# Patient Record
Sex: MALE | Race: WHITE | NOT HISPANIC OR LATINO | URBAN - METROPOLITAN AREA
[De-identification: names, ages, dates, MRNs, and addresses within clinical notes are randomized per-mention and may not be internally consistent; named-entity substitution may affect disease eponyms.]

---

## 2017-07-18 ENCOUNTER — OFFICE (OUTPATIENT)
Dept: URBAN - METROPOLITAN AREA OTHER 6 | Facility: OTHER | Age: 67
End: 2017-07-18

## 2017-07-18 VITALS
WEIGHT: 203 LBS | SYSTOLIC BLOOD PRESSURE: 170 MMHG | HEIGHT: 70 IN | HEART RATE: 70 BPM | DIASTOLIC BLOOD PRESSURE: 84 MMHG

## 2017-07-18 DIAGNOSIS — K64.9 UNSPECIFIED HEMORRHOIDS: ICD-10-CM

## 2017-07-18 PROCEDURE — 99212 OFFICE O/P EST SF 10 MIN: CPT | Performed by: INTERNAL MEDICINE

## 2017-07-18 RX ORDER — PANTOPRAZOLE SODIUM 40 MG/1
80 TABLET, DELAYED RELEASE ORAL
Qty: 180 | Refills: 3 | Status: ACTIVE

## 2017-07-18 RX ORDER — AMITRIPTYLINE HYDROCHLORIDE 25 MG/1
TABLET, FILM COATED ORAL
Qty: 90 | Refills: 4 | Status: ACTIVE

## 2017-08-09 ENCOUNTER — OFFICE VISIT (OUTPATIENT)
Dept: CARDIOLOGY | Facility: CLINIC | Age: 67
End: 2017-08-09

## 2017-08-09 VITALS
BODY MASS INDEX: 29.26 KG/M2 | HEIGHT: 70 IN | DIASTOLIC BLOOD PRESSURE: 80 MMHG | SYSTOLIC BLOOD PRESSURE: 136 MMHG | HEART RATE: 67 BPM | WEIGHT: 204.4 LBS

## 2017-08-09 DIAGNOSIS — I25.10 CORONARY ARTERY DISEASE INVOLVING NATIVE CORONARY ARTERY OF NATIVE HEART WITHOUT ANGINA PECTORIS: Primary | ICD-10-CM

## 2017-08-09 DIAGNOSIS — I10 ESSENTIAL HYPERTENSION: ICD-10-CM

## 2017-08-09 PROCEDURE — 93000 ELECTROCARDIOGRAM COMPLETE: CPT | Performed by: INTERNAL MEDICINE

## 2017-08-09 PROCEDURE — 99213 OFFICE O/P EST LOW 20 MIN: CPT | Performed by: INTERNAL MEDICINE

## 2017-08-09 RX ORDER — ISOSORBIDE MONONITRATE 30 MG/1
30 TABLET, EXTENDED RELEASE ORAL 2 TIMES DAILY
COMMUNITY
End: 2018-08-09 | Stop reason: ALTCHOICE

## 2017-08-09 RX ORDER — MELOXICAM 15 MG/1
15 TABLET ORAL DAILY
COMMUNITY
Start: 2017-07-12 | End: 2018-02-06 | Stop reason: HOSPADM

## 2017-08-09 NOTE — PROGRESS NOTES
Date of Office Visit: 2017  Encounter Provider: Aquiles Velazquez MD  Place of Service: [facility] CARDIOLOGY  Patient Name: Kyle Amador  :1950    Chief Complaint   Patient presents with   • Coronary Artery Disease     1 yr follow up    :     HPI: Kyle Amador is a 66 y.o. male who presents today to followup. He has history of fairly chronic chest pain. He was found to have distal small vessel disease by catheterization in . In , he had another catheterization due to persistent pain and he had a normal FFR of the right coronary artery. In May 2015, his chest pain got much worse and he underwent repeat cardiac catheterization and the right coronary artery was stented. Despite this, his pain continued to worsen and ultimately, he was diagnosed with severe esophagitis. Now that this is under control, his chest pain has improved significantly.  He still has 1-2 second long episodes of pain a few times a month, but nothing that persists or is anginal.  He has good exercise tolerance. He is very active.  He denies dyspnea, edema, orthopnea, palpitations, or syncope.      Past Medical History:   Diagnosis Date   • CAD (coronary artery disease)     cath 2012 with 20-30% diffuse LAD, 30-40% ostial diag, small LCx with diffuse 30-50%, large RCA with 60-70% mid (normal FFR of 0.9). Angiographically it was unchanged by cath 2015 but due to persistant chest pain, he underwent placement of a ARLETTE (3.5x23 Xience)   • Chronic chest pain     likely due to esophagitis, finally resolved    • Diverticulosis    • Esophagitis    • GERD (gastroesophageal reflux disease)    • Hyperlipidemia    • Hypertension        Past Surgical History:   Procedure Laterality Date   • APPENDECTOMY         Social History     Social History   • Marital status:      Spouse name: N/A   • Number of children: N/A   • Years of education: N/A     Occupational History   • Not on file.     Social  "History Main Topics   • Smoking status: Former Smoker     Quit date: 8/9/1987   • Smokeless tobacco: Never Used      Comment: caffeine use/ SOFT DRINK - DAILY    • Alcohol use Yes      Comment: occasional use/ SOCIAL USE    • Drug use: Not on file   • Sexual activity: Not on file     Other Topics Concern   • Not on file     Social History Narrative       Family History   Problem Relation Age of Onset   • Heart disease Other    • Stroke Other    • Heart disease Father        Review of Systems   All other systems reviewed and are negative.      Allergies   Allergen Reactions   • Penicillins          Current Outpatient Prescriptions:   •  amitriptyline (ELAVIL) 25 MG tablet, every night., Disp: , Rfl:   •  amLODIPine (NORVASC) 5 MG tablet, Take  by mouth daily., Disp: , Rfl:   •  aspirin 81 MG tablet, Take  by mouth daily., Disp: , Rfl:   •  atorvastatin (LIPITOR) 40 MG tablet, , Disp: , Rfl:   •  benazepril (LOTENSIN) 20 MG tablet, Take by mouth., Disp: , Rfl:   •  Glucosamine-Chondroit-Vit C-Mn (GLUCOSAMINE 1500 COMPLEX) capsule, Take  by mouth., Disp: , Rfl:   •  ibuprofen (ADVIL,MOTRIN) 600 MG tablet, every day., Disp: , Rfl:   •  isosorbide mononitrate (IMDUR) 30 MG 24 hr tablet, Take 30 mg by mouth. TAKE TWO TABLETS BY MOUTH DAILY, Disp: , Rfl:   •  meloxicam (MOBIC) 15 MG tablet, Daily., Disp: , Rfl:   •  metoprolol succinate XL (TOPROL-XL) 50 MG 24 hr tablet, Take  by mouth daily., Disp: , Rfl:   •  pantoprazole (PROTONIX) 40 MG EC tablet, every day., Disp: , Rfl:   •  potassium citrate (UROCIT-K) 10 MEQ (1080 MG) CR tablet, Take  by mouth every day., Disp: , Rfl:   •  Saw Palmetto 160 MG tablet, Take 2 tablets by mouth., Disp: , Rfl:      Objective:     Vitals:    08/09/17 1139 08/09/17 1148   BP: 140/82 136/80   Pulse: 67    Weight: 204 lb 6.4 oz (92.7 kg)    Height: 70\" (177.8 cm)      Body mass index is 29.33 kg/(m^2).    Physical Exam   Constitutional: He is oriented to person, place, and time. He appears " well-developed and well-nourished.   HENT:   Head: Normocephalic.   Nose: Nose normal.   Mouth/Throat: Oropharynx is clear and moist.   Eyes: Conjunctivae and EOM are normal. Pupils are equal, round, and reactive to light.   Neck: Normal range of motion. No JVD present.   Cardiovascular: Normal rate, regular rhythm, normal heart sounds and intact distal pulses.    No murmur heard.  Pulmonary/Chest: Effort normal and breath sounds normal.   Abdominal: Soft. He exhibits no mass. There is no tenderness.   Musculoskeletal: Normal range of motion. He exhibits no edema.   Lymphadenopathy:     He has no cervical adenopathy.   Neurological: He is alert and oriented to person, place, and time. No cranial nerve deficit.   Skin: Skin is warm and dry. No rash noted.   Psychiatric: He has a normal mood and affect. His behavior is normal. Judgment and thought content normal.   Vitals reviewed.        ECG 12 Lead  Date/Time: 8/9/2017 1:03 PM  Performed by: AQUILES VELAZQUEZ  Authorized by: AQUILES VELAZQUEZ   Comparison: compared with previous ECG   Similar to previous ECG  Rhythm: sinus rhythm  Conduction: conduction normal  ST Segments: ST segments normal  T Waves: T waves normal  QRS axis: normal  Other: no other findings  Clinical impression: normal ECG              Assessment:       Diagnosis Plan   1. Coronary artery disease involving native coronary artery of native heart without angina pectoris     2. Essential hypertension            Plan:       1.  Coronary Artery Disease  Assessment  • The patient has no angina  • He has chronic chest pain and small vessel disease.  He's on aspirin and atorvastatin.    Subjective - Objective  • There has been a previous stent procedure using ARLETTE 2015  • Current antiplatelet therapy includes aspirin 81 mg    2.  His BP was 136/80 on recheck, which is within goal for him (he's not diabetic).      Sincerely,       Aquiles Velazquez MD

## 2017-11-30 ENCOUNTER — OFFICE (OUTPATIENT)
Dept: URBAN - METROPOLITAN AREA OTHER 6 | Facility: OTHER | Age: 67
End: 2017-11-30

## 2017-11-30 VITALS
DIASTOLIC BLOOD PRESSURE: 80 MMHG | HEART RATE: 78 BPM | SYSTOLIC BLOOD PRESSURE: 148 MMHG | WEIGHT: 205 LBS | HEIGHT: 70 IN

## 2017-11-30 DIAGNOSIS — K20.9 ESOPHAGITIS, UNSPECIFIED: ICD-10-CM

## 2017-11-30 PROCEDURE — 99212 OFFICE O/P EST SF 10 MIN: CPT | Performed by: INTERNAL MEDICINE

## 2018-02-05 ENCOUNTER — HOSPITAL ENCOUNTER (OUTPATIENT)
Facility: HOSPITAL | Age: 68
Setting detail: OBSERVATION
Discharge: HOME | End: 2018-02-06
Attending: INTERNAL MEDICINE | Admitting: INTERNAL MEDICINE

## 2018-02-05 ENCOUNTER — TELEPHONE (OUTPATIENT)
Dept: CARDIOLOGY | Facility: CLINIC | Age: 68
End: 2018-02-05

## 2018-02-05 ENCOUNTER — PREP FOR SURGERY (OUTPATIENT)
Dept: OTHER | Facility: HOSPITAL | Age: 68
End: 2018-02-05

## 2018-02-05 DIAGNOSIS — R07.2 PRECORDIAL PAIN: ICD-10-CM

## 2018-02-05 DIAGNOSIS — R07.2 PRECORDIAL PAIN: Primary | ICD-10-CM

## 2018-02-05 PROBLEM — R07.9 CHEST PAIN: Status: ACTIVE | Noted: 2018-02-05

## 2018-02-05 PROCEDURE — G0378 HOSPITAL OBSERVATION PER HR: HCPCS

## 2018-02-05 PROCEDURE — G0379 DIRECT REFER HOSPITAL OBSERV: HCPCS

## 2018-02-05 RX ORDER — ASPIRIN 81 MG/1
81 TABLET ORAL DAILY
Status: DISCONTINUED | OUTPATIENT
Start: 2018-02-06 | End: 2018-02-06 | Stop reason: HOSPADM

## 2018-02-05 RX ORDER — LISINOPRIL 20 MG/1
20 TABLET ORAL
Status: DISCONTINUED | OUTPATIENT
Start: 2018-02-06 | End: 2018-02-06 | Stop reason: HOSPADM

## 2018-02-05 RX ORDER — AMITRIPTYLINE HYDROCHLORIDE 25 MG/1
25 TABLET, FILM COATED ORAL NIGHTLY
Status: DISCONTINUED | OUTPATIENT
Start: 2018-02-05 | End: 2018-02-06 | Stop reason: HOSPADM

## 2018-02-05 RX ORDER — LORAZEPAM 0.5 MG/1
0.5 TABLET ORAL NIGHTLY PRN
Status: CANCELLED | OUTPATIENT
Start: 2018-02-05 | End: 2018-02-15

## 2018-02-05 RX ORDER — ISOSORBIDE MONONITRATE 30 MG/1
30 TABLET, EXTENDED RELEASE ORAL 2 TIMES DAILY
Status: DISCONTINUED | OUTPATIENT
Start: 2018-02-05 | End: 2018-02-06 | Stop reason: HOSPADM

## 2018-02-05 RX ORDER — ACETAMINOPHEN 325 MG/1
650 TABLET ORAL EVERY 4 HOURS PRN
Status: CANCELLED | OUTPATIENT
Start: 2018-02-05

## 2018-02-05 RX ORDER — ATORVASTATIN CALCIUM 20 MG/1
40 TABLET, FILM COATED ORAL DAILY
Status: DISCONTINUED | OUTPATIENT
Start: 2018-02-06 | End: 2018-02-06 | Stop reason: HOSPADM

## 2018-02-05 RX ORDER — METOPROLOL SUCCINATE 50 MG/1
50 TABLET, EXTENDED RELEASE ORAL NIGHTLY
Status: DISCONTINUED | OUTPATIENT
Start: 2018-02-05 | End: 2018-02-06 | Stop reason: HOSPADM

## 2018-02-05 RX ORDER — AMLODIPINE BESYLATE 5 MG/1
5 TABLET ORAL NIGHTLY
Status: DISCONTINUED | OUTPATIENT
Start: 2018-02-05 | End: 2018-02-06

## 2018-02-05 RX ORDER — ACETAMINOPHEN 325 MG/1
650 TABLET ORAL EVERY 4 HOURS PRN
Status: DISCONTINUED | OUTPATIENT
Start: 2018-02-05 | End: 2018-02-06 | Stop reason: HOSPADM

## 2018-02-05 RX ORDER — SODIUM CHLORIDE 0.9 % (FLUSH) 0.9 %
1-10 SYRINGE (ML) INJECTION AS NEEDED
Status: CANCELLED | OUTPATIENT
Start: 2018-02-05

## 2018-02-05 RX ORDER — PANTOPRAZOLE SODIUM 40 MG/1
40 TABLET, DELAYED RELEASE ORAL
Status: DISCONTINUED | OUTPATIENT
Start: 2018-02-06 | End: 2018-02-06 | Stop reason: HOSPADM

## 2018-02-05 RX ORDER — NITROGLYCERIN 0.4 MG/1
0.4 TABLET SUBLINGUAL
Status: DISCONTINUED | OUTPATIENT
Start: 2018-02-05 | End: 2018-02-06 | Stop reason: HOSPADM

## 2018-02-05 RX ORDER — ONDANSETRON 2 MG/ML
4 INJECTION INTRAMUSCULAR; INTRAVENOUS EVERY 6 HOURS PRN
Status: DISCONTINUED | OUTPATIENT
Start: 2018-02-05 | End: 2018-02-06 | Stop reason: HOSPADM

## 2018-02-05 RX ORDER — LORAZEPAM 0.5 MG/1
0.5 TABLET ORAL NIGHTLY PRN
Status: DISCONTINUED | OUTPATIENT
Start: 2018-02-05 | End: 2018-02-06 | Stop reason: HOSPADM

## 2018-02-05 RX ORDER — NITROGLYCERIN 0.4 MG/1
0.4 TABLET SUBLINGUAL
Status: CANCELLED | OUTPATIENT
Start: 2018-02-05

## 2018-02-05 RX ORDER — SODIUM CHLORIDE 0.9 % (FLUSH) 0.9 %
1-10 SYRINGE (ML) INJECTION AS NEEDED
Status: DISCONTINUED | OUTPATIENT
Start: 2018-02-05 | End: 2018-02-06 | Stop reason: HOSPADM

## 2018-02-05 RX ORDER — HYDROMORPHONE HYDROCHLORIDE 1 MG/ML
0.5 INJECTION, SOLUTION INTRAMUSCULAR; INTRAVENOUS; SUBCUTANEOUS
Status: DISCONTINUED | OUTPATIENT
Start: 2018-02-05 | End: 2018-02-06 | Stop reason: HOSPADM

## 2018-02-05 RX ADMIN — METOPROLOL SUCCINATE 50 MG: 50 TABLET, FILM COATED, EXTENDED RELEASE ORAL at 21:39

## 2018-02-05 RX ADMIN — AMLODIPINE BESYLATE 5 MG: 5 TABLET ORAL at 21:39

## 2018-02-05 RX ADMIN — AMITRIPTYLINE HYDROCHLORIDE 25 MG: 25 TABLET, FILM COATED ORAL at 21:39

## 2018-02-05 RX ADMIN — ISOSORBIDE MONONITRATE 30 MG: 30 TABLET ORAL at 21:39

## 2018-02-05 NOTE — TELEPHONE ENCOUNTER
Pt is having some chest pain. Last week during the night he had a sharp pain and became sweaty. This morning around 4:00 AM he had another episode of CP that lasted about an hour. It was a sharp pain on his left side that went up to his face where it became numb. He has been feeling a little dizzy. I advise the pt to go to the ER if he continue to have CP. Please advise    PT can be reached at [phone]    Thanks Erin

## 2018-02-06 ENCOUNTER — APPOINTMENT (OUTPATIENT)
Dept: NUCLEAR MEDICINE | Facility: HOSPITAL | Age: 68
End: 2018-02-06
Attending: INTERNAL MEDICINE

## 2018-02-06 VITALS
HEIGHT: 70 IN | WEIGHT: 204.5 LBS | RESPIRATION RATE: 18 BRPM | DIASTOLIC BLOOD PRESSURE: 89 MMHG | SYSTOLIC BLOOD PRESSURE: 160 MMHG | HEART RATE: 82 BPM | OXYGEN SATURATION: 98 % | TEMPERATURE: 98.1 F | BODY MASS INDEX: 29.28 KG/M2

## 2018-02-06 LAB
ALBUMIN SERPL-MCNC: 3.8 G/DL (ref 3.5–5.2)
ALBUMIN/GLOB SERPL: 1.6 G/DL
ALP SERPL-CCNC: 66 U/L (ref 39–117)
ALT SERPL W P-5'-P-CCNC: 19 U/L (ref 1–41)
ANION GAP SERPL CALCULATED.3IONS-SCNC: 8.8 MMOL/L
AST SERPL-CCNC: 23 U/L (ref 1–40)
BASOPHILS # BLD AUTO: 0.03 10*3/MM3 (ref 0–0.2)
BASOPHILS NFR BLD AUTO: 0.4 % (ref 0–1.5)
BH CV STRESS BP STAGE 1: NORMAL
BH CV STRESS BP STAGE 2: NORMAL
BH CV STRESS BP STAGE 3: NORMAL
BH CV STRESS DURATION MIN STAGE 1: 3
BH CV STRESS DURATION MIN STAGE 2: 3
BH CV STRESS DURATION MIN STAGE 3: 1
BH CV STRESS DURATION SEC STAGE 1: 0
BH CV STRESS DURATION SEC STAGE 2: 0
BH CV STRESS DURATION SEC STAGE 3: 35
BH CV STRESS GRADE STAGE 1: 10
BH CV STRESS GRADE STAGE 2: 12
BH CV STRESS GRADE STAGE 3: 14
BH CV STRESS HR STAGE 1: 102
BH CV STRESS HR STAGE 2: 118
BH CV STRESS HR STAGE 3: 133
BH CV STRESS METS STAGE 1: 5
BH CV STRESS METS STAGE 2: 7.5
BH CV STRESS METS STAGE 3: 10
BH CV STRESS PROTOCOL 1: NORMAL
BH CV STRESS RECOVERY BP: NORMAL MMHG
BH CV STRESS RECOVERY HR: 89 BPM
BH CV STRESS SPEED STAGE 1: 1.7
BH CV STRESS SPEED STAGE 2: 2.5
BH CV STRESS SPEED STAGE 3: 3.4
BH CV STRESS STAGE 1: 1
BH CV STRESS STAGE 2: 2
BH CV STRESS STAGE 3: 3
BILIRUB SERPL-MCNC: 0.5 MG/DL (ref 0.1–1.2)
BUN BLD-MCNC: 26 MG/DL (ref 8–23)
BUN/CREAT SERPL: 26.3 (ref 7–25)
CALCIUM SPEC-SCNC: 8.7 MG/DL (ref 8.6–10.5)
CHLORIDE SERPL-SCNC: 102 MMOL/L (ref 98–107)
CO2 SERPL-SCNC: 30.2 MMOL/L (ref 22–29)
CREAT BLD-MCNC: 0.99 MG/DL (ref 0.76–1.27)
DEPRECATED RDW RBC AUTO: 46 FL (ref 37–54)
EOSINOPHIL # BLD AUTO: 0.28 10*3/MM3 (ref 0–0.7)
EOSINOPHIL NFR BLD AUTO: 3.8 % (ref 0.3–6.2)
ERYTHROCYTE [DISTWIDTH] IN BLOOD BY AUTOMATED COUNT: 13.2 % (ref 11.5–14.5)
GFR SERPL CREATININE-BSD FRML MDRD: 75 ML/MIN/1.73
GLOBULIN UR ELPH-MCNC: 2.4 GM/DL
GLUCOSE BLD-MCNC: 99 MG/DL (ref 65–99)
HCT VFR BLD AUTO: 42.5 % (ref 40.4–52.2)
HGB BLD-MCNC: 13.7 G/DL (ref 13.7–17.6)
IMM GRANULOCYTES # BLD: 0 10*3/MM3 (ref 0–0.03)
IMM GRANULOCYTES NFR BLD: 0 % (ref 0–0.5)
LV EF NUC BP: 70 %
LYMPHOCYTES # BLD AUTO: 1.79 10*3/MM3 (ref 0.9–4.8)
LYMPHOCYTES NFR BLD AUTO: 24.5 % (ref 19.6–45.3)
MAXIMAL PREDICTED HEART RATE: 152 BPM
MCH RBC QN AUTO: 30.9 PG (ref 27–32.7)
MCHC RBC AUTO-ENTMCNC: 32.2 G/DL (ref 32.6–36.4)
MCV RBC AUTO: 95.9 FL (ref 79.8–96.2)
MONOCYTES # BLD AUTO: 0.6 10*3/MM3 (ref 0.2–1.2)
MONOCYTES NFR BLD AUTO: 8.2 % (ref 5–12)
NEUTROPHILS # BLD AUTO: 4.6 10*3/MM3 (ref 1.9–8.1)
NEUTROPHILS NFR BLD AUTO: 63.1 % (ref 42.7–76)
PERCENT MAX PREDICTED HR: 88.16 %
PLATELET # BLD AUTO: 222 10*3/MM3 (ref 140–500)
PMV BLD AUTO: 9.4 FL (ref 6–12)
POTASSIUM BLD-SCNC: 3.5 MMOL/L (ref 3.5–5.2)
PROT SERPL-MCNC: 6.2 G/DL (ref 6–8.5)
RBC # BLD AUTO: 4.43 10*6/MM3 (ref 4.6–6)
SODIUM BLD-SCNC: 141 MMOL/L (ref 136–145)
STRESS BASELINE BP: NORMAL MMHG
STRESS BASELINE HR: 72 BPM
STRESS PERCENT HR: 104 %
STRESS POST ESTIMATED WORKLOAD: 8.8 METS
STRESS POST EXERCISE DUR MIN: 7 MIN
STRESS POST EXERCISE DUR SEC: 35 SEC
STRESS POST PEAK BP: NORMAL MMHG
STRESS POST PEAK HR: 134 BPM
STRESS TARGET HR: 129 BPM
TROPONIN T SERPL-MCNC: <0.01 NG/ML (ref 0–0.03)
TROPONIN T SERPL-MCNC: <0.01 NG/ML (ref 0–0.03)
WBC NRBC COR # BLD: 7.3 10*3/MM3 (ref 4.5–10.7)

## 2018-02-06 PROCEDURE — 85025 COMPLETE CBC W/AUTO DIFF WBC: CPT | Performed by: INTERNAL MEDICINE

## 2018-02-06 PROCEDURE — G0378 HOSPITAL OBSERVATION PER HR: HCPCS

## 2018-02-06 PROCEDURE — 93005 ELECTROCARDIOGRAM TRACING: CPT | Performed by: INTERNAL MEDICINE

## 2018-02-06 PROCEDURE — A9500 TC99M SESTAMIBI: HCPCS | Performed by: INTERNAL MEDICINE

## 2018-02-06 PROCEDURE — 0 TECHNETIUM SESTAMIBI: Performed by: INTERNAL MEDICINE

## 2018-02-06 PROCEDURE — 93016 CV STRESS TEST SUPVJ ONLY: CPT | Performed by: INTERNAL MEDICINE

## 2018-02-06 PROCEDURE — 84484 ASSAY OF TROPONIN QUANT: CPT | Performed by: INTERNAL MEDICINE

## 2018-02-06 PROCEDURE — 93018 CV STRESS TEST I&R ONLY: CPT | Performed by: INTERNAL MEDICINE

## 2018-02-06 PROCEDURE — 93017 CV STRESS TEST TRACING ONLY: CPT

## 2018-02-06 PROCEDURE — 78452 HT MUSCLE IMAGE SPECT MULT: CPT

## 2018-02-06 PROCEDURE — 78452 HT MUSCLE IMAGE SPECT MULT: CPT | Performed by: INTERNAL MEDICINE

## 2018-02-06 PROCEDURE — 80053 COMPREHEN METABOLIC PANEL: CPT | Performed by: INTERNAL MEDICINE

## 2018-02-06 PROCEDURE — 93010 ELECTROCARDIOGRAM REPORT: CPT | Performed by: INTERNAL MEDICINE

## 2018-02-06 PROCEDURE — 99220 PR INITIAL OBSERVATION CARE/DAY 70 MINUTES: CPT | Performed by: INTERNAL MEDICINE

## 2018-02-06 RX ORDER — AMLODIPINE BESYLATE 10 MG/1
10 TABLET ORAL NIGHTLY
Qty: 30 TABLET | Refills: 5 | Status: SHIPPED | OUTPATIENT
Start: 2018-02-06 | End: 2018-02-27 | Stop reason: DRUGHIGH

## 2018-02-06 RX ORDER — AMLODIPINE BESYLATE 10 MG/1
10 TABLET ORAL NIGHTLY
Status: DISCONTINUED | OUTPATIENT
Start: 2018-02-06 | End: 2018-02-06 | Stop reason: HOSPADM

## 2018-02-06 RX ADMIN — TECHNETIUM TC 99M SESTAMIBI 1 DOSE: 1 INJECTION INTRAVENOUS at 12:30

## 2018-02-06 RX ADMIN — ISOSORBIDE MONONITRATE 30 MG: 30 TABLET ORAL at 08:39

## 2018-02-06 RX ADMIN — TECHNETIUM TC 99M SESTAMIBI 1 DOSE: 1 INJECTION INTRAVENOUS at 10:15

## 2018-02-06 RX ADMIN — ACETAMINOPHEN 650 MG: 325 TABLET, FILM COATED ORAL at 08:40

## 2018-02-06 NOTE — PLAN OF CARE
Problem: Patient Care Overview (Adult)  Goal: Plan of Care Review  Outcome: Ongoing (interventions implemented as appropriate)   02/06/18 0606   Coping/Psychosocial Response Interventions   Plan Of Care Reviewed With patient   Patient Care Overview   Progress improving   Outcome Evaluation   Outcome Summary/Follow up Plan Vitals stable. No falls. Pt c/o mild chest pain, MD aware. NPO. Monitoring closely.      Goal: Adult Individualization and Mutuality  Outcome: Ongoing (interventions implemented as appropriate)    Goal: Discharge Needs Assessment  Outcome: Ongoing (interventions implemented as appropriate)      Problem: Pain, Acute (Adult)  Goal: Identify Related Risk Factors and Signs and Symptoms  Outcome: Outcome(s) achieved Date Met: 02/06/18 02/06/18 0606   Pain, Acute   Related Risk Factors (Acute Pain) persistent pain   Signs and Symptoms (Acute Pain) verbalization of pain descriptors     Goal: Acceptable Pain Control/Comfort Level  Outcome: Ongoing (interventions implemented as appropriate)   02/06/18 0606   Pain, Acute (Adult)   Acceptable Pain Control/Comfort Level making progress toward outcome

## 2018-02-06 NOTE — H&P
Date of Hospital Visit:18  Encounter Provider: Cynthia Mercado, RN  Place of Service: [facility] CARDIOLOGY  Patient Name: Kyle Amador  :1950  Referral Provider: Aquiles Velazquez MD    Chief complaint: chest pain    History of Present Illness: Mr. Amador is a 66 y/o patient of Dr. Velazquez's with a history of CAD s/p PCI/ARLETTE to RCA in , esophagitis, GERD, HTN and hyperlipidemia. He was last seen by Dr. Velazquez, in office, on 17. At that time he stated that he has chest pain a few times a month that lasts about 2 seconds, which was felt to be chronic. No medication changes were made and he was scheduled to return in 1 year.     He called our office yesterday with c/o chest that was sharp and accompanied by diaphoresis. He went to the ED at [facility] and was transferred here for further care. He had a cardiac cath in  by Dr. Viera that showed a 75% RCA lesion that had PCI and ARLETTE placed. He also had an echo at that time which showed an EF of 60%. He is on Imdur 30mg bid.     This morning his troponin is negative and EKG reads NSR. AM labs are pending. He states that he continues to have constant dull pain, that is a 1/10 on pain scale. It is midsternal and does not radiate, which initially it radiated to his left arm. He was given 1 NTG in  ED, and states that it did not make a difference in his pain level. However, he did get relief with 4mg Morphine IV. Labs at  showed a negative trop, lipase 28, creat 1.12, K 3.3 and a normal CBC. EKG showed NSR cannot rule out anterior MI.   When he got on the treadmill he didn't really have increased chest pain but did have increased dizziness he denies any associated nausea vomiting or diaphoresis no shortness of breath.  In addition prior to this he been feeling fine.  He does state this is similar to prior pain.    Cardiac testing:  Echo   Conclusions  The left ventricular chamber size is normal.  Mild concentric  left ventricular hypertrophy is observed.  Global left ventricular wall motion and contractility are within normal  limits.  The ejection fraction was measured at 60%.    Cardiac Cath 2015  CINEANGIOGRAPHY:   1.  Left main: The left main coronary artery was free of disease bifurcating  into the left anterior descending and left circumflex coronary arteries. There  was no significant stenosis noted. Mild calcification was observed.   2.  Left anterior descending: There is mild calcification in the proximal and  midsegments of the left anterior descending. There is no evident high-grade  stenosis, but mild plaquing is noted in the midsegment. The distal segment is  large and has a significant apical branch that is free of significant disease.  There is a major diagonal branch arising from the proximal segment that is free  of significant disease.   3.  Left circumflex: The left circumflex coronary artery and terminal obtuse  marginal branches are free of significant disease.   4.  Right coronary artery: Diffuse calcification is seen in the right coronary  artery.  There is a 30% stenosis in the proximal segment and a 75% stenosis in  the distal segment. Beyond this narrowing the artery gives rise to the  posterior descending and several large posterolateral branches.  The distal  right coronary artery and posterolateral branches as well as the posterior  descending are free of significant disease.      LEFT VENTRICULOGRAPHY: Overall size of the ventricle is normal. Contractility  of the ventricle is within normal limits.      INTERVENTION: The initial stenosis in the mid right coronary artery was 75%.  CHARLEY flow was 3 initially and 3 post.  It is a C lesion.   Past Medical History:   Diagnosis Date   • CAD (coronary artery disease)     cath 5/2012 with 20-30% diffuse LAD, 30-40% ostial diag, small LCx with diffuse 30-50%, large RCA with 60-70% mid (normal FFR of 0.9). Angiographically it was unchanged by cath 5/2015  but due to persistant chest pain, he underwent placement of a ARLETTE (3.5x23 Xience)   • Chronic chest pain     likely due to esophagitis, finally resolved 2015   • Diverticulosis    • Esophagitis    • GERD (gastroesophageal reflux disease)    • Hyperlipidemia    • Hypertension        Past Surgical History:   Procedure Laterality Date   • APPENDECTOMY         No current facility-administered medications on file prior to encounter.      Current Outpatient Prescriptions on File Prior to Encounter   Medication Sig Dispense Refill   • amitriptyline (ELAVIL) 25 MG tablet every night.     • amLODIPine (NORVASC) 5 MG tablet Take 5 mg by mouth Every Night.     • aspirin 81 MG tablet Take  by mouth Every Night.     • atorvastatin (LIPITOR) 40 MG tablet Every Night.     • benazepril (LOTENSIN) 20 MG tablet Take 20 mg by mouth Every Night.     • Glucosamine-Chondroit-Vit C-Mn (GLUCOSAMINE 1500 COMPLEX) capsule Take  by mouth 2 (Two) Times a Day.     • ibuprofen (ADVIL,MOTRIN) 600 MG tablet Every 6 (Six) Hours As Needed for Moderate Pain .     • isosorbide mononitrate (IMDUR) 30 MG 24 hr tablet Take 30 mg by mouth 2 (Two) Times a Day. TAKE TWO TABLETS BY MOUTH DAILY      • meloxicam (MOBIC) 15 MG tablet 15 mg Daily.     • metoprolol succinate XL (TOPROL-XL) 50 MG 24 hr tablet Take 50 mg by mouth Every Night.     • pantoprazole (PROTONIX) 40 MG EC tablet Daily.     • Saw Palmetto 160 MG tablet Take 2 tablets by mouth 2 (Two) Times a Day.     • potassium citrate (UROCIT-K) 10 MEQ (1080 MG) CR tablet Take  by mouth every day.         Social History     Social History   • Marital status:      Spouse name: N/A   • Number of children: N/A   • Years of education: N/A     Occupational History   • Not on file.     Social History Main Topics   • Smoking status: Former Smoker     Quit date: 8/9/1987   • Smokeless tobacco: Never Used      Comment: caffeine use/ SOFT DRINK - DAILY    • Alcohol use Yes      Comment: occasional use/  "SOCIAL USE    • Drug use: Not on file   • Sexual activity: Not on file     Other Topics Concern   • Not on file     Social History Narrative       Family History   Problem Relation Age of Onset   • Heart disease Other    • Stroke Other    • Heart disease Father        REVIEW OF SYSTEMS:   All ROS was performed and is Negative except as outlined in HPI    Objective:     Vitals:    02/05/18 1942 02/05/18 2139 02/06/18 0423   BP: (!) 169/101 161/96 132/86   BP Location: Left arm Right arm Right arm   Patient Position: Lying Lying Lying   Pulse: 83 90 63   Resp: 18  18   Temp: 97.3 °F (36.3 °C)     TempSrc: Oral     SpO2: 97%  97%   Weight: 92.8 kg (204 lb 8 oz)     Height: 177.8 cm (70\")       Body mass index is 29.34 kg/(m^2).  Flowsheet Rows         First Filed Value    Admission Height  177.8 cm (70\") Documented at 02/05/2018 1942    Admission Weight  92.8 kg (204 lb 8 oz) Documented at 02/05/2018 1942          Physical Exam   Physical Exam   Constitutional: He is oriented to person, place, and time. He appears well-developed and well-nourished. No distress.   HENT:   Head: Normocephalic.   Eyes: Conjunctivae are normal. Pupils are equal, round, and reactive to light. No scleral icterus.   Neck: Normal carotid pulses, no hepatojugular reflux and no JVD present. Carotid bruit is not present. No tracheal deviation, no edema and no erythema present. No thyromegaly present.   Cardiovascular: Normal rate, regular rhythm, S1 normal, S2 normal, normal heart sounds and intact distal pulses.   No extrasystoles are present. PMI is not displaced.  Exam reveals no gallop, no distant heart sounds and no friction rub.    No murmur heard.  Pulses:       Carotid pulses are 2+ on the right side, and 2+ on the left side.       Radial pulses are 2+ on the right side, and 2+ on the left side.        Femoral pulses are 2+ on the right side, and 2+ on the left side.       Dorsalis pedis pulses are 2+ on the right side, and 2+ on the left " side.        Posterior tibial pulses are 2+ on the right side, and 2+ on the left side.   Pulmonary/Chest: Effort normal and breath sounds normal. No respiratory distress. He has no decreased breath sounds. He has no wheezes. He has no rhonchi. He has no rales. He exhibits no tenderness.   Abdominal: Soft. Bowel sounds are normal. He exhibits no distension and no mass. There is no hepatosplenomegaly. There is no tenderness. There is no rebound and no guarding.   Musculoskeletal: He exhibits no edema, tenderness or deformity.   Neurological: He is alert and oriented to person, place, and time.   Skin: Skin is warm and dry. No rash noted. He is not diaphoretic. No cyanosis or erythema. No pallor. Nails show no clubbing.   Psychiatric: He has a normal mood and affect. His speech is normal and behavior is normal. Judgment and thought content normal.       Lab Review:                      Results from last 7 days  Lab Units 02/06/18  0416   TROPONIN T ng/mL <0.010                                I personally viewed and interpreted the patient's EKG/Telemetry data    Assessment:    1.  67-year-old gentleman with a history of coronary artery disease now with chest pain a little worrisome for angina however may just be related to his elevated blood pressure.  ECG is normal troponin is negative.  He's had this discomfort now for over a day if troponins are negative.  Would check a perfusion stress test if that's low risk for normal adjust blood pressure medication.  If abnormal will need catheterization.  2.  Hypertension as above blood pressure markedly elevated yesterday and the day before that.  3.  Coronary disease preserved left ventricular systolic function previous stent placed to the right coronary artery in 2015 has above.  4.  Hyperlipidemia continue home therapy.      Plan:

## 2018-02-07 NOTE — PLAN OF CARE
Problem: Patient Care Overview (Adult)  Goal: Plan of Care Review  Outcome: Ongoing (interventions implemented as appropriate)   02/06/18 7984   Coping/Psychosocial Response Interventions   Plan Of Care Reviewed With patient   Patient Care Overview   Progress improving   Outcome Evaluation   Outcome Summary/Follow up Plan VSS. Up ad jones. Complaints of chest pain are ongoing and known by Dr. Leiva. Negative troponins x2. Negative stress test. Home this evening. CTM.     Goal: Adult Individualization and Mutuality  Outcome: Ongoing (interventions implemented as appropriate)    Goal: Discharge Needs Assessment  Outcome: Ongoing (interventions implemented as appropriate)      Problem: Pain, Acute (Adult)  Goal: Acceptable Pain Control/Comfort Level  Outcome: Ongoing (interventions implemented as appropriate)

## 2018-02-26 ENCOUNTER — TELEPHONE (OUTPATIENT)
Dept: CARDIOLOGY | Facility: CLINIC | Age: 68
End: 2018-02-26

## 2018-02-26 NOTE — TELEPHONE ENCOUNTER
Patient called from [phone]  Patient called stating he was discharged from the hospital and was told to follow up with PCP for bp.  He has been following with PCP but his bp is not coming coming down and he suggested he see you for this issue.  Called patient back.    Patient was told to increase amlodipine on discharge to 10 mg daily.  He was swelling badly so Dr. Lea told him to take 5 mg again and increase metoprolol from 50 mg to 100 mg daily.  After a week, he doubled it taking 50 mg 2 tablets twice daily.  So now he is on 200 mg daily.    BP and HR readings. (these were all after changing to metoprolol 200 mg daily)  Today 168/80  (usually in 60's, doesn't write it down)  Yesterday  10 am 156/71    9 pm 181/87  02/25/18 9 am 174/81    8 pm 157/79  02/23/18 10 am 152/77    8 pm 173/81  02/22/18 8:30 am 136/78 (best one)  02/21/18 9 /84  02/20/18 9:30 am 156/80    10 pm 164/71        The morning readings are after medications and the evening readings are before medications.    Pharmacy-[facility] in [place]   Today Dr. Lea told him to increase benazepril to 20 mg twice daily.  Patient denies eating salty foods but he states he cannot avoid all of it.  Patient just took the doubled benazepril this morning for the first time    Thanks,  Cami

## 2018-02-27 RX ORDER — AMLODIPINE BESYLATE 5 MG/1
5 TABLET ORAL DAILY
COMMUNITY
End: 2019-02-13

## 2018-03-05 DIAGNOSIS — I10 ESSENTIAL HYPERTENSION: Primary | ICD-10-CM

## 2018-03-13 ENCOUNTER — TELEPHONE (OUTPATIENT)
Dept: CARDIOLOGY | Facility: CLINIC | Age: 68
End: 2018-03-13

## 2018-03-13 NOTE — TELEPHONE ENCOUNTER
Pt called and needs to cancel his renal duplex for tomorrow and r/s it. Can you call pt at [phone]. Thanks.

## 2018-03-14 ENCOUNTER — APPOINTMENT (OUTPATIENT)
Dept: CARDIOLOGY | Facility: HOSPITAL | Age: 68
End: 2018-03-14
Attending: INTERNAL MEDICINE

## 2018-03-21 ENCOUNTER — HOSPITAL ENCOUNTER (OUTPATIENT)
Dept: CARDIOLOGY | Facility: HOSPITAL | Age: 68
Discharge: HOME | End: 2018-03-21
Attending: INTERNAL MEDICINE | Admitting: INTERNAL MEDICINE

## 2018-03-21 DIAGNOSIS — I10 ESSENTIAL HYPERTENSION: ICD-10-CM

## 2018-03-21 LAB
BH CV ECHO MEAS - DIST REN A EDV LEFT: -23.2 CM/SEC
BH CV ECHO MEAS - DIST REN A PSV LEFT: -96.3 CM/SEC
BH CV ECHO MEAS - DIST REN A RI LEFT: 0.76
BH CV ECHO MEAS - MID REN A EDV LEFT: -22.4 CM/SEC
BH CV ECHO MEAS - MID REN A PSV LEFT: -95.5 CM/SEC
BH CV ECHO MEAS - MID REN A RI LEFT: 0.77
BH CV ECHO MEAS - PROX REN A EDV LEFT: -20.6 CM/SEC
BH CV ECHO MEAS - PROX REN A PSV LEFT: -64.5 CM/SEC
BH CV ECHO MEAS - PROX REN A RI LEFT: 0.68
BH CV VAS KIDNEY HEIGHT LEFT: 5 CM
BH CV VAS RENAL AORTIC MID PSV: 82 CM/S
BH CV XLRA MEAS - KID L LEFT: 13.8 CM
BH CV XLRA MEAS DIST REN A EDV RIGHT: -19.8 CM/SEC
BH CV XLRA MEAS DIST REN A PSV RIGHT: -85.2 CM/SEC
BH CV XLRA MEAS DIST REN A RI RIGHT: 0.77
BH CV XLRA MEAS KID L RIGHT: 13.5 CM
BH CV XLRA MEAS MID REN A EDV RIGHT: -19.8 CM/SEC
BH CV XLRA MEAS MID REN A PSV RIGHT: -92.9 CM/SEC
BH CV XLRA MEAS MID REN A RI RIGHT: 0.79
BH CV XLRA MEAS PROX REN A EDV RIGHT: 11.2 CM/SEC
BH CV XLRA MEAS PROX REN A PSV RIGHT: 83.4 CM/SEC
BH CV XLRA MEAS PROX REN A RI RIGHT: 0.87
BH CV XLRA MEAS RAR LEFT: 1.2
BH CV XLRA MEAS RAR RIGHT: 1.1

## 2018-03-21 PROCEDURE — 93975 VASCULAR STUDY: CPT | Performed by: INTERNAL MEDICINE

## 2018-03-21 PROCEDURE — 93975 VASCULAR STUDY: CPT

## 2018-03-21 RX ORDER — SPIRONOLACTONE 25 MG/1
25 TABLET ORAL DAILY
Qty: 30 TABLET | Refills: 1 | Status: SHIPPED | OUTPATIENT
Start: 2018-03-21 | End: 2018-04-19 | Stop reason: SDUPTHER

## 2018-03-22 ENCOUNTER — TELEPHONE (OUTPATIENT)
Dept: CARDIOLOGY | Facility: CLINIC | Age: 68
End: 2018-03-22

## 2018-03-22 NOTE — TELEPHONE ENCOUNTER
----- Message from Aquiles Velazquez MD sent at 3/21/2018  3:17 PM EDT -----  I s/w him.      He continues to have hypoK+.  I am starting aldactone 25mg daily.  He is taking one potassium pill daily, and I kept that the same.  He will have blood work done on Tuesday at Dr. Lea's office and will report his BP to us after a week.  We'll need to get that blood work results.

## 2018-03-29 NOTE — TELEPHONE ENCOUNTER
Called to request lab work from Dr. Lea's office.  Pt reports there has been no change in his bp since starting the Spironolactone 25 mg on 03/21.      03/21 171/81 hr 61 10pm starting med   03/22 163/77 hr 60 9 am            175/ 76 hr 59 9 pm  03/23 149/80 hr 66 9 am            184/81 hr 60 10:30 pm    03/24  153/ 65 hr 64 10 am              179/76 hr 65  10 pm    03/27   178/77 hr 59 930 am   169/82 hr 61  8:30 pm    Pt reports that he has had a consistent headache prior to the medication.  He does not take anything for the pain; he also reports that he does have some intermittent cp at times but it doesn't last long and some lightheadedness.  Received labs from Dr. Lea's office.     Cr 1.09  Bun 26  Sodium 140  Potassium 4.2 < 4.3  chloride 104  Carbon Dioxide 29  Calcium 8.6

## 2018-03-29 NOTE — TELEPHONE ENCOUNTER
Increase to 50mg (2 pills) daily.  Can take both at same time.    Stop potassium pills.    Update in one week.

## 2018-03-29 NOTE — TELEPHONE ENCOUNTER
I informed pt of the dosage increase; he has declined to stop the Potassium with the increase.  I did go over the risk.  Pt will have additional lab work with his pcp the following week and will contact the office with an update.

## 2018-03-30 ENCOUNTER — TELEPHONE (OUTPATIENT)
Dept: CARDIOLOGY | Facility: CLINIC | Age: 68
End: 2018-03-30

## 2018-03-30 NOTE — TELEPHONE ENCOUNTER
Pt called today c/o that since he has been taking the new med, Spironolactone, he has noticed that he has noticed pain in his neck.  Pt reports that he notices the pain about 2 hrs after taking the medication.  Pt is in his pcp's office at this time and will consult with him regarding the pain.  He will report/ contact the office with any updates or changes.

## 2018-04-19 RX ORDER — SPIRONOLACTONE 100 MG/1
100 TABLET, FILM COATED ORAL DAILY
Qty: 30 TABLET | Refills: 1 | Status: SHIPPED | OUTPATIENT
Start: 2018-04-19 | End: 2018-06-11 | Stop reason: SDUPTHER

## 2018-04-30 ENCOUNTER — TELEPHONE (OUTPATIENT)
Dept: CARDIOLOGY | Facility: CLINIC | Age: 68
End: 2018-04-30

## 2018-04-30 NOTE — TELEPHONE ENCOUNTER
Pt called to report BP readings and see if you wanted to make any medicine adjustments...Chanda                                  HR                HR  4/21--  151/80  (61)  152/83  (69)  4/22--  139/52  (66)   170/80  (61)  4/23--  149/78  (59)  190/61  (67)  4/24--  157/81  (61  4/25--  163/81  (65)  166/88  (71)  4/26--  163/78  (66)  169/81  (70)  4/27--  153/69  (80)  168/79  (60)  4/28--  152/83  (79)  184/69  (64)  4/29--  170/82  (70)  178/77  (71)  4/30--  167/64  (66)

## 2018-05-03 DIAGNOSIS — I10 UNCONTROLLED HYPERTENSION: Primary | ICD-10-CM

## 2018-05-03 RX ORDER — BENAZEPRIL HYDROCHLORIDE 20 MG/1
20 TABLET ORAL 2 TIMES DAILY
Qty: 180 TABLET | Refills: 1 | Status: SHIPPED | OUTPATIENT
Start: 2018-05-03 | End: 2018-10-29 | Stop reason: ALTCHOICE

## 2018-05-09 ENCOUNTER — TELEPHONE (OUTPATIENT)
Dept: CARDIOLOGY | Facility: CLINIC | Age: 68
End: 2018-05-09

## 2018-05-09 NOTE — TELEPHONE ENCOUNTER
Patient called from [phone] and stated he has not heard yet about his referral to a kidney specialist.    He prefers  over [facility] any day.  It is the same distance from where he lives.      Thank you,  Julee

## 2018-05-09 NOTE — TELEPHONE ENCOUNTER
The patient is scheduled at [facility] on [address]. The patient is calling them to see if he can go their [place] location.    Thanks, Mavis

## 2018-06-11 ENCOUNTER — APPOINTMENT (OUTPATIENT)
Dept: CT IMAGING | Facility: HOSPITAL | Age: 68
End: 2018-06-11

## 2018-06-11 ENCOUNTER — HOSPITAL ENCOUNTER (EMERGENCY)
Facility: HOSPITAL | Age: 68
Discharge: HOME | End: 2018-06-11
Attending: EMERGENCY MEDICINE | Admitting: EMERGENCY MEDICINE

## 2018-06-11 ENCOUNTER — APPOINTMENT (OUTPATIENT)
Dept: GENERAL RADIOLOGY | Facility: HOSPITAL | Age: 68
End: 2018-06-11

## 2018-06-11 VITALS
WEIGHT: 200 LBS | HEIGHT: 70 IN | RESPIRATION RATE: 16 BRPM | BODY MASS INDEX: 28.63 KG/M2 | OXYGEN SATURATION: 99 % | DIASTOLIC BLOOD PRESSURE: 89 MMHG | HEART RATE: 64 BPM | TEMPERATURE: 98.3 F | SYSTOLIC BLOOD PRESSURE: 132 MMHG

## 2018-06-11 DIAGNOSIS — R07.89 ATYPICAL CHEST PAIN: Primary | ICD-10-CM

## 2018-06-11 LAB
ALBUMIN SERPL-MCNC: 4.1 G/DL (ref 3.5–5.2)
ALBUMIN/GLOB SERPL: 1.3 G/DL
ALP SERPL-CCNC: 79 U/L (ref 39–117)
ALT SERPL W P-5'-P-CCNC: 19 U/L (ref 1–41)
ANION GAP SERPL CALCULATED.3IONS-SCNC: 10.5 MMOL/L
AST SERPL-CCNC: 10 U/L (ref 1–40)
BASOPHILS # BLD AUTO: 0.02 10*3/MM3 (ref 0–0.2)
BASOPHILS NFR BLD AUTO: 0.2 % (ref 0–1.5)
BILIRUB SERPL-MCNC: 0.4 MG/DL (ref 0.1–1.2)
BUN BLD-MCNC: 28 MG/DL (ref 8–23)
BUN/CREAT SERPL: 23.5 (ref 7–25)
CALCIUM SPEC-SCNC: 9.5 MG/DL (ref 8.6–10.5)
CHLORIDE SERPL-SCNC: 102 MMOL/L (ref 98–107)
CO2 SERPL-SCNC: 26.5 MMOL/L (ref 22–29)
CREAT BLD-MCNC: 1.19 MG/DL (ref 0.76–1.27)
D DIMER PPP FEU-MCNC: 0.81 MCGFEU/ML (ref 0–0.49)
DEPRECATED RDW RBC AUTO: 44.8 FL (ref 37–54)
EOSINOPHIL # BLD AUTO: 0.26 10*3/MM3 (ref 0–0.7)
EOSINOPHIL NFR BLD AUTO: 3 % (ref 0.3–6.2)
ERYTHROCYTE [DISTWIDTH] IN BLOOD BY AUTOMATED COUNT: 13.3 % (ref 11.5–14.5)
GFR SERPL CREATININE-BSD FRML MDRD: 61 ML/MIN/1.73
GLOBULIN UR ELPH-MCNC: 3.1 GM/DL
GLUCOSE BLD-MCNC: 104 MG/DL (ref 65–99)
HCT VFR BLD AUTO: 39.6 % (ref 40.4–52.2)
HGB BLD-MCNC: 13.5 G/DL (ref 13.7–17.6)
IMM GRANULOCYTES # BLD: 0.01 10*3/MM3 (ref 0–0.03)
IMM GRANULOCYTES NFR BLD: 0.1 % (ref 0–0.5)
LYMPHOCYTES # BLD AUTO: 1.27 10*3/MM3 (ref 0.9–4.8)
LYMPHOCYTES NFR BLD AUTO: 14.8 % (ref 19.6–45.3)
MCH RBC QN AUTO: 31.5 PG (ref 27–32.7)
MCHC RBC AUTO-ENTMCNC: 34.1 G/DL (ref 32.6–36.4)
MCV RBC AUTO: 92.3 FL (ref 79.8–96.2)
MONOCYTES # BLD AUTO: 0.67 10*3/MM3 (ref 0.2–1.2)
MONOCYTES NFR BLD AUTO: 7.8 % (ref 5–12)
NEUTROPHILS # BLD AUTO: 6.36 10*3/MM3 (ref 1.9–8.1)
NEUTROPHILS NFR BLD AUTO: 74.2 % (ref 42.7–76)
NT-PROBNP SERPL-MCNC: 185 PG/ML (ref 5–900)
PLATELET # BLD AUTO: 222 10*3/MM3 (ref 140–500)
PMV BLD AUTO: 9.2 FL (ref 6–12)
POTASSIUM BLD-SCNC: 4.3 MMOL/L (ref 3.5–5.2)
PROT SERPL-MCNC: 7.2 G/DL (ref 6–8.5)
RBC # BLD AUTO: 4.29 10*6/MM3 (ref 4.6–6)
SODIUM BLD-SCNC: 139 MMOL/L (ref 136–145)
TROPONIN T SERPL-MCNC: <0.01 NG/ML (ref 0–0.03)
WBC NRBC COR # BLD: 8.58 10*3/MM3 (ref 4.5–10.7)

## 2018-06-11 PROCEDURE — 93005 ELECTROCARDIOGRAM TRACING: CPT | Performed by: EMERGENCY MEDICINE

## 2018-06-11 PROCEDURE — 85025 COMPLETE CBC W/AUTO DIFF WBC: CPT | Performed by: EMERGENCY MEDICINE

## 2018-06-11 PROCEDURE — 80053 COMPREHEN METABOLIC PANEL: CPT | Performed by: EMERGENCY MEDICINE

## 2018-06-11 PROCEDURE — 99284 EMERGENCY DEPT VISIT MOD MDM: CPT

## 2018-06-11 PROCEDURE — 83880 ASSAY OF NATRIURETIC PEPTIDE: CPT | Performed by: EMERGENCY MEDICINE

## 2018-06-11 PROCEDURE — 0 IOPAMIDOL PER 1 ML: Performed by: EMERGENCY MEDICINE

## 2018-06-11 PROCEDURE — 71045 X-RAY EXAM CHEST 1 VIEW: CPT

## 2018-06-11 PROCEDURE — 84484 ASSAY OF TROPONIN QUANT: CPT | Performed by: EMERGENCY MEDICINE

## 2018-06-11 PROCEDURE — 85379 FIBRIN DEGRADATION QUANT: CPT | Performed by: EMERGENCY MEDICINE

## 2018-06-11 PROCEDURE — 93010 ELECTROCARDIOGRAM REPORT: CPT | Performed by: INTERNAL MEDICINE

## 2018-06-11 PROCEDURE — 71275 CT ANGIOGRAPHY CHEST: CPT

## 2018-06-11 RX ORDER — SPIRONOLACTONE 100 MG/1
TABLET, FILM COATED ORAL
Qty: 90 TABLET | Refills: 0 | Status: SHIPPED | OUTPATIENT
Start: 2018-06-11 | End: 2018-09-09 | Stop reason: SDUPTHER

## 2018-06-11 RX ORDER — SODIUM CHLORIDE 0.9 % (FLUSH) 0.9 %
10 SYRINGE (ML) INJECTION AS NEEDED
Status: DISCONTINUED | OUTPATIENT
Start: 2018-06-11 | End: 2018-06-11 | Stop reason: HOSPADM

## 2018-06-11 RX ORDER — ASPIRIN 81 MG/1
324 TABLET, CHEWABLE ORAL ONCE
Status: COMPLETED | OUTPATIENT
Start: 2018-06-11 | End: 2018-06-11

## 2018-06-11 RX ORDER — NITROGLYCERIN 0.4 MG/1
0.4 TABLET SUBLINGUAL
Status: DISCONTINUED | OUTPATIENT
Start: 2018-06-11 | End: 2018-06-11 | Stop reason: HOSPADM

## 2018-06-11 RX ADMIN — ASPIRIN 324 MG: 81 TABLET, CHEWABLE ORAL at 07:38

## 2018-06-11 RX ADMIN — IOPAMIDOL 95 ML: 755 INJECTION, SOLUTION INTRAVENOUS at 09:09

## 2018-06-11 RX ADMIN — NITROGLYCERIN 0.4 MG: 0.4 TABLET SUBLINGUAL at 07:40

## 2018-06-11 NOTE — ED PROVIDER NOTES
" EMERGENCY DEPARTMENT ENCOUNTER    CHIEF COMPLAINT  Chief Complaint: chest pain  History given by: Patient  History limited by: nothing  Room Number: 14/14  PMD: Harinder Lea DO      HPI:  Pt is a 67 y.o. male who presents complaining of chest pain that began about 3 am with SOA. Pain woke him up out of sleep but was able to fall back asleep until 6 am. Pt has a stent that was placed in 2015. Pt has never experienced this kind of pain before. Pt has NG but doesn't use it instead he uses ASA. Pt says pain have improved. Pt has associated fever, chills, SOA, cough and diaphoresis. Pt denies vomiting and nausea.     Duration:  Began this morning at 3 am  Onset: Sudden  Timing: intermittent   Location: chest   Radiation: none  Quality: \"pain\"  Intensity/Severity: moderate  Progression: improved   Associated Symptoms: fever, chills, cough, and diaphoresis, SOA.   Aggravating Factors: none  Alleviating Factors: none  Previous Episodes: none  Treatment before arrival: none    PAST MEDICAL HISTORY  Active Ambulatory Problems     Diagnosis Date Noted   • CAD (coronary artery disease)    • Hypertension    • Chest pain 02/05/2018     Resolved Ambulatory Problems     Diagnosis Date Noted   • No Resolved Ambulatory Problems     Past Medical History:   Diagnosis Date   • CAD (coronary artery disease)    • Chronic chest pain    • Diverticulosis    • Esophagitis    • GERD (gastroesophageal reflux disease)    • Hyperlipidemia    • Hypertension        PAST SURGICAL HISTORY  Past Surgical History:   Procedure Laterality Date   • APPENDECTOMY         FAMILY HISTORY  Family History   Problem Relation Age of Onset   • Heart disease Other    • Stroke Other    • Heart disease Father        SOCIAL HISTORY  Social History     Social History   • Marital status:      Spouse name: N/A   • Number of children: N/A   • Years of education: N/A     Occupational History   • Not on file.     Social History Main Topics   • Smoking " status: Former Smoker     Quit date: 8/9/1987   • Smokeless tobacco: Never Used      Comment: caffeine use/ SOFT DRINK - DAILY    • Alcohol use Yes      Comment: occasional use/ SOCIAL USE    • Drug use: Unknown   • Sexual activity: Not on file     Other Topics Concern   • Not on file     Social History Narrative   • No narrative on file       ALLERGIES  Penicillins    REVIEW OF SYSTEMS  Review of Systems   Constitutional: Positive for chills, diaphoresis and fever (subjective). Negative for activity change and appetite change.   HENT: Negative for congestion and sore throat.    Eyes: Negative.    Respiratory: Positive for cough and shortness of breath.    Cardiovascular: Positive for chest pain. Negative for leg swelling.   Gastrointestinal: Negative for abdominal pain, diarrhea and vomiting.   Endocrine: Negative.    Genitourinary: Negative for decreased urine volume and dysuria.   Musculoskeletal: Negative for neck pain.   Skin: Negative for rash and wound.   Allergic/Immunologic: Negative.    Neurological: Negative for weakness, numbness and headaches.   Hematological: Negative.    Psychiatric/Behavioral: Negative.    All other systems reviewed and are negative.      PHYSICAL EXAM  ED Triage Vitals [06/11/18 0714]   Temp Heart Rate Resp BP SpO2   98.3 °F (36.8 °C) 79 16 -- 99 %      Temp src Heart Rate Source Patient Position BP Location FiO2 (%)   Oral Monitor -- -- --       Physical Exam   Constitutional: He is oriented to person, place, and time. No distress.   HENT:   Head: Normocephalic and atraumatic.   Eyes: EOM are normal. Pupils are equal, round, and reactive to light.   Neck: Normal range of motion. Neck supple.   Cardiovascular: Normal rate, regular rhythm and normal heart sounds.    Pulmonary/Chest: Effort normal and breath sounds normal. No respiratory distress.   Abdominal: Soft. There is no tenderness. There is no rebound and no guarding.   Musculoskeletal: Normal range of motion. He exhibits no  edema.   Neurological: He is alert and oriented to person, place, and time. He has normal sensation and normal strength.   Skin: Skin is warm and dry.   Psychiatric: Mood and affect normal.   Nursing note and vitals reviewed.      LAB RESULTS  Lab Results (last 24 hours)     Procedure Component Value Units Date/Time    CBC & Differential [[id number]] Collected:  06/11/18 0751    Specimen:  Blood Updated:  06/11/18 0804    Narrative:       The following orders were created for panel order CBC & Differential.  Procedure                               Abnormality         Status                     ---------                               -----------         ------                     CBC Auto Differential[[id number]]        Abnormal            Final result                 Please view results for these tests on the individual orders.    Comprehensive Metabolic Panel [[id number]]  (Abnormal) Collected:  06/11/18 0751    Specimen:  Blood Updated:  06/11/18 0823     Glucose 104 (H) mg/dL      BUN 28 (H) mg/dL      Creatinine 1.19 mg/dL      Sodium 139 mmol/L      Potassium 4.3 mmol/L      Chloride 102 mmol/L      CO2 26.5 mmol/L      Calcium 9.5 mg/dL      Total Protein 7.2 g/dL      Albumin 4.10 g/dL      ALT (SGPT) 19 U/L      AST (SGOT) 10 U/L      Alkaline Phosphatase 79 U/L      Total Bilirubin 0.4 mg/dL      eGFR Non African Amer 61 mL/min/1.73      Globulin 3.1 gm/dL      A/G Ratio 1.3 g/dL      BUN/Creatinine Ratio 23.5     Anion Gap 10.5 mmol/L     Troponin [[id number]]  (Normal) Collected:  06/11/18 0751    Specimen:  Blood Updated:  06/11/18 0823     Troponin T <0.010 ng/mL     Narrative:       Troponin T Reference Ranges:  Less than 0.03 ng/mL:    Negative for AMI  0.03 to 0.09 ng/mL:      Indeterminant for AMI  Greater than 0.09 ng/mL: Positive for AMI    BNP [[id number]]  (Normal) Collected:  06/11/18 0751    Specimen:  Blood Updated:  06/11/18 0821     proBNP 185.0 pg/mL     Narrative:       Among patients with  dyspnea, NT-proBNP is highly sensitive for the detection of acute congestive heart failure. In addition NT-proBNP of <300 pg/ml effectively rules out acute congestive heart failure with 99% negative predictive value.    D-dimer, Quantitative [[id number]]  (Abnormal) Collected:  06/11/18 0751    Specimen:  Blood Updated:  06/11/18 0817     D-Dimer, Quantitative 0.81 (H) MCGFEU/mL     Narrative:       The Stago D-Dimer test used in conjunction with a clinical pretest probability (PTP) assessment model, has been approved by the FDA to rule out the presence of venous thromboembolism (VTE) in outpatients suspected of deep venous thrombosis (DVT) or pulmonary embolism (PE).     CBC Auto Differential [[id number]]  (Abnormal) Collected:  06/11/18 0751    Specimen:  Blood Updated:  06/11/18 0804     WBC 8.58 10*3/mm3      RBC 4.29 (L) 10*6/mm3      Hemoglobin 13.5 (L) g/dL      Hematocrit 39.6 (L) %      MCV 92.3 fL      MCH 31.5 pg      MCHC 34.1 g/dL      RDW 13.3 %      RDW-SD 44.8 fl      MPV 9.2 fL      Platelets 222 10*3/mm3      Neutrophil % 74.2 %      Lymphocyte % 14.8 (L) %      Monocyte % 7.8 %      Eosinophil % 3.0 %      Basophil % 0.2 %      Immature Grans % 0.1 %      Neutrophils, Absolute 6.36 10*3/mm3      Lymphocytes, Absolute 1.27 10*3/mm3      Monocytes, Absolute 0.67 10*3/mm3      Eosinophils, Absolute 0.26 10*3/mm3      Basophils, Absolute 0.02 10*3/mm3      Immature Grans, Absolute 0.01 10*3/mm3           I ordered the above labs and reviewed the results    RADIOLOGY  XR Chest 1 View   Final Result   Mild cardiomegaly. No evidence of acute disease within the chest.     CT Angiogram Chest With Contrast: There is no evidence for pulmonary thromboemboli. No acute  abnormality is seen.     Discussed with Dr. Bates.        I ordered the above noted radiological studies. Interpreted by radiologist. Reviewed by me in PACS.       PROCEDURES  Procedures      PROGRESS AND CONSULTS      0730   CXR, EKG and labs  ordered for further testing.    0843  CT scan ordered     0845  Rechecked patient who is resting comfortably with stable vital signs. He is in no acute distress and states symptoms have improved.     1023  Rechecked pt and discussed normal CT scan findings    1023   Spoke with Dr. Ratliff (cardiology) and discussed pt history, labs, EKG, and CT scan findings. Discussed plan to discharge and follow-up with Dr. Hodges for GI work-up. Pt had a negative stress test in February when he was here.     1028  Recheck pt and discussed plan for discharge and to follow up with Dr. Hodges (GI) for GI work-up. Pt understands and agrees with treatment plan all questions were addressed.     MEDICAL DECISION MAKING  Results were reviewed/discussed with the patient and they were also made aware of online access. Pt also made aware that some labs, such as cultures, will not be resulted during ER visit and follow up with PMD is necessary.     MDM  Number of Diagnoses or Management Options     Amount and/or Complexity of Data Reviewed  Clinical lab tests: ordered and reviewed (Troponin - normal, D-dimer - 0.81)  Tests in the radiology section of CPT®: ordered and reviewed (CXR - Mild cardiomegaly. No evidence of acute disease within the chest.  CTA - unremarkable )  Tests in the medicine section of CPT®: ordered and reviewed  Decide to obtain previous medical records or to obtain history from someone other than the patient: yes           DIAGNOSIS  Final diagnoses:   Atypical chest pain       DISPOSITION  DISCHARGE    Patient discharged in stable condition.    Reviewed implications of results, diagnosis, meds, responsibility to follow up, warning signs and symptoms of possible worsening, potential complications and reasons to return to ER.    Patient/Family voiced understanding of above instructions.    Discussed plan for discharge, as there is no emergent indication for admission. Patient referred to primary care provider for BP management  due to today's BP. Pt/family is agreeable and understands need for follow up and repeat testing.  Pt is aware that discharge does not mean that nothing is wrong but it indicates no emergency is present that requires admission and they must continue care with follow-up as given below or physician of their choice.     FOLLOW-UP  Harinder Lea DO  [address] [facility] [address]  [phone]    Schedule an appointment as soon as possible for a visit       Alvino Hodges MD  [address] [facility]  [address]  [facility] [id number] [phone]      keep scheduled appointment    Aquiles Velazquez MD  [address] [facility] [address]  [facility] [id number] [phone]               Medication List      No changes were made to your prescriptions during this visit.           Latest Documented Vital Signs:  As of 10:32 AM  BP- 131/89 HR- 61 Temp- 98.3 °F (36.8 °C) (Oral) O2 sat- 99%    --  Documentation assistance provided by devon Ordonez for Dr. Bates.  Information recorded by the scribe was done at my direction and has been verified and validated by me.     Diego Ordonez  06/11/18 8797       Galen Bates MD  06/11/18 0919

## 2018-07-17 ENCOUNTER — OFFICE (OUTPATIENT)
Dept: URBAN - METROPOLITAN AREA CLINIC 66 | Facility: CLINIC | Age: 68
End: 2018-07-17
Payer: COMMERCIAL

## 2018-07-17 VITALS
WEIGHT: 207 LBS | DIASTOLIC BLOOD PRESSURE: 72 MMHG | HEART RATE: 68 BPM | HEIGHT: 70 IN | SYSTOLIC BLOOD PRESSURE: 110 MMHG

## 2018-07-17 DIAGNOSIS — K20.9 ESOPHAGITIS, UNSPECIFIED: ICD-10-CM

## 2018-07-17 DIAGNOSIS — R07.9 CHEST PAIN, UNSPECIFIED: ICD-10-CM

## 2018-07-17 PROCEDURE — 99213 OFFICE O/P EST LOW 20 MIN: CPT | Performed by: INTERNAL MEDICINE

## 2018-08-07 NOTE — PROGRESS NOTES
Date of Office Visit: 2018  Encounter Provider: Aquiles Velazquez MD  Place of Service: [facility] CARDIOLOGY  Patient Name: Kyle Amador  :1950    Chief Complaint   Patient presents with   • Coronary Artery Disease     1 yr follow up   :     HPI: Kyle Amador is a 67 y.o. male who presents today to followup.     He has history of fairly chronic chest pain. He was found to have distal small vessel disease by catheterization in . In , he had another catheterization due to persistent pain and he had a normal FFR of the right coronary artery. In May 2015, his chest pain got much worse and he underwent repeat cardiac catheterization and the right coronary artery was stented. Despite this, his pain continued to worsen and ultimately, he was diagnosed with severe esophagitis. Now that this is under control, his chest pain has improved significantly.  He still has 1-2 second long episodes of pain a few times a month, but nothing that persists or is anginal.  He had a normal Myoview stress in 2018 due to atypical chest pain.    Over the last year, we have struggled with worsening hypertension. We have made numerous medication adjustments.  He had terrible leg swelling with 10mg amlodipine but tolerates 5mg daily.  He had a normal renal artery duplex.  He refuses a sleep apnea evaluation.  He was noted to be hypokalemic and was placed on spironolactone; he was seen by Dr. Velasquez who increased this, and his SBP is now much better (consistently in the 130s).      He has generalized fatigue, and a dry hacking cough.  He has not been exercising regularly.  He denies dyspnea, edema, orthopnea, palpitations, or syncope.    Past Medical History:   Diagnosis Date   • CAD (coronary artery disease)     cath 2012 with 20-30% diffuse LAD, 30-40% ostial diag, small LCx with diffuse 30-50%, large RCA with 60-70% mid (normal FFR of 0.9). Angiographically it was unchanged by  cath 5/2015 but due to persistant chest pain, he underwent placement of a ARLETTE (3.5x23 Xience).  Normal Myoview 2/2018.   • Chronic chest pain     likely due to esophagitis, finally resolved 2015   • Diverticulosis    • Esophagitis    • GERD (gastroesophageal reflux disease)    • Hyperlipidemia    • Hypertension     probable hyperaldosteronism       Past Surgical History:   Procedure Laterality Date   • APPENDECTOMY         Social History     Social History   • Marital status:      Spouse name: N/A   • Number of children: N/A   • Years of education: N/A     Occupational History   • Not on file.     Social History Main Topics   • Smoking status: Former Smoker     Quit date: 8/9/1987   • Smokeless tobacco: Never Used      Comment: caffeine use/ SOFT DRINK - DAILY    • Alcohol use Yes      Comment: occasional use/ SOCIAL USE    • Drug use: Unknown   • Sexual activity: Not on file     Other Topics Concern   • Not on file     Social History Narrative   • No narrative on file       Family History   Problem Relation Age of Onset   • Heart disease Other    • Stroke Other    • Heart disease Father        Review of Systems   Constitution: Positive for malaise/fatigue and weight gain.   Cardiovascular: Negative for chest pain and palpitations.   Respiratory: Positive for cough and shortness of breath.    Endocrine: Positive for cold intolerance.   Musculoskeletal: Positive for joint pain.   Neurological: Positive for headaches and light-headedness.   All other systems reviewed and are negative.      Allergies   Allergen Reactions   • Penicillins          Current Outpatient Prescriptions:   •  amitriptyline (ELAVIL) 25 MG tablet, every night., Disp: , Rfl:   •  amLODIPine (NORVASC) 5 MG tablet, Take 5 mg by mouth Daily., Disp: , Rfl:   •  aspirin 81 MG tablet, Take  by mouth Every Night., Disp: , Rfl:   •  atorvastatin (LIPITOR) 40 MG tablet, Every Night., Disp: , Rfl:   •  benazepril (LOTENSIN) 20 MG tablet, Take 1  "tablet by mouth 2 (Two) Times a Day., Disp: 180 tablet, Rfl: 1  •  Glucosamine-Chondroit-Vit C-Mn (GLUCOSAMINE 1500 COMPLEX) capsule, Take  by mouth 2 (Two) Times a Day., Disp: , Rfl:   •  isosorbide mononitrate (IMDUR) 60 MG 24 hr tablet, 1/2 tablet bid, Disp: , Rfl:   •  metoprolol succinate XL (TOPROL-XL) 100 MG 24 hr tablet, Daily., Disp: , Rfl:   •  pantoprazole (PROTONIX) 40 MG EC tablet, Daily., Disp: , Rfl:   •  potassium citrate (UROCIT-K) 10 MEQ (1080 MG) CR tablet, Take  by mouth every day., Disp: , Rfl:   •  Saw Palmetto 160 MG tablet, Take 2 tablets by mouth 2 (Two) Times a Day., Disp: , Rfl:   •  spironolactone (ALDACTONE) 100 MG tablet, TAKE 1 TABLET BY MOUTH ONCE DAILY, Disp: 90 tablet, Rfl: 0  •  irbesartan (AVAPRO) 300 MG tablet, Take 1 tablet by mouth Every Night., Disp: 90 tablet, Rfl: 0     Objective:     Vitals:    08/09/18 1128 08/09/18 1137   BP: 150/70 138/80   BP Location: Left arm    Pulse: 66    Weight: 92.5 kg (204 lb)    Height: 177.8 cm (70\")      Body mass index is 29.27 kg/m².    Physical Exam   Constitutional: He is oriented to person, place, and time. He appears well-developed and well-nourished.   HENT:   Head: Normocephalic.   Nose: Nose normal.   Mouth/Throat: Oropharynx is clear and moist.   Eyes: Pupils are equal, round, and reactive to light. Conjunctivae and EOM are normal.   Neck: Normal range of motion. No JVD present.   Cardiovascular: Normal rate, regular rhythm, normal heart sounds and intact distal pulses.    No murmur heard.  Pulmonary/Chest: Effort normal and breath sounds normal.   Abdominal: Soft. He exhibits no mass. There is no tenderness.   Musculoskeletal: Normal range of motion. He exhibits no edema.   Lymphadenopathy:     He has no cervical adenopathy.   Neurological: He is alert and oriented to person, place, and time. No cranial nerve deficit.   Skin: Skin is warm and dry. No rash noted.   Psychiatric: He has a normal mood and affect. His behavior is " "normal. Judgment and thought content normal.   Vitals reviewed.        ECG 12 Lead  Date/Time: 8/9/2018 12:55 PM  Performed by: AQUILES VELAZQUEZ  Authorized by: AQUILES VELAZQUEZ   Comparison: compared with previous ECG   Similar to previous ECG  Rhythm: sinus rhythm  Conduction: conduction normal  ST Segments: ST segments normal  T Waves: T waves normal  QRS axis: normal  Other: no other findings  Clinical impression: normal ECG              Assessment:       Diagnosis Plan   1. Essential hypertension     2. Coronary artery disease involving native coronary artery of native heart without angina pectoris            Plan:       1.  His BP has greatly improved.  His dry cough may be due to benazepril.  His fatigue may be due to high dose metoprolol.  He very much wants to be on \"less medication.\"  I have cut metoprolol in half to 50mg daily, stopped amlodipine and benazepril, and started irbesartan 300mg daily.  He will call in a week with an update.    2.  Coronary Artery Disease  Assessment  • The patient has no angina  • He has chronic chest pain and small vessel disease.  He's on aspirin and atorvastatin.    Subjective - Objective  • There has been a previous stent procedure using ARLETTE 2015  • Current antiplatelet therapy includes aspirin 81 mg        Sincerely,       Aquiles Velazquez MD                  "

## 2018-08-09 ENCOUNTER — OFFICE VISIT (OUTPATIENT)
Dept: CARDIOLOGY | Facility: CLINIC | Age: 68
End: 2018-08-09

## 2018-08-09 VITALS
BODY MASS INDEX: 29.2 KG/M2 | WEIGHT: 204 LBS | SYSTOLIC BLOOD PRESSURE: 138 MMHG | HEIGHT: 70 IN | HEART RATE: 66 BPM | DIASTOLIC BLOOD PRESSURE: 80 MMHG

## 2018-08-09 DIAGNOSIS — I10 ESSENTIAL HYPERTENSION: Primary | ICD-10-CM

## 2018-08-09 DIAGNOSIS — I25.10 CORONARY ARTERY DISEASE INVOLVING NATIVE CORONARY ARTERY OF NATIVE HEART WITHOUT ANGINA PECTORIS: ICD-10-CM

## 2018-08-09 PROBLEM — R07.9 CHEST PAIN: Status: RESOLVED | Noted: 2018-02-05 | Resolved: 2018-08-09

## 2018-08-09 PROCEDURE — 99214 OFFICE O/P EST MOD 30 MIN: CPT | Performed by: INTERNAL MEDICINE

## 2018-08-09 PROCEDURE — 93000 ELECTROCARDIOGRAM COMPLETE: CPT | Performed by: INTERNAL MEDICINE

## 2018-08-09 RX ORDER — METOPROLOL SUCCINATE 50 MG/1
50 TABLET, EXTENDED RELEASE ORAL DAILY
COMMUNITY
Start: 2018-07-08 | End: 2019-02-13 | Stop reason: SDUPTHER

## 2018-08-09 RX ORDER — IRBESARTAN 300 MG/1
300 TABLET ORAL NIGHTLY
Qty: 90 TABLET | Refills: 0 | Status: SHIPPED | OUTPATIENT
Start: 2018-08-09 | End: 2018-10-29 | Stop reason: SDUPTHER

## 2018-08-09 RX ORDER — ISOSORBIDE MONONITRATE 60 MG/1
TABLET, EXTENDED RELEASE ORAL
COMMUNITY
Start: 2018-06-11 | End: 2019-02-13 | Stop reason: SDUPTHER

## 2018-08-09 NOTE — PATIENT INSTRUCTIONS
1.  Cut metoprolol in half    2.  Stop benazepril and stop amlodipine    3.  Start irbesartan 300mg daily

## 2018-08-17 ENCOUNTER — TELEPHONE (OUTPATIENT)
Dept: CARDIOLOGY | Facility: CLINIC | Age: 68
End: 2018-08-17

## 2018-08-17 NOTE — TELEPHONE ENCOUNTER
"Pt called with his HBP readings.    Plan:       1.  His BP has greatly improved.  His dry cough may be due to benazepril.  His fatigue may be due to high dose metoprolol.  He very much wants to be on \"less medication.\"  I have cut metoprolol in half to 50mg daily, stopped amlodipine and benazepril, and started irbesartan 300mg daily.  He will call in a week with an update.     08/10 0900 113/61 hr 72 - 9:00 129/75 hr 65  08/11 0900 140/50 hr 86 - 9:00 138/57 hr 79  08/12 1030 110/77 hr 93 - 9:00 138/58 hr 72   08/13 0900 142/60 hr 94 - 9:00 140/56 hr 69  08/14 0930 133/76 hr 96 - 9:00 126/83 hr 88  08/15 0900 123/72 hr 104 - 9:00 138/58 hr 69  08/16 0900 159/ 64 hr 93 - 9:00 139/72 hr 79    Pt does not reports the only thing he has notice that is different is his heart rate is a little higher.    "

## 2018-08-21 NOTE — TELEPHONE ENCOUNTER
I am happy with these.  HR is higher since I cut back on metoprolol.  I think that will improve over time.  Please let him know and that there is no change.    jdk

## 2018-09-10 RX ORDER — SPIRONOLACTONE 100 MG/1
TABLET, FILM COATED ORAL
Qty: 90 TABLET | Refills: 1 | Status: SHIPPED | OUTPATIENT
Start: 2018-09-10 | End: 2019-02-13

## 2018-10-29 RX ORDER — IRBESARTAN 300 MG/1
TABLET ORAL
Qty: 90 TABLET | Refills: 1 | Status: SHIPPED | OUTPATIENT
Start: 2018-10-29 | End: 2019-02-13 | Stop reason: SDUPTHER

## 2019-02-13 ENCOUNTER — OFFICE VISIT (OUTPATIENT)
Dept: CARDIOLOGY | Facility: CLINIC | Age: 69
End: 2019-02-13

## 2019-02-13 VITALS
WEIGHT: 200.8 LBS | BODY MASS INDEX: 28.75 KG/M2 | DIASTOLIC BLOOD PRESSURE: 80 MMHG | HEIGHT: 70 IN | SYSTOLIC BLOOD PRESSURE: 140 MMHG | HEART RATE: 71 BPM

## 2019-02-13 DIAGNOSIS — I10 ESSENTIAL HYPERTENSION: ICD-10-CM

## 2019-02-13 DIAGNOSIS — R42 DIZZINESS: ICD-10-CM

## 2019-02-13 DIAGNOSIS — I25.10 CORONARY ARTERY DISEASE INVOLVING NATIVE CORONARY ARTERY OF NATIVE HEART WITHOUT ANGINA PECTORIS: Primary | ICD-10-CM

## 2019-02-13 DIAGNOSIS — R79.9 ELEVATED BUN: ICD-10-CM

## 2019-02-13 PROCEDURE — 93000 ELECTROCARDIOGRAM COMPLETE: CPT | Performed by: NURSE PRACTITIONER

## 2019-02-13 PROCEDURE — 99214 OFFICE O/P EST MOD 30 MIN: CPT | Performed by: NURSE PRACTITIONER

## 2019-02-13 RX ORDER — ISOSORBIDE MONONITRATE 60 MG/1
30 TABLET, EXTENDED RELEASE ORAL 2 TIMES DAILY
Qty: 90 TABLET | Refills: 2 | Status: SHIPPED | OUTPATIENT
Start: 2019-02-13 | End: 2019-09-19 | Stop reason: SDUPTHER

## 2019-02-13 RX ORDER — METOPROLOL SUCCINATE 50 MG/1
50 TABLET, EXTENDED RELEASE ORAL DAILY
Qty: 90 TABLET | Refills: 2 | Status: SHIPPED | OUTPATIENT
Start: 2019-02-13 | End: 2019-11-28 | Stop reason: HOSPADM

## 2019-02-13 RX ORDER — IRBESARTAN 300 MG/1
300 TABLET ORAL DAILY
Qty: 90 TABLET | Refills: 2 | Status: SHIPPED | OUTPATIENT
Start: 2019-02-13 | End: 2019-08-26 | Stop reason: RX

## 2019-02-13 NOTE — PROGRESS NOTES
Date of Office Visit: 2019  Encounter Provider: SAUL Britt  Place of Service: [facility] CARDIOLOGY  Patient Name: Kyle Amador  :1950      Chief Complaint   Patient presents with   • Hypertension   • Coronary Artery Disease   :     Dear Dr. Lea,     HPI: Kyle Amador is a pleasant 68 y.o. male who presents today for cardiac follow up. He is a new patient to me and his previous records have been reviewed.  He has a history of GERD, hypertension, and hyperlipidemia.    He has a history of coronary artery disease (distal small vessel) per cardiac catheterization in .  In May 2015 he had worsening chest discomfort and underwent repeat cardiac catheterization with right coronary artery stent placement.  In 2018 he was having atypical chest pain and had a normal nuclear stress test.  As far as his blood pressure, he had a normal renal artery duplex in the past.  At one point he was on amlodipine 10 mg but developed lower extremity edema and this was eventually discontinued.  He has been hypokalemic in the past at one point was on spironolactone.  He stopped this on his own accord back in 2018 because of dizziness.    He is checking his blood pressures at home which typically are 120s-130s over 70s.  On occasion he will see his blood pressure 140s over 80s.  He reports dizziness/lightheadedness with quick positional changes.  His dizziness has improved since the discontinuation of the spironolactone.  He denies chest pain, shortness of breath, PND, orthopnea, cough, edema, palpitations, or syncope.  He is exercising on his treadmill and has lost 10 pounds since Ka[place] time.    He brought a copy of his lab work from his PCP office collected 19.  Lipid panel revealed total cholesterol 98, HDL 46, triglycerides 63, and LDL 38.  Basic metabolic panel was normal except for BUN of 29 which was elevated, creatinine was 1.1 and  potassium 4.2 both within normal limits.    Past Medical History:   Diagnosis Date   • CAD (coronary artery disease)     cath 2012 with 20-30% diffuse LAD, 30-40% ostial diag, small LCx with diffuse 30-50%, large RCA with 60-70% mid (normal FFR of 0.9). Angiographically it was unchanged by cath 2015 but due to persistant chest pain, he underwent placement of a ARLETTE (3.5x23 Xience).  Normal Myoview 2018.   • Chronic chest pain     likely due to esophagitis, finally resolved    • Diverticulosis    • Esophagitis    • GERD (gastroesophageal reflux disease)    • Hyperlipidemia    • Hypertension     probable hyperaldosteronism       Past Surgical History:   Procedure Laterality Date   • APPENDECTOMY         Social History     Socioeconomic History   • Marital status:      Spouse name: Not on file   • Number of children: Not on file   • Years of education: Not on file   • Highest education level: Not on file   Social Needs   • Financial resource strain: Not on file   • Food insecurity - worry: Not on file   • Food insecurity - inability: Not on file   • Transportation needs - medical: Not on file   • Transportation needs - non-medical: Not on file   Occupational History   • Not on file   Tobacco Use   • Smoking status: Former Smoker     Last attempt to quit: 1987     Years since quittin.5   • Smokeless tobacco: Never Used   Substance and Sexual Activity   • Alcohol use: Yes     Comment: No caffeine use   • Drug use: Not on file   • Sexual activity: Not on file   Other Topics Concern   • Not on file   Social History Narrative   • Not on file       Family History   Problem Relation Age of Onset   • Heart disease Other    • Stroke Other    • Heart disease Father        The following portion of the patient's history were reviewed and updated as appropriate: past medical history, past surgical history, past social history, past family history, allergies, current medications, and problem list.    Review  of Systems   Constitution: Positive for weight loss. Negative for chills, diaphoresis, fever, malaise/fatigue, night sweats and weight gain.   HENT: Negative for hearing loss, nosebleeds, sore throat and tinnitus.    Eyes: Negative for blurred vision, double vision, pain and visual disturbance.   Cardiovascular: Negative for chest pain, claudication, cyanosis, dyspnea on exertion, irregular heartbeat, leg swelling, near-syncope, orthopnea, palpitations, paroxysmal nocturnal dyspnea and syncope.   Respiratory: Negative for cough, hemoptysis, shortness of breath, snoring and wheezing.    Endocrine: Positive for cold intolerance. Negative for heat intolerance and polyuria.   Hematologic/Lymphatic: Negative for bleeding problem. Does not bruise/bleed easily.   Skin: Negative for color change, dry skin, flushing and itching.   Musculoskeletal: Positive for joint pain. Negative for falls, joint swelling, muscle cramps, muscle weakness and myalgias.   Gastrointestinal: Negative for abdominal pain, constipation, heartburn, melena, nausea and vomiting.   Genitourinary: Negative for dysuria and hematuria.   Neurological: Positive for dizziness, headaches and light-headedness. Negative for excessive daytime sleepiness, loss of balance, numbness, paresthesias, seizures and vertigo.   Psychiatric/Behavioral: Negative for altered mental status, depression, memory loss and substance abuse. The patient does not have insomnia and is not nervous/anxious.    Allergic/Immunologic: Negative for environmental allergies.       Allergies   Allergen Reactions   • Amlodipine Other (See Comments)     Lower extremity edema   • Penicillins    • Spironolactone Dizziness         Current Outpatient Medications:   •  amitriptyline (ELAVIL) 25 MG tablet, every night., Disp: , Rfl:   •  aspirin 81 MG tablet, Take  by mouth Every Night., Disp: , Rfl:   •  atorvastatin (LIPITOR) 40 MG tablet, Every Night., Disp: , Rfl:   •  Glucosamine-Chondroit-Vit  "C-Mn (GLUCOSAMINE 1500 COMPLEX) capsule, Take  by mouth 2 (Two) Times a Day., Disp: , Rfl:   •  irbesartan (AVAPRO) 300 MG tablet, Take 1 tablet by mouth Daily., Disp: 90 tablet, Rfl: 2  •  isosorbide mononitrate (IMDUR) 60 MG 24 hr tablet, Take 0.5 tablets by mouth 2 (Two) Times a Day. 1/2 tablet bid, Disp: 90 tablet, Rfl: 2  •  metoprolol succinate XL (TOPROL-XL) 50 MG 24 hr tablet, Take 1 tablet by mouth Daily., Disp: 90 tablet, Rfl: 2  •  pantoprazole (PROTONIX) 40 MG EC tablet, Daily., Disp: , Rfl:   •  Saw Palmetto 160 MG tablet, Take 2 tablets by mouth 2 (Two) Times a Day., Disp: , Rfl:   •  potassium citrate (UROCIT-K) 10 MEQ (1080 MG) CR tablet, Take  by mouth every day., Disp: , Rfl:         Objective:     Vitals:    02/13/19 1331   BP: 140/80   BP Location: Left arm   Pulse: 71   Weight: 91.1 kg (200 lb 12.8 oz)   Height: 177.8 cm (70\")     Body mass index is 28.81 kg/m².    PHYSICAL EXAM:    Vitals Reviewed.   General Appearance: No acute distress, well developed and well nourished.   Eyes: Conjunctiva and lids: No erythema, swelling, or discharge. Sclera non-icteric.   HENT: Atraumatic, normocephalic. External eyes, ears, and nose normal. No hearing loss noted. Mucous membranes normal. Lips not cyanotic. Neck supple with no tenderness.  Respiratory: No signs of respiratory distress. Respiration rhythm and depth normal.   Clear to auscultation. No rales, crackles, rhonchi, or wheezing auscultated.   Cardiovascular:  Jugular Venous Pressure: Normal  Heart Rate and Rhythm: Normal, Heart Sounds: Normal S1 and S2. No S3 or S4 noted.  Murmurs: No murmurs noted. No rubs, thrills, or gallops.   Arterial Pulses: Carotid pulses normal. No carotid bruit noted. Posterior tibialis and dorsalis pedis pulses normal.   Lower Extremities: No edema noted.  Gastrointestinal:  Abdomen soft, non-distended, non-tender. Normal bowel sounds. No hepatomegaly.   Musculoskeletal: Normal movement of extremities  Skin and Nails: " General appearance normal. No pallor, cyanosis, diaphoresis. Skin temperature normal. No clubbing of fingernails.   Psychiatric: Patient alert and oriented to person, place, and time. Speech and behavior appropriate. Normal mood and affect.       ECG 12 Lead  Date/Time: 2/13/2019 1:31 PM  Performed by: Etta Quach APRN  Authorized by: Etta Quach APRN   Comparison: compared with previous ECG from 8/9/2018  Similar to previous ECG  Rhythm: sinus rhythm  Rate: normal  BPM: 71  Conduction: conduction normal  ST Segments: ST segments normal  T Waves: T waves normal  QRS axis: normal    Clinical impression: normal ECG              Assessment:       Diagnosis Plan   1. Coronary artery disease involving native coronary artery of native heart without angina pectoris  ECG 12 Lead   2. Essential hypertension     3. Dizziness     4. Elevated BUN            Plan:       1.  Coronary Artery Disease: Denies anginal symptoms.  Continue aspirin, Toprol-XL, and atorvastatin for secondary prevention.    Coronary Artery Disease  Assessment  • The patient has no angina    Plan  • Lifestyle modifications discussed include adhering to a heart healthy diet, avoidance of tobacco products, maintenance of a healthy weight, medication compliance, regular exercise and regular monitoring of cholesterol and blood pressure    Subjective - Objective  • There has been a previous stent procedure using ARLETTE  • Current antiplatelet therapy includes aspirin 81 mg      2.  Hypertension: Blood pressure borderline elevated today.  He says his blood pressure is well controlled at home 120-130s over 70s.  He was unable to tolerate spironolactone and amlodipine in the past.  His dizziness has improved with the discontinuation of the spironolactone.    3.  Dizziness: I recommended that he keep himself better hydrated and drink at least 64 ounces of water per day.  This may help both his elevated BUN and dizziness.    4.  Elevated BUN: Noted on  recent blood work.     5. Follow-up with Dr. Velazquez in 6 months, unless otherwise needed sooner.      As always, it has been a pleasure to participate in your patient's care. Thank you.       Sincerely,         SAUL Freire

## 2019-03-19 RX ORDER — ATORVASTATIN CALCIUM 40 MG/1
40 TABLET, FILM COATED ORAL NIGHTLY
Qty: 90 TABLET | Refills: 1 | Status: SHIPPED | OUTPATIENT
Start: 2019-03-19 | End: 2019-09-09 | Stop reason: SDUPTHER

## 2019-05-15 ENCOUNTER — OFFICE (OUTPATIENT)
Dept: URBAN - METROPOLITAN AREA CLINIC 66 | Facility: CLINIC | Age: 69
End: 2019-05-15

## 2019-05-15 VITALS
WEIGHT: 198 LBS | DIASTOLIC BLOOD PRESSURE: 80 MMHG | SYSTOLIC BLOOD PRESSURE: 124 MMHG | HEIGHT: 70 IN | HEART RATE: 72 BPM

## 2019-05-15 DIAGNOSIS — F45.8 OTHER SOMATOFORM DISORDERS: ICD-10-CM

## 2019-05-15 DIAGNOSIS — R07.9 CHEST PAIN, UNSPECIFIED: ICD-10-CM

## 2019-05-15 DIAGNOSIS — K21.9 GASTRO-ESOPHAGEAL REFLUX DISEASE WITHOUT ESOPHAGITIS: ICD-10-CM

## 2019-05-15 PROCEDURE — 99213 OFFICE O/P EST LOW 20 MIN: CPT | Performed by: INTERNAL MEDICINE

## 2019-07-10 ENCOUNTER — HOSPITAL ENCOUNTER (OUTPATIENT)
Facility: HOSPITAL | Age: 69
Discharge: HOME | End: 2019-07-12
Attending: EMERGENCY MEDICINE | Admitting: INTERNAL MEDICINE

## 2019-07-10 ENCOUNTER — APPOINTMENT (OUTPATIENT)
Dept: GENERAL RADIOLOGY | Facility: HOSPITAL | Age: 69
End: 2019-07-10

## 2019-07-10 DIAGNOSIS — I20.0 UNSTABLE ANGINA (HCC): Primary | ICD-10-CM

## 2019-07-10 LAB
ALBUMIN SERPL-MCNC: 4.1 G/DL (ref 3.5–5.2)
ALBUMIN/GLOB SERPL: 1.4 G/DL
ALP SERPL-CCNC: 79 U/L (ref 39–117)
ALT SERPL W P-5'-P-CCNC: 19 U/L (ref 1–41)
ANION GAP SERPL CALCULATED.3IONS-SCNC: 9.7 MMOL/L (ref 5–15)
AST SERPL-CCNC: 20 U/L (ref 1–40)
BASOPHILS # BLD AUTO: 0.02 10*3/MM3 (ref 0–0.2)
BASOPHILS NFR BLD AUTO: 0.2 % (ref 0–1.5)
BILIRUB SERPL-MCNC: 0.4 MG/DL (ref 0.2–1.2)
BUN BLD-MCNC: 22 MG/DL (ref 8–23)
BUN/CREAT SERPL: 17.5 (ref 7–25)
CALCIUM SPEC-SCNC: 9.6 MG/DL (ref 8.6–10.5)
CHLORIDE SERPL-SCNC: 103 MMOL/L (ref 98–107)
CO2 SERPL-SCNC: 30.3 MMOL/L (ref 22–29)
CREAT BLD-MCNC: 1.26 MG/DL (ref 0.76–1.27)
DEPRECATED RDW RBC AUTO: 47.8 FL (ref 37–54)
EOSINOPHIL # BLD AUTO: 0.14 10*3/MM3 (ref 0–0.4)
EOSINOPHIL NFR BLD AUTO: 1.5 % (ref 0.3–6.2)
ERYTHROCYTE [DISTWIDTH] IN BLOOD BY AUTOMATED COUNT: 13.6 % (ref 12.3–15.4)
GFR SERPL CREATININE-BSD FRML MDRD: 57 ML/MIN/1.73
GLOBULIN UR ELPH-MCNC: 3 GM/DL
GLUCOSE BLD-MCNC: 106 MG/DL (ref 65–99)
HCT VFR BLD AUTO: 43.6 % (ref 37.5–51)
HGB BLD-MCNC: 14 G/DL (ref 13–17.7)
HOLD SPECIMEN: NORMAL
HOLD SPECIMEN: NORMAL
IMM GRANULOCYTES # BLD AUTO: 0.04 10*3/MM3 (ref 0–0.05)
IMM GRANULOCYTES NFR BLD AUTO: 0.4 % (ref 0–0.5)
LYMPHOCYTES # BLD AUTO: 1.53 10*3/MM3 (ref 0.7–3.1)
LYMPHOCYTES NFR BLD AUTO: 16.6 % (ref 19.6–45.3)
MCH RBC QN AUTO: 30.4 PG (ref 26.6–33)
MCHC RBC AUTO-ENTMCNC: 32.1 G/DL (ref 31.5–35.7)
MCV RBC AUTO: 94.6 FL (ref 79–97)
MONOCYTES # BLD AUTO: 0.6 10*3/MM3 (ref 0.1–0.9)
MONOCYTES NFR BLD AUTO: 6.5 % (ref 5–12)
NEUTROPHILS # BLD AUTO: 6.86 10*3/MM3 (ref 1.7–7)
NEUTROPHILS NFR BLD AUTO: 74.8 % (ref 42.7–76)
NRBC BLD AUTO-RTO: 0 /100 WBC (ref 0–0.2)
PLATELET # BLD AUTO: 266 10*3/MM3 (ref 140–450)
PMV BLD AUTO: 9 FL (ref 6–12)
POTASSIUM BLD-SCNC: 4.1 MMOL/L (ref 3.5–5.2)
PROT SERPL-MCNC: 7.1 G/DL (ref 6–8.5)
RBC # BLD AUTO: 4.61 10*6/MM3 (ref 4.14–5.8)
SODIUM BLD-SCNC: 143 MMOL/L (ref 136–145)
TROPONIN T SERPL-MCNC: <0.01 NG/ML (ref 0–0.03)
TROPONIN T SERPL-MCNC: <0.01 NG/ML (ref 0–0.03)
WBC NRBC COR # BLD: 9.19 10*3/MM3 (ref 3.4–10.8)
WHOLE BLOOD HOLD SPECIMEN: NORMAL
WHOLE BLOOD HOLD SPECIMEN: NORMAL

## 2019-07-10 PROCEDURE — G0378 HOSPITAL OBSERVATION PER HR: HCPCS

## 2019-07-10 PROCEDURE — 93005 ELECTROCARDIOGRAM TRACING: CPT | Performed by: EMERGENCY MEDICINE

## 2019-07-10 PROCEDURE — 99285 EMERGENCY DEPT VISIT HI MDM: CPT

## 2019-07-10 PROCEDURE — 93010 ELECTROCARDIOGRAM REPORT: CPT | Performed by: INTERNAL MEDICINE

## 2019-07-10 PROCEDURE — 93005 ELECTROCARDIOGRAM TRACING: CPT

## 2019-07-10 PROCEDURE — 80053 COMPREHEN METABOLIC PANEL: CPT | Performed by: EMERGENCY MEDICINE

## 2019-07-10 PROCEDURE — 84484 ASSAY OF TROPONIN QUANT: CPT | Performed by: EMERGENCY MEDICINE

## 2019-07-10 PROCEDURE — 85025 COMPLETE CBC W/AUTO DIFF WBC: CPT | Performed by: EMERGENCY MEDICINE

## 2019-07-10 PROCEDURE — 71046 X-RAY EXAM CHEST 2 VIEWS: CPT

## 2019-07-10 RX ORDER — NITROGLYCERIN 0.4 MG/1
0.4 TABLET SUBLINGUAL
Status: COMPLETED | OUTPATIENT
Start: 2019-07-10 | End: 2019-07-10

## 2019-07-10 RX ORDER — SODIUM CHLORIDE 0.9 % (FLUSH) 0.9 %
10 SYRINGE (ML) INJECTION AS NEEDED
Status: DISCONTINUED | OUTPATIENT
Start: 2019-07-10 | End: 2019-07-12 | Stop reason: HOSPADM

## 2019-07-10 RX ORDER — ASPIRIN 325 MG
325 TABLET ORAL ONCE
Status: COMPLETED | OUTPATIENT
Start: 2019-07-10 | End: 2019-07-10

## 2019-07-10 RX ADMIN — ASPIRIN 325 MG: 325 TABLET ORAL at 20:35

## 2019-07-10 RX ADMIN — NITROGLYCERIN 1 INCH: 20 OINTMENT TOPICAL at 21:39

## 2019-07-10 RX ADMIN — NITROGLYCERIN 0.4 MG: 0.4 TABLET SUBLINGUAL at 21:04

## 2019-07-10 RX ADMIN — NITROGLYCERIN 0.4 MG: 0.4 TABLET SUBLINGUAL at 20:57

## 2019-07-10 RX ADMIN — NITROGLYCERIN 0.4 MG: 0.4 TABLET SUBLINGUAL at 20:51

## 2019-07-11 PROBLEM — R42 DIZZINESS: Status: RESOLVED | Noted: 2019-02-13 | Resolved: 2019-07-11

## 2019-07-11 PROBLEM — R79.9 ELEVATED BUN: Status: RESOLVED | Noted: 2019-02-13 | Resolved: 2019-07-11

## 2019-07-11 PROBLEM — R07.2 PRECORDIAL PAIN: Status: ACTIVE | Noted: 2018-02-05

## 2019-07-11 PROCEDURE — G0378 HOSPITAL OBSERVATION PER HR: HCPCS

## 2019-07-11 PROCEDURE — C1894 INTRO/SHEATH, NON-LASER: HCPCS

## 2019-07-11 PROCEDURE — 25010000002 HEPARIN (PORCINE) PER 1000 UNITS: Performed by: INTERNAL MEDICINE

## 2019-07-11 PROCEDURE — 93458 L HRT ARTERY/VENTRICLE ANGIO: CPT | Performed by: INTERNAL MEDICINE

## 2019-07-11 PROCEDURE — 25010000002 MIDAZOLAM PER 1 MG: Performed by: INTERNAL MEDICINE

## 2019-07-11 PROCEDURE — 25010000002 FENTANYL CITRATE (PF) 100 MCG/2ML SOLUTION: Performed by: INTERNAL MEDICINE

## 2019-07-11 PROCEDURE — 0 IOPAMIDOL PER 1 ML: Performed by: INTERNAL MEDICINE

## 2019-07-11 PROCEDURE — 99220 PR INITIAL OBSERVATION CARE/DAY 70 MINUTES: CPT | Performed by: INTERNAL MEDICINE

## 2019-07-11 PROCEDURE — C1769 GUIDE WIRE: HCPCS | Performed by: INTERNAL MEDICINE

## 2019-07-11 PROCEDURE — 93010 ELECTROCARDIOGRAM REPORT: CPT | Performed by: INTERNAL MEDICINE

## 2019-07-11 PROCEDURE — C1894 INTRO/SHEATH, NON-LASER: HCPCS | Performed by: INTERNAL MEDICINE

## 2019-07-11 PROCEDURE — 93005 ELECTROCARDIOGRAM TRACING: CPT | Performed by: INTERNAL MEDICINE

## 2019-07-11 RX ORDER — FENTANYL CITRATE 50 UG/ML
INJECTION, SOLUTION INTRAMUSCULAR; INTRAVENOUS AS NEEDED
Status: DISCONTINUED | OUTPATIENT
Start: 2019-07-11 | End: 2019-07-11 | Stop reason: HOSPADM

## 2019-07-11 RX ORDER — METOPROLOL SUCCINATE 50 MG/1
50 TABLET, EXTENDED RELEASE ORAL
Status: DISCONTINUED | OUTPATIENT
Start: 2019-07-11 | End: 2019-07-12 | Stop reason: HOSPADM

## 2019-07-11 RX ORDER — ISOSORBIDE MONONITRATE 30 MG/1
30 TABLET, EXTENDED RELEASE ORAL 2 TIMES DAILY
Status: DISCONTINUED | OUTPATIENT
Start: 2019-07-11 | End: 2019-07-12 | Stop reason: HOSPADM

## 2019-07-11 RX ORDER — SODIUM CHLORIDE 9 MG/ML
INJECTION, SOLUTION INTRAVENOUS CONTINUOUS PRN
Status: COMPLETED | OUTPATIENT
Start: 2019-07-11 | End: 2019-07-11

## 2019-07-11 RX ORDER — MIDAZOLAM HYDROCHLORIDE 1 MG/ML
INJECTION INTRAMUSCULAR; INTRAVENOUS AS NEEDED
Status: DISCONTINUED | OUTPATIENT
Start: 2019-07-11 | End: 2019-07-11 | Stop reason: HOSPADM

## 2019-07-11 RX ORDER — LIDOCAINE HYDROCHLORIDE 20 MG/ML
INJECTION, SOLUTION INFILTRATION; PERINEURAL AS NEEDED
Status: DISCONTINUED | OUTPATIENT
Start: 2019-07-11 | End: 2019-07-11 | Stop reason: HOSPADM

## 2019-07-11 RX ORDER — SODIUM CHLORIDE 9 MG/ML
100 INJECTION, SOLUTION INTRAVENOUS CONTINUOUS
Status: DISCONTINUED | OUTPATIENT
Start: 2019-07-11 | End: 2019-07-12 | Stop reason: HOSPADM

## 2019-07-11 RX ORDER — ISOSORBIDE MONONITRATE 30 MG/1
30 TABLET, EXTENDED RELEASE ORAL 2 TIMES DAILY
Status: DISCONTINUED | OUTPATIENT
Start: 2019-07-11 | End: 2019-07-11

## 2019-07-11 RX ADMIN — SODIUM CHLORIDE 100 ML/HR: 9 INJECTION, SOLUTION INTRAVENOUS at 21:46

## 2019-07-11 RX ADMIN — ISOSORBIDE MONONITRATE 30 MG: 30 TABLET ORAL at 03:33

## 2019-07-11 RX ADMIN — METOPROLOL SUCCINATE 50 MG: 50 TABLET, FILM COATED, EXTENDED RELEASE ORAL at 09:06

## 2019-07-11 RX ADMIN — ISOSORBIDE MONONITRATE 30 MG: 30 TABLET ORAL at 09:06

## 2019-07-11 NOTE — PLAN OF CARE
Problem: Patient Care Overview  Goal: Plan of Care Review  Outcome: Ongoing (interventions implemented as appropriate)   07/11/19 0415   Coping/Psychosocial   Plan of Care Reviewed With patient   Plan of Care Review   Progress improving   OTHER   Outcome Summary Pt arrived from ER with intermittent CP worse with activity. VSS. BP elevated MD aware. NPO. Cardio to see. WCM     Goal: Individualization and Mutuality  Outcome: Ongoing (interventions implemented as appropriate)    Goal: Discharge Needs Assessment  Outcome: Ongoing (interventions implemented as appropriate)    Goal: Interprofessional Rounds/Family Conf  Outcome: Ongoing (interventions implemented as appropriate)      Problem: Pain, Chronic (Adult)  Goal: Identify Related Risk Factors and Signs and Symptoms  Outcome: Ongoing (interventions implemented as appropriate)    Goal: Acceptable Pain/Comfort Level and Functional Ability  Outcome: Ongoing (interventions implemented as appropriate)

## 2019-07-11 NOTE — ED PROVIDER NOTES
" EMERGENCY DEPARTMENT ENCOUNTER    Room Number:  24/24  Date of encounter:  7/10/2019  PCP: Harinder Lea DO  Historian:  Patient       HPI:  Chief Complaint:  Chest Pain   A complete HPI/ROS/PMH/PSH/SH/FH are limited due to:  None   Context: Kyle Amador is a 68 y.o. male with hx of CAD and stent placement in 2015 who presents to the ED c/o intermittent episodes of chest pain described as \"pressure\" that began this AM while on the treadmill. Pt states pain resolved with rest, but then reoccurred 1 hour prior to arrival at ED while still at rest, and is still persistent at this time. Pt confirms mild SOA upon episodes, but denies nausea, diaphoresis, N/V/D, abd pain, urinary symptoms, leg swelling, sore throat, or recent illness. Pt states he has had no recent travel or injury, and usually is able to run on the treadmill without issue. Pt affirms compliance with all medication outpatient, but states he did not take ASA or NTG PTA.    Duration:  More than 8 hours   Onset: gradual   Timing: intermittent   Location: chest   Radiation: none   Quality: pain   Intensity/Severity: mild to moderate   Progression: unchanged   Associated Symptoms: SOA  Aggravating Factors: none   Alleviating Factors: none   Previous Episodes: Pt has hx of CAD.   Treatment before arrival: none    PAST MEDICAL HISTORY  Active Ambulatory Problems     Diagnosis Date Noted   • CAD (coronary artery disease)    • Hypertension    • Dizziness 02/13/2019   • Elevated BUN 02/13/2019     Resolved Ambulatory Problems     Diagnosis Date Noted   • Chest pain 02/05/2018     Past Medical History:   Diagnosis Date   • CAD (coronary artery disease)    • Chronic chest pain    • Diverticulosis    • Esophagitis    • GERD (gastroesophageal reflux disease)    • Hyperlipidemia    • Hypertension          PAST SURGICAL HISTORY  Past Surgical History:   Procedure Laterality Date   • APPENDECTOMY           FAMILY HISTORY  Family History   Problem Relation Age " of Onset   • Heart disease Other    • Stroke Other    • Heart disease Father          SOCIAL HISTORY  Social History     Socioeconomic History   • Marital status:      Spouse name: Not on file   • Number of children: Not on file   • Years of education: Not on file   • Highest education level: Not on file   Tobacco Use   • Smoking status: Former Smoker     Last attempt to quit: 1987     Years since quittin.9   • Smokeless tobacco: Never Used   Substance and Sexual Activity   • Alcohol use: Yes     Comment: No caffeine use         ALLERGIES  Amlodipine; Penicillins; and Spironolactone        REVIEW OF SYSTEMS  Review of Systems   Constitutional: Negative for activity change, appetite change and fever.   HENT: Negative for congestion and sore throat.    Eyes: Negative.    Respiratory: Positive for shortness of breath. Negative for cough.    Cardiovascular: Positive for chest pain. Negative for leg swelling.   Gastrointestinal: Negative for abdominal pain, diarrhea, nausea and vomiting.   Endocrine: Negative.    Genitourinary: Negative for decreased urine volume and dysuria.   Musculoskeletal: Negative for neck pain.   Skin: Negative for rash and wound.   Allergic/Immunologic: Negative.    Neurological: Negative for weakness, numbness and headaches.   Hematological: Negative.    Psychiatric/Behavioral: Negative.    All other systems reviewed and are negative.           PHYSICAL EXAM      GENERAL: well developed, well nourished in no apparent distress  HENT: NCAT, neck supple, trachea midline  EYES: no scleral icterus, PERRL, normal conjunctiva  CV: regular rhythm, regular rate, no murmur  RESPIRATORY: unlabored effort, CTAB  ABDOMEN: soft, non-tender, non-distended  MUSCULOSKELETAL: no gross deformity, no BLE edema  NEURO: alert, CN II-XII grossly intact, sensory and motor function of extremities grossly intact.  SKIN: warm, dry, no rash  PSYCH:  Appropriate mood and affect    Vital signs and nursing  notes reviewed.          LAB RESULTS  Recent Results (from the past 24 hour(s))   Comprehensive Metabolic Panel    Collection Time: 07/10/19  8:28 PM   Result Value Ref Range    Glucose 106 (H) 65 - 99 mg/dL    BUN 22 8 - 23 mg/dL    Creatinine 1.26 0.76 - 1.27 mg/dL    Sodium 143 136 - 145 mmol/L    Potassium 4.1 3.5 - 5.2 mmol/L    Chloride 103 98 - 107 mmol/L    CO2 30.3 (H) 22.0 - 29.0 mmol/L    Calcium 9.6 8.6 - 10.5 mg/dL    Total Protein 7.1 6.0 - 8.5 g/dL    Albumin 4.10 3.50 - 5.20 g/dL    ALT (SGPT) 19 1 - 41 U/L    AST (SGOT) 20 1 - 40 U/L    Alkaline Phosphatase 79 39 - 117 U/L    Total Bilirubin 0.4 0.2 - 1.2 mg/dL    eGFR Non African Amer 57 (L) >60 mL/min/1.73    Globulin 3.0 gm/dL    A/G Ratio 1.4 g/dL    BUN/Creatinine Ratio 17.5 7.0 - 25.0    Anion Gap 9.7 5.0 - 15.0 mmol/L   Troponin    Collection Time: 07/10/19  8:28 PM   Result Value Ref Range    Troponin T <0.010 0.000 - 0.030 ng/mL   Light Blue Top    Collection Time: 07/10/19  8:28 PM   Result Value Ref Range    Extra Tube hold for add-on    Green Top (Gel)    Collection Time: 07/10/19  8:28 PM   Result Value Ref Range    Extra Tube Hold for add-ons.    Lavender Top    Collection Time: 07/10/19  8:28 PM   Result Value Ref Range    Extra Tube hold for add-on    Gold Top - SST    Collection Time: 07/10/19  8:28 PM   Result Value Ref Range    Extra Tube Hold for add-ons.    CBC Auto Differential    Collection Time: 07/10/19  8:28 PM   Result Value Ref Range    WBC 9.19 3.40 - 10.80 10*3/mm3    RBC 4.61 4.14 - 5.80 10*6/mm3    Hemoglobin 14.0 13.0 - 17.7 g/dL    Hematocrit 43.6 37.5 - 51.0 %    MCV 94.6 79.0 - 97.0 fL    MCH 30.4 26.6 - 33.0 pg    MCHC 32.1 31.5 - 35.7 g/dL    RDW 13.6 12.3 - 15.4 %    RDW-SD 47.8 37.0 - 54.0 fl    MPV 9.0 6.0 - 12.0 fL    Platelets 266 140 - 450 10*3/mm3    Neutrophil % 74.8 42.7 - 76.0 %    Lymphocyte % 16.6 (L) 19.6 - 45.3 %    Monocyte % 6.5 5.0 - 12.0 %    Eosinophil % 1.5 0.3 - 6.2 %    Basophil % 0.2 0.0 -  1.5 %    Immature Grans % 0.4 0.0 - 0.5 %    Neutrophils, Absolute 6.86 1.70 - 7.00 10*3/mm3    Lymphocytes, Absolute 1.53 0.70 - 3.10 10*3/mm3    Monocytes, Absolute 0.60 0.10 - 0.90 10*3/mm3    Eosinophils, Absolute 0.14 0.00 - 0.40 10*3/mm3    Basophils, Absolute 0.02 0.00 - 0.20 10*3/mm3    Immature Grans, Absolute 0.04 0.00 - 0.05 10*3/mm3    nRBC 0.0 0.0 - 0.2 /100 WBC       Ordered the above labs and reviewed the results.        RADIOLOGY  Xr Chest 2 View    Result Date: 7/10/2019  PA AND LATERAL CHEST X-RAY  HISTORY: Central chest pain.  Chest x-ray consisting of PA and lateral views is provided.  Comparison exams: Chest CT 06/11/2018 and chest x-ray 06/11/2018.  FINDINGS: The cardiomediastinal silhouette is normal. The lungs are clear. The costophrenic sulci are dry and the bones appear normal. There is no pneumothorax.      Negative.  This report was finalized on 7/10/2019 8:59 PM by Dr. Kaleb Bryant M.D.        I ordered the above noted radiological studies. Interpreted by radiologist. Images independently reviewed by me in PACS.    PROCEDURES  Procedures    EKG:           EKG time: 2011  Rhythm/Rate: NSR 88  P waves and DE: nml  QRS, axis: nml   ST and T waves: nml     Interpreted Contemporaneously by me, independently viewed  Comparison:  Unchanged compared to 6/11/18        MEDICATIONS GIVEN IN ER  Medications   sodium chloride 0.9 % flush 10 mL (not administered)   aspirin tablet 325 mg (325 mg Oral Given 7/10/19 2035)   nitroglycerin (NITROSTAT) SL tablet 0.4 mg (0.4 mg Sublingual Given 7/10/19 2104)   nitroglycerin (NITROSTAT) ointment 1 inch (1 inch Topical Given 7/10/19 2139)       PROGRESS AND CONSULTS     2024: 325mg ASA ordered for chest pain. Labs and CXR ordered for further evaluation.     2047: Upon pt exam, discussed pt's hypertension at 184/112. Informed pt of plan for labs and CXR in ED as well for further rule out, but pt updated on current normal EKG. Discussed plan for NTG in ED for  further chest pain and HTN improvement, but pt states he had no improvement with ASA already administered in ED.    2049: NTG ordered for chest pain.     2211: Call placed to [facility].     2244: Discussed pt's case with Dr. Brunner ([facility]) who agreed to admit pt to telemetry for further evaluation and cardiac workup.      2253: Pt rechecked and resting comfortably, states pain is improved at this time, but worsened with ambulation to restroom. Discussed with pt the negative acute findings of labs and CXR in ED, but plan for admission for further cardiac workup due to pt's hx. Pt understands and agrees with the plan, all questions answered.      MEDICAL DECISION MAKING  Review of old medical records -     MDM  Number of Diagnoses or Management Options     Amount and/or Complexity of Data Reviewed  Clinical lab tests: ordered and reviewed (Troponin (2028) - negative)  Tests in the radiology section of CPT®: ordered and reviewed (CXR - negative)  Tests in the medicine section of CPT®: ordered and reviewed (See note)  Review and summarize past medical records: yes (Stent placed in 2015, followed up with Dr. Quach (cardiology) in 2/2018 for hx of CAD, negative findings at that time )  Discuss the patient with other providers: yes (Dr. Brunner ([facility]))               DIAGNOSIS  Final diagnoses:   Unstable angina (CMS/[facility])         DISPOSITION  ADMISSION    Discussed treatment plan and reason for admission with pt/family and admitting physician.  Pt/family voiced understanding of the plan for admission for further testing/treatment as needed.         --  Documentation assistance provided by devon Kelly for Dr. Timi MD.  Information recorded by the devon was done at my direction and has been verified and validated by me.              Geri Kelly  07/10/19 9961       Presley Capellan MD  07/11/19 6977

## 2019-07-11 NOTE — PROGRESS NOTES
Discharge Planning Assessment  [facility]     Patient Name: Kyle Amador  MRN: [id number]  Today's Date: 7/11/2019    Admit Date: 7/10/2019    Discharge Needs Assessment     Row Name 07/11/19 1637       Living Environment    Lives With  spouse    Current Living Arrangements  home/apartment/condo    Primary Care Provided by  self    Quality of Family Relationships  supportive    Able to Return to Prior Arrangements  yes       Transition Planning    Patient/Family Anticipates Transition to  home with family    Patient/Family Anticipated Services at Transition  none    Transportation Anticipated  family or friend will provide       Discharge Needs Assessment    Concerns to be Addressed  no discharge needs identified;denies needs/concerns at this time    Equipment Currently Used at Home  none    Equipment Needed After Discharge  none        Discharge Plan     Row Name 07/11/19 1637       Plan    Plan  plans are home.     Patient/Family in Agreement with Plan  yes    Plan Comments  CCP spoke with pt and wife, Maricruz @ bedside, confirmed face sheet and primary pharmacy as [facility] in [place] // pt states he is independent with ADL's  Plans are home.  Instructed that CCP would follow and assist with dc as needed. Donna Reynoso RN        Destination      No service coordination in this encounter.      Durable Medical Equipment      No service coordination in this encounter.      Dialysis/Infusion      No service coordination in this encounter.      Home Medical Care      No service coordination in this encounter.      Therapy      No service coordination in this encounter.      Community Resources      No service coordination in this encounter.          Demographic Summary    No documentation.       Functional Status    No documentation.       Psychosocial    No documentation.       Abuse/Neglect    No documentation.       Legal     Row Name 07/11/19 1637       Financial/Legal    Who Manages Finances if Patient Unable   Pt states he has a [facility], paperwork was given to staff today to have scanned in EPIC        Substance Abuse    No documentation.       Patient Forms    No documentation.           Donna Reynoso RN

## 2019-07-11 NOTE — ACP (ADVANCE CARE PLANNING)
Patient requested wanted information regarding an Advance Directive.  It has been completed and is now on chart.

## 2019-07-11 NOTE — H&P
"Date of Hospital Visit: 19  Encounter Provider: Aquiles Velazquez MD  Place of Service: [facility] CARDIOLOG[facility]  Patient Name: Kyle Amador  :1950  Referral Provider: Ruth Brunner MD    Chief complaint: chest pain    History of Present Illness:     Mr Amador is a 69yo man who follows with me.     He has history of fairly chronic chest pain. He was found to have distal small vessel disease by catheterization in . In , he had another catheterization due to persistent pain and he had a normal FFR of the right coronary artery. In May 2015, his chest pain got much worse and he underwent repeat cardiac catheterization and the right coronary artery was stented. Despite this, his pain continued to worsen and ultimately, he was diagnosed with severe esophagitis. Once that was treated, his pain improved.  He did have atypical pain in 2018 and had a normal Myoview stress.     In , his blood pressure worsened and we had to make numerous medication adjustments.  He had terrible leg swelling with 10mg amlodipine but tolerated 5mg daily.  He had a normal renal artery duplex. He refused a sleep apnea evaluation.  He was noted to be hypokalemic and was placed on spironolactone; he was seen by Dr. Velasquez who increased this, and his SBP improved significantly. However, he self-discontinued this due to dizziness.  He lost weight and started exercising and his blood pressure improved.  He reports that his SBP is 130-140s at home.    He has been doing well.  Yesterday morning, he was walking on his treadmill and developed central chest pressure and dyspnea.  He stopped exercising and sat down and it resolved.  Last night, it came back \"with a vengeance\" at rest.  His wife drove him to the ED.  He received 3 SLNTG; the first two did very little but the third helped. NTP was placed.  He has been fine overnight but he was in bed.  This morning he got up to the go to the " bathroom and developed chest pressure again.     His BP was 185/110 upon arrival but has quickly come to 130/80s and has stayed there.    CXR 7/10/19  FINDINGS: The cardiomediastinal silhouette is normal. The lungs are  clear. The costophrenic sulci are dry and the bones appear normal. There  is no pneumothorax.    Previous Cardiac Testing    Stress Test 2/6/18  · Left ventricular ejection fraction is normal (Calculated EF = 70%).  · Myocardial perfusion imaging indicates a normal myocardial perfusion study with no evidence of ischemia.  · Impressions are consistent with a low risk study.    Echocardiogram 6/14/15  Conclusions  The left ventricular chamber size is normal.  Mild concentric left ventricular hypertrophy is observed.  Global left ventricular wall motion and contractility are within normal  limits.  The ejection fraction was measured at 60%.       Cardiac Catheterization 6/1/15  CINEANGIOGRAPHY:   1.  Left main: The left main coronary artery was free of disease bifurcating  into the left anterior descending and left circumflex coronary arteries. There  was no significant stenosis noted. Mild calcification was observed.   2.  Left anterior descending: There is mild calcification in the proximal and  midsegments of the left anterior descending. There is no evident high-grade  stenosis, but mild plaquing is noted in the midsegment. The distal segment is  large and has a significant apical branch that is free of significant disease.  There is a major diagonal branch arising from the proximal segment that is free  of significant disease.   3.  Left circumflex: The left circumflex coronary artery and terminal obtuse  marginal branches are free of significant disease.   4.  Right coronary artery: Diffuse calcification is seen in the right coronary  artery.  There is a 30% stenosis in the proximal segment and a 75% stenosis in  the distal segment. Beyond this narrowing the artery gives rise to the  posterior  descending and several large posterolateral branches.  The distal  right coronary artery and posterolateral branches as well as the posterior  descending are free of significant disease.   LEFT VENTRICULOGRAPHY: Overall size of the ventricle is normal. Contractility  of the ventricle is within normal limits.   INTERVENTION: The initial stenosis in the mid right coronary artery was 75%.  CHARLEY flow was 3 initially and 3 post.  It is a C lesion.   ASSESSMENT:  1.  Ischemic heart disease  A)  Etiology: Coronary atherosclerosis.   B)  Anatomy: Significant single-vessel coronary disease, but calcification  noted in the left anterior descending as well as noncritical disease.   C.  Physiology: Angina pectoris.  PROCEDURE:  1.  Left heart catheterization.   2.  Coronary arteriography.   3.  Left ventriculography.   4.  Percutaneous transluminal angioplasty with drug-eluting stent.      Herbert Viera M.D.      Past Medical History:   Diagnosis Date   • CAD (coronary artery disease)     cath 5/2012 with 20-30% diffuse LAD, 30-40% ostial diag, small LCx with diffuse 30-50%, large RCA with 60-70% mid (normal FFR of 0.9). Angiographically it was unchanged by cath 5/2015 but due to persistant chest pain, he underwent placement of a ARLETTE (3.5x23 Xience).  Normal Myoview 2/2018.   • Chronic chest pain     likely due to esophagitis, finally resolved 2015   • Diverticulosis    • Esophagitis    • GERD (gastroesophageal reflux disease)    • Hyperlipidemia    • Hypertension     probable hyperaldosteronism       Past Surgical History:   Procedure Laterality Date   • APPENDECTOMY         Prior to Admission medications    Medication Sig Start Date End Date Taking? Authorizing Provider   amitriptyline (ELAVIL) 25 MG tablet every night. 2/15/16  Yes Provider, MD Renzo   aspirin 81 MG tablet Take  by mouth Every Night. 6/19/13  Yes Provider, MD Renzo   atorvastatin (LIPITOR) 40 MG tablet Take 1 tablet by mouth Every Night.  "3/19/19  Yes Etta Quach APRN   Glucosamine-Chondroit-Vit C-Mn (GLUCOSAMINE 1500 COMPLEX) capsule Take  by mouth 2 (Two) Times a Day. 13  Yes Renzo Jeronimo MD   irbesartan (AVAPRO) 300 MG tablet Take 1 tablet by mouth Daily. 19  Yes Etta Quach APRN   isosorbide mononitrate (IMDUR) 60 MG 24 hr tablet Take 0.5 tablets by mouth 2 (Two) Times a Day. 1/2 tablet bid 19  Yes Etta Quach APRN   metoprolol succinate XL (TOPROL-XL) 50 MG 24 hr tablet Take 1 tablet by mouth Daily. 19  Yes Etta Quach APRN   pantoprazole (PROTONIX) 40 MG EC tablet Daily. 16  Yes Renzo Jeronimo MD   Saw Palmetto 160 MG tablet Take 2 tablets by mouth 2 (Two) Times a Day. 13  Yes Renzo Jeronimo MD       Social History     Socioeconomic History   • Marital status:      Spouse name: Not on file   • Number of children: Not on file   • Years of education: Not on file   • Highest education level: Not on file   Tobacco Use   • Smoking status: Former Smoker     Last attempt to quit: 1987     Years since quittin.9   • Smokeless tobacco: Never Used   Substance and Sexual Activity   • Alcohol use: Yes     Comment: No caffeine use   • Drug use: No   • Sexual activity: No     Birth control/protection: None       Family History   Problem Relation Age of Onset   • Heart disease Other    • Stroke Other    • Heart disease Father        Review of Systems   Respiratory: Positive for chest tightness.    All other systems reviewed and are negative.       Objective:     Vitals:    19 0741 19 0800 19 0905 19 0906   BP: 133/90  149/99    BP Location: Right arm  Right arm    Patient Position: Lying  Lying    Pulse:    77   Resp: 18 18 18    Temp: 98 °F (36.7 °C)      TempSrc: Oral      SpO2:       Weight:       Height:         Body mass index is 28.19 kg/m².  Flowsheet Rows      First Filed Value   Admission Height  177.8 cm (70\") Documented at 07/10/2019 " 2007   Admission Weight  90.8 kg (200 lb 1.6 oz) Documented at 07/10/2019 2022          Physical Exam   Constitutional: He is oriented to person, place, and time. He appears well-developed and well-nourished.   HENT:   Head: Normocephalic.   Nose: Nose normal.   Mouth/Throat: Oropharynx is clear and moist.   Eyes: Conjunctivae and EOM are normal. Pupils are equal, round, and reactive to light.   Neck: Normal range of motion. No JVD present.   Cardiovascular: Normal rate, regular rhythm, normal heart sounds and intact distal pulses.   Pulmonary/Chest: Effort normal and breath sounds normal.   Abdominal: Soft. He exhibits no mass. There is no tenderness.   Musculoskeletal: Normal range of motion. He exhibits no edema.   Neurological: He is alert and oriented to person, place, and time. No cranial nerve deficit.   Skin: Skin is warm and dry. No erythema.   Psychiatric: He has a normal mood and affect. His behavior is normal. Judgment and thought content normal.   Vitals reviewed.              Lab Review:                Results from last 7 days   Lab Units 07/10/19  2028   SODIUM mmol/L 143   POTASSIUM mmol/L 4.1   CHLORIDE mmol/L 103   CO2 mmol/L 30.3*   BUN mg/dL 22   CREATININE mg/dL 1.26   GLUCOSE mg/dL 106*   CALCIUM mg/dL 9.6     Results from last 7 days   Lab Units 07/10/19  2240 07/10/19  2028   TROPONIN T ng/mL <0.010 <0.010     Results from last 7 days   Lab Units 07/10/19  2028   WBC 10*3/mm3 9.19   HEMOGLOBIN g/dL 14.0   HEMATOCRIT % 43.6   PLATELETS 10*3/mm3 266                     EKG 7/10/19      Baseline EKG 2/13/19      I personally viewed and interpreted the patient's EKG/Telemetry data -- NSR, normal EKG    Assessment/Plan:     1.  Precordial pain  2.  CAD (coronary artery disease)  3.  Hypertension    He has known small vessel disease and has undergone previous PCI for large vessel disease, and continued to have pain until his esophagitis was treated.  Then he did well for a while.  Now he's  describing fairly typical angina and is still experiencing it with exertion today despite nitropaste and normalization of blood pressure.  We will proceed with coronary angiography today.    After that I'll remove the nitropaste and readdress his BP.  I suspect it was acutely high in the ED due to pain and the stress of the whole thing.

## 2019-07-12 VITALS
BODY MASS INDEX: 28.13 KG/M2 | OXYGEN SATURATION: 98 % | WEIGHT: 196.5 LBS | HEART RATE: 68 BPM | RESPIRATION RATE: 16 BRPM | HEIGHT: 70 IN | TEMPERATURE: 97.9 F | SYSTOLIC BLOOD PRESSURE: 151 MMHG | DIASTOLIC BLOOD PRESSURE: 90 MMHG

## 2019-07-12 LAB
ANION GAP SERPL CALCULATED.3IONS-SCNC: 10.6 MMOL/L (ref 5–15)
BUN BLD-MCNC: 22 MG/DL (ref 8–23)
BUN/CREAT SERPL: 21.6 (ref 7–25)
CALCIUM SPEC-SCNC: 8.3 MG/DL (ref 8.6–10.5)
CHLORIDE SERPL-SCNC: 103 MMOL/L (ref 98–107)
CHOLEST SERPL-MCNC: 100 MG/DL (ref 0–200)
CO2 SERPL-SCNC: 23.4 MMOL/L (ref 22–29)
CREAT BLD-MCNC: 1.02 MG/DL (ref 0.76–1.27)
DEPRECATED RDW RBC AUTO: 46.5 FL (ref 37–54)
ERYTHROCYTE [DISTWIDTH] IN BLOOD BY AUTOMATED COUNT: 13.6 % (ref 12.3–15.4)
GFR SERPL CREATININE-BSD FRML MDRD: 72 ML/MIN/1.73
GLUCOSE BLD-MCNC: 133 MG/DL (ref 65–99)
HBA1C MFR BLD: 5.4 % (ref 4.8–5.6)
HCT VFR BLD AUTO: 39.9 % (ref 37.5–51)
HDLC SERPL-MCNC: 42 MG/DL (ref 40–60)
HGB BLD-MCNC: 13.1 G/DL (ref 13–17.7)
LDLC SERPL CALC-MCNC: 41 MG/DL (ref 0–100)
LDLC/HDLC SERPL: 0.97 {RATIO}
MCH RBC QN AUTO: 30.8 PG (ref 26.6–33)
MCHC RBC AUTO-ENTMCNC: 32.8 G/DL (ref 31.5–35.7)
MCV RBC AUTO: 93.7 FL (ref 79–97)
PLATELET # BLD AUTO: 256 10*3/MM3 (ref 140–450)
PMV BLD AUTO: 9.3 FL (ref 6–12)
POTASSIUM BLD-SCNC: 3.6 MMOL/L (ref 3.5–5.2)
RBC # BLD AUTO: 4.26 10*6/MM3 (ref 4.14–5.8)
SODIUM BLD-SCNC: 137 MMOL/L (ref 136–145)
TRIGL SERPL-MCNC: 87 MG/DL (ref 0–150)
VLDLC SERPL-MCNC: 17.4 MG/DL (ref 5–40)
WBC NRBC COR # BLD: 7.17 10*3/MM3 (ref 3.4–10.8)

## 2019-07-12 PROCEDURE — A9270 NON-COVERED ITEM OR SERVICE: HCPCS | Performed by: INTERNAL MEDICINE

## 2019-07-12 PROCEDURE — 93005 ELECTROCARDIOGRAM TRACING: CPT | Performed by: NURSE PRACTITIONER

## 2019-07-12 PROCEDURE — 63710000001 ASPIRIN 81 MG TABLET DELAYED-RELEASE: Performed by: NURSE PRACTITIONER

## 2019-07-12 PROCEDURE — 80048 BASIC METABOLIC PNL TOTAL CA: CPT | Performed by: NURSE PRACTITIONER

## 2019-07-12 PROCEDURE — A9270 NON-COVERED ITEM OR SERVICE: HCPCS | Performed by: NURSE PRACTITIONER

## 2019-07-12 PROCEDURE — G0378 HOSPITAL OBSERVATION PER HR: HCPCS

## 2019-07-12 PROCEDURE — 85027 COMPLETE CBC AUTOMATED: CPT | Performed by: NURSE PRACTITIONER

## 2019-07-12 PROCEDURE — 63710000001 ISOSORBIDE MONONITRATE 30 MG TABLET SUSTAINED-RELEASE 24 HOUR: Performed by: INTERNAL MEDICINE

## 2019-07-12 PROCEDURE — 63710000001 METOPROLOL SUCCINATE XL 50 MG TABLET SUSTAINED-RELEASE 24 HOUR: Performed by: INTERNAL MEDICINE

## 2019-07-12 PROCEDURE — 80061 LIPID PANEL: CPT | Performed by: NURSE PRACTITIONER

## 2019-07-12 PROCEDURE — 99217 PR OBSERVATION CARE DISCHARGE MANAGEMENT: CPT | Performed by: NURSE PRACTITIONER

## 2019-07-12 PROCEDURE — 93010 ELECTROCARDIOGRAM REPORT: CPT | Performed by: INTERNAL MEDICINE

## 2019-07-12 PROCEDURE — 63710000001 LOSARTAN 100 MG TABLET: Performed by: NURSE PRACTITIONER

## 2019-07-12 PROCEDURE — 83036 HEMOGLOBIN GLYCOSYLATED A1C: CPT | Performed by: NURSE PRACTITIONER

## 2019-07-12 RX ORDER — LOSARTAN POTASSIUM 100 MG/1
100 TABLET ORAL
Status: DISCONTINUED | OUTPATIENT
Start: 2019-07-12 | End: 2019-07-12 | Stop reason: HOSPADM

## 2019-07-12 RX ORDER — ATORVASTATIN CALCIUM 20 MG/1
40 TABLET, FILM COATED ORAL NIGHTLY
Status: DISCONTINUED | OUTPATIENT
Start: 2019-07-12 | End: 2019-07-12 | Stop reason: HOSPADM

## 2019-07-12 RX ORDER — ASPIRIN 81 MG/1
81 TABLET ORAL DAILY
Status: DISCONTINUED | OUTPATIENT
Start: 2019-07-12 | End: 2019-07-12 | Stop reason: HOSPADM

## 2019-07-12 RX ORDER — PANTOPRAZOLE SODIUM 40 MG/1
40 TABLET, DELAYED RELEASE ORAL
Status: DISCONTINUED | OUTPATIENT
Start: 2019-07-12 | End: 2019-07-12 | Stop reason: HOSPADM

## 2019-07-12 RX ADMIN — LOSARTAN POTASSIUM 100 MG: 100 TABLET, FILM COATED ORAL at 13:02

## 2019-07-12 RX ADMIN — ISOSORBIDE MONONITRATE 30 MG: 30 TABLET ORAL at 08:56

## 2019-07-12 RX ADMIN — SODIUM CHLORIDE 100 ML/HR: 9 INJECTION, SOLUTION INTRAVENOUS at 07:17

## 2019-07-12 RX ADMIN — METOPROLOL SUCCINATE 50 MG: 50 TABLET, FILM COATED, EXTENDED RELEASE ORAL at 08:56

## 2019-07-12 RX ADMIN — ASPIRIN 81 MG: 81 TABLET, COATED ORAL at 13:02

## 2019-07-12 NOTE — DISCHARGE SUMMARY
Hospital Discharge    Patient Name: Kyle Amador  Age/Sex: 68 y.o. male  : 1950  MRN: [id number]    Encounter Provider: SAUL Powers  Referring Provider: Ruth Brunner MD  Place of Service: [facility] C[facility]IOLOG[facility]  Patient Care Team:  Harinder Lea DO as PCP - General  Harinder Lea DO as PCP - Family Medicine  Harinder Lea DO as PCP - Claims Attributed         Date of Discharge:  2019   Date of Admit: 7/10/2019    Discharge Condition: Stable  Discharge Diagnosis:    Precordial pain    CAD (coronary artery disease)    Hypertension      Hospital Course:   Kyle Amador is a 68 y.o. male followed by Dr. Velazquez with chronic chest pain and known coronary artery disease with small vessel disease as well as stenting to large RCA. He presented to the emergency room on 7/10 with complaints of chest pressure and dyspnea that occurred while on the treadmill and resolved with rest. It then recurred while at rest. He ruled out for myocardial infarction. His blood pressure was significantly elevated but quickly normalized. He underwent cardiac catheterization on  which showed nonobstructive disease and widely patent RCA. He was monitored overnight. His blood pressure has remained stable and he has not had additional chest pain. He has an appointment on  already scheduled with Dr. Velazquez which he will keep. He will monitor his blood pressure at home and call with problems.     Objective:  Temp:  [97.9 °F (36.6 °C)-98.4 °F (36.9 °C)] 97.9 °F (36.6 °C)  Heart Rate:  [64-90] 70  Resp:  [16-17] 16  BP: (134-169)/() 146/89    Intake/Output Summary (Last 24 hours) at 2019 1354  Last data filed at 2019 1321  Gross per 24 hour   Intake 2458.33 ml   Output --   Net 2458.33 ml     Body mass index is 28.19 kg/m².      07/10/19  2022 07/11/19  0108   Weight: 90.8 kg (200 lb 1.6 oz) 89.1 kg (196 lb 8 oz)     Weight change:      Physical Exam:  Constitutional: He is oriented to person, place, and time. He appears well-developed. He does not appear ill.   Neck: No JVD present.   Cardiovascular: Normal rate, regular rhythm and normal heart sounds.    Pulses:       Posterior tibial pulses are 2+ on the right side, and 2+ on the left side.   Pulmonary/Chest: Effort normal and breath sounds normal.   Abdominal: Soft. Normal appearance and bowel sounds are normal. There is no tenderness.   Musculoskeletal: Normal range of motion.        Right shoulder: He exhibits no deformity.        Left shoulder: He exhibits no deformity.   Neurological: He is alert and oriented to person, place, and time. He has normal strength.   Skin: Skin is warm, dry and intact. No rash noted.   Psychiatric: He has a normal mood and affect. His behavior is normal. Thought content normal.   Vitals reviewed  Right radial cath site - soft, no hematoma. Pulses intact.     Procedures Performed  Procedure(s):  Left Heart Cath       FINDINGS:     1. HEMODYNAMICS:  /5, /70.     2. LEFT VENTRICULOGRAPHY: EF 60-65, LVEDP 4-8mmHg     3. CORONARY ANGIOGRAPHY:   Left main: Medium caliber LMCA which is normal. Bifurcates to give LAD and circumflex.   LAD: Medium caliber LAD which is heavily calcified. There is mild mid LAD plaque. There is a large caliber first diagonal which is normal.  Left circumflex: Medium caliber. Mild stenosis, <40% in the mid segment. The first and second OM branches are normal.  RCA: Large caliebr, dominant RCA which is has a 30-40% mid vessel stenosis. The distal RCA stent is widely patent. The rPDA and PL are medium caliber are normal.      SUMMARY: No evidence of obstructive CAD. The RCA stent is widely patent.      RECOMMENDATIONS: BP management. Risk factor modification.        Consults:  Consults     Date and Time Order Name Status Description    7/10/2019 4040 JENSG (on-call MD unless specified) Completed           Pertinent Test  Results:  Results from last 7 days   Lab Units 07/12/19  0536 07/10/19  2028   SODIUM mmol/L 137 143   POTASSIUM mmol/L 3.6 4.1   CHLORIDE mmol/L 103 103   CO2 mmol/L 23.4 30.3*   BUN mg/dL 22 22   CREATININE mg/dL 1.02 1.26   GLUCOSE mg/dL 133* 106*   CALCIUM mg/dL 8.3* 9.6   AST (SGOT) U/L  --  20   ALT (SGPT) U/L  --  19     Results from last 7 days   Lab Units 07/10/19  2240 07/10/19  2028   TROPONIN T ng/mL <0.010 <0.010     Results from last 7 days   Lab Units 07/12/19  0536 07/10/19  2028   WBC 10*3/mm3 7.17 9.19   HEMOGLOBIN g/dL 13.1 14.0   HEMATOCRIT % 39.9 43.6   PLATELETS 10*3/mm3 256 266             Results from last 7 days   Lab Units 07/12/19 0536   CHOLESTEROL mg/dL 100   TRIGLYCERIDES mg/dL 87   HDL CHOL mg/dL 42               Discharge Medications     Discharge Medications      Continue These Medications      Instructions Start Date   amitriptyline 25 MG tablet  Commonly known as:  ELAVIL   Nightly      aspirin 81 MG tablet   Oral, Nightly      atorvastatin 40 MG tablet  Commonly known as:  LIPITOR   40 mg, Oral, Nightly      GLUCOSAMINE 1500 COMPLEX capsule   Oral, 2 Times Daily      irbesartan 300 MG tablet  Commonly known as:  AVAPRO   300 mg, Oral, Daily      isosorbide mononitrate 60 MG 24 hr tablet  Commonly known as:  IMDUR   30 mg, Oral, 2 Times Daily, 1/2 tablet bid       metoprolol succinate XL 50 MG 24 hr tablet  Commonly known as:  TOPROL-XL   50 mg, Oral, Daily      pantoprazole 40 MG EC tablet  Commonly known as:  PROTONIX   Daily      Saw Palmetto 160 MG tablet   2 tablets, Oral, 2 Times Daily             Discharge Diet:    Dietary Orders (From admission, onward)    Start     Ordered    07/11/19 2140  Diet Regular; Cardiac  Diet Effective Now     Question Answer Comment   Diet Texture / Consistency Regular    Common Modifiers Cardiac        07/11/19 2139          Activity at Discharge:  Routine post cardiac catheterization/PCI discharge home care instructions:    1. No submerging  procedure site below water for 7-10 days.  2. No lifting objects greater than 1 lbs for 3 days.  3. If groin site used, avoid climbing several flights of stairs or sitting for longer than 2 hours at a time for the next 24 hours.   4. Monitor puncture site for bleeding and/or knots;. If bleeding should occur at the groin site: lie flat, apply pressure and return to the ER. If bleeding should occur at the wrist site, apply pressure and return to the ER.  5.  You may apply a DRY Band-Aid over the puncture site if needed. Do not apply any lotions, salves or ointments to site.  6. No driving for 24 hrs  7. Return to ER for recurrent symptoms.  8. No smoking.  9. Take all medications as prescribed.      Discharge disposition: home     Discharge Instructions and Follow ups:  Future Appointments   Date Time Provider Department Center   8/21/2019 10:15 AM Aquiles Velazquez MD [id number] None     Additional Instructions for the Follow-ups that You Need to Schedule     Discharge Follow-up with Specified Provider: Keep appointment with Dr. Velazquez on 8/21   As directed      To:  Keep appointment with Dr. Velazquez on 8/21           Follow-up Information     Harinder Lea DO .    Specialty:  Family Medicine  Contact information:  [address] [place] [id number] [phone]                   Test Results Pending at Discharge: none     SAUL Powers  07/12/19  1:54 PM

## 2019-07-12 NOTE — PLAN OF CARE
Problem: Patient Care Overview  Goal: Plan of Care Review  Outcome: Ongoing (interventions implemented as appropriate)   07/11/19 1815   Coping/Psychosocial   Plan of Care Reviewed With patient   Plan of Care Review   Progress improving   OTHER   Outcome Summary VSS, no c/o sob or chest pain today, on room air, awaiting cardiac cath today , remains NPO      Goal: Individualization and Mutuality  Outcome: Ongoing (interventions implemented as appropriate)   07/11/19 1815   Individualization   Patient Specific Preferences likes door closed, television on, lights dimmed in room    Patient Specific Goals (Include Timeframe) pain controlled, medication regime tolerated, no sob    Patient Specific Interventions monitor vitals and oxygen sats, assess for c/o pain, cardiac monitor    Mutuality/Individual Preferences   What Anxieties, Fears, Concerns, or Questions Do You Have About Your Care? length of hospital stay, chest pain, tests ordered    What Information Would Help Us Give You More Personalized Care? provide patient with daily updates re: plan of care progress    How Would You and/or Your Support Person Like to Participate in Your Care? involve patient in plan of care and decision making    Mutuality/Individual Preferences   How to Address Anxieties/Fears discuss fears / concerns with [facility] staff / physicians      Goal: Discharge Needs Assessment  Outcome: Ongoing (interventions implemented as appropriate)   07/11/19 1815   Discharge Needs Assessment   Readmission Within the Last 30 Days no previous admission in last 30 days   Concerns to be Addressed no discharge needs identified;denies needs/concerns at this time   Patient/Family Anticipates Transition to home with family   Patient/Family Anticipated Services at Transition none   Transportation Anticipated family or friend will provide   Anticipated Changes Related to Illness none   Equipment Needed After Discharge none   Discharge Coordination/Progress plans on  going home with family assistance    Disability   Equipment Currently Used at Home none       Problem: Pain, Chronic (Adult)  Goal: Identify Related Risk Factors and Signs and Symptoms  Outcome: Ongoing (interventions implemented as appropriate)    Goal: Acceptable Pain/Comfort Level and Functional Ability  Outcome: Ongoing (interventions implemented as appropriate)   07/11/19 0928   Pain, Chronic (Adult)   Acceptable Pain/Comfort Level and Functional Ability making progress toward outcome

## 2019-07-16 ENCOUNTER — OFFICE (OUTPATIENT)
Dept: URBAN - METROPOLITAN AREA CLINIC 66 | Facility: CLINIC | Age: 69
End: 2019-07-16

## 2019-07-16 VITALS
HEIGHT: 70 IN | WEIGHT: 194 LBS | DIASTOLIC BLOOD PRESSURE: 90 MMHG | HEART RATE: 78 BPM | SYSTOLIC BLOOD PRESSURE: 144 MMHG

## 2019-07-16 DIAGNOSIS — K30 FUNCTIONAL DYSPEPSIA: ICD-10-CM

## 2019-07-16 DIAGNOSIS — R68.81 EARLY SATIETY: ICD-10-CM

## 2019-07-16 DIAGNOSIS — R07.89 OTHER CHEST PAIN: ICD-10-CM

## 2019-07-16 PROCEDURE — 99213 OFFICE O/P EST LOW 20 MIN: CPT | Performed by: NURSE PRACTITIONER

## 2019-07-16 RX ORDER — SUCRALFATE 1 G/1
2 TABLET ORAL
Qty: 60 | Refills: 1 | Status: ACTIVE
Start: 2019-07-16

## 2019-08-14 ENCOUNTER — ANESTHESIA EVENT (OUTPATIENT)
Dept: GASTROENTEROLOGY | Facility: HOSPITAL | Age: 69
End: 2019-08-14

## 2019-08-14 ENCOUNTER — ON CAMPUS - OUTPATIENT (OUTPATIENT)
Dept: URBAN - METROPOLITAN AREA HOSPITAL 114 | Facility: HOSPITAL | Age: 69
End: 2019-08-14

## 2019-08-14 ENCOUNTER — ANESTHESIA (OUTPATIENT)
Dept: GASTROENTEROLOGY | Facility: HOSPITAL | Age: 69
End: 2019-08-14

## 2019-08-14 ENCOUNTER — HOSPITAL ENCOUNTER (OUTPATIENT)
Facility: HOSPITAL | Age: 69
Setting detail: HOSPITAL OUTPATIENT SURGERY
Discharge: HOME | End: 2019-08-14
Attending: INTERNAL MEDICINE | Admitting: INTERNAL MEDICINE

## 2019-08-14 VITALS
SYSTOLIC BLOOD PRESSURE: 140 MMHG | RESPIRATION RATE: 14 BRPM | BODY MASS INDEX: 27.64 KG/M2 | WEIGHT: 193.1 LBS | TEMPERATURE: 97.9 F | OXYGEN SATURATION: 96 % | DIASTOLIC BLOOD PRESSURE: 90 MMHG | HEIGHT: 70 IN | HEART RATE: 63 BPM

## 2019-08-14 DIAGNOSIS — K44.9 DIAPHRAGMATIC HERNIA WITHOUT OBSTRUCTION OR GANGRENE: ICD-10-CM

## 2019-08-14 DIAGNOSIS — R07.9 CHEST PAIN: ICD-10-CM

## 2019-08-14 DIAGNOSIS — K29.50 UNSPECIFIED CHRONIC GASTRITIS WITHOUT BLEEDING: ICD-10-CM

## 2019-08-14 DIAGNOSIS — R07.89 OTHER CHEST PAIN: ICD-10-CM

## 2019-08-14 DIAGNOSIS — K20.8 OTHER ESOPHAGITIS: ICD-10-CM

## 2019-08-14 PROCEDURE — 25010000002 PROPOFOL 10 MG/ML EMULSION: Performed by: ANESTHESIOLOGY

## 2019-08-14 PROCEDURE — 43239 EGD BIOPSY SINGLE/MULTIPLE: CPT | Performed by: INTERNAL MEDICINE

## 2019-08-14 PROCEDURE — 88305 TISSUE EXAM BY PATHOLOGIST: CPT | Performed by: INTERNAL MEDICINE

## 2019-08-14 RX ORDER — PROPOFOL 10 MG/ML
VIAL (ML) INTRAVENOUS CONTINUOUS PRN
Status: DISCONTINUED | OUTPATIENT
Start: 2019-08-14 | End: 2019-08-14 | Stop reason: SURG

## 2019-08-14 RX ORDER — SODIUM CHLORIDE, SODIUM LACTATE, POTASSIUM CHLORIDE, CALCIUM CHLORIDE 600; 310; 30; 20 MG/100ML; MG/100ML; MG/100ML; MG/100ML
30 INJECTION, SOLUTION INTRAVENOUS CONTINUOUS PRN
Status: DISCONTINUED | OUTPATIENT
Start: 2019-08-14 | End: 2019-08-14 | Stop reason: HOSPADM

## 2019-08-14 RX ORDER — SODIUM CHLORIDE 0.9 % (FLUSH) 0.9 %
3 SYRINGE (ML) INJECTION EVERY 12 HOURS SCHEDULED
Status: DISCONTINUED | OUTPATIENT
Start: 2019-08-14 | End: 2019-08-14 | Stop reason: HOSPADM

## 2019-08-14 RX ORDER — SODIUM CHLORIDE 0.9 % (FLUSH) 0.9 %
3-10 SYRINGE (ML) INJECTION AS NEEDED
Status: DISCONTINUED | OUTPATIENT
Start: 2019-08-14 | End: 2019-08-14 | Stop reason: HOSPADM

## 2019-08-14 RX ORDER — PROPOFOL 10 MG/ML
VIAL (ML) INTRAVENOUS AS NEEDED
Status: DISCONTINUED | OUTPATIENT
Start: 2019-08-14 | End: 2019-08-14 | Stop reason: SURG

## 2019-08-14 RX ORDER — LIDOCAINE HYDROCHLORIDE 20 MG/ML
INJECTION, SOLUTION INFILTRATION; PERINEURAL AS NEEDED
Status: DISCONTINUED | OUTPATIENT
Start: 2019-08-14 | End: 2019-08-14 | Stop reason: SURG

## 2019-08-14 RX ADMIN — PROPOFOL 100 MG: 10 INJECTION, EMULSION INTRAVENOUS at 08:43

## 2019-08-14 RX ADMIN — SODIUM CHLORIDE, POTASSIUM CHLORIDE, SODIUM LACTATE AND CALCIUM CHLORIDE 30 ML/HR: 600; 310; 30; 20 INJECTION, SOLUTION INTRAVENOUS at 07:39

## 2019-08-14 RX ADMIN — PROPOFOL 100 MCG/KG/MIN: 10 INJECTION, EMULSION INTRAVENOUS at 08:43

## 2019-08-14 RX ADMIN — LIDOCAINE HYDROCHLORIDE 60 MG: 20 INJECTION, SOLUTION INFILTRATION; PERINEURAL at 08:43

## 2019-08-14 NOTE — H&P
Patient Care Team:  Harinder Lea DO as PCP - General  Harinder Lea DO as PCP - Family Medicine  Harinder Lea DO as PCP - Claims Attributed    Chief complaint chest pain    Subjective     History of Present Illness  Had recent event with chest pain and increased blood pressure this has resolved, no issues with eating,  Swallowing, bowel activity is okk   Review of Systems   All other systems reviewed and are negative.       Past Medical History:   Diagnosis Date   • CAD (coronary artery disease)     cath 5/2012 with 20-30% diffuse LAD, 30-40% ostial diag, small LCx with diffuse 30-50%, large RCA with 60-70% mid (normal FFR of 0.9). Angiographically it was unchanged by cath 5/2015 but due to persistant chest pain, he underwent placement of a ARLETTE (3.5x23 Xience).  Normal Myoview 2/2018.   • Chronic chest pain     likely due to esophagitis, finally resolved 2015   • Diverticulosis    • Esophagitis    • GERD (gastroesophageal reflux disease)    • Hyperlipidemia    • Hypertension     probable hyperaldosteronism   • Kidney stone      Past Surgical History:   Procedure Laterality Date   • APPENDECTOMY     • CARDIAC CATHETERIZATION N/A 7/11/2019    Procedure: Left Heart Cath;  Surgeon: Charo Weiss MD;  Location:  [facility];  Service: Cardiology   • CARDIAC CATHETERIZATION N/A 7/11/2019    Procedure: Coronary angiography;  Surgeon: Charo Weiss MD;  Location:  [facility];  Service: Cardiology   • CARDIAC CATHETERIZATION N/A 7/11/2019    Procedure: Left ventriculography;  Surgeon: Charo Weiss MD;  Location:  [facility];  Service: Cardiology   • COLONOSCOPY     • KIDNEY STONE SURGERY       Family History   Problem Relation Age of Onset   • Heart disease Other    • Stroke Other    • Heart disease Father      Social History     Tobacco Use   • Smoking status: Former Smoker     Last attempt to quit: 8/9/1987     Years since quitting:  32.0   • Smokeless tobacco: Never Used   Substance Use Topics   • Alcohol use: Yes     Comment: MONTHLY    • Drug use: No     Medications Prior to Admission   Medication Sig Dispense Refill Last Dose   • amitriptyline (ELAVIL) 25 MG tablet every night.   8/13/2019 at Unknown time   • aspirin 81 MG tablet Take  by mouth Every Night.   8/13/2019 at Unknown time   • atorvastatin (LIPITOR) 40 MG tablet Take 1 tablet by mouth Every Night. 90 tablet 1 8/13/2019 at Unknown time   • Glucosamine-Chondroit-Vit C-Mn (GLUCOSAMINE 1500 COMPLEX) capsule Take  by mouth 2 (Two) Times a Day.   8/13/2019 at Unknown time   • irbesartan (AVAPRO) 300 MG tablet Take 1 tablet by mouth Daily. 90 tablet 2 8/13/2019 at Unknown time   • isosorbide mononitrate (IMDUR) 60 MG 24 hr tablet Take 0.5 tablets by mouth 2 (Two) Times a Day. 1/2 tablet bid 90 tablet 2 8/13/2019 at Unknown time   • metoprolol succinate XL (TOPROL-XL) 50 MG 24 hr tablet Take 1 tablet by mouth Daily. 90 tablet 2 8/13/2019 at Unknown time   • pantoprazole (PROTONIX) 40 MG EC tablet Daily.   8/13/2019 at Unknown time   • Saw Palmetto 160 MG tablet Take 2 tablets by mouth 2 (Two) Times a Day.   8/13/2019 at Unknown time     Allergies:  Amlodipine; Penicillins; and Spironolactone    Objective      Vital Signs  Temp:  [97.9 °F (36.6 °C)] 97.9 °F (36.6 °C)  Heart Rate:  [61] 61  Resp:  [16] 16  BP: (159)/(86) 159/86    Physical Exam   Constitutional: He is oriented to person, place, and time. He appears well-developed and well-nourished.   HENT:   Mouth/Throat: Oropharynx is clear and moist.   Eyes: Conjunctivae are normal.   Neck: Neck supple.   Cardiovascular: Normal rate and regular rhythm.   Pulmonary/Chest: Effort normal and breath sounds normal.   Abdominal: Soft. Bowel sounds are normal.   Neurological: He is alert and oriented to person, place, and time.   Skin: Skin is warm and dry.   Psychiatric: He has a normal mood and affect.       Results Review:   I reviewed the  patient's new clinical results.      Assessment/Plan       * No active hospital problems. *      Assessment:  (Chest pain  Suspected reflux disease ).     Plan:   (Upper tract endoscopy, risks, alternatives and benefits discussed with patient and patient is agreeable to proceed).       I discussed the patients findings and my recommendations with patient and nursing staff    Alvino Hodges MD  08/14/19  8:46 AM

## 2019-08-14 NOTE — ANESTHESIA PREPROCEDURE EVALUATION
Anesthesia Evaluation     Nursing notes reviewed                Airway   Dental      Pulmonary - normal exam   (+) a smoker Former,   Cardiovascular - normal exam    (+) hypertension, CAD,   (-) angina      Neuro/Psych  GI/Hepatic/Renal/Endo    (+)  GERD,      Musculoskeletal     Abdominal    Substance History      OB/GYN          Other                        Anesthesia Plan    ASA 3     MAC     Anesthetic plan, all risks, benefits, and alternatives have been provided, discussed and informed consent has been obtained with: patient.

## 2019-08-14 NOTE — ANESTHESIA POSTPROCEDURE EVALUATION
Patient: Kyle Amador    Procedure Summary     Date:  08/14/19 Room / Location:   [facility]    Anesthesia Start:  0843 Anesthesia Stop:  0902    Procedure:  ESOPHAGOGASTRODUODENOSCOPY with Bx's (N/A Esophagus) Diagnosis:      Surgeon:  Alvino Hodges MD Provider:  Elias Figueroa MD    Anesthesia Type:  MAC ASA Status:  3          Anesthesia Type: MAC  Last vitals  BP   139/84 (08/14/19 0900)   Temp   36.6 °C (97.9 °F) (08/14/19 0732)   Pulse   68 (08/14/19 0900)   Resp   12 (08/14/19 0900)     SpO2   94 % (08/14/19 0900)     Post Anesthesia Care and Evaluation    Patient location during evaluation: PHASE II  Anesthetic complications: No anesthetic complications

## 2019-08-15 LAB
CYTO UR: NORMAL
LAB AP CASE REPORT: NORMAL
PATH REPORT.FINAL DX SPEC: NORMAL
PATH REPORT.GROSS SPEC: NORMAL

## 2019-08-21 ENCOUNTER — OFFICE VISIT (OUTPATIENT)
Dept: CARDIOLOGY | Facility: CLINIC | Age: 69
End: 2019-08-21

## 2019-08-21 VITALS
HEIGHT: 70 IN | DIASTOLIC BLOOD PRESSURE: 90 MMHG | SYSTOLIC BLOOD PRESSURE: 150 MMHG | WEIGHT: 198.4 LBS | BODY MASS INDEX: 28.4 KG/M2 | HEART RATE: 75 BPM

## 2019-08-21 DIAGNOSIS — I25.10 CORONARY ARTERY DISEASE INVOLVING NATIVE CORONARY ARTERY OF NATIVE HEART WITHOUT ANGINA PECTORIS: Primary | ICD-10-CM

## 2019-08-21 DIAGNOSIS — I10 ESSENTIAL HYPERTENSION: ICD-10-CM

## 2019-08-21 PROBLEM — R07.2 PRECORDIAL PAIN: Status: RESOLVED | Noted: 2018-02-05 | Resolved: 2019-08-21

## 2019-08-21 PROCEDURE — 99214 OFFICE O/P EST MOD 30 MIN: CPT | Performed by: INTERNAL MEDICINE

## 2019-08-21 PROCEDURE — 93000 ELECTROCARDIOGRAM COMPLETE: CPT | Performed by: INTERNAL MEDICINE

## 2019-08-21 NOTE — PROGRESS NOTES
Date of Office Visit: 2019  Encounter Provider: Aquiles Velazquez MD  Place of Service: [facility] CARDIOLOGY  Patient Name: Kyle Amador  :1950    Chief Complaint   Patient presents with   • Follow-up   :     HPI: Kyle Amador is a 68 y.o. male who presents today to followup.     He has history of fairly chronic chest pain. He was found to have distal small vessel disease by catheterization in . In , he had another catheterization due to persistent pain and he had a normal FFR of the right coronary artery. In May 2015, his chest pain got much worse and he underwent repeat cardiac catheterization and the right coronary artery was stented. Despite this, his pain continued to worsen and ultimately, he was diagnosed with severe esophagitis. He had a normal Myoview stress in 2018 due to atypical chest pain.  He was hospitalized in 2019 with chest pain again, and underwent coronary angiography; he had 30% disease in the circumflex and distal RCA.  Again, it was felt that his pain was GI in etiology.    He also has a history of severe hypertension. He had terrible leg swelling with 10mg amlodipine but tolerates 5mg daily.  He had a normal renal artery duplex.  He has refused a sleep apnea evaluation.  He was noted to be hypokalemic and was placed on spironolactone; he was seen by Dr. Velasquez who increased this, and his SBP is now much better (consistently in the 130s).      He has generalized fatigue and daytime sleepiness  He has a headache from ISMN and sometimes holds it for up to three days at a time due to medications for ED.  He does not notice any more chest pain on those days. He denies dyspnea, edema, orthopnea, palpitations, or syncope.    Past Medical History:   Diagnosis Date   • CAD (coronary artery disease)     cath 2012 with 20-30% diffuse LAD, 30-40% ostial diag, small LCx with diffuse 30-50%, large RCA with 60-70% mid (normal FFR of 0.9).  Angiographically it was unchanged by cath 2015 but due to persistant chest pain, he underwent placement of a ARLETTE (3.5x23 Xience).  Normal Myoview 2018. Cath 2019: 30 mid LCx, 30% distal RCA   • Chronic chest pain     likely due to esophagitis, finally resolved    • Diverticulosis    • Esophagitis    • GERD (gastroesophageal reflux disease)    • Hyperlipidemia    • Hypertension     probable hyperaldosteronism   • Kidney stone        Past Surgical History:   Procedure Laterality Date   • APPENDECTOMY     • CARDIAC CATHETERIZATION N/A 2019    Procedure: Left Heart Cath;  Surgeon: Charo Weiss MD;  [facility];  Service: Cardiology   • CARDIAC CATHETERIZATION N/A 2019    Procedure: Coronary angiography;  Surgeon: Charo Weiss MD;  Location:  [facility];  Service: Cardiology   • CARDIAC CATHETERIZATION N/A 2019    Procedure: Left ventriculography;  Surgeon: Charo Weiss MD;  [facility];  Service: Cardiology   • COLONOSCOPY     • ENDOSCOPY N/A 2019    Procedure: ESOPHAGOGASTRODUODENOSCOPY with Bx's;  Surgeon: Alvino Hodges MD;  Location: [facility] ENDOSCOPY;  Service: Gastroenterology   • KIDNEY STONE SURGERY         Social History     Socioeconomic History   • Marital status:      Spouse name: Not on file   • Number of children: Not on file   • Years of education: Not on file   • Highest education level: Not on file   Tobacco Use   • Smoking status: Former Smoker     Last attempt to quit: 1987     Years since quittin.0   • Smokeless tobacco: Never Used   Substance and Sexual Activity   • Alcohol use: Yes     Comment: MONTHLY    • Drug use: No   • Sexual activity: No     Birth control/protection: None       Family History   Problem Relation Age of Onset   • Heart disease Other    • Stroke Other    • Heart disease Father    • Diabetes Father    • Hypertension Father        Review of Systems  "  Constitution: Positive for malaise/fatigue.   Cardiovascular: Negative for chest pain and palpitations.   Respiratory: Positive for shortness of breath.    Musculoskeletal: Positive for joint pain.   Neurological: Positive for headaches.   All other systems reviewed and are negative.      Allergies   Allergen Reactions   • Ace Inhibitors Cough   • Amlodipine Other (See Comments)     Lower extremity edema   • Penicillins    • Spironolactone Dizziness         Current Outpatient Medications:   •  amitriptyline (ELAVIL) 25 MG tablet, every night., Disp: , Rfl:   •  aspirin 81 MG tablet, Take  by mouth Every Night., Disp: , Rfl:   •  atorvastatin (LIPITOR) 40 MG tablet, Take 1 tablet by mouth Every Night., Disp: 90 tablet, Rfl: 1  •  Glucosamine-Chondroit-Vit C-Mn (GLUCOSAMINE 1500 COMPLEX) capsule, Take  by mouth 2 (Two) Times a Day., Disp: , Rfl:   •  irbesartan (AVAPRO) 300 MG tablet, Take 1 tablet by mouth Daily., Disp: 90 tablet, Rfl: 2  •  isosorbide mononitrate (IMDUR) 60 MG 24 hr tablet, Take 0.5 tablets by mouth 2 (Two) Times a Day. 1/2 tablet bid, Disp: 90 tablet, Rfl: 2  •  metoprolol succinate XL (TOPROL-XL) 50 MG 24 hr tablet, Take 1 tablet by mouth Daily., Disp: 90 tablet, Rfl: 2  •  pantoprazole (PROTONIX) 40 MG EC tablet, Daily., Disp: , Rfl:   •  Saw Palmetto 160 MG tablet, Take 2 tablets by mouth 2 (Two) Times a Day., Disp: , Rfl:      Objective:     Vitals:    08/21/19 1030   BP: 150/90   BP Location: Left arm   Pulse: 75   Weight: 90 kg (198 lb 6.4 oz)   Height: 177.8 cm (70\")     Body mass index is 28.47 kg/m².    Physical Exam   Constitutional: He is oriented to person, place, and time. He appears well-developed and well-nourished.   HENT:   Head: Normocephalic.   Nose: Nose normal.   Mouth/Throat: Oropharynx is clear and moist.   Eyes: Conjunctivae and EOM are normal. Pupils are equal, round, and reactive to light.   Neck: Normal range of motion. No JVD present.   Cardiovascular: Normal rate, " regular rhythm, normal heart sounds and intact distal pulses.   No murmur heard.  Pulmonary/Chest: Effort normal and breath sounds normal.   Abdominal: Soft. There is no tenderness.   Musculoskeletal: Normal range of motion. He exhibits no edema.   Neurological: He is alert and oriented to person, place, and time. No cranial nerve deficit.   Skin: Skin is warm and dry. No rash noted.   Psychiatric: He has a normal mood and affect. His behavior is normal. Judgment and thought content normal.   Vitals reviewed.        ECG 12 Lead  Date/Time: 8/21/2019 1:56 PM  Performed by: Aquiles Velazquez MD  Authorized by: Aquiles Velazquez MD   Comparison: compared with previous ECG   Similar to previous ECG  Rhythm: sinus rhythm  Ectopy: atrial premature contractions  Q waves: III and aVF    ST Segments: ST segments normal  T Waves: T waves normal  Other: no other findings    Clinical impression: abnormal EKG              Assessment:       Diagnosis Plan   1. Coronary artery disease involving native coronary artery of native heart without angina pectoris     2. Essential hypertension            Plan:       1.  Coronary Artery Disease  Assessment  • The patient has no angina  • He has chronic chest pain and small vessel disease.  He's on aspirin and atorvastatin.  He would like to decrease ISMN to 30mg daily and I'm fine with that.  He states he was asked to consider increasing his pantoprazole to BID dosing but he states he will need a prescription for that.  I will defer that to Dr Lea or Dr Hodges.    Subjective - Objective  • There has been a previous stent procedure using ARLETTE 2015  • Current antiplatelet therapy includes aspirin 81 mg      2.  His BP has greatly improved. At home, his average SBP is in the 130s.  Irbesartan is on backorder so I'm going to order valsartan 320mg daily.  He will let me know if his BP goes up with the reduction in the ISMN dose.     Sincerely,       Aquiles Velazquez MD

## 2019-08-26 RX ORDER — VALSARTAN 320 MG/1
320 TABLET ORAL DAILY
Qty: 90 TABLET | Refills: 2 | Status: SHIPPED | OUTPATIENT
Start: 2019-08-26 | End: 2019-11-28 | Stop reason: HOSPADM

## 2019-09-09 RX ORDER — ATORVASTATIN CALCIUM 40 MG/1
TABLET, FILM COATED ORAL
Qty: 90 TABLET | Refills: 1 | Status: SHIPPED | OUTPATIENT
Start: 2019-09-09 | End: 2020-03-05

## 2019-09-17 ENCOUNTER — OFFICE (OUTPATIENT)
Dept: URBAN - METROPOLITAN AREA CLINIC 66 | Facility: CLINIC | Age: 69
End: 2019-09-17

## 2019-09-17 VITALS
DIASTOLIC BLOOD PRESSURE: 82 MMHG | HEART RATE: 86 BPM | HEIGHT: 70 IN | WEIGHT: 193 LBS | SYSTOLIC BLOOD PRESSURE: 124 MMHG

## 2019-09-17 DIAGNOSIS — K21.9 GASTRO-ESOPHAGEAL REFLUX DISEASE WITHOUT ESOPHAGITIS: ICD-10-CM

## 2019-09-17 DIAGNOSIS — K62.5 HEMORRHAGE OF ANUS AND RECTUM: ICD-10-CM

## 2019-09-17 DIAGNOSIS — K64.9 UNSPECIFIED HEMORRHOIDS: ICD-10-CM

## 2019-09-17 PROCEDURE — 99214 OFFICE O/P EST MOD 30 MIN: CPT | Performed by: INTERNAL MEDICINE

## 2019-09-17 RX ORDER — HYDROCORTISONE 25 MG/G
OINTMENT TOPICAL
Qty: 20 | Refills: 6 | Status: COMPLETED
Start: 2019-09-17 | End: 2024-01-25

## 2019-09-19 RX ORDER — ISOSORBIDE MONONITRATE 60 MG/1
TABLET, EXTENDED RELEASE ORAL
Qty: 90 TABLET | Refills: 0 | Status: SHIPPED | OUTPATIENT
Start: 2019-09-19 | End: 2020-04-16 | Stop reason: SDUPTHER

## 2019-09-24 ENCOUNTER — AMBULATORY SURGICAL CENTER (OUTPATIENT)
Dept: URBAN - METROPOLITAN AREA SURGERY 20 | Facility: SURGERY | Age: 69
End: 2019-09-24

## 2019-09-24 ENCOUNTER — OFFICE (OUTPATIENT)
Dept: URBAN - METROPOLITAN AREA PATHOLOGY 4 | Facility: PATHOLOGY | Age: 69
End: 2019-09-24

## 2019-09-24 VITALS — HEIGHT: 70 IN

## 2019-09-24 DIAGNOSIS — K52.89 OTHER SPECIFIED NONINFECTIVE GASTROENTERITIS AND COLITIS: ICD-10-CM

## 2019-09-24 DIAGNOSIS — K64.2 THIRD DEGREE HEMORRHOIDS: ICD-10-CM

## 2019-09-24 DIAGNOSIS — K62.5 HEMORRHAGE OF ANUS AND RECTUM: ICD-10-CM

## 2019-09-24 DIAGNOSIS — K52.9 NONINFECTIVE GASTROENTERITIS AND COLITIS, UNSPECIFIED: ICD-10-CM

## 2019-09-24 LAB
GI HISTOLOGY: A. SELECT: (no result)
GI HISTOLOGY: PDF REPORT: (no result)

## 2019-09-24 PROCEDURE — 88305 TISSUE EXAM BY PATHOLOGIST: CPT | Performed by: INTERNAL MEDICINE

## 2019-09-24 PROCEDURE — 45380 COLONOSCOPY AND BIOPSY: CPT | Performed by: INTERNAL MEDICINE

## 2019-10-09 ENCOUNTER — OFFICE VISIT (OUTPATIENT)
Dept: SURGERY | Facility: CLINIC | Age: 69
End: 2019-10-09

## 2019-10-09 VITALS
HEART RATE: 74 BPM | TEMPERATURE: 97.6 F | WEIGHT: 194.2 LBS | HEIGHT: 70 IN | OXYGEN SATURATION: 99 % | DIASTOLIC BLOOD PRESSURE: 80 MMHG | SYSTOLIC BLOOD PRESSURE: 180 MMHG | BODY MASS INDEX: 27.8 KG/M2

## 2019-10-09 DIAGNOSIS — K64.4 EXTERNAL HEMORRHOIDS WITH COMPLICATION: ICD-10-CM

## 2019-10-09 DIAGNOSIS — K64.8 INTERNAL HEMORRHOIDS WITH COMPLICATION: Primary | ICD-10-CM

## 2019-10-09 PROCEDURE — 99204 OFFICE O/P NEW MOD 45 MIN: CPT | Performed by: COLON & RECTAL SURGERY

## 2019-10-09 PROCEDURE — 46600 DIAGNOSTIC ANOSCOPY SPX: CPT | Performed by: COLON & RECTAL SURGERY

## 2019-10-09 RX ORDER — BACLOFEN 10 MG/1
TABLET ORAL
Refills: 2 | COMMUNITY
Start: 2019-10-01 | End: 2020-01-27 | Stop reason: ALTCHOICE

## 2019-10-09 RX ORDER — IRBESARTAN 300 MG/1
TABLET ORAL
COMMUNITY
Start: 2019-10-04 | End: 2019-10-09

## 2019-10-09 RX ORDER — SILODOSIN 4 MG/1
4 CAPSULE ORAL
COMMUNITY
End: 2020-01-27

## 2019-10-09 NOTE — PROGRESS NOTES
Kyle Amador is a 68 y.o. male who is seen as a consult at the request of Alvino Hodges MD for rectal bleeding    HPI:    Pt c/o hemorrhoids which bother him  He has intermittent bleeding, which will last for part of 1 day, then resolve on its own  However, the bleeding is heavy enough that he sometimes bleeds through his clothes    He had colonoscopy with Dr. Hodges 9/24/2019: +enlarged hems, patchy erythema with erosion and congestion in sigmoid    HC cream from Dr. Hodges might have helped a little bit    He has regular, daily BMs  He takes fiber tabs and a stool softener every day, which he has done for a long time    He was admitted for CP with subsequent cardiac cath in July with Dr. Weiss  Cardiac stent placement Dr. Viera 2015  Cardiologist Dr. Aquiles Velazquez    Past Medical History:   Diagnosis Date   • Atherosclerosis    • Atypical chest pain 06/11/2018    SEEN AT [facility] ER   • BPH (benign prostatic hyperplasia)    • Bursitis of right shoulder 03/2013   • CAD (coronary artery disease)    • Cardiomegaly 06/2015   • Cataract     BILATERAL   • Cervical spondylosis 09/2016   • Chorioretinal scar     BILATERAL   • Diverticulitis    • Early satiety    • ED (erectile dysfunction)    • Elevated BUN 02/2019   • Esophagitis    • Fatigue    • Functional dyspepsia    • GERD (gastroesophageal reflux disease)    • Heart disease    • Hemorrhoids    • High blood pressure    • High cholesterol    • Hyperlipidemia    • Hypertension     probable hyperaldosteronism   • Hyperthyroidism    • Hypocalcemia 03/2018   • Hypokalemia 06/2018   • Kidney cysts 03/2018    FOLLOWED BY DR. HIRAM EDWARD   • Kidney stones    • Low back pain    • Myopia 12/2018    LEFT EYE   • OA (osteoarthritis)    • Precordial pain    • Seborrheic keratosis    • Trochanteric bursitis of right hip 04/2019       Past Surgical History:   Procedure Laterality Date   • APPENDECTOMY N/A    • CARDIAC CATHETERIZATION N/A 7/11/2019    Procedure: Left Heart Cath;   Surgeon: Charo Weiss MD;  Location:  [facility] INVASIVE LOCATION;  Service: Cardiology   • CARDIAC CATHETERIZATION N/A 7/11/2019    Procedure: Coronary angiography;  Surgeon: Charo Weiss MD;  Location:  [facility] INVASIVE LOCATION;  Service: Cardiology   • CARDIAC CATHETERIZATION N/A 7/11/2019     Left ventriculography; 30 mid LCx, 30% distal RCA  Charo Weiss MD at [facility]   • CARDIAC CATHETERIZATION Left 05/18/2012     20-30% diffuse LAD, 30-40% ostial diag, small LCx with diffuse 30-50%, large RCA with 60-70% mid (normal FFR of 0.9). Angiographically it was unchanged by previous cath DR. ANSELMO FLANAGAN AT [facility]   • CARDIAC CATHETERIZATION Left 05/2015     but due to persistant chest pain, he underwent placement of a ARLETTE (3.5x23 Xience).     • COLONOSCOPY N/A 09/12/2006    DIVERTICULOSIS, MODERATE EXTERNAL HEMORRHOIDS, DR. KAYLAN PINEDA AT [facility]   • CORONARY ANGIOPLASTY WITH STENT PLACEMENT Left 06/01/2015    75% DISTAL RCA STENOSIS, RCA STENT, DR. JUSTIN BERKOWITZ AT [facility]   • ENDOSCOPY N/A 8/14/2019    Z LINE IRREGULAR, SMALL HIATAL HERNIA, MILD INFLAMMATION WITH ERYTHEMA AND FRIABILITY IN GASTRIC BODY, DR. MARTA FLORES AT [facility]   • ENDOSCOPY N/A 06/16/2015    LA GRADE B ESOPHAGITIS, ACUTE GASTRITIS, POSSIBLE PILL ESOPHAGITIS, DR. MARTA FLORES AT [facility]   • ENDOSCOPY AND COLONOSCOPY N/A 06/03/2011    CHRONIC GASTRITIS, HIATAL HERNIA,OTHERWISE NORMAL EGD, DIVERTICULOSIS IN SIGMOID, NON BLEEDING INTERNAL HEMORRHOIDS, OTHERWISE WNL CY, RESCOPE IN 10 YRS, DR. MARTA FLORES AT [facility]   • KIDNEY STONE SURGERY N/A        Social History:   reports that he quit smoking about 32 years ago. His smoking use included cigarettes. He has never used smokeless tobacco. He reports that he drinks alcohol. He reports that he does not use drugs.      Marriage status:     Family History   Problem Relation Age of Onset   • Stroke Other    • Heart disease Father    • Diabetes Father    • Hypertension Father          Current Outpatient  Medications:   •  amitriptyline (ELAVIL) 25 MG tablet, every night., Disp: , Rfl:   •  atorvastatin (LIPITOR) 40 MG tablet, TAKE 1 TABLET BY MOUTH EVERY DAY AT NIGHT, Disp: 90 tablet, Rfl: 1  •  baclofen (LIORESAL) 10 MG tablet, TAKE 1 TABLET BY MOUTH 3 TIMES A DAY AS NEEDED FOR MUSCLE RELAXATION, Disp: , Rfl: 2  •  isosorbide mononitrate (IMDUR) 60 MG 24 hr tablet, TAKE 0.5 TABLETS BY MOUTH 2 (TWO) TIMES A DAY., Disp: 90 tablet, Rfl: 0  •  metoprolol succinate XL (TOPROL-XL) 50 MG 24 hr tablet, Take 1 tablet by mouth Daily., Disp: 90 tablet, Rfl: 2  •  pantoprazole (PROTONIX) 40 MG EC tablet, Daily., Disp: , Rfl:   •  silodosin (RAPAFLO) 4 MG capsule capsule, Take 8 mg by mouth Daily With Breakfast., Disp: , Rfl:   •  valsartan (DIOVAN) 320 MG tablet, Take 1 tablet by mouth Daily., Disp: 90 tablet, Rfl: 2  •  aspirin 81 MG tablet, Take  by mouth Every Night., Disp: , Rfl:   •  Glucosamine-Chondroit-Vit C-Mn (GLUCOSAMINE 1500 COMPLEX) capsule, Take  by mouth 2 (Two) Times a Day., Disp: , Rfl:     Allergy  Ace inhibitors; Amlodipine; Penicillins; and Spironolactone    Review of Systems   Constitution: Negative for decreased appetite, weakness and weight gain.   HENT: Negative for congestion, hearing loss and hoarse voice.    Eyes: Negative for blurred vision, discharge and visual disturbance.   Cardiovascular: Positive for chest pain. Negative for cyanosis and leg swelling.   Respiratory: Negative for cough, shortness of breath, sleep disturbances due to breathing and snoring.    Endocrine: Negative for cold intolerance and heat intolerance.   Hematologic/Lymphatic: Does not bruise/bleed easily.   Skin: Negative for itching, poor wound healing and skin cancer.   Musculoskeletal: Positive for back pain. Negative for arthritis, joint pain and joint swelling.   Gastrointestinal: Positive for abdominal pain. Negative for change in bowel habit, bowel incontinence and constipation.   Genitourinary: Positive for bladder  incontinence. Negative for dysuria and hematuria.   Neurological: Negative for brief paralysis, excessive daytime sleepiness, dizziness, focal weakness, headaches and light-headedness.   Psychiatric/Behavioral: Negative for altered mental status and hallucinations. The patient does not have insomnia.    Allergic/Immunologic: Negative for HIV exposure and persistent infections.   All other systems reviewed and are negative.      Vitals:    10/09/19 1436   BP: 180/80   Pulse: 74   Temp: 97.6 °F (36.4 °C)   SpO2: 99%     Body mass index is 27.86 kg/m².    Physical Exam   Constitutional: He is oriented to person, place, and time. He appears well-developed and well-nourished. No distress.   HENT:   Head: Normocephalic and atraumatic.   Nose: Nose normal.   Mouth/Throat: Oropharynx is clear and moist.   Eyes: Conjunctivae and EOM are normal. Pupils are equal, round, and reactive to light.   Neck: Normal range of motion. No tracheal deviation present.   Pulmonary/Chest: Effort normal and breath sounds normal. No respiratory distress.   Abdominal: Soft. Bowel sounds are normal. He exhibits no distension.   Genitourinary:   Genitourinary Comments: Perianal exam: external hem - slightly enlarged RP  JEFFREY- good tone, no masses  Anoscopy performed:  Grade 3 x 2 internal hem RP & LLat, irritated   Musculoskeletal: Normal range of motion. He exhibits no edema or deformity.   Neurological: He is alert and oriented to person, place, and time. No cranial nerve deficit. Coordination and gait normal.   Skin: Skin is warm and dry.   Psychiatric: He has a normal mood and affect. His behavior is normal. Judgment normal.       Review of Medical Record:  I reviewed Dr. Hodges records: colonoscopy 9/24/2019: +enlarged hems, patchy erythema with erosion and congestion in sigmoid    Assessment:  1. Internal hemorrhoids with complication    2. External hemorrhoids with complication        Plan:    For persistent rectal bleeding, I recommend  hemorrhoidectomy.  I discussed risk including bleeding, infection, injury to sphincters; benefits; and alternatives.  I discussed in detail expected recovery, possible urinary retention, time off activities, and healing.  Patient expresses understanding and wishes to proceed.    Will need cardiac risk evaluation from Dr. Velazquez prior to surgery.      Scribed for Melo Hinson MD by Areli Dhillon PA-C  10/9/2019   This patient was evaluated by me, recommendations made, documentation reviewed, edited, and revised by me, Melo Hinson MD

## 2019-11-23 ENCOUNTER — ANESTHESIA EVENT (OUTPATIENT)
Dept: PERIOP | Facility: HOSPITAL | Age: 69
End: 2019-11-23

## 2019-11-23 ENCOUNTER — ANESTHESIA (OUTPATIENT)
Dept: PERIOP | Facility: HOSPITAL | Age: 69
End: 2019-11-23

## 2019-11-23 ENCOUNTER — APPOINTMENT (OUTPATIENT)
Dept: GENERAL RADIOLOGY | Facility: HOSPITAL | Age: 69
End: 2019-11-23

## 2019-11-23 ENCOUNTER — HOSPITAL ENCOUNTER (INPATIENT)
Facility: HOSPITAL | Age: 69
LOS: 5 days | Discharge: HOME | End: 2019-11-28
Attending: EMERGENCY MEDICINE | Admitting: HOSPITALIST

## 2019-11-23 DIAGNOSIS — N28.9 ACUTE RENAL INSUFFICIENCY: ICD-10-CM

## 2019-11-23 DIAGNOSIS — N13.2 URETERAL STONE WITH HYDRONEPHROSIS: Primary | ICD-10-CM

## 2019-11-23 DIAGNOSIS — R82.81 BACTERIURIA WITH PYURIA: ICD-10-CM

## 2019-11-23 DIAGNOSIS — R82.71 BACTERIURIA WITH PYURIA: ICD-10-CM

## 2019-11-23 PROBLEM — N17.9 AKI (ACUTE KIDNEY INJURY): Status: ACTIVE | Noted: 2019-11-23

## 2019-11-23 LAB
ANION GAP SERPL CALCULATED.3IONS-SCNC: 15.3 MMOL/L (ref 5–15)
BACTERIA UR QL AUTO: ABNORMAL /HPF
BASOPHILS # BLD AUTO: 0.04 10*3/MM3 (ref 0–0.2)
BASOPHILS NFR BLD AUTO: 0.2 % (ref 0–1.5)
BILIRUB UR QL STRIP: NEGATIVE
BUN BLD-MCNC: 24 MG/DL (ref 8–23)
BUN/CREAT SERPL: 17.3 (ref 7–25)
CALCIUM SPEC-SCNC: 9.6 MG/DL (ref 8.6–10.5)
CHLORIDE SERPL-SCNC: 97 MMOL/L (ref 98–107)
CLARITY UR: ABNORMAL
CO2 SERPL-SCNC: 26.7 MMOL/L (ref 22–29)
COLOR UR: YELLOW
CREAT BLD-MCNC: 1.39 MG/DL (ref 0.76–1.27)
DEPRECATED RDW RBC AUTO: 45.2 FL (ref 37–54)
EOSINOPHIL # BLD AUTO: 0.11 10*3/MM3 (ref 0–0.4)
EOSINOPHIL NFR BLD AUTO: 0.6 % (ref 0.3–6.2)
ERYTHROCYTE [DISTWIDTH] IN BLOOD BY AUTOMATED COUNT: 13.2 % (ref 12.3–15.4)
GFR SERPL CREATININE-BSD FRML MDRD: 51 ML/MIN/1.73
GLUCOSE BLD-MCNC: 135 MG/DL (ref 65–99)
GLUCOSE UR STRIP-MCNC: NEGATIVE MG/DL
HCT VFR BLD AUTO: 44 % (ref 37.5–51)
HGB BLD-MCNC: 14.6 G/DL (ref 13–17.7)
HGB UR QL STRIP.AUTO: ABNORMAL
HYALINE CASTS UR QL AUTO: ABNORMAL /LPF
IMM GRANULOCYTES # BLD AUTO: 0.08 10*3/MM3 (ref 0–0.05)
IMM GRANULOCYTES NFR BLD AUTO: 0.5 % (ref 0–0.5)
KETONES UR QL STRIP: ABNORMAL
LEUKOCYTE ESTERASE UR QL STRIP.AUTO: ABNORMAL
LYMPHOCYTES # BLD AUTO: 0.97 10*3/MM3 (ref 0.7–3.1)
LYMPHOCYTES NFR BLD AUTO: 5.7 % (ref 19.6–45.3)
MCH RBC QN AUTO: 30.5 PG (ref 26.6–33)
MCHC RBC AUTO-ENTMCNC: 33.2 G/DL (ref 31.5–35.7)
MCV RBC AUTO: 91.9 FL (ref 79–97)
MONOCYTES # BLD AUTO: 0.63 10*3/MM3 (ref 0.1–0.9)
MONOCYTES NFR BLD AUTO: 3.7 % (ref 5–12)
NEUTROPHILS # BLD AUTO: 15.19 10*3/MM3 (ref 1.7–7)
NEUTROPHILS NFR BLD AUTO: 89.3 % (ref 42.7–76)
NITRITE UR QL STRIP: POSITIVE
NRBC BLD AUTO-RTO: 0 /100 WBC (ref 0–0.2)
PH UR STRIP.AUTO: 6.5 [PH] (ref 5–8)
PLATELET # BLD AUTO: 329 10*3/MM3 (ref 140–450)
PMV BLD AUTO: 8.9 FL (ref 6–12)
POTASSIUM BLD-SCNC: 3.7 MMOL/L (ref 3.5–5.2)
PROT UR QL STRIP: ABNORMAL
RBC # BLD AUTO: 4.79 10*6/MM3 (ref 4.14–5.8)
RBC # UR: ABNORMAL /HPF
REF LAB TEST METHOD: ABNORMAL
SODIUM BLD-SCNC: 139 MMOL/L (ref 136–145)
SP GR UR STRIP: 1.02 (ref 1–1.03)
SQUAMOUS #/AREA URNS HPF: ABNORMAL /HPF
UROBILINOGEN UR QL STRIP: ABNORMAL
WBC NRBC COR # BLD: 17.02 10*3/MM3 (ref 3.4–10.8)
WBC UR QL AUTO: ABNORMAL /HPF

## 2019-11-23 PROCEDURE — 81001 URINALYSIS AUTO W/SCOPE: CPT | Performed by: EMERGENCY MEDICINE

## 2019-11-23 PROCEDURE — 74018 RADEX ABDOMEN 1 VIEW: CPT

## 2019-11-23 PROCEDURE — 25010000002 CEFTRIAXONE PER 250 MG: Performed by: EMERGENCY MEDICINE

## 2019-11-23 PROCEDURE — 0T768DZ DILATION OF RIGHT URETER WITH INTRALUMINAL DEVICE, VIA NATURAL OR ARTIFICIAL OPENING ENDOSCOPIC: ICD-10-PCS | Performed by: UROLOGY

## 2019-11-23 PROCEDURE — 87186 SC STD MICRODIL/AGAR DIL: CPT | Performed by: EMERGENCY MEDICINE

## 2019-11-23 PROCEDURE — 25010000002 FENTANYL CITRATE (PF) 100 MCG/2ML SOLUTION: Performed by: ANESTHESIOLOGY

## 2019-11-23 PROCEDURE — C1769 GUIDE WIRE: HCPCS | Performed by: UROLOGY

## 2019-11-23 PROCEDURE — C2617 STENT, NON-COR, TEM W/O DEL: HCPCS | Performed by: UROLOGY

## 2019-11-23 PROCEDURE — 25010000002 PROPOFOL 10 MG/ML EMULSION: Performed by: ANESTHESIOLOGY

## 2019-11-23 PROCEDURE — 99284 EMERGENCY DEPT VISIT MOD MDM: CPT

## 2019-11-23 PROCEDURE — 25010000002 MIDAZOLAM PER 1 MG

## 2019-11-23 PROCEDURE — 25010000002 ONDANSETRON PER 1 MG: Performed by: INTERNAL MEDICINE

## 2019-11-23 PROCEDURE — 25010000002 HYDROMORPHONE PER 4 MG: Performed by: EMERGENCY MEDICINE

## 2019-11-23 PROCEDURE — 25010000002 ONDANSETRON PER 1 MG: Performed by: EMERGENCY MEDICINE

## 2019-11-23 PROCEDURE — 87086 URINE CULTURE/COLONY COUNT: CPT | Performed by: EMERGENCY MEDICINE

## 2019-11-23 PROCEDURE — 85025 COMPLETE CBC W/AUTO DIFF WBC: CPT | Performed by: EMERGENCY MEDICINE

## 2019-11-23 PROCEDURE — 80048 BASIC METABOLIC PNL TOTAL CA: CPT | Performed by: EMERGENCY MEDICINE

## 2019-11-23 PROCEDURE — C1758 CATHETER, URETERAL: HCPCS | Performed by: UROLOGY

## 2019-11-23 PROCEDURE — 25010000002 KETOROLAC TROMETHAMINE PER 15 MG: Performed by: EMERGENCY MEDICINE

## 2019-11-23 PROCEDURE — 76000 FLUOROSCOPY <1 HR PHYS/QHP: CPT

## 2019-11-23 PROCEDURE — 0 IOTHALAMATE 60 % SOLUTION: Performed by: UROLOGY

## 2019-11-23 DEVICE — URETERAL STENT
Type: IMPLANTABLE DEVICE | Site: URETER | Status: FUNCTIONAL
Brand: CONTOUR™

## 2019-11-23 RX ORDER — PROPOFOL 10 MG/ML
VIAL (ML) INTRAVENOUS AS NEEDED
Status: DISCONTINUED | OUTPATIENT
Start: 2019-11-23 | End: 2019-11-23 | Stop reason: SURG

## 2019-11-23 RX ORDER — NITROGLYCERIN 0.4 MG/1
0.4 TABLET SUBLINGUAL
Status: DISCONTINUED | OUTPATIENT
Start: 2019-11-23 | End: 2019-11-28 | Stop reason: HOSPADM

## 2019-11-23 RX ORDER — CEFTRIAXONE SODIUM 1 G/50ML
1 INJECTION, SOLUTION INTRAVENOUS EVERY 24 HOURS
Status: DISCONTINUED | OUTPATIENT
Start: 2019-11-24 | End: 2019-11-24

## 2019-11-23 RX ORDER — MAGNESIUM HYDROXIDE 1200 MG/15ML
LIQUID ORAL AS NEEDED
Status: DISCONTINUED | OUTPATIENT
Start: 2019-11-23 | End: 2019-11-23 | Stop reason: HOSPADM

## 2019-11-23 RX ORDER — MIDAZOLAM HYDROCHLORIDE 1 MG/ML
1 INJECTION INTRAMUSCULAR; INTRAVENOUS
Status: DISCONTINUED | OUTPATIENT
Start: 2019-11-23 | End: 2019-11-23 | Stop reason: HOSPADM

## 2019-11-23 RX ORDER — CEFTRIAXONE SODIUM 1 G/50ML
1 INJECTION, SOLUTION INTRAVENOUS ONCE
Status: COMPLETED | OUTPATIENT
Start: 2019-11-23 | End: 2019-11-23

## 2019-11-23 RX ORDER — SODIUM CHLORIDE 9 MG/ML
125 INJECTION, SOLUTION INTRAVENOUS CONTINUOUS
Status: DISCONTINUED | OUTPATIENT
Start: 2019-11-23 | End: 2019-11-24

## 2019-11-23 RX ORDER — SODIUM CHLORIDE 0.9 % (FLUSH) 0.9 %
10 SYRINGE (ML) INJECTION AS NEEDED
Status: DISCONTINUED | OUTPATIENT
Start: 2019-11-23 | End: 2019-11-28 | Stop reason: HOSPADM

## 2019-11-23 RX ORDER — ONDANSETRON 2 MG/ML
4 INJECTION INTRAMUSCULAR; INTRAVENOUS ONCE
Status: COMPLETED | OUTPATIENT
Start: 2019-11-23 | End: 2019-11-23

## 2019-11-23 RX ORDER — SODIUM CHLORIDE 9 MG/ML
100 INJECTION, SOLUTION INTRAVENOUS CONTINUOUS
Status: DISCONTINUED | OUTPATIENT
Start: 2019-11-23 | End: 2019-11-24

## 2019-11-23 RX ORDER — ACETAMINOPHEN 325 MG/1
650 TABLET ORAL EVERY 6 HOURS PRN
Status: DISCONTINUED | OUTPATIENT
Start: 2019-11-23 | End: 2019-11-28 | Stop reason: HOSPADM

## 2019-11-23 RX ORDER — LIDOCAINE HYDROCHLORIDE 20 MG/ML
INJECTION, SOLUTION INFILTRATION; PERINEURAL AS NEEDED
Status: DISCONTINUED | OUTPATIENT
Start: 2019-11-23 | End: 2019-11-23 | Stop reason: SURG

## 2019-11-23 RX ORDER — HYDROMORPHONE HYDROCHLORIDE 1 MG/ML
0.5 INJECTION, SOLUTION INTRAMUSCULAR; INTRAVENOUS; SUBCUTANEOUS ONCE
Status: COMPLETED | OUTPATIENT
Start: 2019-11-23 | End: 2019-11-23

## 2019-11-23 RX ORDER — VALSARTAN 320 MG/1
320 TABLET ORAL DAILY
Status: DISCONTINUED | OUTPATIENT
Start: 2019-11-24 | End: 2019-11-24

## 2019-11-23 RX ORDER — SODIUM CHLORIDE 0.9 % (FLUSH) 0.9 %
3-10 SYRINGE (ML) INJECTION AS NEEDED
Status: DISCONTINUED | OUTPATIENT
Start: 2019-11-23 | End: 2019-11-23 | Stop reason: HOSPADM

## 2019-11-23 RX ORDER — SODIUM CHLORIDE 0.9 % (FLUSH) 0.9 %
3 SYRINGE (ML) INJECTION EVERY 12 HOURS SCHEDULED
Status: DISCONTINUED | OUTPATIENT
Start: 2019-11-23 | End: 2019-11-23 | Stop reason: HOSPADM

## 2019-11-23 RX ORDER — ATORVASTATIN CALCIUM 20 MG/1
40 TABLET, FILM COATED ORAL DAILY
Status: DISCONTINUED | OUTPATIENT
Start: 2019-11-24 | End: 2019-11-28 | Stop reason: HOSPADM

## 2019-11-23 RX ORDER — MIDAZOLAM HYDROCHLORIDE 1 MG/ML
INJECTION INTRAMUSCULAR; INTRAVENOUS
Status: COMPLETED
Start: 2019-11-23 | End: 2019-11-23

## 2019-11-23 RX ORDER — KETOROLAC TROMETHAMINE 15 MG/ML
15 INJECTION, SOLUTION INTRAMUSCULAR; INTRAVENOUS ONCE
Status: COMPLETED | OUTPATIENT
Start: 2019-11-23 | End: 2019-11-23

## 2019-11-23 RX ORDER — PANTOPRAZOLE SODIUM 40 MG/1
40 TABLET, DELAYED RELEASE ORAL 2 TIMES DAILY
Status: DISCONTINUED | OUTPATIENT
Start: 2019-11-23 | End: 2019-11-28 | Stop reason: HOSPADM

## 2019-11-23 RX ORDER — HYDROMORPHONE HYDROCHLORIDE 1 MG/ML
0.5 INJECTION, SOLUTION INTRAMUSCULAR; INTRAVENOUS; SUBCUTANEOUS EVERY 4 HOURS PRN
Status: DISCONTINUED | OUTPATIENT
Start: 2019-11-23 | End: 2019-11-28 | Stop reason: HOSPADM

## 2019-11-23 RX ORDER — METOPROLOL SUCCINATE 50 MG/1
50 TABLET, EXTENDED RELEASE ORAL DAILY
Status: DISCONTINUED | OUTPATIENT
Start: 2019-11-24 | End: 2019-11-24

## 2019-11-23 RX ORDER — MEPERIDINE HYDROCHLORIDE 25 MG/ML
12.5 INJECTION INTRAMUSCULAR; INTRAVENOUS; SUBCUTANEOUS
Status: DISCONTINUED | OUTPATIENT
Start: 2019-11-23 | End: 2019-11-23 | Stop reason: HOSPADM

## 2019-11-23 RX ORDER — SODIUM CHLORIDE, SODIUM LACTATE, POTASSIUM CHLORIDE, CALCIUM CHLORIDE 600; 310; 30; 20 MG/100ML; MG/100ML; MG/100ML; MG/100ML
9 INJECTION, SOLUTION INTRAVENOUS CONTINUOUS PRN
Status: DISCONTINUED | OUTPATIENT
Start: 2019-11-23 | End: 2019-11-28 | Stop reason: HOSPADM

## 2019-11-23 RX ORDER — MIDAZOLAM HYDROCHLORIDE 1 MG/ML
2 INJECTION INTRAMUSCULAR; INTRAVENOUS
Status: DISCONTINUED | OUTPATIENT
Start: 2019-11-23 | End: 2019-11-23 | Stop reason: HOSPADM

## 2019-11-23 RX ORDER — ONDANSETRON 2 MG/ML
4 INJECTION INTRAMUSCULAR; INTRAVENOUS ONCE AS NEEDED
Status: DISCONTINUED | OUTPATIENT
Start: 2019-11-23 | End: 2019-11-23 | Stop reason: HOSPADM

## 2019-11-23 RX ORDER — ONDANSETRON 2 MG/ML
4 INJECTION INTRAMUSCULAR; INTRAVENOUS EVERY 6 HOURS PRN
Status: DISCONTINUED | OUTPATIENT
Start: 2019-11-23 | End: 2019-11-28 | Stop reason: HOSPADM

## 2019-11-23 RX ORDER — ISOSORBIDE MONONITRATE 30 MG/1
30 TABLET, EXTENDED RELEASE ORAL
Status: DISCONTINUED | OUTPATIENT
Start: 2019-11-24 | End: 2019-11-24

## 2019-11-23 RX ORDER — FAMOTIDINE 10 MG/ML
20 INJECTION, SOLUTION INTRAVENOUS
Status: DISCONTINUED | OUTPATIENT
Start: 2019-11-23 | End: 2019-11-23 | Stop reason: HOSPADM

## 2019-11-23 RX ORDER — SODIUM CHLORIDE 0.9 % (FLUSH) 0.9 %
10 SYRINGE (ML) INJECTION EVERY 12 HOURS SCHEDULED
Status: DISCONTINUED | OUTPATIENT
Start: 2019-11-23 | End: 2019-11-28 | Stop reason: HOSPADM

## 2019-11-23 RX ORDER — FENTANYL CITRATE 50 UG/ML
25 INJECTION, SOLUTION INTRAMUSCULAR; INTRAVENOUS
Status: DISCONTINUED | OUTPATIENT
Start: 2019-11-23 | End: 2019-11-23 | Stop reason: HOSPADM

## 2019-11-23 RX ORDER — HYDROMORPHONE HYDROCHLORIDE 1 MG/ML
1 INJECTION, SOLUTION INTRAMUSCULAR; INTRAVENOUS; SUBCUTANEOUS ONCE
Status: COMPLETED | OUTPATIENT
Start: 2019-11-23 | End: 2019-11-23

## 2019-11-23 RX ORDER — AMITRIPTYLINE HYDROCHLORIDE 25 MG/1
25 TABLET, FILM COATED ORAL NIGHTLY
Status: DISCONTINUED | OUTPATIENT
Start: 2019-11-23 | End: 2019-11-28 | Stop reason: HOSPADM

## 2019-11-23 RX ADMIN — ONDANSETRON 4 MG: 2 INJECTION INTRAMUSCULAR; INTRAVENOUS at 16:52

## 2019-11-23 RX ADMIN — FENTANYL CITRATE 25 MCG: 50 INJECTION, SOLUTION INTRAMUSCULAR; INTRAVENOUS at 22:43

## 2019-11-23 RX ADMIN — MIDAZOLAM 1 MG: 1 INJECTION INTRAMUSCULAR; INTRAVENOUS at 21:38

## 2019-11-23 RX ADMIN — HYDROMORPHONE HYDROCHLORIDE 1 MG: 1 INJECTION, SOLUTION INTRAMUSCULAR; INTRAVENOUS; SUBCUTANEOUS at 16:52

## 2019-11-23 RX ADMIN — ONDANSETRON 4 MG: 2 INJECTION INTRAMUSCULAR; INTRAVENOUS at 15:58

## 2019-11-23 RX ADMIN — ACETAMINOPHEN 650 MG: 325 TABLET, FILM COATED ORAL at 23:54

## 2019-11-23 RX ADMIN — CEFTRIAXONE SODIUM 1 G: 1 INJECTION, SOLUTION INTRAVENOUS at 17:57

## 2019-11-23 RX ADMIN — HYDROMORPHONE HYDROCHLORIDE 0.5 MG: 1 INJECTION, SOLUTION INTRAMUSCULAR; INTRAVENOUS; SUBCUTANEOUS at 16:03

## 2019-11-23 RX ADMIN — SODIUM CHLORIDE 1000 ML: 9 INJECTION, SOLUTION INTRAVENOUS at 16:03

## 2019-11-23 RX ADMIN — KETOROLAC TROMETHAMINE 15 MG: 15 INJECTION, SOLUTION INTRAMUSCULAR; INTRAVENOUS at 16:02

## 2019-11-23 RX ADMIN — MIDAZOLAM HYDROCHLORIDE 1 MG: 1 INJECTION INTRAMUSCULAR; INTRAVENOUS at 21:38

## 2019-11-23 RX ADMIN — METOPROLOL TARTRATE 5 MG: 5 INJECTION, SOLUTION INTRAVENOUS at 18:33

## 2019-11-23 RX ADMIN — ONDANSETRON 4 MG: 2 INJECTION INTRAMUSCULAR; INTRAVENOUS at 23:54

## 2019-11-23 RX ADMIN — LIDOCAINE HYDROCHLORIDE 60 MG: 20 INJECTION, SOLUTION INFILTRATION; PERINEURAL at 21:45

## 2019-11-23 RX ADMIN — PROPOFOL 150 MG: 10 INJECTION, EMULSION INTRAVENOUS at 21:45

## 2019-11-24 ENCOUNTER — APPOINTMENT (OUTPATIENT)
Dept: CT IMAGING | Facility: HOSPITAL | Age: 69
End: 2019-11-24

## 2019-11-24 ENCOUNTER — APPOINTMENT (OUTPATIENT)
Dept: GENERAL RADIOLOGY | Facility: HOSPITAL | Age: 69
End: 2019-11-24

## 2019-11-24 PROBLEM — I95.9 HYPOTENSION: Status: ACTIVE | Noted: 2019-11-24

## 2019-11-24 PROBLEM — A41.9 SEVERE SEPSIS: Status: ACTIVE | Noted: 2019-11-24

## 2019-11-24 PROBLEM — N10 ACUTE PYELONEPHRITIS: Status: ACTIVE | Noted: 2019-11-24

## 2019-11-24 PROBLEM — R65.20 SEVERE SEPSIS (HCC): Status: ACTIVE | Noted: 2019-11-24

## 2019-11-24 LAB
ALBUMIN SERPL-MCNC: 3.4 G/DL (ref 3.5–5.2)
ALBUMIN/GLOB SERPL: 1.2 G/DL
ALP SERPL-CCNC: 87 U/L (ref 39–117)
ALT SERPL W P-5'-P-CCNC: 18 U/L (ref 1–41)
ANION GAP SERPL CALCULATED.3IONS-SCNC: 16.8 MMOL/L (ref 5–15)
ANION GAP SERPL CALCULATED.3IONS-SCNC: 17.9 MMOL/L (ref 5–15)
ANISOCYTOSIS BLD QL: ABNORMAL
AST SERPL-CCNC: 29 U/L (ref 1–40)
BASOPHILS # BLD MANUAL: 0.18 10*3/MM3 (ref 0–0.2)
BASOPHILS NFR BLD AUTO: 1 % (ref 0–1.5)
BILIRUB SERPL-MCNC: 1.1 MG/DL (ref 0.2–1.2)
BUN BLD-MCNC: 27 MG/DL (ref 8–23)
BUN BLD-MCNC: 34 MG/DL (ref 8–23)
BUN/CREAT SERPL: 11 (ref 7–25)
BUN/CREAT SERPL: 11.8 (ref 7–25)
CALCIUM SPEC-SCNC: 7.7 MG/DL (ref 8.6–10.5)
CALCIUM SPEC-SCNC: 8.1 MG/DL (ref 8.6–10.5)
CHLORIDE SERPL-SCNC: 103 MMOL/L (ref 98–107)
CHLORIDE SERPL-SCNC: 105 MMOL/L (ref 98–107)
CO2 SERPL-SCNC: 19.1 MMOL/L (ref 22–29)
CO2 SERPL-SCNC: 23.2 MMOL/L (ref 22–29)
CORTIS SERPL-MCNC: 26.4 MCG/DL
CREAT BLD-MCNC: 2.29 MG/DL (ref 0.76–1.27)
CREAT BLD-MCNC: 3.08 MG/DL (ref 0.76–1.27)
D-LACTATE SERPL-SCNC: 3.4 MMOL/L (ref 0.5–2)
D-LACTATE SERPL-SCNC: 4.6 MMOL/L (ref 0.5–2)
D-LACTATE SERPL-SCNC: 5.4 MMOL/L (ref 0.5–2)
DEPRECATED RDW RBC AUTO: 43.3 FL (ref 37–54)
ERYTHROCYTE [DISTWIDTH] IN BLOOD BY AUTOMATED COUNT: 12.7 % (ref 12.3–15.4)
GFR SERPL CREATININE-BSD FRML MDRD: 20 ML/MIN/1.73
GFR SERPL CREATININE-BSD FRML MDRD: 28 ML/MIN/1.73
GLOBULIN UR ELPH-MCNC: 2.8 GM/DL
GLUCOSE BLD-MCNC: 115 MG/DL (ref 65–99)
GLUCOSE BLD-MCNC: 96 MG/DL (ref 65–99)
HCT VFR BLD AUTO: 39.3 % (ref 37.5–51)
HGB BLD-MCNC: 13 G/DL (ref 13–17.7)
HOLD SPECIMEN: NORMAL
LYMPHOCYTES # BLD MANUAL: 0 10*3/MM3 (ref 0.7–3.1)
LYMPHOCYTES NFR BLD MANUAL: 0 % (ref 19.6–45.3)
MCH RBC QN AUTO: 30.6 PG (ref 26.6–33)
MCHC RBC AUTO-ENTMCNC: 33.1 G/DL (ref 31.5–35.7)
MCV RBC AUTO: 92.5 FL (ref 79–97)
METAMYELOCYTES NFR BLD MANUAL: 6 % (ref 0–0)
MICROCYTES BLD QL: ABNORMAL
MYELOCYTES NFR BLD MANUAL: 1 % (ref 0–0)
NEUTROPHILS # BLD AUTO: 16.92 10*3/MM3 (ref 1.7–7)
NEUTROPHILS NFR BLD MANUAL: 92 % (ref 42.7–76)
NT-PROBNP SERPL-MCNC: ABNORMAL PG/ML (ref 5–900)
PLAT MORPH BLD: NORMAL
PLATELET # BLD AUTO: 182 10*3/MM3 (ref 140–450)
PMV BLD AUTO: 9.4 FL (ref 6–12)
POTASSIUM BLD-SCNC: 3.5 MMOL/L (ref 3.5–5.2)
POTASSIUM BLD-SCNC: 4.2 MMOL/L (ref 3.5–5.2)
PROT SERPL-MCNC: 6.2 G/DL (ref 6–8.5)
RBC # BLD AUTO: 4.25 10*6/MM3 (ref 4.14–5.8)
SODIUM BLD-SCNC: 142 MMOL/L (ref 136–145)
SODIUM BLD-SCNC: 143 MMOL/L (ref 136–145)
TROPONIN T SERPL-MCNC: 0.02 NG/ML (ref 0–0.03)
WBC MORPH BLD: NORMAL
WBC NRBC COR # BLD: 18.39 10*3/MM3 (ref 3.4–10.8)

## 2019-11-24 PROCEDURE — 83605 ASSAY OF LACTIC ACID: CPT | Performed by: INTERNAL MEDICINE

## 2019-11-24 PROCEDURE — 25010000003 CEFTRIAXONE PER 250 MG: Performed by: HOSPITALIST

## 2019-11-24 PROCEDURE — 84484 ASSAY OF TROPONIN QUANT: CPT | Performed by: INTERNAL MEDICINE

## 2019-11-24 PROCEDURE — 83880 ASSAY OF NATRIURETIC PEPTIDE: CPT | Performed by: INTERNAL MEDICINE

## 2019-11-24 PROCEDURE — 25010000002 ERTAPENEM PER 500 MG: Performed by: INTERNAL MEDICINE

## 2019-11-24 PROCEDURE — 93010 ELECTROCARDIOGRAM REPORT: CPT | Performed by: INTERNAL MEDICINE

## 2019-11-24 PROCEDURE — 74176 CT ABD & PELVIS W/O CONTRAST: CPT

## 2019-11-24 PROCEDURE — 85007 BL SMEAR W/DIFF WBC COUNT: CPT | Performed by: INTERNAL MEDICINE

## 2019-11-24 PROCEDURE — 25010000002 ONDANSETRON PER 1 MG: Performed by: INTERNAL MEDICINE

## 2019-11-24 PROCEDURE — 82533 TOTAL CORTISOL: CPT | Performed by: INTERNAL MEDICINE

## 2019-11-24 PROCEDURE — 25010000002 HYDROMORPHONE PER 4 MG: Performed by: INTERNAL MEDICINE

## 2019-11-24 PROCEDURE — 93005 ELECTROCARDIOGRAM TRACING: CPT | Performed by: HOSPITALIST

## 2019-11-24 PROCEDURE — 71045 X-RAY EXAM CHEST 1 VIEW: CPT

## 2019-11-24 PROCEDURE — 80053 COMPREHEN METABOLIC PANEL: CPT | Performed by: INTERNAL MEDICINE

## 2019-11-24 PROCEDURE — 87040 BLOOD CULTURE FOR BACTERIA: CPT | Performed by: INTERNAL MEDICINE

## 2019-11-24 PROCEDURE — 85025 COMPLETE CBC W/AUTO DIFF WBC: CPT | Performed by: INTERNAL MEDICINE

## 2019-11-24 RX ORDER — CEFTRIAXONE SODIUM 2 G/50ML
2 INJECTION, SOLUTION INTRAVENOUS EVERY 24 HOURS
Status: DISCONTINUED | OUTPATIENT
Start: 2019-11-24 | End: 2019-11-28 | Stop reason: HOSPADM

## 2019-11-24 RX ORDER — SCOLOPAMINE TRANSDERMAL SYSTEM 1 MG/1
1 PATCH, EXTENDED RELEASE TRANSDERMAL
Status: DISCONTINUED | OUTPATIENT
Start: 2019-11-24 | End: 2019-11-28 | Stop reason: HOSPADM

## 2019-11-24 RX ADMIN — SODIUM CHLORIDE, PRESERVATIVE FREE 10 ML: 5 INJECTION INTRAVENOUS at 10:40

## 2019-11-24 RX ADMIN — ONDANSETRON 4 MG: 2 INJECTION INTRAMUSCULAR; INTRAVENOUS at 10:33

## 2019-11-24 RX ADMIN — SODIUM CHLORIDE 100 ML/HR: 9 INJECTION, SOLUTION INTRAVENOUS at 00:01

## 2019-11-24 RX ADMIN — PANTOPRAZOLE SODIUM 40 MG: 40 TABLET, DELAYED RELEASE ORAL at 21:35

## 2019-11-24 RX ADMIN — AMITRIPTYLINE HYDROCHLORIDE 25 MG: 25 TABLET, FILM COATED ORAL at 21:24

## 2019-11-24 RX ADMIN — SODIUM CHLORIDE 2625 ML: 9 INJECTION, SOLUTION INTRAVENOUS at 09:03

## 2019-11-24 RX ADMIN — ERTAPENEM SODIUM 1 G: 1 INJECTION, POWDER, LYOPHILIZED, FOR SOLUTION INTRAMUSCULAR; INTRAVENOUS at 15:09

## 2019-11-24 RX ADMIN — ACETAMINOPHEN 650 MG: 325 TABLET, FILM COATED ORAL at 11:13

## 2019-11-24 RX ADMIN — HYDROMORPHONE HYDROCHLORIDE 0.5 MG: 1 INJECTION, SOLUTION INTRAMUSCULAR; INTRAVENOUS; SUBCUTANEOUS at 02:46

## 2019-11-24 RX ADMIN — ATORVASTATIN CALCIUM 40 MG: 20 TABLET, FILM COATED ORAL at 10:33

## 2019-11-24 RX ADMIN — SODIUM CHLORIDE, PRESERVATIVE FREE 10 ML: 5 INJECTION INTRAVENOUS at 21:24

## 2019-11-24 RX ADMIN — PANTOPRAZOLE SODIUM 40 MG: 40 TABLET, DELAYED RELEASE ORAL at 10:33

## 2019-11-24 RX ADMIN — CEFTRIAXONE SODIUM 2 G: 2 INJECTION, SOLUTION INTRAVENOUS at 17:55

## 2019-11-24 RX ADMIN — ACETAMINOPHEN 650 MG: 325 TABLET, FILM COATED ORAL at 21:27

## 2019-11-24 RX ADMIN — ONDANSETRON 4 MG: 2 INJECTION INTRAMUSCULAR; INTRAVENOUS at 17:55

## 2019-11-24 RX ADMIN — SCOPALAMINE 1 PATCH: 1 PATCH, EXTENDED RELEASE TRANSDERMAL at 21:26

## 2019-11-24 NOTE — ANESTHESIA POSTPROCEDURE EVALUATION
"Patient: Kyle Amador    Procedure Summary     Date:  11/23/19 Room / Location:  [facility] OR 01 / [facility] MAIN OR    Anesthesia Start:  2140 Anesthesia Stop:  2207    Procedure:  CYSTO RT STENT INSERTION (Right ) Diagnosis:      Surgeon:  Jonnathan Wolf MD Provider:  Julio Yun MD    Anesthesia Type:  general ASA Status:  3 - Emergent          Anesthesia Type: general  Last vitals  BP   135/77 (11/23/19 2230)   Temp   37.3 °C (99.2 °F) (11/23/19 2206)   Pulse   (!) 122 (11/23/19 2230)   Resp   16 (11/23/19 2230)     SpO2   96 % (11/23/19 2230)     Post Anesthesia Care and Evaluation    Patient location during evaluation: bedside  Patient participation: complete - patient participated  Level of consciousness: awake and alert  Pain management: adequate  Airway patency: patent  Anesthetic complications: No anesthetic complications    Cardiovascular status: acceptable  Respiratory status: acceptable  Hydration status: acceptable    Comments: /77 (BP Location: Right arm, Patient Position: Lying)   Pulse (!) 122   Temp 37.3 °C (99.2 °F) (Oral)   Resp 16   Ht 177.8 cm (70\")   Wt 87.5 kg (193 lb)   SpO2 96%   BMI 27.69 kg/m²       "

## 2019-11-24 NOTE — ANESTHESIA PROCEDURE NOTES
Airway  Urgency: elective    Date/Time: 11/23/2019 9:46 PM  Airway not difficult    General Information and Staff    Patient location during procedure: OR  Anesthesiologist: Julio Yun MD    Indications and Patient Condition  Indications for airway management: airway protection    Preoxygenated: yes  MILS maintained throughout  Mask difficulty assessment: 1 - vent by mask    Final Airway Details  Final airway type: supraglottic airway      Successful airway: classic  Size 4    Number of attempts at approach: 1  Assessment: lips, teeth, and gum same as pre-op

## 2019-11-24 NOTE — ANESTHESIA PREPROCEDURE EVALUATION
Anesthesia Evaluation     Patient summary reviewed                Airway   No difficulty expected  Dental      Pulmonary    Cardiovascular     ECG reviewed  Rhythm: regular  Rate: abnormal    (+) hypertension, CAD,       Neuro/Psych  GI/Hepatic/Renal/Endo      Musculoskeletal     Abdominal    Substance History      OB/GYN          Other                        Anesthesia Plan    ASA 3 - emergent     general       Anesthetic plan, all risks, benefits, and alternatives have been provided, discussed and informed consent has been obtained with: patient.  Use of blood products discussed with patient .

## 2019-11-25 ENCOUNTER — APPOINTMENT (OUTPATIENT)
Dept: GENERAL RADIOLOGY | Facility: HOSPITAL | Age: 69
End: 2019-11-25

## 2019-11-25 ENCOUNTER — APPOINTMENT (OUTPATIENT)
Dept: ULTRASOUND IMAGING | Facility: HOSPITAL | Age: 69
End: 2019-11-25

## 2019-11-25 LAB
ALBUMIN SERPL-MCNC: 2.9 G/DL (ref 3.5–5.2)
ALBUMIN/GLOB SERPL: 1 G/DL
ALP SERPL-CCNC: 73 U/L (ref 39–117)
ALT SERPL W P-5'-P-CCNC: 17 U/L (ref 1–41)
ANION GAP SERPL CALCULATED.3IONS-SCNC: 15.4 MMOL/L (ref 5–15)
ANISOCYTOSIS BLD QL: ABNORMAL
ARTERIAL PATENCY WRIST A: POSITIVE
AST SERPL-CCNC: 32 U/L (ref 1–40)
ATMOSPHERIC PRESS: 745 MMHG
BACTERIA SPEC AEROBE CULT: ABNORMAL
BASE EXCESS BLDA CALC-SCNC: -4.6 MMOL/L (ref 0–2)
BDY SITE: ABNORMAL
BILIRUB SERPL-MCNC: 1 MG/DL (ref 0.2–1.2)
BUN BLD-MCNC: 46 MG/DL (ref 8–23)
BUN/CREAT SERPL: 12 (ref 7–25)
CALCIUM SPEC-SCNC: 7.5 MG/DL (ref 8.6–10.5)
CHLORIDE SERPL-SCNC: 101 MMOL/L (ref 98–107)
CHLORIDE UR-SCNC: 30 MMOL/L
CK SERPL-CCNC: 280 U/L (ref 20–200)
CK SERPL-CCNC: 325 U/L (ref 20–200)
CO2 SERPL-SCNC: 19.6 MMOL/L (ref 22–29)
CREAT BLD-MCNC: 3.84 MG/DL (ref 0.76–1.27)
CREAT UR-MCNC: 129.6 MG/DL
CRP SERPL-MCNC: 27.56 MG/DL (ref 0–0.5)
DEPRECATED RDW RBC AUTO: 42.1 FL (ref 37–54)
EOSINOPHIL SPEC QL MICRO: 0 % EOS/100 CELLS (ref 0–0)
ERYTHROCYTE [DISTWIDTH] IN BLOOD BY AUTOMATED COUNT: 12.9 % (ref 12.3–15.4)
GAS FLOW AIRWAY: 2 LPM
GFR SERPL CREATININE-BSD FRML MDRD: 16 ML/MIN/1.73
GLOBULIN UR ELPH-MCNC: 3 GM/DL
GLUCOSE BLD-MCNC: 69 MG/DL (ref 65–99)
HCO3 BLDA-SCNC: 19.2 MMOL/L (ref 22–28)
HCT VFR BLD AUTO: 34.8 % (ref 37.5–51)
HGB BLD-MCNC: 12.3 G/DL (ref 13–17.7)
LYMPHOCYTES # BLD MANUAL: 0.27 10*3/MM3 (ref 0.7–3.1)
LYMPHOCYTES NFR BLD MANUAL: 1 % (ref 19.6–45.3)
LYMPHOCYTES NFR BLD MANUAL: 3 % (ref 5–12)
MAGNESIUM SERPL-MCNC: 1.6 MG/DL (ref 1.6–2.4)
MCH RBC QN AUTO: 31.3 PG (ref 26.6–33)
MCHC RBC AUTO-ENTMCNC: 35.3 G/DL (ref 31.5–35.7)
MCV RBC AUTO: 88.5 FL (ref 79–97)
METAMYELOCYTES NFR BLD MANUAL: 1 % (ref 0–0)
MODALITY: ABNORMAL
MONOCYTES # BLD AUTO: 0.8 10*3/MM3 (ref 0.1–0.9)
NEUTROPHILS # BLD AUTO: 25.35 10*3/MM3 (ref 1.7–7)
NEUTROPHILS NFR BLD MANUAL: 95 % (ref 42.7–76)
PCO2 BLDA: 31.2 MM HG (ref 35–45)
PH BLDA: 7.4 PH UNITS (ref 7.35–7.45)
PLAT MORPH BLD: NORMAL
PLATELET # BLD AUTO: 136 10*3/MM3 (ref 140–450)
PMV BLD AUTO: 9.7 FL (ref 6–12)
PO2 BLDA: 74.9 MM HG (ref 80–100)
POTASSIUM BLD-SCNC: 4.4 MMOL/L (ref 3.5–5.2)
PROCALCITONIN SERPL-MCNC: 82.68 NG/ML (ref 0.1–0.25)
PROT SERPL-MCNC: 5.9 G/DL (ref 6–8.5)
PROT UR-MCNC: 107 MG/DL
RBC # BLD AUTO: 3.93 10*6/MM3 (ref 4.14–5.8)
SAO2 % BLDCOA: 95.1 % (ref 92–99)
SODIUM BLD-SCNC: 136 MMOL/L (ref 136–145)
SODIUM UR-SCNC: 33 MMOL/L
SPHEROCYTES BLD QL SMEAR: ABNORMAL
TOTAL RATE: 18 BREATHS/MINUTE
TROPONIN T SERPL-MCNC: 0.01 NG/ML (ref 0–0.03)
UUN 24H UR-MCNC: 447 MG/DL
WBC MORPH BLD: NORMAL
WBC NRBC COR # BLD: 26.68 10*3/MM3 (ref 3.4–10.8)

## 2019-11-25 PROCEDURE — 76775 US EXAM ABDO BACK WALL LIM: CPT

## 2019-11-25 PROCEDURE — 82550 ASSAY OF CK (CPK): CPT | Performed by: HOSPITALIST

## 2019-11-25 PROCEDURE — 86140 C-REACTIVE PROTEIN: CPT | Performed by: HOSPITALIST

## 2019-11-25 PROCEDURE — 84484 ASSAY OF TROPONIN QUANT: CPT | Performed by: HOSPITALIST

## 2019-11-25 PROCEDURE — 82803 BLOOD GASES ANY COMBINATION: CPT

## 2019-11-25 PROCEDURE — 93010 ELECTROCARDIOGRAM REPORT: CPT | Performed by: INTERNAL MEDICINE

## 2019-11-25 PROCEDURE — 83735 ASSAY OF MAGNESIUM: CPT | Performed by: HOSPITALIST

## 2019-11-25 PROCEDURE — 93005 ELECTROCARDIOGRAM TRACING: CPT | Performed by: HOSPITALIST

## 2019-11-25 PROCEDURE — 94799 UNLISTED PULMONARY SVC/PX: CPT

## 2019-11-25 PROCEDURE — 85025 COMPLETE CBC W/AUTO DIFF WBC: CPT | Performed by: HOSPITALIST

## 2019-11-25 PROCEDURE — 84540 ASSAY OF URINE/UREA-N: CPT | Performed by: INTERNAL MEDICINE

## 2019-11-25 PROCEDURE — 85007 BL SMEAR W/DIFF WBC COUNT: CPT | Performed by: HOSPITALIST

## 2019-11-25 PROCEDURE — 82550 ASSAY OF CK (CPK): CPT | Performed by: INTERNAL MEDICINE

## 2019-11-25 PROCEDURE — 25010000003 CEFTRIAXONE PER 250 MG: Performed by: HOSPITALIST

## 2019-11-25 PROCEDURE — 80053 COMPREHEN METABOLIC PANEL: CPT | Performed by: HOSPITALIST

## 2019-11-25 PROCEDURE — 82436 ASSAY OF URINE CHLORIDE: CPT | Performed by: INTERNAL MEDICINE

## 2019-11-25 PROCEDURE — 84300 ASSAY OF URINE SODIUM: CPT | Performed by: INTERNAL MEDICINE

## 2019-11-25 PROCEDURE — 71045 X-RAY EXAM CHEST 1 VIEW: CPT

## 2019-11-25 PROCEDURE — 36600 WITHDRAWAL OF ARTERIAL BLOOD: CPT

## 2019-11-25 PROCEDURE — 84145 PROCALCITONIN (PCT): CPT | Performed by: HOSPITALIST

## 2019-11-25 PROCEDURE — 84156 ASSAY OF PROTEIN URINE: CPT | Performed by: INTERNAL MEDICINE

## 2019-11-25 PROCEDURE — 25010000002 AMIODARONE IN DEXTROSE 5% 360-4.14 MG/200ML-% SOLUTION: Performed by: INTERNAL MEDICINE

## 2019-11-25 PROCEDURE — 87205 SMEAR GRAM STAIN: CPT | Performed by: INTERNAL MEDICINE

## 2019-11-25 PROCEDURE — 25010000002 ONDANSETRON PER 1 MG: Performed by: INTERNAL MEDICINE

## 2019-11-25 PROCEDURE — 25010000002 AMIODARONE IN DEXTROSE 5% 150-4.21 MG/100ML-% SOLUTION: Performed by: INTERNAL MEDICINE

## 2019-11-25 PROCEDURE — 82570 ASSAY OF URINE CREATININE: CPT | Performed by: INTERNAL MEDICINE

## 2019-11-25 RX ORDER — ECHINACEA PURPUREA EXTRACT 125 MG
1 TABLET ORAL AS NEEDED
Status: DISCONTINUED | OUTPATIENT
Start: 2019-11-25 | End: 2019-11-28 | Stop reason: HOSPADM

## 2019-11-25 RX ORDER — SODIUM CHLORIDE 9 MG/ML
75 INJECTION, SOLUTION INTRAVENOUS CONTINUOUS
Status: DISCONTINUED | OUTPATIENT
Start: 2019-11-25 | End: 2019-11-28 | Stop reason: HOSPADM

## 2019-11-25 RX ADMIN — CEFTRIAXONE SODIUM 2 G: 2 INJECTION, SOLUTION INTRAVENOUS at 18:07

## 2019-11-25 RX ADMIN — SODIUM CHLORIDE, PRESERVATIVE FREE 10 ML: 5 INJECTION INTRAVENOUS at 09:32

## 2019-11-25 RX ADMIN — PANTOPRAZOLE SODIUM 40 MG: 40 TABLET, DELAYED RELEASE ORAL at 20:44

## 2019-11-25 RX ADMIN — ONDANSETRON 4 MG: 2 INJECTION INTRAMUSCULAR; INTRAVENOUS at 18:34

## 2019-11-25 RX ADMIN — AMIODARONE HYDROCHLORIDE 1 MG/MIN: 1.8 INJECTION, SOLUTION INTRAVENOUS at 17:32

## 2019-11-25 RX ADMIN — SODIUM CHLORIDE 1000 ML: 9 INJECTION, SOLUTION INTRAVENOUS at 16:56

## 2019-11-25 RX ADMIN — SALINE NASAL SPRAY 1 SPRAY: 1.5 SOLUTION NASAL at 03:45

## 2019-11-25 RX ADMIN — ACETAMINOPHEN 650 MG: 325 TABLET, FILM COATED ORAL at 09:30

## 2019-11-25 RX ADMIN — PANTOPRAZOLE SODIUM 40 MG: 40 TABLET, DELAYED RELEASE ORAL at 09:30

## 2019-11-25 RX ADMIN — METOPROLOL TARTRATE 5 MG: 5 INJECTION, SOLUTION INTRAVENOUS at 16:12

## 2019-11-25 RX ADMIN — SODIUM CHLORIDE, PRESERVATIVE FREE 10 ML: 5 INJECTION INTRAVENOUS at 20:44

## 2019-11-25 RX ADMIN — SODIUM CHLORIDE 75 ML/HR: 9 INJECTION, SOLUTION INTRAVENOUS at 13:28

## 2019-11-25 RX ADMIN — ATORVASTATIN CALCIUM 40 MG: 20 TABLET, FILM COATED ORAL at 09:30

## 2019-11-25 RX ADMIN — AMITRIPTYLINE HYDROCHLORIDE 25 MG: 25 TABLET, FILM COATED ORAL at 20:44

## 2019-11-25 RX ADMIN — AMIODARONE HYDROCHLORIDE 0.5 MG/MIN: 1.8 INJECTION, SOLUTION INTRAVENOUS at 23:10

## 2019-11-25 RX ADMIN — AMIODARONE HYDROCHLORIDE 150 MG: 1.5 INJECTION, SOLUTION INTRAVENOUS at 16:55

## 2019-11-26 ENCOUNTER — APPOINTMENT (OUTPATIENT)
Dept: CARDIOLOGY | Facility: HOSPITAL | Age: 69
End: 2019-11-26

## 2019-11-26 PROBLEM — I48.91 ATRIAL FIBRILLATION (HCC): Status: ACTIVE | Noted: 2019-11-26

## 2019-11-26 LAB
ALBUMIN SERPL-MCNC: 2.7 G/DL (ref 3.5–5.2)
ANION GAP SERPL CALCULATED.3IONS-SCNC: 15.6 MMOL/L (ref 5–15)
AORTIC DIMENSIONLESS INDEX: 0.8 (DI)
BH CV ECHO MEAS - ACS: 2.1 CM
BH CV ECHO MEAS - AO MAX PG: 10.3 MMHG
BH CV ECHO MEAS - AO MEAN PG (FULL): 2 MMHG
BH CV ECHO MEAS - AO MEAN PG: 5 MMHG
BH CV ECHO MEAS - AO ROOT AREA (BSA CORRECTED): 1.6
BH CV ECHO MEAS - AO ROOT AREA: 9.1 CM^2
BH CV ECHO MEAS - AO ROOT DIAM: 3.4 CM
BH CV ECHO MEAS - AO V2 MAX: 160.1 CM/SEC
BH CV ECHO MEAS - AO V2 MEAN: 97.6 CM/SEC
BH CV ECHO MEAS - AO V2 VTI: 28.9 CM
BH CV ECHO MEAS - ASC AORTA: 3.4 CM
BH CV ECHO MEAS - AVA(I,A): 2.6 CM^2
BH CV ECHO MEAS - AVA(I,D): 2.6 CM^2
BH CV ECHO MEAS - BSA(HAYCOCK): 2.1 M^2
BH CV ECHO MEAS - BSA: 2.1 M^2
BH CV ECHO MEAS - BZI_BMI: 28.8 KILOGRAMS/M^2
BH CV ECHO MEAS - BZI_METRIC_HEIGHT: 177.8 CM
BH CV ECHO MEAS - BZI_METRIC_WEIGHT: 91.2 KG
BH CV ECHO MEAS - EDV(CUBED): 148.9 ML
BH CV ECHO MEAS - EDV(MOD-SP4): 99 ML
BH CV ECHO MEAS - EDV(TEICH): 135.3 ML
BH CV ECHO MEAS - EF(CUBED): 42.8 %
BH CV ECHO MEAS - EF(MOD-SP4): 42.4 %
BH CV ECHO MEAS - EF(TEICH): 35.2 %
BH CV ECHO MEAS - ESV(CUBED): 85.2 ML
BH CV ECHO MEAS - ESV(MOD-SP4): 57 ML
BH CV ECHO MEAS - ESV(TEICH): 87.7 ML
BH CV ECHO MEAS - FS: 17 %
BH CV ECHO MEAS - IVS/LVPW: 1.7
BH CV ECHO MEAS - IVSD: 1.5 CM
BH CV ECHO MEAS - LAT PEAK E' VEL: 6 CM/SEC
BH CV ECHO MEAS - LV DIASTOLIC VOL/BSA (35-75): 47.3 ML/M^2
BH CV ECHO MEAS - LV MASS(C)D: 256.6 GRAMS
BH CV ECHO MEAS - LV MASS(C)DI: 122.7 GRAMS/M^2
BH CV ECHO MEAS - LV MEAN PG: 3 MMHG
BH CV ECHO MEAS - LV SYSTOLIC VOL/BSA (12-30): 27.2 ML/M^2
BH CV ECHO MEAS - LV V1 MAX: 124.7 CM/SEC
BH CV ECHO MEAS - LV V1 MEAN: 77.2 CM/SEC
BH CV ECHO MEAS - LV V1 VTI: 23.8 CM
BH CV ECHO MEAS - LVIDD: 5.3 CM
BH CV ECHO MEAS - LVIDS: 4.4 CM
BH CV ECHO MEAS - LVLD AP4: 8.6 CM
BH CV ECHO MEAS - LVLS AP4: 7.3 CM
BH CV ECHO MEAS - LVOT AREA (M): 3.1 CM^2
BH CV ECHO MEAS - LVOT AREA: 3.1 CM^2
BH CV ECHO MEAS - LVOT DIAM: 2 CM
BH CV ECHO MEAS - LVPWD: 0.9 CM
BH CV ECHO MEAS - MED PEAK E' VEL: 6.68 CM/SEC
BH CV ECHO MEAS - MV A DUR: 0.11 SEC
BH CV ECHO MEAS - MV A MAX VEL: 84.8 CM/SEC
BH CV ECHO MEAS - MV DEC SLOPE: 574 CM/SEC^2
BH CV ECHO MEAS - MV DEC TIME: 172 SEC
BH CV ECHO MEAS - MV E MAX VEL: 84.8 CM/SEC
BH CV ECHO MEAS - MV E/A: 1
BH CV ECHO MEAS - MV MEAN PG: 3 MMHG
BH CV ECHO MEAS - MV P1/2T MAX VEL: 90.3 CM/SEC
BH CV ECHO MEAS - MV P1/2T: 46.1 MSEC
BH CV ECHO MEAS - MV V2 MEAN: 77.9 CM/SEC
BH CV ECHO MEAS - MV V2 VTI: 20.3 CM
BH CV ECHO MEAS - MVA P1/2T LCG: 2.4 CM^2
BH CV ECHO MEAS - MVA(P1/2T): 4.8 CM^2
BH CV ECHO MEAS - MVA(VTI): 3.7 CM^2
BH CV ECHO MEAS - PA ACC SLOPE: 1014 CM/SEC^2
BH CV ECHO MEAS - PA ACC TIME: 0.1 SEC
BH CV ECHO MEAS - PA MAX PG (FULL): 2.2 MMHG
BH CV ECHO MEAS - PA MAX PG: 3.7 MMHG
BH CV ECHO MEAS - PA PR(ACCEL): 36.3 MMHG
BH CV ECHO MEAS - PA V2 MAX: 96.4 CM/SEC
BH CV ECHO MEAS - RAP SYSTOLE: 8 MMHG
BH CV ECHO MEAS - RV MAX PG: 1.5 MMHG
BH CV ECHO MEAS - RV MEAN PG: 1 MMHG
BH CV ECHO MEAS - RV V1 MAX: 60.6 CM/SEC
BH CV ECHO MEAS - RV V1 MEAN: 45.3 CM/SEC
BH CV ECHO MEAS - RV V1 VTI: 13.6 CM
BH CV ECHO MEAS - RVSP: 33 MMHG
BH CV ECHO MEAS - SI(AO): 125.4 ML/M^2
BH CV ECHO MEAS - SI(CUBED): 30.4 ML/M^2
BH CV ECHO MEAS - SI(LVOT): 35.7 ML/M^2
BH CV ECHO MEAS - SI(MOD-SP4): 20.1 ML/M^2
BH CV ECHO MEAS - SI(TEICH): 22.8 ML/M^2
BH CV ECHO MEAS - SV(AO): 262.4 ML
BH CV ECHO MEAS - SV(CUBED): 63.7 ML
BH CV ECHO MEAS - SV(LVOT): 74.8 ML
BH CV ECHO MEAS - SV(MOD-SP4): 42 ML
BH CV ECHO MEAS - SV(TEICH): 47.7 ML
BH CV ECHO MEAS - TAPSE (>1.6): 2.7 CM2
BH CV ECHO MEAS - TR MAX PG: 25
BH CV ECHO MEAS - TR MAX VEL: 249 CM/SEC
BH CV ECHO MEASUREMENTS AVERAGE E/E' RATIO: 13.38
BH CV VAS BP RIGHT ARM: NORMAL MMHG
BH CV XLRA - RV BASE: 3 CM
BH CV XLRA - TDI S': 15.9 CM/SEC
BUN BLD-MCNC: 51 MG/DL (ref 8–23)
BUN/CREAT SERPL: 17.6 (ref 7–25)
BURR CELLS BLD QL SMEAR: ABNORMAL
CALCIUM SPEC-SCNC: 7.4 MG/DL (ref 8.6–10.5)
CHLORIDE SERPL-SCNC: 106 MMOL/L (ref 98–107)
CO2 SERPL-SCNC: 19.4 MMOL/L (ref 22–29)
CREAT BLD-MCNC: 2.89 MG/DL (ref 0.76–1.27)
DEPRECATED RDW RBC AUTO: 44.6 FL (ref 37–54)
ERYTHROCYTE [DISTWIDTH] IN BLOOD BY AUTOMATED COUNT: 13.6 % (ref 12.3–15.4)
GFR SERPL CREATININE-BSD FRML MDRD: 22 ML/MIN/1.73
GLUCOSE BLD-MCNC: 91 MG/DL (ref 65–99)
HCT VFR BLD AUTO: 33.9 % (ref 37.5–51)
HGB BLD-MCNC: 11.6 G/DL (ref 13–17.7)
LEFT ATRIUM VOLUME INDEX: 16.9 ML/M2
LYMPHOCYTES # BLD MANUAL: 0 10*3/MM3 (ref 0.7–3.1)
LYMPHOCYTES NFR BLD MANUAL: 0 % (ref 19.6–45.3)
MAXIMAL PREDICTED HEART RATE: 150 BPM
MCH RBC QN AUTO: 30.8 PG (ref 26.6–33)
MCHC RBC AUTO-ENTMCNC: 34.2 G/DL (ref 31.5–35.7)
MCV RBC AUTO: 89.9 FL (ref 79–97)
NEUTROPHILS # BLD AUTO: 25.04 10*3/MM3 (ref 1.7–7)
NEUTROPHILS NFR BLD MANUAL: 100 % (ref 42.7–76)
PHOSPHATE SERPL-MCNC: 2.7 MG/DL (ref 2.5–4.5)
PLATELET # BLD AUTO: 122 10*3/MM3 (ref 140–450)
PMV BLD AUTO: 9.8 FL (ref 6–12)
POIKILOCYTOSIS BLD QL SMEAR: ABNORMAL
POTASSIUM BLD-SCNC: 3.8 MMOL/L (ref 3.5–5.2)
RBC # BLD AUTO: 3.77 10*6/MM3 (ref 4.14–5.8)
SMALL PLATELETS BLD QL SMEAR: ABNORMAL
SODIUM BLD-SCNC: 141 MMOL/L (ref 136–145)
STRESS TARGET HR: 128 BPM
TSH SERPL DL<=0.05 MIU/L-ACNC: 0.66 UIU/ML (ref 0.27–4.2)
WBC MORPH BLD: NORMAL
WBC NRBC COR # BLD: 25.04 10*3/MM3 (ref 3.4–10.8)

## 2019-11-26 PROCEDURE — 93306 TTE W/DOPPLER COMPLETE: CPT | Performed by: INTERNAL MEDICINE

## 2019-11-26 PROCEDURE — 85025 COMPLETE CBC W/AUTO DIFF WBC: CPT | Performed by: HOSPITALIST

## 2019-11-26 PROCEDURE — 93306 TTE W/DOPPLER COMPLETE: CPT

## 2019-11-26 PROCEDURE — 85007 BL SMEAR W/DIFF WBC COUNT: CPT | Performed by: HOSPITALIST

## 2019-11-26 PROCEDURE — 94799 UNLISTED PULMONARY SVC/PX: CPT

## 2019-11-26 PROCEDURE — 99222 1ST HOSP IP/OBS MODERATE 55: CPT | Performed by: INTERNAL MEDICINE

## 2019-11-26 PROCEDURE — 84443 ASSAY THYROID STIM HORMONE: CPT | Performed by: INTERNAL MEDICINE

## 2019-11-26 PROCEDURE — 25010000003 CEFTRIAXONE PER 250 MG: Performed by: HOSPITALIST

## 2019-11-26 PROCEDURE — 25010000002 AMIODARONE IN DEXTROSE 5% 360-4.14 MG/200ML-% SOLUTION: Performed by: INTERNAL MEDICINE

## 2019-11-26 PROCEDURE — 80069 RENAL FUNCTION PANEL: CPT | Performed by: INTERNAL MEDICINE

## 2019-11-26 PROCEDURE — 25010000002 MAGNESIUM SULFATE IN D5W 1G/100ML (PREMIX) 1-5 GM/100ML-% SOLUTION: Performed by: INTERNAL MEDICINE

## 2019-11-26 RX ORDER — MAGNESIUM SULFATE 1 G/100ML
2 INJECTION INTRAVENOUS ONCE
Status: COMPLETED | OUTPATIENT
Start: 2019-11-26 | End: 2019-11-26

## 2019-11-26 RX ADMIN — PANTOPRAZOLE SODIUM 40 MG: 40 TABLET, DELAYED RELEASE ORAL at 20:29

## 2019-11-26 RX ADMIN — PANTOPRAZOLE SODIUM 40 MG: 40 TABLET, DELAYED RELEASE ORAL at 09:54

## 2019-11-26 RX ADMIN — SODIUM CHLORIDE, PRESERVATIVE FREE 10 ML: 5 INJECTION INTRAVENOUS at 20:29

## 2019-11-26 RX ADMIN — ATORVASTATIN CALCIUM 40 MG: 20 TABLET, FILM COATED ORAL at 09:53

## 2019-11-26 RX ADMIN — SODIUM CHLORIDE 75 ML/HR: 9 INJECTION, SOLUTION INTRAVENOUS at 18:17

## 2019-11-26 RX ADMIN — SODIUM CHLORIDE 75 ML/HR: 9 INJECTION, SOLUTION INTRAVENOUS at 04:20

## 2019-11-26 RX ADMIN — AMIODARONE HYDROCHLORIDE 1 MG/MIN: 1.8 INJECTION, SOLUTION INTRAVENOUS at 20:29

## 2019-11-26 RX ADMIN — MAGNESIUM SULFATE 2 G: 1 INJECTION INTRAVENOUS at 09:55

## 2019-11-26 RX ADMIN — SODIUM CHLORIDE, PRESERVATIVE FREE 10 ML: 5 INJECTION INTRAVENOUS at 09:55

## 2019-11-26 RX ADMIN — AMITRIPTYLINE HYDROCHLORIDE 25 MG: 25 TABLET, FILM COATED ORAL at 20:29

## 2019-11-26 RX ADMIN — CEFTRIAXONE SODIUM 2 G: 2 INJECTION, SOLUTION INTRAVENOUS at 18:17

## 2019-11-27 LAB
ALBUMIN SERPL-MCNC: 2.8 G/DL (ref 3.5–5.2)
ANION GAP SERPL CALCULATED.3IONS-SCNC: 11.6 MMOL/L (ref 5–15)
BUN BLD-MCNC: 34 MG/DL (ref 8–23)
BUN/CREAT SERPL: 18.5 (ref 7–25)
BURR CELLS BLD QL SMEAR: ABNORMAL
CALCIUM SPEC-SCNC: 8.2 MG/DL (ref 8.6–10.5)
CHLORIDE SERPL-SCNC: 106 MMOL/L (ref 98–107)
CO2 SERPL-SCNC: 23.4 MMOL/L (ref 22–29)
CREAT BLD-MCNC: 1.84 MG/DL (ref 0.76–1.27)
DEPRECATED RDW RBC AUTO: 45.5 FL (ref 37–54)
EOSINOPHIL # BLD MANUAL: 0.36 10*3/MM3 (ref 0–0.4)
EOSINOPHIL NFR BLD MANUAL: 2 % (ref 0.3–6.2)
ERYTHROCYTE [DISTWIDTH] IN BLOOD BY AUTOMATED COUNT: 13.4 % (ref 12.3–15.4)
GFR SERPL CREATININE-BSD FRML MDRD: 37 ML/MIN/1.73
GLUCOSE BLD-MCNC: 107 MG/DL (ref 65–99)
HCT VFR BLD AUTO: 34.8 % (ref 37.5–51)
HGB BLD-MCNC: 11.6 G/DL (ref 13–17.7)
LYMPHOCYTES # BLD MANUAL: 0.54 10*3/MM3 (ref 0.7–3.1)
LYMPHOCYTES NFR BLD MANUAL: 1 % (ref 5–12)
LYMPHOCYTES NFR BLD MANUAL: 3 % (ref 19.6–45.3)
MAGNESIUM SERPL-MCNC: 2.4 MG/DL (ref 1.6–2.4)
MCH RBC QN AUTO: 30.8 PG (ref 26.6–33)
MCHC RBC AUTO-ENTMCNC: 33.3 G/DL (ref 31.5–35.7)
MCV RBC AUTO: 92.3 FL (ref 79–97)
MONOCYTES # BLD AUTO: 0.18 10*3/MM3 (ref 0.1–0.9)
NEUTROPHILS # BLD AUTO: 16.8 10*3/MM3 (ref 1.7–7)
NEUTROPHILS NFR BLD MANUAL: 94 % (ref 42.7–76)
PHOSPHATE SERPL-MCNC: 1.8 MG/DL (ref 2.5–4.5)
PHOSPHATE SERPL-MCNC: 2.8 MG/DL (ref 2.5–4.5)
PLAT MORPH BLD: NORMAL
PLATELET # BLD AUTO: 134 10*3/MM3 (ref 140–450)
PMV BLD AUTO: 10.3 FL (ref 6–12)
POIKILOCYTOSIS BLD QL SMEAR: ABNORMAL
POTASSIUM BLD-SCNC: 3.6 MMOL/L (ref 3.5–5.2)
RBC # BLD AUTO: 3.77 10*6/MM3 (ref 4.14–5.8)
SODIUM BLD-SCNC: 141 MMOL/L (ref 136–145)
WBC MORPH BLD: NORMAL
WBC NRBC COR # BLD: 17.87 10*3/MM3 (ref 3.4–10.8)

## 2019-11-27 PROCEDURE — 25010000003 CEFTRIAXONE PER 250 MG: Performed by: HOSPITALIST

## 2019-11-27 PROCEDURE — 85007 BL SMEAR W/DIFF WBC COUNT: CPT | Performed by: HOSPITALIST

## 2019-11-27 PROCEDURE — 84100 ASSAY OF PHOSPHORUS: CPT | Performed by: HOSPITALIST

## 2019-11-27 PROCEDURE — 85025 COMPLETE CBC W/AUTO DIFF WBC: CPT | Performed by: HOSPITALIST

## 2019-11-27 PROCEDURE — 80069 RENAL FUNCTION PANEL: CPT | Performed by: INTERNAL MEDICINE

## 2019-11-27 PROCEDURE — 83735 ASSAY OF MAGNESIUM: CPT | Performed by: HOSPITALIST

## 2019-11-27 PROCEDURE — 25010000002 AMIODARONE IN DEXTROSE 5% 360-4.14 MG/200ML-% SOLUTION: Performed by: INTERNAL MEDICINE

## 2019-11-27 PROCEDURE — 94799 UNLISTED PULMONARY SVC/PX: CPT

## 2019-11-27 PROCEDURE — 99233 SBSQ HOSP IP/OBS HIGH 50: CPT | Performed by: NURSE PRACTITIONER

## 2019-11-27 RX ORDER — AMIODARONE HYDROCHLORIDE 200 MG/1
200 TABLET ORAL EVERY 12 HOURS SCHEDULED
Status: DISCONTINUED | OUTPATIENT
Start: 2019-11-27 | End: 2019-11-28 | Stop reason: HOSPADM

## 2019-11-27 RX ORDER — TAMSULOSIN HYDROCHLORIDE 0.4 MG/1
0.4 CAPSULE ORAL DAILY
Status: DISCONTINUED | OUTPATIENT
Start: 2019-11-27 | End: 2019-11-28 | Stop reason: HOSPADM

## 2019-11-27 RX ORDER — METOPROLOL TARTRATE 50 MG/1
50 TABLET, FILM COATED ORAL EVERY 12 HOURS SCHEDULED
Status: DISCONTINUED | OUTPATIENT
Start: 2019-11-27 | End: 2019-11-28 | Stop reason: HOSPADM

## 2019-11-27 RX ADMIN — METOPROLOL TARTRATE 50 MG: 50 TABLET, FILM COATED ORAL at 12:29

## 2019-11-27 RX ADMIN — TAMSULOSIN HYDROCHLORIDE 0.4 MG: 0.4 CAPSULE ORAL at 09:28

## 2019-11-27 RX ADMIN — SODIUM CHLORIDE, PRESERVATIVE FREE 10 ML: 5 INJECTION INTRAVENOUS at 09:28

## 2019-11-27 RX ADMIN — AMIODARONE HYDROCHLORIDE 200 MG: 200 TABLET ORAL at 20:37

## 2019-11-27 RX ADMIN — SODIUM CHLORIDE, PRESERVATIVE FREE 10 ML: 5 INJECTION INTRAVENOUS at 20:39

## 2019-11-27 RX ADMIN — AMITRIPTYLINE HYDROCHLORIDE 25 MG: 25 TABLET, FILM COATED ORAL at 20:37

## 2019-11-27 RX ADMIN — CEFTRIAXONE SODIUM 2 G: 2 INJECTION, SOLUTION INTRAVENOUS at 17:50

## 2019-11-27 RX ADMIN — METOPROLOL TARTRATE 50 MG: 50 TABLET, FILM COATED ORAL at 20:37

## 2019-11-27 RX ADMIN — AMIODARONE HYDROCHLORIDE 1 MG/MIN: 1.8 INJECTION, SOLUTION INTRAVENOUS at 07:28

## 2019-11-27 RX ADMIN — SODIUM CHLORIDE 75 ML/HR: 9 INJECTION, SOLUTION INTRAVENOUS at 22:37

## 2019-11-27 RX ADMIN — AMIODARONE HYDROCHLORIDE 200 MG: 200 TABLET ORAL at 12:29

## 2019-11-27 RX ADMIN — SODIUM CHLORIDE 75 ML/HR: 9 INJECTION, SOLUTION INTRAVENOUS at 07:28

## 2019-11-27 RX ADMIN — PANTOPRAZOLE SODIUM 40 MG: 40 TABLET, DELAYED RELEASE ORAL at 09:28

## 2019-11-27 RX ADMIN — SCOPALAMINE 1 PATCH: 1 PATCH, EXTENDED RELEASE TRANSDERMAL at 17:51

## 2019-11-27 RX ADMIN — PANTOPRAZOLE SODIUM 40 MG: 40 TABLET, DELAYED RELEASE ORAL at 20:37

## 2019-11-27 RX ADMIN — POTASSIUM PHOSPHATE, MONOBASIC AND POTASSIUM PHOSPHATE, DIBASIC 20 MMOL: 224; 236 INJECTION, SOLUTION INTRAVENOUS at 11:26

## 2019-11-27 RX ADMIN — ATORVASTATIN CALCIUM 40 MG: 20 TABLET, FILM COATED ORAL at 09:28

## 2019-11-28 VITALS
TEMPERATURE: 97.7 F | DIASTOLIC BLOOD PRESSURE: 94 MMHG | WEIGHT: 200.5 LBS | HEIGHT: 70 IN | RESPIRATION RATE: 18 BRPM | OXYGEN SATURATION: 99 % | SYSTOLIC BLOOD PRESSURE: 157 MMHG | HEART RATE: 84 BPM | BODY MASS INDEX: 28.71 KG/M2

## 2019-11-28 LAB
ALBUMIN SERPL-MCNC: 2.9 G/DL (ref 3.5–5.2)
ANION GAP SERPL CALCULATED.3IONS-SCNC: 11.9 MMOL/L (ref 5–15)
BASOPHILS # BLD AUTO: 0.06 10*3/MM3 (ref 0–0.2)
BASOPHILS NFR BLD AUTO: 0.6 % (ref 0–1.5)
BUN BLD-MCNC: 23 MG/DL (ref 8–23)
BUN/CREAT SERPL: 17.6 (ref 7–25)
CALCIUM SPEC-SCNC: 8 MG/DL (ref 8.6–10.5)
CHLORIDE SERPL-SCNC: 104 MMOL/L (ref 98–107)
CO2 SERPL-SCNC: 24.1 MMOL/L (ref 22–29)
CREAT BLD-MCNC: 1.31 MG/DL (ref 0.76–1.27)
DEPRECATED RDW RBC AUTO: 43.9 FL (ref 37–54)
EOSINOPHIL # BLD AUTO: 0.34 10*3/MM3 (ref 0–0.4)
EOSINOPHIL NFR BLD AUTO: 3.2 % (ref 0.3–6.2)
ERYTHROCYTE [DISTWIDTH] IN BLOOD BY AUTOMATED COUNT: 13.4 % (ref 12.3–15.4)
GFR SERPL CREATININE-BSD FRML MDRD: 54 ML/MIN/1.73
GLUCOSE BLD-MCNC: 102 MG/DL (ref 65–99)
HCT VFR BLD AUTO: 32.2 % (ref 37.5–51)
HGB BLD-MCNC: 10.7 G/DL (ref 13–17.7)
IMM GRANULOCYTES # BLD AUTO: 0.07 10*3/MM3 (ref 0–0.05)
IMM GRANULOCYTES NFR BLD AUTO: 0.7 % (ref 0–0.5)
LYMPHOCYTES # BLD AUTO: 0.74 10*3/MM3 (ref 0.7–3.1)
LYMPHOCYTES NFR BLD AUTO: 7.1 % (ref 19.6–45.3)
MAGNESIUM SERPL-MCNC: 2 MG/DL (ref 1.6–2.4)
MCH RBC QN AUTO: 29.6 PG (ref 26.6–33)
MCHC RBC AUTO-ENTMCNC: 33.2 G/DL (ref 31.5–35.7)
MCV RBC AUTO: 89 FL (ref 79–97)
MONOCYTES # BLD AUTO: 0.89 10*3/MM3 (ref 0.1–0.9)
MONOCYTES NFR BLD AUTO: 8.5 % (ref 5–12)
NEUTROPHILS # BLD AUTO: 8.37 10*3/MM3 (ref 1.7–7)
NEUTROPHILS NFR BLD AUTO: 79.9 % (ref 42.7–76)
NRBC BLD AUTO-RTO: 0 /100 WBC (ref 0–0.2)
PHOSPHATE SERPL-MCNC: 2.1 MG/DL (ref 2.5–4.5)
PLATELET # BLD AUTO: 129 10*3/MM3 (ref 140–450)
PMV BLD AUTO: 9.5 FL (ref 6–12)
POTASSIUM BLD-SCNC: 3.3 MMOL/L (ref 3.5–5.2)
RBC # BLD AUTO: 3.62 10*6/MM3 (ref 4.14–5.8)
SODIUM BLD-SCNC: 140 MMOL/L (ref 136–145)
WBC NRBC COR # BLD: 10.47 10*3/MM3 (ref 3.4–10.8)

## 2019-11-28 PROCEDURE — 83735 ASSAY OF MAGNESIUM: CPT | Performed by: HOSPITALIST

## 2019-11-28 PROCEDURE — 99232 SBSQ HOSP IP/OBS MODERATE 35: CPT | Performed by: INTERNAL MEDICINE

## 2019-11-28 PROCEDURE — 85025 COMPLETE CBC W/AUTO DIFF WBC: CPT | Performed by: HOSPITALIST

## 2019-11-28 PROCEDURE — 80069 RENAL FUNCTION PANEL: CPT | Performed by: INTERNAL MEDICINE

## 2019-11-28 RX ORDER — AMIODARONE HYDROCHLORIDE 200 MG/1
200 TABLET ORAL EVERY 12 HOURS SCHEDULED
Qty: 60 TABLET | Refills: 0 | Status: SHIPPED | OUTPATIENT
Start: 2019-11-28 | End: 2019-12-26

## 2019-11-28 RX ORDER — METOPROLOL TARTRATE 50 MG/1
50 TABLET, FILM COATED ORAL EVERY 12 HOURS SCHEDULED
Qty: 60 TABLET | Refills: 0 | Status: SHIPPED | OUTPATIENT
Start: 2019-11-28 | End: 2019-12-28

## 2019-11-28 RX ORDER — CEPHALEXIN 500 MG/1
500 CAPSULE ORAL 4 TIMES DAILY
Qty: 20 CAPSULE | Refills: 0 | Status: SHIPPED | OUTPATIENT
Start: 2019-11-28 | End: 2019-12-03

## 2019-11-28 RX ADMIN — SODIUM CHLORIDE, PRESERVATIVE FREE 10 ML: 5 INJECTION INTRAVENOUS at 08:51

## 2019-11-28 RX ADMIN — PANTOPRAZOLE SODIUM 40 MG: 40 TABLET, DELAYED RELEASE ORAL at 08:52

## 2019-11-28 RX ADMIN — AMIODARONE HYDROCHLORIDE 200 MG: 200 TABLET ORAL at 08:52

## 2019-11-28 RX ADMIN — ATORVASTATIN CALCIUM 40 MG: 20 TABLET, FILM COATED ORAL at 08:52

## 2019-11-28 RX ADMIN — METOPROLOL TARTRATE 50 MG: 50 TABLET, FILM COATED ORAL at 08:52

## 2019-11-28 RX ADMIN — TAMSULOSIN HYDROCHLORIDE 0.4 MG: 0.4 CAPSULE ORAL at 08:52

## 2019-11-28 RX ADMIN — SODIUM CHLORIDE 75 ML/HR: 9 INJECTION, SOLUTION INTRAVENOUS at 12:27

## 2019-11-29 ENCOUNTER — TRANSCRIBE ORDERS (OUTPATIENT)
Dept: LAB | Facility: HOSPITAL | Age: 69
End: 2019-11-29

## 2019-11-29 ENCOUNTER — READMISSION MANAGEMENT (OUTPATIENT)
Dept: CALL CENTER | Facility: HOSPITAL | Age: 69
End: 2019-11-29

## 2019-11-29 ENCOUNTER — LAB (OUTPATIENT)
Dept: LAB | Facility: HOSPITAL | Age: 69
End: 2019-11-29

## 2019-11-29 DIAGNOSIS — N17.9 ACUTE RENAL FAILURE, UNSPECIFIED ACUTE RENAL FAILURE TYPE (HCC): Primary | ICD-10-CM

## 2019-11-29 DIAGNOSIS — N17.9 ACUTE RENAL FAILURE, UNSPECIFIED ACUTE RENAL FAILURE TYPE (HCC): ICD-10-CM

## 2019-11-29 LAB
ANION GAP SERPL CALCULATED.3IONS-SCNC: 11.2 MMOL/L (ref 5–15)
BACTERIA SPEC AEROBE CULT: NORMAL
BACTERIA SPEC AEROBE CULT: NORMAL
BUN BLD-MCNC: 22 MG/DL (ref 8–23)
BUN/CREAT SERPL: 17.1 (ref 7–25)
CALCIUM SPEC-SCNC: 8.6 MG/DL (ref 8.6–10.5)
CHLORIDE SERPL-SCNC: 104 MMOL/L (ref 98–107)
CO2 SERPL-SCNC: 25.8 MMOL/L (ref 22–29)
CREAT BLD-MCNC: 1.29 MG/DL (ref 0.76–1.27)
GFR SERPL CREATININE-BSD FRML MDRD: 55 ML/MIN/1.73
GLUCOSE BLD-MCNC: 105 MG/DL (ref 65–99)
POTASSIUM BLD-SCNC: 3.5 MMOL/L (ref 3.5–5.2)
SODIUM BLD-SCNC: 141 MMOL/L (ref 136–145)

## 2019-11-29 PROCEDURE — 80048 BASIC METABOLIC PNL TOTAL CA: CPT

## 2019-11-29 PROCEDURE — 36415 COLL VENOUS BLD VENIPUNCTURE: CPT

## 2019-11-29 NOTE — OUTREACH NOTE
Prep Survey      Responses   Facility patient discharged from?  [place]   Is patient eligible?  Yes   Discharge diagnosis  Acute pyelonephritis, cystoscopy & stent   Does the patient have one of the following disease processes/diagnoses(primary or secondary)?  Other   Does the patient have Home health ordered?  No   Is there a DME ordered?  No   Prep survey completed?  Yes          Saige Vargas RN

## 2019-12-02 ENCOUNTER — READMISSION MANAGEMENT (OUTPATIENT)
Dept: CALL CENTER | Facility: HOSPITAL | Age: 69
End: 2019-12-02

## 2019-12-02 NOTE — OUTREACH NOTE
Medical Week 1 Survey      Responses   Facility patient discharged from?  [place]   Does the patient have one of the following disease processes/diagnoses(primary or secondary)?  Other   Is there a successful TCM telephone encounter documented?  No   Week 1 attempt successful?  Yes   Call start time  1010   Call end time  1016   Discharge diagnosis  Acute pyelonephritis, cystoscopy & stent   Meds reviewed with patient/caregiver?  Yes   Is the patient having any side effects they believe may be caused by any medication additions or changes?  No   Does the patient have all medications ordered at discharge?  Yes   Is the patient taking all medications as directed (includes completed medication regime)?  Yes   Does the patient have a primary care provider?   Yes   Does the patient have an appointment with their PCP within 7 days of discharge?  N/A   Comments regarding PCP  Appointment will be scheduled with PCP today   Has the patient kept scheduled appointments due by today?  N/A   Comments  Appointment with Etta Quach is 12/11/19,  Appointment with Dr. Regan for stent removal will be made when office calls back. Dr. with Dr. Peters is 12/16/19.    Psychosocial issues?  No   Did the patient receive a copy of their discharge instructions?  Yes   Nursing interventions  Reviewed instructions with patient   What is the patient's perception of their health status since discharge?  Improving   Is the patient/caregiver able to teach back signs and symptoms related to disease process for when to call PCP?  Yes   Is the patient/caregiver able to teach back signs and symptoms related to disease process for when to call 911?  Yes   Is the patient/caregiver able to teach back the hierarchy of who to call/visit for symptoms/problems? PCP, Specialist, Home health nurse, Urgent Care, ED, 911  Yes   Week 1 call completed?  Yes          Selene Everett RN

## 2019-12-04 ENCOUNTER — HOSPITAL ENCOUNTER (OUTPATIENT)
Facility: HOSPITAL | Age: 69
Setting detail: HOSPITAL OUTPATIENT SURGERY
End: 2019-12-04
Attending: UROLOGY | Admitting: UROLOGY

## 2019-12-10 ENCOUNTER — READMISSION MANAGEMENT (OUTPATIENT)
Dept: CALL CENTER | Facility: HOSPITAL | Age: 69
End: 2019-12-10

## 2019-12-10 NOTE — OUTREACH NOTE
Medical Week 2 Survey      Responses   Facility patient discharged from?  [place]   Does the patient have one of the following disease processes/diagnoses(primary or secondary)?  Other   Week 2 attempt successful?  Yes   Call start time  1137   Discharge diagnosis  Acute pyelonephritis, cystoscopy & stent   Call end time  1139   Is patient permission given to speak with other caregiver?  No   Meds reviewed with patient/caregiver?  Yes   Is the patient taking all medications as directed (includes completed medication regime)?  Yes   Does the patient have a primary care provider?   Yes   Has the patient kept scheduled appointments due by today?  Yes   Comments  Has followed with urology, stent still in   Has home health visited the patient within 72 hours of discharge?  N/A   Psychosocial issues?  No   What is the patient's perception of their health status since discharge?  Improving   Week 2 Call Completed?  Yes   Wrap up additional comments  Stent in place. No complaints. Compliments nurse, Ruddy, who took care of him.           Aida Pedro RN

## 2019-12-11 ENCOUNTER — OFFICE VISIT (OUTPATIENT)
Dept: CARDIOLOGY | Facility: CLINIC | Age: 69
End: 2019-12-11

## 2019-12-11 VITALS
SYSTOLIC BLOOD PRESSURE: 164 MMHG | HEIGHT: 70 IN | HEART RATE: 65 BPM | BODY MASS INDEX: 27.92 KG/M2 | WEIGHT: 195 LBS | DIASTOLIC BLOOD PRESSURE: 90 MMHG | OXYGEN SATURATION: 98 %

## 2019-12-11 DIAGNOSIS — I25.10 CORONARY ARTERY DISEASE INVOLVING NATIVE CORONARY ARTERY OF NATIVE HEART WITHOUT ANGINA PECTORIS: ICD-10-CM

## 2019-12-11 DIAGNOSIS — I10 ESSENTIAL HYPERTENSION: ICD-10-CM

## 2019-12-11 DIAGNOSIS — I48.0 PAROXYSMAL ATRIAL FIBRILLATION (HCC): Primary | ICD-10-CM

## 2019-12-11 DIAGNOSIS — Z01.810 PRE-OPERATIVE CARDIOVASCULAR EXAMINATION: ICD-10-CM

## 2019-12-11 PROCEDURE — 99214 OFFICE O/P EST MOD 30 MIN: CPT | Performed by: NURSE PRACTITIONER

## 2019-12-11 RX ORDER — OXYCODONE HYDROCHLORIDE AND ACETAMINOPHEN 5; 325 MG/1; MG/1
1 TABLET ORAL EVERY 4 HOURS PRN
Refills: 0 | COMMUNITY
Start: 2019-11-23 | End: 2019-12-11 | Stop reason: SDDI

## 2019-12-11 NOTE — PROGRESS NOTES
[facility] CARDIOLOGY  [address]  [place] [facility] [address]  Phone: [phone]      Patient Name: Kyle Amador  :1950  Age: 69 y.o.  Primary Cardiologist: Aquiles Velazquez MD  Encounter Provider:  SAUL Flood      Chief Complaint:   Chief Complaint   Patient presents with   • Follow-up     1 week Hospital         HPI  Kyle Amador is a 69 y.o. male who presents today for one-week hospital reevaluation.     Pt has a  history significant for atrial fibrillation, CAD, hypertension, history of sepsis.    Patient was hospitalized and discharged on 2019.  Patient was admitted with kidney stones and pyelonephritis.  During hospitalization patient had paroxysmal atrial fibrillation with rapid ventricular rate.  Patient was started on amiodarone as well as restarting of beta-blocker therapy.    Patient reports that he has not had any episodes of atrial fibrillation since discharge.  He states he is in need of having his ureteral stent removed and lithotripsy for renal stones.  He states that this was originally been scheduled by his urologist however anesthesiology would not schedule the procedure until he had risk assessment from our office.  Patient states that he has not had any episodes of fever.  He denies any chest pain, shortness of breath, palpitations, lightheadedness, lower extremity edema, fatigue.    The following portions of the patient's history were reviewed and updated as appropriate: allergies, current medications, past family history, past medical history, past social history, past surgical history and problem list.      Review of Systems   Constitution: Negative for malaise/fatigue.   Cardiovascular: Negative for chest pain and leg swelling.   Respiratory: Negative for shortness of breath.    Genitourinary: Positive for flank pain.   Neurological: Negative for light-headedness.   All other systems reviewed and are  "negative.      OBJECTIVE:     Vitals:    12/11/19 1444   BP: 164/90   BP Location: Right arm   Patient Position: Sitting   Pulse: 65   SpO2: 98%   Weight: 88.5 kg (195 lb)   Height: 177.8 cm (70\")     Body mass index is 27.98 kg/m².    Physical Exam   Constitutional: He is oriented to person, place, and time. Vital signs are normal. He appears well-developed and well-nourished.   Eyes: Conjunctivae are normal.   Neck: Carotid bruit is not present.   Cardiovascular: Normal rate, regular rhythm and normal heart sounds.   No murmur heard.  Pulmonary/Chest: Effort normal and breath sounds normal.   Abdominal: Normal appearance.   Musculoskeletal: Normal range of motion.   No pedal edema   Neurological: He is alert and oriented to person, place, and time. GCS eye subscore is 4. GCS verbal subscore is 5. GCS motor subscore is 6.   Skin: Skin is warm and dry.   Psychiatric: He has a normal mood and affect. His speech is normal and behavior is normal. Judgment and thought content normal. Cognition and memory are normal.       Procedures    Cardiac Procedures:  1. Myocardial perfusion stress test 2/6/2018.  Normal myocardial perfusion study without evidence of ischemia.  2. Renal artery duplex 3/21/2018.  Normal.  3. Cardiac catheterization 7/11/2019.  No evidence of obstructive CAD.  RCA stent is widely patent.  4. Echocardiogram 11/26/2019.  EF 66%.  All LV wall segments contract normally.  LV cavity is small.  Mild LVH.  Diastolic function normal.          ASSESSMENT:      Diagnosis Plan   1. Paroxysmal atrial fibrillation (CMS/HCC)     2. Coronary artery disease involving native coronary artery of native heart without angina pectoris     3. Essential hypertension     4. Pre-operative cardiovascular examination           PLAN OF CARE:     1. Paroxysmal atrial fibrillation.  Patient had episodes of atrial fibrillation during hospitalization where he was septic from obstructive renal stones with pyelonephritis.  Patient's " rate has been controlled with amiodarone and metoprolol tartrate.  Was considering stopping amiodarone today, however patient is in need of ureteral stent removal and lithotripsy.  I think that given his atrial fibrillation during an acute illness we should probably continue this until after procedure.  He is currently not anticoagulated secondary to needs for surgery.  2. CAD.  Patient had normal myocardial perfusion stress test February 2018.  He is currently asymptomatic and denies any chest pain or shortness of breath.  3. Hypertension.  Blood pressure currently elevated at 164/90.  Patient reports that he is having pain related to his renal stones.  We will continue to monitor blood pressure closely.  4. Preoperative cardiovascular exam.  Patient considered low risk of major cardiac event during procedure.  He is currently not on any anticoagulants.  No need for any further cardiac testing.  Patient had echocardiogram November 2019 which revealed normal LV function and wall motion.  5. Follow-up with me 1 week after ureteral stent and lithotripsy.  Follow-up with Dr. Velazquez 1 month after my scheduled appointment.           Discharge Medications           Accurate as of December 11, 2019  3:17 PM. If you have any questions, ask your nurse or doctor.               Continue These Medications      Instructions Start Date   amiodarone 200 MG tablet  Commonly known as:  PACERONE   200 mg, Oral, Every 12 Hours Scheduled      amitriptyline 25 MG tablet  Commonly known as:  ELAVIL   Nightly      aspirin 81 MG tablet   Oral, Nightly      atorvastatin 40 MG tablet  Commonly known as:  LIPITOR   TAKE 1 TABLET BY MOUTH EVERY DAY AT NIGHT      baclofen 10 MG tablet  Commonly known as:  LIORESAL   TAKE 1 TABLET BY MOUTH 3 TIMES A DAY AS NEEDED FOR MUSCLE RELAXATION      GLUCOSAMINE 1500 COMPLEX capsule   Oral, 2 Times Daily      isosorbide mononitrate 60 MG 24 hr tablet  Commonly known as:  IMDUR   TAKE 0.5 TABLETS BY MOUTH 2  (TWO) TIMES A DAY.      metoprolol tartrate 50 MG tablet  Commonly known as:  LOPRESSOR   50 mg, Oral, Every 12 Hours Scheduled      pantoprazole 40 MG EC tablet  Commonly known as:  PROTONIX   40 mg, 2 Times Daily      silodosin 4 MG capsule capsule  Commonly known as:  RAPAFLO   8 mg, Oral, Daily With Breakfast         Stop These Medications    oxyCODONE-acetaminophen 5-325 MG per tablet  Commonly known as:  PERCOCET  Stopped by:  SAUL Flood            Thank you for allowing me to participate in the care of your patient,         SAUL Flood  [facility]  12/11/19  3:17 PM    **Hua Disclaimer:**  Much of this encounter note is an electronic transcription/translation of spoken language to printed text. The electronic translation of spoken language may permit erroneous, or at times, nonsensical words or phrases to be inadvertently transcribed. Although I have reviewed the note for such errors, some may still exist.

## 2019-12-17 RX ORDER — CEFTRIAXONE SODIUM 1 G/50ML
1 INJECTION, SOLUTION INTRAVENOUS ONCE
Status: CANCELLED | OUTPATIENT
Start: 2019-12-18

## 2019-12-18 ENCOUNTER — HOSPITAL ENCOUNTER (OUTPATIENT)
Facility: HOSPITAL | Age: 69
Setting detail: HOSPITAL OUTPATIENT SURGERY
Discharge: HOME | End: 2019-12-18
Attending: UROLOGY | Admitting: UROLOGY

## 2019-12-18 ENCOUNTER — ANESTHESIA EVENT (OUTPATIENT)
Dept: PERIOP | Facility: HOSPITAL | Age: 69
End: 2019-12-18

## 2019-12-18 ENCOUNTER — APPOINTMENT (OUTPATIENT)
Dept: GENERAL RADIOLOGY | Facility: HOSPITAL | Age: 69
End: 2019-12-18

## 2019-12-18 ENCOUNTER — READMISSION MANAGEMENT (OUTPATIENT)
Dept: CALL CENTER | Facility: HOSPITAL | Age: 69
End: 2019-12-18

## 2019-12-18 ENCOUNTER — ANESTHESIA (OUTPATIENT)
Dept: PERIOP | Facility: HOSPITAL | Age: 69
End: 2019-12-18

## 2019-12-18 VITALS
TEMPERATURE: 97.7 F | HEIGHT: 70 IN | RESPIRATION RATE: 14 BRPM | SYSTOLIC BLOOD PRESSURE: 148 MMHG | WEIGHT: 190.38 LBS | BODY MASS INDEX: 27.25 KG/M2 | OXYGEN SATURATION: 98 % | HEART RATE: 58 BPM | DIASTOLIC BLOOD PRESSURE: 75 MMHG

## 2019-12-18 DIAGNOSIS — N20.1 RIGHT URETERAL STONE: ICD-10-CM

## 2019-12-18 PROCEDURE — 25010000002 FENTANYL CITRATE (PF) 100 MCG/2ML SOLUTION: Performed by: NURSE ANESTHETIST, CERTIFIED REGISTERED

## 2019-12-18 PROCEDURE — 74420 UROGRAPHY RTRGR +-KUB: CPT

## 2019-12-18 PROCEDURE — C1758 CATHETER, URETERAL: HCPCS | Performed by: UROLOGY

## 2019-12-18 PROCEDURE — 74018 RADEX ABDOMEN 1 VIEW: CPT

## 2019-12-18 PROCEDURE — 25010000002 ONDANSETRON PER 1 MG: Performed by: NURSE ANESTHETIST, CERTIFIED REGISTERED

## 2019-12-18 PROCEDURE — 25010000003 CEFAZOLIN IN DEXTROSE 2-4 GM/100ML-% SOLUTION: Performed by: UROLOGY

## 2019-12-18 PROCEDURE — C2617 STENT, NON-COR, TEM W/O DEL: HCPCS | Performed by: UROLOGY

## 2019-12-18 PROCEDURE — 82360 CALCULUS ASSAY QUANT: CPT | Performed by: UROLOGY

## 2019-12-18 PROCEDURE — 76000 FLUOROSCOPY <1 HR PHYS/QHP: CPT

## 2019-12-18 PROCEDURE — 25010000002 MIDAZOLAM PER 1 MG: Performed by: ANESTHESIOLOGY

## 2019-12-18 PROCEDURE — 25010000002 PROPOFOL 10 MG/ML EMULSION: Performed by: NURSE ANESTHETIST, CERTIFIED REGISTERED

## 2019-12-18 DEVICE — URETERAL STENT
Type: IMPLANTABLE DEVICE | Site: URETER | Status: FUNCTIONAL
Brand: CONTOUR™

## 2019-12-18 RX ORDER — HYDROMORPHONE HYDROCHLORIDE 1 MG/ML
0.5 INJECTION, SOLUTION INTRAMUSCULAR; INTRAVENOUS; SUBCUTANEOUS
Status: DISCONTINUED | OUTPATIENT
Start: 2019-12-18 | End: 2019-12-18 | Stop reason: HOSPADM

## 2019-12-18 RX ORDER — ONDANSETRON 2 MG/ML
4 INJECTION INTRAMUSCULAR; INTRAVENOUS ONCE AS NEEDED
Status: DISCONTINUED | OUTPATIENT
Start: 2019-12-18 | End: 2019-12-18 | Stop reason: HOSPADM

## 2019-12-18 RX ORDER — PROMETHAZINE HYDROCHLORIDE 25 MG/1
25 TABLET ORAL ONCE AS NEEDED
Status: DISCONTINUED | OUTPATIENT
Start: 2019-12-18 | End: 2019-12-18 | Stop reason: HOSPADM

## 2019-12-18 RX ORDER — FENTANYL CITRATE 50 UG/ML
INJECTION, SOLUTION INTRAMUSCULAR; INTRAVENOUS AS NEEDED
Status: DISCONTINUED | OUTPATIENT
Start: 2019-12-18 | End: 2019-12-18 | Stop reason: SURG

## 2019-12-18 RX ORDER — DIPHENHYDRAMINE HCL 25 MG
25 CAPSULE ORAL
Status: DISCONTINUED | OUTPATIENT
Start: 2019-12-18 | End: 2019-12-18 | Stop reason: HOSPADM

## 2019-12-18 RX ORDER — SODIUM CHLORIDE 0.9 % (FLUSH) 0.9 %
3-10 SYRINGE (ML) INJECTION AS NEEDED
Status: DISCONTINUED | OUTPATIENT
Start: 2019-12-18 | End: 2019-12-18 | Stop reason: HOSPADM

## 2019-12-18 RX ORDER — FENTANYL CITRATE 50 UG/ML
50 INJECTION, SOLUTION INTRAMUSCULAR; INTRAVENOUS
Status: DISCONTINUED | OUTPATIENT
Start: 2019-12-18 | End: 2019-12-18 | Stop reason: HOSPADM

## 2019-12-18 RX ORDER — SODIUM CHLORIDE 0.9 % (FLUSH) 0.9 %
3 SYRINGE (ML) INJECTION EVERY 12 HOURS SCHEDULED
Status: DISCONTINUED | OUTPATIENT
Start: 2019-12-18 | End: 2019-12-18 | Stop reason: HOSPADM

## 2019-12-18 RX ORDER — EPHEDRINE SULFATE 50 MG/ML
5 INJECTION, SOLUTION INTRAVENOUS ONCE AS NEEDED
Status: DISCONTINUED | OUTPATIENT
Start: 2019-12-18 | End: 2019-12-18 | Stop reason: HOSPADM

## 2019-12-18 RX ORDER — OXYCODONE AND ACETAMINOPHEN 7.5; 325 MG/1; MG/1
1 TABLET ORAL ONCE AS NEEDED
Status: DISCONTINUED | OUTPATIENT
Start: 2019-12-18 | End: 2019-12-18 | Stop reason: HOSPADM

## 2019-12-18 RX ORDER — SODIUM CHLORIDE, SODIUM LACTATE, POTASSIUM CHLORIDE, CALCIUM CHLORIDE 600; 310; 30; 20 MG/100ML; MG/100ML; MG/100ML; MG/100ML
9 INJECTION, SOLUTION INTRAVENOUS CONTINUOUS
Status: DISCONTINUED | OUTPATIENT
Start: 2019-12-18 | End: 2019-12-18 | Stop reason: HOSPADM

## 2019-12-18 RX ORDER — FAMOTIDINE 10 MG/ML
20 INJECTION, SOLUTION INTRAVENOUS ONCE
Status: COMPLETED | OUTPATIENT
Start: 2019-12-18 | End: 2019-12-18

## 2019-12-18 RX ORDER — PROPOFOL 10 MG/ML
VIAL (ML) INTRAVENOUS AS NEEDED
Status: DISCONTINUED | OUTPATIENT
Start: 2019-12-18 | End: 2019-12-18 | Stop reason: SURG

## 2019-12-18 RX ORDER — DIPHENHYDRAMINE HYDROCHLORIDE 50 MG/ML
12.5 INJECTION INTRAMUSCULAR; INTRAVENOUS
Status: DISCONTINUED | OUTPATIENT
Start: 2019-12-18 | End: 2019-12-18 | Stop reason: HOSPADM

## 2019-12-18 RX ORDER — LABETALOL HYDROCHLORIDE 5 MG/ML
5 INJECTION, SOLUTION INTRAVENOUS
Status: DISCONTINUED | OUTPATIENT
Start: 2019-12-18 | End: 2019-12-18 | Stop reason: HOSPADM

## 2019-12-18 RX ORDER — FAMOTIDINE 10 MG/ML
20 INJECTION, SOLUTION INTRAVENOUS ONCE
Status: DISCONTINUED | OUTPATIENT
Start: 2019-12-18 | End: 2019-12-18 | Stop reason: HOSPADM

## 2019-12-18 RX ORDER — LIDOCAINE HYDROCHLORIDE 20 MG/ML
INJECTION, SOLUTION INFILTRATION; PERINEURAL AS NEEDED
Status: DISCONTINUED | OUTPATIENT
Start: 2019-12-18 | End: 2019-12-18 | Stop reason: SURG

## 2019-12-18 RX ORDER — MIDAZOLAM HYDROCHLORIDE 1 MG/ML
2 INJECTION INTRAMUSCULAR; INTRAVENOUS
Status: DISCONTINUED | OUTPATIENT
Start: 2019-12-18 | End: 2019-12-18 | Stop reason: HOSPADM

## 2019-12-18 RX ORDER — CEFAZOLIN SODIUM 2 G/100ML
2 INJECTION, SOLUTION INTRAVENOUS ONCE
Status: COMPLETED | OUTPATIENT
Start: 2019-12-18 | End: 2019-12-18

## 2019-12-18 RX ORDER — MIDAZOLAM HYDROCHLORIDE 1 MG/ML
1 INJECTION INTRAMUSCULAR; INTRAVENOUS
Status: DISCONTINUED | OUTPATIENT
Start: 2019-12-18 | End: 2019-12-18 | Stop reason: HOSPADM

## 2019-12-18 RX ORDER — PROMETHAZINE HYDROCHLORIDE 25 MG/ML
12.5 INJECTION, SOLUTION INTRAMUSCULAR; INTRAVENOUS ONCE AS NEEDED
Status: DISCONTINUED | OUTPATIENT
Start: 2019-12-18 | End: 2019-12-18 | Stop reason: HOSPADM

## 2019-12-18 RX ORDER — ONDANSETRON 2 MG/ML
INJECTION INTRAMUSCULAR; INTRAVENOUS AS NEEDED
Status: DISCONTINUED | OUTPATIENT
Start: 2019-12-18 | End: 2019-12-18 | Stop reason: SURG

## 2019-12-18 RX ORDER — FLUMAZENIL 0.1 MG/ML
0.2 INJECTION INTRAVENOUS AS NEEDED
Status: DISCONTINUED | OUTPATIENT
Start: 2019-12-18 | End: 2019-12-18 | Stop reason: HOSPADM

## 2019-12-18 RX ORDER — PROMETHAZINE HYDROCHLORIDE 25 MG/1
25 SUPPOSITORY RECTAL ONCE AS NEEDED
Status: DISCONTINUED | OUTPATIENT
Start: 2019-12-18 | End: 2019-12-18 | Stop reason: HOSPADM

## 2019-12-18 RX ORDER — LIDOCAINE HYDROCHLORIDE 10 MG/ML
0.5 INJECTION, SOLUTION EPIDURAL; INFILTRATION; INTRACAUDAL; PERINEURAL ONCE AS NEEDED
Status: DISCONTINUED | OUTPATIENT
Start: 2019-12-18 | End: 2019-12-18 | Stop reason: HOSPADM

## 2019-12-18 RX ORDER — PROMETHAZINE HYDROCHLORIDE 25 MG/ML
6.25 INJECTION, SOLUTION INTRAMUSCULAR; INTRAVENOUS
Status: DISCONTINUED | OUTPATIENT
Start: 2019-12-18 | End: 2019-12-18 | Stop reason: HOSPADM

## 2019-12-18 RX ORDER — MAGNESIUM HYDROXIDE 1200 MG/15ML
LIQUID ORAL AS NEEDED
Status: DISCONTINUED | OUTPATIENT
Start: 2019-12-18 | End: 2019-12-18 | Stop reason: HOSPADM

## 2019-12-18 RX ORDER — EPHEDRINE SULFATE 50 MG/ML
INJECTION, SOLUTION INTRAVENOUS AS NEEDED
Status: DISCONTINUED | OUTPATIENT
Start: 2019-12-18 | End: 2019-12-18 | Stop reason: SURG

## 2019-12-18 RX ORDER — HYDRALAZINE HYDROCHLORIDE 20 MG/ML
5 INJECTION INTRAMUSCULAR; INTRAVENOUS
Status: DISCONTINUED | OUTPATIENT
Start: 2019-12-18 | End: 2019-12-18 | Stop reason: HOSPADM

## 2019-12-18 RX ORDER — ACETAMINOPHEN 325 MG/1
650 TABLET ORAL ONCE AS NEEDED
Status: DISCONTINUED | OUTPATIENT
Start: 2019-12-18 | End: 2019-12-18 | Stop reason: HOSPADM

## 2019-12-18 RX ORDER — NALOXONE HCL 0.4 MG/ML
0.2 VIAL (ML) INJECTION AS NEEDED
Status: DISCONTINUED | OUTPATIENT
Start: 2019-12-18 | End: 2019-12-18 | Stop reason: HOSPADM

## 2019-12-18 RX ORDER — HYDROCODONE BITARTRATE AND ACETAMINOPHEN 7.5; 325 MG/1; MG/1
1 TABLET ORAL ONCE AS NEEDED
Status: COMPLETED | OUTPATIENT
Start: 2019-12-18 | End: 2019-12-18

## 2019-12-18 RX ADMIN — CEFAZOLIN SODIUM 2000 MG: 2 INJECTION, SOLUTION INTRAVENOUS at 14:19

## 2019-12-18 RX ADMIN — SODIUM CHLORIDE, POTASSIUM CHLORIDE, SODIUM LACTATE AND CALCIUM CHLORIDE 9 ML/HR: 600; 310; 30; 20 INJECTION, SOLUTION INTRAVENOUS at 13:58

## 2019-12-18 RX ADMIN — EPHEDRINE SULFATE 10 MG: 50 INJECTION INTRAVENOUS at 14:45

## 2019-12-18 RX ADMIN — ONDANSETRON HYDROCHLORIDE 4 MG: 2 SOLUTION INTRAMUSCULAR; INTRAVENOUS at 14:47

## 2019-12-18 RX ADMIN — HYDROCODONE BITARTRATE AND ACETAMINOPHEN 1 TABLET: 7.5; 325 TABLET ORAL at 15:08

## 2019-12-18 RX ADMIN — PROPOFOL 150 MG: 10 INJECTION, EMULSION INTRAVENOUS at 14:16

## 2019-12-18 RX ADMIN — LIDOCAINE HYDROCHLORIDE 100 MG: 20 INJECTION, SOLUTION INFILTRATION; PERINEURAL at 14:16

## 2019-12-18 RX ADMIN — FENTANYL CITRATE 25 MCG: 50 INJECTION INTRAMUSCULAR; INTRAVENOUS at 14:36

## 2019-12-18 RX ADMIN — MIDAZOLAM 1 MG: 1 INJECTION INTRAMUSCULAR; INTRAVENOUS at 13:58

## 2019-12-18 RX ADMIN — FENTANYL CITRATE 25 MCG: 50 INJECTION INTRAMUSCULAR; INTRAVENOUS at 14:48

## 2019-12-18 RX ADMIN — FENTANYL CITRATE 50 MCG: 50 INJECTION INTRAMUSCULAR; INTRAVENOUS at 14:19

## 2019-12-18 RX ADMIN — EPHEDRINE SULFATE 10 MG: 50 INJECTION INTRAVENOUS at 14:32

## 2019-12-18 RX ADMIN — FAMOTIDINE 20 MG: 10 INJECTION INTRAVENOUS at 13:58

## 2019-12-18 NOTE — ANESTHESIA PROCEDURE NOTES
Airway  Urgency: elective    Date/Time: 12/18/2019 2:17 PM    General Information and Staff    Patient location during procedure: OR  Anesthesiologist: Maximiliano Underwood MD  CRNA: Angelique Street CRNA    Indications and Patient Condition  Indications for airway management: airway protection    Preoxygenated: yes  MILS maintained throughout  Mask difficulty assessment: 1 - vent by mask    Final Airway Details  Final airway type: supraglottic airway      Successful airway: unique  Size 4    Number of attempts at approach: 1  Assessment: lips, teeth, and gum same as pre-op and atraumatic intubation

## 2019-12-18 NOTE — ANESTHESIA PREPROCEDURE EVALUATION
Anesthesia Evaluation     Patient summary reviewed and Nursing notes reviewed                Airway   Mallampati: III  Dental      Pulmonary    (+) a smoker Former,   Cardiovascular     ECG reviewed  Rhythm: irregular    (+) hypertension, CAD, dysrhythmias Paroxysmal Atrial Fib, hyperlipidemia,       Neuro/Psych- negative ROS  GI/Hepatic/Renal/Endo    (+)  GERD,  renal disease CRI and stones,     Musculoskeletal     Abdominal    Substance History - negative use     OB/GYN negative ob/gyn ROS         Other   arthritis,                      Anesthesia Plan    ASA 3     general     intravenous induction     Anesthetic plan, all risks, benefits, and alternatives have been provided, discussed and informed consent has been obtained with: patient.

## 2019-12-18 NOTE — H&P
Urology History and Physical  Patient Identification:  Name: Kyle Amador  Age: 69 y.o.  Sex: male  :  1950  MRN: [id number]                       Chief Complaint:  R ureteral stone.    History of Present Illness: Pt of Dr. Jimenez regan scheduled for ureteroscopy today. Had infected 3mm stone stented 4 weeks ago set up for uscope today by Dr. Regan.       Problem List:  @PROBList@  Past Medical History:  Past Medical History:   Diagnosis Date   • Atherosclerosis    • Atypical chest pain    • BPH (benign prostatic hyperplasia)    • Bursitis of right shoulder 2013   • CAD (coronary artery disease)    • Cataract     BILATERAL   • Cervical spondylosis 2016   • Chorioretinal scar     BILATERAL   • Diverticulitis    • Early satiety    • ED (erectile dysfunction)    • Elevated BUN 2019   • Esophagitis    • Functional dyspepsia    • GERD (gastroesophageal reflux disease)    • Hemorrhoids    • Hyperlipidemia    • Hypertension     probable hyperaldosteronism   • Hyperthyroidism    • Hypocalcemia 2018   • Hypokalemia 2018   • Kidney cysts 2018    FOLLOWED BY DR. HIRAM EDWARD   • Kidney stones    • Low back pain    • Myopia 2018    LEFT EYE   • OA (osteoarthritis)    • PAF (paroxysmal atrial fibrillation) (CMS/formerly [facility])    • Seborrheic keratosis    • Trochanteric bursitis of right hip 2019     Past Surgical History:  Past Surgical History:   Procedure Laterality Date   • APPENDECTOMY N/A    • CARDIAC CATHETERIZATION N/A 2019    Procedure: Left Heart Cath;  Surgeon: Charo Weiss MD;  Location: [facility] CATH INVASIVE LOCATION;  Service: Cardiology   • CARDIAC CATHETERIZATION N/A 2019    Procedure: Coronary angiography;  Surgeon: Charo Weiss MD;  Location: [facility] CATH INVASIVE LOCATION;  Service: Cardiology   • CARDIAC CATHETERIZATION N/A 2019     Left ventriculography; 30 mid LCx, 30% distal RCA  Charo Weiss MD at [facility]   • CARDIAC CATHETERIZATION Left 2012      20-30% diffuse LAD, 30-40% ostial diag, small LCx with diffuse 30-50%, large RCA with 60-70% mid (normal FFR of 0.9). Angiographically it was unchanged by previous cath DR. ANSELMO FLANAGAN AT [facility]   • CARDIAC CATHETERIZATION Left 05/2015     but due to persistant chest pain, he underwent placement of a ARLETTE (3.5x23 Xience).     • COLONOSCOPY N/A 09/12/2006    DIVERTICULOSIS, MODERATE EXTERNAL HEMORRHOIDS, DR. KAYLAN PINEDA AT [facility]   • CORONARY ANGIOPLASTY WITH STENT PLACEMENT Left 06/01/2015    75% DISTAL RCA STENOSIS, RCA STENT, DR. JUSTIN BERKOWITZ AT [facility]   • CYSTOSCOPY W/ URETERAL STENT PLACEMENT Right 11/23/2019    Procedure: CYSTO RT STENT INSERTION;  Surgeon: Jonnathan Wolf MD;  Location: [facility];  Service: Urology   • ENDOSCOPY N/A 8/14/2019    Z LINE IRREGULAR, SMALL HIATAL HERNIA, MILD INFLAMMATION WITH ERYTHEMA AND FRIABILITY IN GASTRIC BODY, DR. MARTA FLORES AT [facility]   • ENDOSCOPY N/A 06/16/2015    LA GRADE B ESOPHAGITIS, ACUTE GASTRITIS, POSSIBLE PILL ESOPHAGITIS, DR. MARTA FLORES AT [facility]   • ENDOSCOPY AND COLONOSCOPY N/A 06/03/2011    CHRONIC GASTRITIS, HIATAL HERNIA,OTHERWISE NORMAL EGD, DIVERTICULOSIS IN SIGMOID, NON BLEEDING INTERNAL HEMORRHOIDS, OTHERWISE WNL CY, RESCOPE IN 10 YRS, DR. MARTA FLORES AT [facility]   • KIDNEY STONE SURGERY N/A       Home Meds:  Medications Prior to Admission   Medication Sig Dispense Refill Last Dose   • amiodarone (PACERONE) 200 MG tablet Take 1 tablet by mouth Every 12 (Twelve) Hours for 30 days. 60 tablet 0 Taking   • amitriptyline (ELAVIL) 25 MG tablet every night.   Taking   • atorvastatin (LIPITOR) 40 MG tablet TAKE 1 TABLET BY MOUTH EVERY DAY AT NIGHT 90 tablet 1 Taking   • baclofen (LIORESAL) 10 MG tablet TAKE 1 TABLET BY MOUTH 3 TIMES A DAY AS NEEDED FOR MUSCLE RELAXATION  2 Taking   • Glucosamine-Chondroit-Vit C-Mn (GLUCOSAMINE 1500 COMPLEX) capsule Take  by mouth 2 (Two) Times a Day.   Taking   • isosorbide mononitrate (IMDUR) 60 MG 24 hr tablet TAKE 0.5 TABLETS BY  MOUTH 2 (TWO) TIMES A DAY. 90 tablet 0 Taking   • metoprolol tartrate (LOPRESSOR) 50 MG tablet Take 1 tablet by mouth Every 12 (Twelve) Hours for 30 days. 60 tablet 0 Taking   • pantoprazole (PROTONIX) 40 MG EC tablet 40 mg 2 (Two) Times a Day.   Taking   • silodosin (RAPAFLO) 4 MG capsule capsule Take 4 mg by mouth Daily With Breakfast.   Taking   • aspirin 81 MG tablet Take 81 mg by mouth Every Night. HOLDING FOR SX   Taking     Current Meds:   Current Facility-Administered Medications   Medication Dose Route Frequency Provider Last Rate Last Dose   • ceFAZolin in dextrose (ANCEF) IVPB solution 2 g  2 g Intravenous Once Roberto Drew MD         Allergies:  Allergies   Allergen Reactions   • Penicillins    • Spironolactone Dizziness     Immunizations:    There is no immunization history on file for this patient.  Social History:   Social History     Tobacco Use   • Smoking status: Former Smoker     Types: Cigarettes     Last attempt to quit: 1987     Years since quittin.3   • Smokeless tobacco: Never Used   Substance Use Topics   • Alcohol use: Yes     Comment: MONTHLY       Family History:  Family History   Problem Relation Age of Onset   • Stroke Other    • Heart disease Father    • Diabetes Father    • Hypertension Father    • Malig Hyperthermia Neg Hx         Review of Systems  negative 12 point system review except:as above    Objective:  tMax 24 hrs: No data recorded.    Vitals Ranges:      Intake and Output Last 3 Shifts:   No intake/output data recorded.    Exam:  There were no vitals taken for this visit.     GENERAL:    Alert, cooperative, no distress, appears stated age   Head:    Normocephalic, without obvious abnormality, atraumatic   Ears:    Normal external inspection, Nl. hearing   Throat:   Lips, mucosa, and tongue normal   Back:     No CVA tenderness   Lungs:     Respirations unlabored;Normal palpation   CV;   Regular rate and rhythm, No edema   Abdomen:     Soft, non-tender,  no  masses   :       Musculoskeletal:   Extremities normal,Gait Normal   Neurologic/Psych:   Orientation Normal; Mood/Affect Normal       Data Review:  No visits with results within 3 Day(s) from this visit.   Latest known visit with results is:   Lab on 11/29/2019   Component Date Value Ref Range Status   • Glucose 11/29/2019 105* 65 - 99 mg/dL Final   • BUN 11/29/2019 22  8 - 23 mg/dL Final   • Creatinine 11/29/2019 1.29* 0.76 - 1.27 mg/dL Final   • Sodium 11/29/2019 141  136 - 145 mmol/L Final   • Potassium 11/29/2019 3.5  3.5 - 5.2 mmol/L Final   • Chloride 11/29/2019 104  98 - 107 mmol/L Final   • CO2 11/29/2019 25.8  22.0 - 29.0 mmol/L Final   • Calcium 11/29/2019 8.6  8.6 - 10.5 mg/dL Final   • eGFR Non African Amer 11/29/2019 55* >60 mL/min/1.73 Final   • BUN/Creatinine Ratio 11/29/2019 17.1  7.0 - 25.0 Final   • Anion Gap 11/29/2019 11.2  5.0 - 15.0 mmol/L Final       No results found.      Assessment:   R ureteral stone      Plan:   Ureteroscopy, stone extraction, JJ stent, RBOs reviewed.       @[facility]@  12/18/2019

## 2019-12-18 NOTE — OUTREACH NOTE
Medical Week 3 Survey      Responses   Facility patient discharged from?  [facility]   Does the patient have one of the following disease processes/diagnoses(primary or secondary)?  Other   Week 3 attempt successful?  No   Revoke  Readmitted [readmitted for surgery]          Leeanna Penaloza RN

## 2019-12-18 NOTE — ANESTHESIA POSTPROCEDURE EVALUATION
"Patient: Kyle Amador    Procedure Summary     Date:  12/18/19 Room / Location:  [facility] OR 01 / [facility] MAIN OR    Anesthesia Start:  1412 Anesthesia Stop:  1459    Procedure:  RIGHT URETEROSCOPY STENT REMOVAL & STENT PLACEMENT & STONE BASKET EXTRACTION &  LASER LITHOTRIPSY (Right ) Diagnosis:      Surgeon:  Roberto Drew MD Provider:  Maximiliano Underwood MD    Anesthesia Type:  general ASA Status:  3          Anesthesia Type: general    Vitals  Vitals Value Taken Time   /80 12/18/2019  3:15 PM   Temp 36.5 °C (97.7 °F) 12/18/2019  2:58 PM   Pulse 61 12/18/2019  3:19 PM   Resp 12 12/18/2019  3:15 PM   SpO2 96 % 12/18/2019  3:19 PM   Vitals shown include unvalidated device data.        Post Anesthesia Care and Evaluation    Patient location during evaluation: bedside  Patient participation: complete - patient participated  Level of consciousness: awake  Pain management: adequate  Airway patency: patent  Anesthetic complications: No anesthetic complications    Cardiovascular status: acceptable  Respiratory status: acceptable  Hydration status: acceptable    Comments: */93 (BP Location: Left arm, Patient Position: Lying)   Pulse 63   Temp 36.5 °C (97.7 °F) (Oral)   Resp 14   Ht 177.8 cm (70\")   Wt 86.4 kg (190 lb 6 oz)   SpO2 97%   BMI 27.32 kg/m²         "

## 2019-12-26 ENCOUNTER — OFFICE VISIT (OUTPATIENT)
Dept: CARDIOLOGY | Facility: CLINIC | Age: 69
End: 2019-12-26

## 2019-12-26 VITALS
WEIGHT: 191.8 LBS | OXYGEN SATURATION: 99 % | BODY MASS INDEX: 27.46 KG/M2 | HEART RATE: 66 BPM | DIASTOLIC BLOOD PRESSURE: 78 MMHG | SYSTOLIC BLOOD PRESSURE: 148 MMHG | HEIGHT: 70 IN

## 2019-12-26 DIAGNOSIS — N18.30 CHRONIC RENAL INSUFFICIENCY, STAGE III (MODERATE) (HCC): ICD-10-CM

## 2019-12-26 DIAGNOSIS — N13.2 URETERAL STONE WITH HYDRONEPHROSIS: ICD-10-CM

## 2019-12-26 DIAGNOSIS — I25.10 CORONARY ARTERY DISEASE INVOLVING NATIVE CORONARY ARTERY OF NATIVE HEART WITHOUT ANGINA PECTORIS: ICD-10-CM

## 2019-12-26 DIAGNOSIS — I10 ESSENTIAL HYPERTENSION: ICD-10-CM

## 2019-12-26 DIAGNOSIS — I48.0 PAROXYSMAL ATRIAL FIBRILLATION (HCC): Primary | ICD-10-CM

## 2019-12-26 DIAGNOSIS — R11.0 NAUSEA: ICD-10-CM

## 2019-12-26 PROBLEM — N20.2 CALCULUS OF KIDNEY AND URETER: Status: ACTIVE | Noted: 2019-12-16

## 2019-12-26 PROCEDURE — 99213 OFFICE O/P EST LOW 20 MIN: CPT | Performed by: NURSE PRACTITIONER

## 2019-12-26 PROCEDURE — 93000 ELECTROCARDIOGRAM COMPLETE: CPT | Performed by: NURSE PRACTITIONER

## 2019-12-26 RX ORDER — PROMETHAZINE HYDROCHLORIDE 25 MG/1
25 TABLET ORAL EVERY 6 HOURS PRN
COMMUNITY
End: 2020-01-27 | Stop reason: ALTCHOICE

## 2019-12-26 NOTE — PROGRESS NOTES
Date of Office Visit: 19  Encounter Provider: SAUL Lima  Primary Cardiologist: Dr. Velazquez  Place of Service: [facility] CARDIOLOGY  Patient Name: Kyle Amador  :1950      Subjective:     Chief Complaint:  1 week follow up PAF    History of Present Illness:  Kyle Amador is a pleasant 69 y.o. male who is new to me.  Outside records have been obtained and reviewed by me.     This is a patient of Dr. Velazquez with history of atrial fibrillation, CAD, hypertension, kidney stones, pyelonephritis and sepsis.    2018 patient was hospitalized with kidney stones and pyelonephritis.  During his hospitalization he had paroxysmal atrial fibrillation with rapid ventricular rate.  He was started on amiodarone and restarted on beta-blocker.    2019 patient presented to see SAUL Anderson in office for preoperative evaluation in 1 week hospital follow-up.  He was in need of having his ureteral stent removed and lithotripsy for renal stones but anesthesiology would not schedule the procedure until he was cleared by cardiology.  At that visit he denied fever, chest pain, shortness of breath, palpitations, lightheadedness, lower extremity edema and fatigue.  Caitlin considered stopping his amiodarone wanted to leave him on until post procedure.  She cleared him for his procedure as he was considered low risk of major cardiac event.  She wanted him to follow back up 1 week after his procedure.    He is presenting today for follow-up visit with his wife.  He overall feels poorly does not have much of an appetite and thus has lost some weight.  He has had increasing nausea but no recent vomiting since hospital discharge.  He denies any constipation, diarrhea or changes in bowel habits.  He still has some mild right flank pain.  He had his lithotripsy about a week ago and he is having his ureteral stent removed tomorrow.  He believes the amiodarone might be causing  him to feel nauseous.  His primary care physician gave him some Phenergan which is not really helping.  He denies chest pain, shortness of breath, lower extremity edema, severe dizziness or lightheadedness or headaches.  He does have some mild dizziness and lightheadedness but no syncope or near syncope.  He denies any PND or orthopnea.  He has not really been checking his blood pressure at home.  While hospitalized he did occasionally have the sensation of rapid fluttering while he was in atrial fibrillation but other times is asymptomatic.  Fortunately today he is in sinus rhythm.  He denies any fever.  At times he has some chills and feels cold but does not feel that he has had a fever.           Past Medical History:   Diagnosis Date   • Atherosclerosis    • Atypical chest pain    • BPH (benign prostatic hyperplasia)    • Bursitis of right shoulder 03/2013   • CAD (coronary artery disease)    • Cataract     BILATERAL   • Cervical spondylosis 09/2016   • Chorioretinal scar     BILATERAL   • Diverticulitis    • Early satiety    • ED (erectile dysfunction)    • Elevated BUN 02/2019   • Esophagitis    • Functional dyspepsia    • GERD (gastroesophageal reflux disease)    • Hemorrhoids    • Hyperlipidemia    • Hypertension     probable hyperaldosteronism   • Hyperthyroidism    • Hypocalcemia 03/2018   • Hypokalemia 06/2018   • Kidney cysts 03/2018    FOLLOWED BY DR. HIRAM EDWARD   • Kidney stones    • Low back pain    • Myopia 12/2018    LEFT EYE   • OA (osteoarthritis)    • PAF (paroxysmal atrial fibrillation) (CMS/[facility])    • Seborrheic keratosis    • Trochanteric bursitis of right hip 04/2019     Past Surgical History:   Procedure Laterality Date   • APPENDECTOMY N/A    • CARDIAC CATHETERIZATION N/A 7/11/2019    Procedure: Left Heart Cath;  Surgeon: Charo Weiss MD;  Location: [facility] CATH INVASIVE LOCATION;  Service: Cardiology   • CARDIAC CATHETERIZATION N/A 7/11/2019    Procedure: Coronary angiography;   Surgeon: Charo Weiss MD;  Location: [facility] CATH INVASIVE LOCATION;  Service: Cardiology   • CARDIAC CATHETERIZATION N/A 7/11/2019     Left ventriculography; 30 mid LCx, 30% distal RCA  Charo Weiss MD at [facility]   • CARDIAC CATHETERIZATION Left 05/18/2012     20-30% diffuse LAD, 30-40% ostial diag, small LCx with diffuse 30-50%, large RCA with 60-70% mid (normal FFR of 0.9). Angiographically it was unchanged by previous cath DR. ANSELMO FLANAGAN AT [facility]   • CARDIAC CATHETERIZATION Left 05/2015     but due to persistant chest pain, he underwent placement of a ARLETTE (3.5x23 Xience).     • COLONOSCOPY N/A 09/12/2006    DIVERTICULOSIS, MODERATE EXTERNAL HEMORRHOIDS, DR. KAYLAN PINEDA AT [facility]   • CORONARY ANGIOPLASTY WITH STENT PLACEMENT Left 06/01/2015    75% DISTAL RCA STENOSIS, RCA STENT, DR. JUSTIN BERKOWITZ AT [facility]   • CYSTOSCOPY URETEROSCOPY LASER LITHOTRIPSY Right 12/18/2019    Procedure: RIGHT URETEROSCOPY STENT REMOVAL & STENT PLACEMENT & STONE BASKET EXTRACTION &  LASER LITHOTRIPSY;  Surgeon: Roberto Drew MD;  Location: [facility] OR;  Service: Urology   • CYSTOSCOPY W/ URETERAL STENT PLACEMENT Right 11/23/2019    Procedure: CYSTO RT STENT INSERTION;  Surgeon: Jonnathan Wolf MD;  Location: [facility] MAIN OR;  Service: Urology   • ENDOSCOPY N/A 8/14/2019    Z LINE IRREGULAR, SMALL HIATAL HERNIA, MILD INFLAMMATION WITH ERYTHEMA AND FRIABILITY IN GASTRIC BODY, DR. MARTA FLORES AT [facility]   • ENDOSCOPY N/A 06/16/2015    LA GRADE B ESOPHAGITIS, ACUTE GASTRITIS, POSSIBLE PILL ESOPHAGITIS, DR. MARTA FLORES AT [facility]   • ENDOSCOPY AND COLONOSCOPY N/A 06/03/2011    CHRONIC GASTRITIS, HIATAL HERNIA,OTHERWISE NORMAL EGD, DIVERTICULOSIS IN SIGMOID, NON BLEEDING INTERNAL HEMORRHOIDS, OTHERWISE WNL CY, RESCOPE IN 10 YRS, DR. MARTA FLORES AT [facility]   • KIDNEY STONE SURGERY N/A      Outpatient Medications Prior to Visit   Medication Sig Dispense Refill   • amitriptyline (ELAVIL) 25 MG tablet every night.     • aspirin 81 MG tablet Take  81 mg by mouth Every Night. HOLDING FOR SX     • atorvastatin (LIPITOR) 40 MG tablet TAKE 1 TABLET BY MOUTH EVERY DAY AT NIGHT 90 tablet 1   • baclofen (LIORESAL) 10 MG tablet TAKE 1 TABLET BY MOUTH 3 TIMES A DAY AS NEEDED FOR MUSCLE RELAXATION  2   • Glucosamine-Chondroit-Vit C-Mn (GLUCOSAMINE 1500 COMPLEX) capsule Take  by mouth 2 (Two) Times a Day.     • isosorbide mononitrate (IMDUR) 60 MG 24 hr tablet TAKE 0.5 TABLETS BY MOUTH 2 (TWO) TIMES A DAY. 90 tablet 0   • metoprolol tartrate (LOPRESSOR) 50 MG tablet Take 1 tablet by mouth Every 12 (Twelve) Hours for 30 days. 60 tablet 0   • Multiple Vitamins-Minerals (MULTIVITAMIN ADULT PO) Take 1 tablet by mouth Daily.     • pantoprazole (PROTONIX) 40 MG EC tablet 40 mg 2 (Two) Times a Day.     • Saw Palmetto, Serenoa repens, (SAW PALMETTO BERRY PO) Take 1 tablet by mouth 2 (Two) Times a Day.     • silodosin (RAPAFLO) 4 MG capsule capsule Take 4 mg by mouth Daily With Breakfast.     • amiodarone (PACERONE) 200 MG tablet Take 1 tablet by mouth Every 12 (Twelve) Hours for 30 days. 60 tablet 0     No facility-administered medications prior to visit.        Allergies as of 2019 - Reviewed 2019   Allergen Reaction Noted   • Penicillins  2016   • Spironolactone Dizziness 2019     Social History     Socioeconomic History   • Marital status:      Spouse name: Not on file   • Number of children: Not on file   • Years of education: Not on file   • Highest education level: Not on file   Tobacco Use   • Smoking status: Former Smoker     Types: Cigarettes     Last attempt to quit: 1987     Years since quittin.4   • Smokeless tobacco: Never Used   • Tobacco comment: caffeine use    Substance and Sexual Activity   • Alcohol use: Yes     Comment: MONTHLY    • Drug use: No   • Sexual activity: Never     Birth control/protection: None     Family History   Problem Relation Age of Onset   • Stroke Other    • Heart disease Father    • Diabetes Father  "   • Hypertension Father    • Malig Hyperthermia Neg Hx      Review of Systems   Constitution: Positive for chills, decreased appetite, malaise/fatigue and weight loss (5 lbs). Negative for fever.   HENT: Negative for ear pain, hearing loss, nosebleeds and sore throat.    Eyes: Negative for double vision, pain, vision loss in left eye and vision loss in right eye.   Cardiovascular: Negative for chest pain, claudication, dyspnea on exertion, irregular heartbeat, leg swelling, near-syncope, orthopnea, palpitations, paroxysmal nocturnal dyspnea and syncope.   Respiratory: Positive for cough. Negative for shortness of breath, snoring and wheezing.    Endocrine: Positive for cold intolerance. Negative for heat intolerance.   Hematologic/Lymphatic: Negative for bleeding problem.   Skin: Positive for color change. Negative for itching, rash and unusual hair distribution.   Musculoskeletal: Negative for joint pain and joint swelling.   Gastrointestinal: Positive for abdominal pain and nausea. Negative for diarrhea, hematochezia, melena and vomiting.   Genitourinary: Positive for frequency. Negative for decreased libido, hematuria, hesitancy and incomplete emptying.   Neurological: Positive for dizziness and light-headedness. Negative for excessive daytime sleepiness, headaches, loss of balance, numbness, paresthesias and seizures.   Psychiatric/Behavioral: Negative for depression.          Objective:     Vitals:    12/26/19 1507 12/26/19 1540   BP: 148/78    BP Location: Right arm    Patient Position: Sitting    Cuff Size: Adult    Pulse: 69 66   SpO2:  99%   Weight: 87 kg (191 lb 12.8 oz)    Height: 177.8 cm (70\")      Body mass index is 27.52 kg/m².    PHYSICAL EXAM:  Physical Exam   Constitutional: He is oriented to person, place, and time. He appears well-developed and well-nourished. No distress.   HENT:   Head: Normocephalic and atraumatic.   Eyes: Pupils are equal, round, and reactive to light. Conjunctivae and EOM " are normal.   Neck: No JVD present. Carotid bruit is not present.   Cardiovascular: Normal rate, regular rhythm, normal heart sounds and intact distal pulses.   No murmur heard.  Pulses:       Radial pulses are 2+ on the right side, and 2+ on the left side.        Posterior tibial pulses are 2+ on the right side, and 2+ on the left side.   No lower extremity edema   Pulmonary/Chest: Effort normal. No accessory muscle usage. No tachypnea. No respiratory distress. He has no decreased breath sounds. He has no wheezes. He has no rhonchi. He has no rales.   Abdominal: Soft. Bowel sounds are normal. He exhibits no distension. There is no tenderness. There is CVA tenderness (right). There is no rebound and no guarding.   Musculoskeletal: Normal range of motion. He exhibits no edema.   Neurological: He is alert and oriented to person, place, and time.   Skin: Skin is warm, dry and intact. He is not diaphoretic. No erythema.   Psychiatric: He has a normal mood and affect. His speech is normal and behavior is normal. Judgment and thought content normal. Cognition and memory are normal.   Nursing note and vitals reviewed.        ECG 12 Lead  Date/Time: 12/26/2019 3:29 PM  Performed by: Seble Esposito APRN  Authorized by: Seble Esposito APRN   Comparison: compared with previous ECG from 11/25/2019  Similar to previous ECG  Comparison to previous ECG: In SR rhythm today  Rhythm: sinus rhythm  Rate: normal  BPM: 69  Comments: Otherwise normal ECG  Indication: Hx of PAF with RVR            Assessment:       Diagnosis Plan   1. Paroxysmal atrial fibrillation (CMS/HCC)     2. Coronary artery disease involving native coronary artery of native heart without angina pectoris     3. Essential hypertension     4. Chronic renal insufficiency, stage III (moderate) (CMS/HCC)     5. Ureteral stone with hydronephrosis         Plan:     1. Paroxysmal atrial fibrillation:  Patient had episodes of atrial fibrillation with RVR while  hospitalized and septic with renal stone. He is in SR today. He is on amiodarone 200 mg BID.  We will stop his amiodarone as he believes it is causing him to feel nauseated/poorly. Will monitor recurrence for atrial fibrillation.  He was educated on signs and symptoms for which to notify me.  2. CAD:  Patient had normal myocardial perfusion stress test February 2018.  He is currently asymptomatic and denies any chest pain or shortness of breath.  Continue medical therapy with aspirin 81 mg daily, atorvastatin 40 mg daily, metoprolol tartrate and isosorbide.  3. Hypertension:  Blood pressure still mildly elevated today. Continue to monitor.   4. Ureteral stone with pyelonephritis: Per urology.  He had his lithotripsy and removal of 2 stones.  His stent is still in place and there is plans to have it removed tomorrow.  5.  Nausea: Patient feels this is secondary to amiodarone.  I am going to just have him stop it and see if his symptoms improve.  If not he should follow back up with primary care physician.  He does not have any other significant abdominal pain or abdominal symptoms.  He has had some chills since the hospital but denies sensation of a fever.    Follow up with Dr. Velazquez on 1/27/19, unless otherwise needed sooner.  I advised the patient to contact our office with any questions or concerns.      It has been a pleasure to participate in this patient's care. Please feel free to contact me with any questions or concerns.     Seble Esposito, APRN  12/26/19             Your medication list           Accurate as of December 26, 2019  9:17 PM. If you have any questions, ask your nurse or doctor.               CONTINUE taking these medications      Instructions Last Dose Given Next Dose Due   amitriptyline 25 MG tablet  Commonly known as:  ELAVIL      every night.       aspirin 81 MG tablet      Take 81 mg by mouth Every Night. HOLDING FOR SX       atorvastatin 40 MG tablet  Commonly known as:  LIPITOR      TAKE 1  TABLET BY MOUTH EVERY DAY AT NIGHT       baclofen 10 MG tablet  Commonly known as:  LIORESAL      TAKE 1 TABLET BY MOUTH 3 TIMES A DAY AS NEEDED FOR MUSCLE RELAXATION       GLUCOSAMINE 1500 COMPLEX capsule      Take  by mouth 2 (Two) Times a Day.       isosorbide mononitrate 60 MG 24 hr tablet  Commonly known as:  IMDUR      TAKE 0.5 TABLETS BY MOUTH 2 (TWO) TIMES A DAY.       metoprolol tartrate 50 MG tablet  Commonly known as:  LOPRESSOR      Take 1 tablet by mouth Every 12 (Twelve) Hours for 30 days.       MULTIVITAMIN ADULT PO      Take 1 tablet by mouth Daily.       pantoprazole 40 MG EC tablet  Commonly known as:  PROTONIX      40 mg 2 (Two) Times a Day.       SAW PALMETTO BERRY PO      Take 1 tablet by mouth 2 (Two) Times a Day.       silodosin 4 MG capsule capsule  Commonly known as:  RAPAFLO      Take 4 mg by mouth Daily With Breakfast.          STOP taking these medications    amiodarone 200 MG tablet  Commonly known as:  PACERONE  Stopped by:  SAUL Lima               The above medication changes may not have been made by this provider.  Medication list was updated to reflect medications patient is currently taking including medication changes and discontinuations made by other healthcare providers.     Dictated utilizing Dragon Dictation System.

## 2020-01-02 LAB
CA PHOS CRY STONE QL IR: 40 %
COLOR STONE: NORMAL
COM CRY STONE QL IR: 60 %
COMPN STONE: NORMAL
Lab: NORMAL
Lab: NORMAL
NIDUS STONE QL: NORMAL
PATH REPORT.COMMENTS IMP SPEC: NORMAL
SIZE STONE: NORMAL MM
WT STONE: 49.2 MG

## 2020-01-07 NOTE — PROGRESS NOTES
RM:________     PCP: Harinder Lea DO    : 1950  AGE: 69 y.o.    REASON FOR VISIT/  CC:  AFIB       WT: ____________ BP: __________L __________R HR______    CHEST PAIN: _____________    SOA: _____________PALPS: _______________     LIGHTHEADED: ___________FATIGUE: ________________ EDEMA __________    ALLERGIES:Penicillins and Spironolactone SMOKING HISTORY:  Social History     Tobacco Use   • Smoking status: Former Smoker     Types: Cigarettes     Last attempt to quit: 1987     Years since quittin.4   • Smokeless tobacco: Never Used   • Tobacco comment: caffeine use    Substance Use Topics   • Alcohol use: Yes     Comment: MONTHLY    • Drug use: No     CAFFEINE USE_________________  ALCOHOL ______________________    Below is the patient's most recent value for Albumin, ALT, AST, BUN, Calcium, Chloride, Cholesterol, CO2, Creatinine, GFR, Glucose, HDL, Hematocrit, Hemoglobin, Hemoglobin A1C, LDL, Magnesium, Phosphorus, Platelets, Potassium, PSA, Sodium, Triglycerides, TSH and WBC.   Lab Results   Component Value Date    ALBUMIN 2.90 (L) 2019    ALT 17 2019    AST 32 2019    BUN 22 2019    CALCIUM 8.6 2019     2019    CHOL 100 2019    CO2 25.8 2019    CREATININE 1.29 (H) 2019    HDL 42 2019    HCT 32.2 (L) 2019    HGB 10.7 (L) 2019    HGBA1C 5.40 2019    LDL 41 2019    MG 2.0 2019    PHOS 2.1 (L) 2019     (L) 2019    K 3.5 2019     2019    TRIG 87 2019    TSH 0.658 2019    WBC 10.47 2019          NEW DIAGNOSIS/ SURGERY/ HOSP OR ED VISITS: ______________________    __________________________________________________________________      RECENT LABS OR DIAGNOSTIC TESTING:  _____________________________    __________________________________________________________________      ASSESSMENT/ PLAN:  _______________________________________________    __________________________________________________________________

## 2020-01-08 RX ORDER — METOPROLOL SUCCINATE 50 MG/1
TABLET, EXTENDED RELEASE ORAL
Qty: 90 TABLET | Refills: 0 | Status: SHIPPED | OUTPATIENT
Start: 2020-01-08 | End: 2021-05-18 | Stop reason: SDUPTHER

## 2020-01-27 ENCOUNTER — OFFICE VISIT (OUTPATIENT)
Dept: CARDIOLOGY | Facility: CLINIC | Age: 70
End: 2020-01-27

## 2020-01-27 ENCOUNTER — TELEPHONE (OUTPATIENT)
Dept: CARDIOLOGY | Facility: CLINIC | Age: 70
End: 2020-01-27

## 2020-01-27 ENCOUNTER — LAB (OUTPATIENT)
Dept: LAB | Facility: HOSPITAL | Age: 70
End: 2020-01-27

## 2020-01-27 VITALS
HEART RATE: 66 BPM | WEIGHT: 194.2 LBS | SYSTOLIC BLOOD PRESSURE: 176 MMHG | HEIGHT: 70 IN | BODY MASS INDEX: 27.8 KG/M2 | DIASTOLIC BLOOD PRESSURE: 90 MMHG

## 2020-01-27 DIAGNOSIS — N10 ACUTE PYELONEPHRITIS: ICD-10-CM

## 2020-01-27 DIAGNOSIS — I48.91 POSTOPERATIVE ATRIAL FIBRILLATION (HCC): Primary | ICD-10-CM

## 2020-01-27 DIAGNOSIS — I10 ESSENTIAL HYPERTENSION: ICD-10-CM

## 2020-01-27 DIAGNOSIS — I97.89 POSTOPERATIVE ATRIAL FIBRILLATION (HCC): Primary | ICD-10-CM

## 2020-01-27 DIAGNOSIS — I25.10 CORONARY ARTERY DISEASE INVOLVING NATIVE CORONARY ARTERY OF NATIVE HEART WITHOUT ANGINA PECTORIS: ICD-10-CM

## 2020-01-27 DIAGNOSIS — N17.9 AKI (ACUTE KIDNEY INJURY) (HCC): ICD-10-CM

## 2020-01-27 DIAGNOSIS — N18.30 CHRONIC RENAL INSUFFICIENCY, STAGE III (MODERATE) (HCC): ICD-10-CM

## 2020-01-27 PROBLEM — A41.9 SEVERE SEPSIS (HCC): Status: RESOLVED | Noted: 2019-11-24 | Resolved: 2020-01-27

## 2020-01-27 PROBLEM — I95.9 HYPOTENSION: Status: RESOLVED | Noted: 2019-11-24 | Resolved: 2020-01-27

## 2020-01-27 PROBLEM — N13.2 URETERAL STONE WITH HYDRONEPHROSIS: Status: RESOLVED | Noted: 2019-11-23 | Resolved: 2020-01-27

## 2020-01-27 PROBLEM — N20.2 CALCULUS OF KIDNEY AND URETER: Status: RESOLVED | Noted: 2019-12-16 | Resolved: 2020-01-27

## 2020-01-27 PROBLEM — R65.20 SEVERE SEPSIS (HCC): Status: RESOLVED | Noted: 2019-11-24 | Resolved: 2020-01-27

## 2020-01-27 LAB
ANION GAP SERPL CALCULATED.3IONS-SCNC: 11.3 MMOL/L (ref 5–15)
BUN BLD-MCNC: 29 MG/DL (ref 8–23)
BUN/CREAT SERPL: 22 (ref 7–25)
CALCIUM SPEC-SCNC: 9.4 MG/DL (ref 8.6–10.5)
CHLORIDE SERPL-SCNC: 100 MMOL/L (ref 98–107)
CO2 SERPL-SCNC: 27.7 MMOL/L (ref 22–29)
CREAT BLD-MCNC: 1.32 MG/DL (ref 0.76–1.27)
GFR SERPL CREATININE-BSD FRML MDRD: 54 ML/MIN/1.73
GLUCOSE BLD-MCNC: 99 MG/DL (ref 65–99)
POTASSIUM BLD-SCNC: 4.3 MMOL/L (ref 3.5–5.2)
SODIUM BLD-SCNC: 139 MMOL/L (ref 136–145)

## 2020-01-27 PROCEDURE — 80048 BASIC METABOLIC PNL TOTAL CA: CPT

## 2020-01-27 PROCEDURE — 99214 OFFICE O/P EST MOD 30 MIN: CPT | Performed by: INTERNAL MEDICINE

## 2020-01-27 PROCEDURE — 93000 ELECTROCARDIOGRAM COMPLETE: CPT | Performed by: INTERNAL MEDICINE

## 2020-01-27 PROCEDURE — 36415 COLL VENOUS BLD VENIPUNCTURE: CPT

## 2020-01-27 RX ORDER — VALSARTAN 80 MG/1
80 TABLET ORAL DAILY
Qty: 90 TABLET | Refills: 1 | Status: SHIPPED | OUTPATIENT
Start: 2020-01-27 | End: 2020-02-18 | Stop reason: SDUPTHER

## 2020-01-27 NOTE — TELEPHONE ENCOUNTER
----- Message from Aquiles Velazquez MD sent at 1/27/2020 12:47 PM EST -----  Delia -- Please let him know that I am restarting valsartan, but at a lower dose than he used to take . I ordered 80mg daily . I am not starting the spironolactone back right now.    Iván -- please arrange 6m f/u with TK.    chriss [email or web address]

## 2020-01-27 NOTE — PROGRESS NOTES
Date of Office Visit: 2020  Encounter Provider: Aquiles Velazquez MD  Place of Service: [facility] CARDIOLOGY  Patient Name: Kyle Amador  :1950    Chief Complaint   Patient presents with   • Coronary Artery Disease   • Atrial Fibrillation   :     HPI: Kyle Amador is a 69 y.o. male who presents today to followup.     He has history of fairly chronic chest pain. He was found to have distal small vessel disease by catheterization in . In , he had another catheterization due to persistent pain and he had a normal FFR of the right coronary artery. In May 2015, his chest pain got much worse and he underwent repeat cardiac catheterization and the right coronary artery was stented. Despite this, his pain continued to worsen and ultimately, he was diagnosed with severe esophagitis. He had a normal Myoview stress in 2018 due to atypical chest pain.  He was hospitalized in 2019 with chest pain again, and underwent coronary angiography; he had 30% disease in the circumflex and distal RCA.  Again, it was felt that his pain was GI in etiology.    He also has a history of severe hypertension. He had terrible leg swelling with 10mg amlodipine but had been tolerating 5mg daily. At some point, that was stopped, however. He had a normal renal artery duplex.  He has refused a sleep apnea evaluation.  He was noted to be hypokalemic and was placed on spironolactone.    In 2019, he was hospitalized with sepsis, GEORGES on CKD, kidney stones, and hypotension.  He had a brief episode of atrial fibrillation in that setting.  He was placed on oral amiodarone, which we stopped after one month.  His spironolactone and valsartan were stopped (spirono was added to his allergy list, but I removed it as he's not allergic).    He is still tired but denies any other complaints.  He denies palpitations or syncope.  He has mild positional lightheadedness sometimes.  He reports  dyspnea, but then says he can walk on a treadmill for 45 minutes without issue.      Past Medical History:   Diagnosis Date   • Atherosclerosis    • Atypical chest pain    • BPH (benign prostatic hyperplasia)    • Bursitis of right shoulder 03/2013   • CAD (coronary artery disease)    • Cataract     BILATERAL   • Cervical spondylosis 09/2016   • Chorioretinal scar     BILATERAL   • Diverticulitis    • Early satiety    • ED (erectile dysfunction)    • Esophagitis    • Functional dyspepsia    • GERD (gastroesophageal reflux disease)    • Hemorrhoids    • Hyperlipidemia    • Hypertension     probable hyperaldosteronism   • Hyperthyroidism    • Hypocalcemia 03/2018   • Hypokalemia 06/2018   • Kidney cysts 03/2018    FOLLOWED BY DR. HIRAM EDWARD   • Kidney stones    • Low back pain    • Myopia 12/2018    LEFT EYE   • OA (osteoarthritis)    • PAF (paroxysmal atrial fibrillation) (CMS/[facility])    • Seborrheic keratosis    • Trochanteric bursitis of right hip 04/2019       Past Surgical History:   Procedure Laterality Date   • APPENDECTOMY N/A    • CARDIAC CATHETERIZATION N/A 7/11/2019    Procedure: Left Heart Cath;  Surgeon: Charo Weiss MD;  Location:  [facility];  Service: Cardiology   • CARDIAC CATHETERIZATION N/A 7/11/2019    Procedure: Coronary angiography;  Surgeon: Charo Weiss MD;  Location:  [facility];  Service: Cardiology   • CARDIAC CATHETERIZATION N/A 7/11/2019     Left ventriculography; 30 mid LCx, 30% distal RCA  Charo Weiss MD at [facility]   • CARDIAC CATHETERIZATION Left 05/18/2012     20-30% diffuse LAD, 30-40% ostial diag, small LCx with diffuse 30-50%, large RCA with 60-70% mid (normal FFR of 0.9). Angiographically it was unchanged by previous cath DR. ANSELMO FLANAGAN AT [facility]   • CARDIAC CATHETERIZATION Left 05/2015     but due to persistant chest pain, he underwent placement of a ARLETTE (3.5x23 Xience).     • COLONOSCOPY N/A 09/12/2006    DIVERTICULOSIS, MODERATE EXTERNAL  HEMORRHOIDS, DR. KAYLAN PINEDA AT [facility]   • CORONARY ANGIOPLASTY WITH STENT PLACEMENT Left 2015    75% DISTAL RCA STENOSIS, RCA STENT, DR. JUSTIN BERKOWITZ AT [facility]   • CYSTOSCOPY URETEROSCOPY LASER LITHOTRIPSY Right 2019    Procedure: RIGHT URETEROSCOPY STENT REMOVAL & STENT PLACEMENT & STONE BASKET EXTRACTION &  LASER LITHOTRIPSY;  Surgeon: Roberto Drew MD;  [facility] [facility];  Service: Urology   • CYSTOSCOPY W/ URETERAL STENT PLACEMENT Right 2019    Procedure: CYSTO RT STENT INSERTION;  Surgeon: Jonnathan Wolf MD;  Location: [facility];  Service: Urology   • ENDOSCOPY N/A 2019    Z LINE IRREGULAR, SMALL HIATAL HERNIA, MILD INFLAMMATION WITH ERYTHEMA AND FRIABILITY IN GASTRIC BODY, DR. MARTA FLORES AT [facility]   • ENDOSCOPY N/A 2015    LA GRADE B ESOPHAGITIS, ACUTE GASTRITIS, POSSIBLE PILL ESOPHAGITIS, DR. MARTA FLORES AT [facility]   • ENDOSCOPY AND COLONOSCOPY N/A 2011    CHRONIC GASTRITIS, HIATAL HERNIA,OTHERWISE NORMAL EGD, DIVERTICULOSIS IN SIGMOID, NON BLEEDING INTERNAL HEMORRHOIDS, OTHERWISE WNL CY, RESCOPE IN 10 YRS, DR. MARTA FLORES AT [facility]   • KIDNEY STONE SURGERY N/A        Social History     Socioeconomic History   • Marital status:      Spouse name: Not on file   • Number of children: Not on file   • Years of education: Not on file   • Highest education level: Not on file   Tobacco Use   • Smoking status: Former Smoker     Types: Cigarettes     Last attempt to quit: 1987     Years since quittin.4   • Smokeless tobacco: Never Used   • Tobacco comment: caffeine use    Substance and Sexual Activity   • Alcohol use: Yes     Drinks per session: 1 or 2     Binge frequency: Weekly   • Drug use: No   • Sexual activity: Never     Birth control/protection: None   Social History Narrative    Caffeine use; Decaf.        Family History   Problem Relation Age of Onset   • Stroke Other    • Heart disease Father    • Diabetes Father    • Hypertension Father   "  • Malig Hyperthermia Neg Hx        Review of Systems   Constitution: Positive for malaise/fatigue.   Cardiovascular: Negative for chest pain and palpitations.   Endocrine: Positive for cold intolerance.   Musculoskeletal: Positive for joint pain.   Neurological: Positive for headaches.   All other systems reviewed and are negative.      Allergies   Allergen Reactions   • Amiodarone Nausea Only and Cough   • Penicillins          Current Outpatient Medications:   •  amitriptyline (ELAVIL) 25 MG tablet, every night., Disp: , Rfl:   •  aspirin 81 MG tablet, Take 81 mg by mouth Every Night. HOLDING FOR SX, Disp: , Rfl:   •  atorvastatin (LIPITOR) 40 MG tablet, TAKE 1 TABLET BY MOUTH EVERY DAY AT NIGHT, Disp: 90 tablet, Rfl: 1  •  Glucosamine-Chondroit-Vit C-Mn (GLUCOSAMINE 1500 COMPLEX) capsule, Take  by mouth 2 (Two) Times a Day., Disp: , Rfl:   •  isosorbide mononitrate (IMDUR) 60 MG 24 hr tablet, TAKE 0.5 TABLETS BY MOUTH 2 (TWO) TIMES A DAY., Disp: 90 tablet, Rfl: 0  •  metoprolol succinate XL (TOPROL-XL) 50 MG 24 hr tablet, TAKE 1 TABLET BY MOUTH EVERY DAY, Disp: 90 tablet, Rfl: 0  •  Multiple Vitamins-Minerals (MULTIVITAMIN ADULT PO), Take 1 tablet by mouth Daily., Disp: , Rfl:   •  pantoprazole (PROTONIX) 40 MG EC tablet, 40 mg 2 (Two) Times a Day., Disp: , Rfl:   •  silodosin (RAPAFLO) 4 MG capsule capsule, Take 4 mg by mouth Daily With Breakfast., Disp: , Rfl:      Objective:     Vitals:    01/27/20 1048   BP: 176/90   BP Location: Left arm   Pulse: 66   Weight: 88.1 kg (194 lb 3.2 oz)   Height: 177.8 cm (70\")     Body mass index is 27.86 kg/m².    Physical Exam   Constitutional: He is oriented to person, place, and time. He appears well-developed and well-nourished.   HENT:   Head: Normocephalic.   Nose: Nose normal.   Mouth/Throat: Oropharynx is clear and moist.   Eyes: Pupils are equal, round, and reactive to light. Conjunctivae and EOM are normal.   Neck: Normal range of motion. No JVD present. "   Cardiovascular: Normal rate, regular rhythm, normal heart sounds and intact distal pulses.   No murmur heard.  Pulmonary/Chest: Effort normal and breath sounds normal.   Abdominal: Soft. There is no tenderness.   Musculoskeletal: Normal range of motion. He exhibits no edema.   Neurological: He is alert and oriented to person, place, and time. No cranial nerve deficit.   Skin: Skin is warm and dry. No rash noted.   Psychiatric: He has a normal mood and affect. His behavior is normal. Judgment and thought content normal.   Vitals reviewed.        ECG 12 Lead  Date/Time: 1/27/2020 12:39 PM  Performed by: Aquiles Velazquez MD  Authorized by: Aquiles Velazquez MD   Comparison: compared with previous ECG   Similar to previous ECG  Rhythm: sinus rhythm  Conduction: conduction normal  ST Segments: ST segments normal  T Waves: T waves normal  QRS axis: normal  Other: no other findings    Clinical impression: normal ECG              Assessment:       Diagnosis Plan   1. Postoperative atrial fibrillation (CMS/HCC)     2. Acute pyelonephritis     3. GEORGES (acute kidney injury) (CMS/HCC)  Basic Metabolic Panel   4. Chronic renal insufficiency, stage III (moderate) (CMS/[facility])  Basic Metabolic Panel   5. Essential hypertension     6. Coronary artery disease involving native coronary artery of native heart without angina pectoris            Plan:       1.  In November 2019 he had sepsis/GEORGES/pyelo/renal stones, and he had a very brief episode of AF. He took one month of amiodarone.  He will remain on metoprolol.    2-5.  In November 2019 he had sepsis/GEORGES/pyelo/renal stones, and his valsartan and spironolactone were stopped.  He also had a very brief episode of AF, and took one month of amiodarone.  Now, his BP is too high again . I sent him for labs, and his SCr is stable at 1.3.      I am going to restart valsartan 80mg, and he has an appointment with Dr Green next month.     6.  Coronary Artery Disease  Assessment  • The patient has no  angina  • He has chronic chest pain and small vessel disease.  He's on aspirin and atorvastatin.      Subjective - Objective  • There has been a previous stent procedure using ARLETTE 2015  • Current antiplatelet therapy includes aspirin 81 mg      Sincerely,       Aquiles Velazquez MD

## 2020-02-18 ENCOUNTER — TELEPHONE (OUTPATIENT)
Dept: CARDIOLOGY | Facility: CLINIC | Age: 70
End: 2020-02-18

## 2020-02-18 RX ORDER — VALSARTAN 80 MG/1
80 TABLET ORAL 2 TIMES DAILY
Qty: 60 TABLET | Refills: 1 | Status: SHIPPED | OUTPATIENT
Start: 2020-02-18 | End: 2020-02-26 | Stop reason: SDUPTHER

## 2020-02-18 NOTE — TELEPHONE ENCOUNTER
"Pt called concerned that his blood pressure top number has been high 185-200 and the past few days he has been having a headache and he feels like his \"eyes are going to pop out of his head\"  "

## 2020-02-18 NOTE — TELEPHONE ENCOUNTER
I spoke with patient via telephone.  He says his blood pressures have been high ever since he saw Dr. Velazquez.  He is currently taking valsartan 80 mg daily and said at one point he was taking 320 mg daily.    I have recommended that he increase his valsartan to 80 mg twice per day.  If he has any worsening symptoms, have asked him to call the office.  He does not feel that he needs to go to the emergency department tonight and I told him if things change he may need to do so.    He has an appointment to see his nephrologist, Dr. Green in [place], [place] on Monday.

## 2020-02-21 ENCOUNTER — TELEPHONE (OUTPATIENT)
Dept: CARDIOLOGY | Facility: CLINIC | Age: 70
End: 2020-02-21

## 2020-02-21 NOTE — TELEPHONE ENCOUNTER
----- Message from SAUL Turcios sent at 2/18/2020  5:36 PM EST -----    Follow-up on patient symptoms and blood pressure

## 2020-02-21 NOTE — TELEPHONE ENCOUNTER
I spoke with patient via phone.  His blood pressure yesterday was 147/74 and he said this was the lowest that it has been in a long time.  He is feeling better on the valsartan 80 mg twice a day and is no longer having a headache or eye pain.  He will follow-up with his nephrologist on Monday.

## 2020-02-25 NOTE — TELEPHONE ENCOUNTER
Pt calls back today and states that he has not had any improvement in his BP readings since increasing his valsartan 80 mg to twice daily. He states that he has a constant dull headache.    Pt reports that he went to his nephrologist yesterday, Dr. Maloney, and was told that his kidneys were in excellent condition.     Pt would like to know what to do next    Saturday  900 am  187/80, 61    1130am  185/92,67    200pm  219/83,60    500pm  214/83,60    900pm  214/88,68    Sunday  830am  184/94, 69    1130am  189/87, 68    445pm  206/93,68    930pm  226/74, 60    Monday  900am  172/91, 73    100pm at Nephrology office  166/90, 72    530pm   183/89, 61    Today   800am  213/79, 79

## 2020-02-26 RX ORDER — VALSARTAN 160 MG/1
160 TABLET ORAL 2 TIMES DAILY
Qty: 60 TABLET | Refills: 1 | Status: SHIPPED | OUTPATIENT
Start: 2020-02-26 | End: 2020-02-27 | Stop reason: SDUPTHER

## 2020-02-26 RX ORDER — HYDROCHLOROTHIAZIDE 25 MG/1
25 TABLET ORAL DAILY
Qty: 30 TABLET | Refills: 1 | Status: SHIPPED | OUTPATIENT
Start: 2020-02-26 | End: 2020-02-27 | Stop reason: SDUPTHER

## 2020-02-26 NOTE — TELEPHONE ENCOUNTER
Dr. Maloney asked us to manage blood pressure.    Increase valsartan to 160 mg twice per day.  Add hydrochlorothiazide 25 mg 1 tablet daily in the morning (this is a water pill).  Have him follow-up with me in 2 weeks and will repeat a BMP at that time.

## 2020-02-27 RX ORDER — VALSARTAN 160 MG/1
160 TABLET ORAL 2 TIMES DAILY
Qty: 180 TABLET | Refills: 0 | Status: SHIPPED | OUTPATIENT
Start: 2020-02-27 | End: 2020-05-21 | Stop reason: SDUPTHER

## 2020-02-27 RX ORDER — HYDROCHLOROTHIAZIDE 25 MG/1
25 TABLET ORAL DAILY
Qty: 90 TABLET | Refills: 0 | Status: SHIPPED | OUTPATIENT
Start: 2020-02-27 | End: 2020-03-06

## 2020-02-27 NOTE — TELEPHONE ENCOUNTER
Spoke with pt  He will start taking Valsartan 160 mg BID and HCTZ 25 mg QD    I have sent in both of these prescriptions for pt per his request.    Pt is scheduled to see TK on 3/10/2020

## 2020-03-05 RX ORDER — ATORVASTATIN CALCIUM 40 MG/1
TABLET, FILM COATED ORAL
Qty: 90 TABLET | Refills: 0 | Status: SHIPPED | OUTPATIENT
Start: 2020-03-05 | End: 2020-03-13 | Stop reason: SDUPTHER

## 2020-03-10 ENCOUNTER — OFFICE VISIT (OUTPATIENT)
Dept: CARDIOLOGY | Facility: CLINIC | Age: 70
End: 2020-03-10

## 2020-03-10 ENCOUNTER — HOSPITAL ENCOUNTER (OUTPATIENT)
Dept: CARDIOLOGY | Facility: HOSPITAL | Age: 70
Discharge: HOME | End: 2020-03-10
Admitting: NURSE PRACTITIONER

## 2020-03-10 VITALS
HEART RATE: 74 BPM | BODY MASS INDEX: 27.89 KG/M2 | SYSTOLIC BLOOD PRESSURE: 190 MMHG | DIASTOLIC BLOOD PRESSURE: 94 MMHG | HEIGHT: 70 IN | WEIGHT: 194.8 LBS

## 2020-03-10 VITALS — HEART RATE: 74 BPM | DIASTOLIC BLOOD PRESSURE: 85 MMHG | SYSTOLIC BLOOD PRESSURE: 160 MMHG

## 2020-03-10 DIAGNOSIS — I97.89 POSTOPERATIVE ATRIAL FIBRILLATION (HCC): ICD-10-CM

## 2020-03-10 DIAGNOSIS — N18.30 CHRONIC RENAL INSUFFICIENCY, STAGE III (MODERATE) (HCC): ICD-10-CM

## 2020-03-10 DIAGNOSIS — I25.10 CORONARY ARTERY DISEASE INVOLVING NATIVE CORONARY ARTERY OF NATIVE HEART WITHOUT ANGINA PECTORIS: ICD-10-CM

## 2020-03-10 DIAGNOSIS — I10 ESSENTIAL HYPERTENSION: Primary | ICD-10-CM

## 2020-03-10 DIAGNOSIS — I48.91 POSTOPERATIVE ATRIAL FIBRILLATION (HCC): ICD-10-CM

## 2020-03-10 PROCEDURE — 93000 ELECTROCARDIOGRAM COMPLETE: CPT | Performed by: NURSE PRACTITIONER

## 2020-03-10 PROCEDURE — 36415 COLL VENOUS BLD VENIPUNCTURE: CPT

## 2020-03-10 PROCEDURE — 25010000002 HYDRALAZINE PER 20 MG: Performed by: NURSE PRACTITIONER

## 2020-03-10 PROCEDURE — 96374 THER/PROPH/DIAG INJ IV PUSH: CPT

## 2020-03-10 PROCEDURE — 99214 OFFICE O/P EST MOD 30 MIN: CPT | Performed by: NURSE PRACTITIONER

## 2020-03-10 RX ORDER — HYDRALAZINE HYDROCHLORIDE 20 MG/ML
10 INJECTION INTRAMUSCULAR; INTRAVENOUS ONCE
Status: COMPLETED | OUTPATIENT
Start: 2020-03-10 | End: 2020-03-10

## 2020-03-10 RX ORDER — HYDRALAZINE HYDROCHLORIDE 25 MG/1
25 TABLET, FILM COATED ORAL 3 TIMES DAILY
Qty: 90 TABLET | Refills: 1 | Status: SHIPPED | OUTPATIENT
Start: 2020-03-10 | End: 2020-04-16 | Stop reason: SDUPTHER

## 2020-03-10 RX ADMIN — HYDRALAZINE HYDROCHLORIDE 10 MG: 20 INJECTION INTRAMUSCULAR; INTRAVENOUS at 12:38

## 2020-03-10 NOTE — PROGRESS NOTES
Date of Office Visit: 03/10/2020  Encounter Provider: SAUL Britt  Place of Service: [facility] CARDIOLOGY  Patient Name: Kyle Amador  :1950  Primary Cardiologist: Dr. Aquiles Velazquez    Chief Complaint   Patient presents with   • Hypertension   • Follow-up   :     Dear Dr. Lea,     HPI: yKle Amador is a pleasant 69 y.o. male who presents today for cardiac follow up.  He has been diagnosed with GERD, hypertension, and hyperlipidemia.    In , he was diagnosed with distal small vessel coronary artery disease per cardiac catheterization.  In May 2015, he had worsening chest discomfort and underwent repeat cardiac catheterization and had right coronary artery stent placement.  In 2018 he was having atypical chest pain and had a normal nuclear stress test.      As far as his blood pressure, he had a normal renal artery duplex in the past.  At one point he was on amlodipine 10 mg, but developed lower extremity edema and this was eventually discontinued.  In 2018, he stopped the spironolactone due to dizziness.    In 2019, he followed up in the office with Dr. Aquiles Velazquez.  He reported generalized fatigue, daytime sleepiness, headache, and chest pain.  Dr. Velazquez noted that his chest pain was chronic and she recommended decreasing the isosorbide to 30 mg daily to help with his headaches and felt that it may be better to increase his pantoprazole to a twice daily dosing but would defer to PCP or GI.  His blood pressure was well controlled systolic 130s.  Irbesartan was on back order so he was changed to valsartan.    In 2019, he was hospitalized with kidney stones and pyelonephritis.  During the hospitalization he developed paroxysmal atrial fibrillation with RVR and was started on amiodarone and beta-blocker therapy.  He had acute kidney injury on chronic kidney disease and the spironolactone and valsartan were discontinued.    In  "December 2019, he followed up in our office with SAUL Anderson.  He had an upcoming ureteral stent removal and lithotripsy so she recommended that he continue with his amiodarone to prevent further atrial fibrillation.  His blood pressure was elevated which was felt to be due to his pain.    At the end of December 2019, he then followed up with SAUL Hernandez after his lithotripsy.  He reported decreased appetite, weight loss, nausea, and right flank pain.  His amiodarone was discontinued due to the GI symptoms.    In January 2020, he followed up in the office with Dr. Velazquez.  He reported some mild dyspnea and positional lightheadedness.  His blood pressure was elevated and she restarted him on valsartan 80 mg as his creatinine was 1.3.  He was to follow-up with his nephrologist, Dr. Green.    On 2/18/2020 he contacted our office stating that his blood pressures were systolic 185-200 the last few days he felt like \"my eyes are going to pop out of my head\".  I spoke with him via telephone and recommended increasing his valsartan to 80 mg twice per day.  He was to follow-up with his nephrologist, Dr. Green that following Monday.  I followed up with him via telephone and he said his blood pressure was much better controlled 147/74 and that was the best it had been in \"a long time\".  He further denied headache or eye pain.    On 2/25/2020 he contacted our office stating that he was told his kidneys were in excellent condition by Dr. Green.  His blood pressure was elevated again 180s-200s over 70-90s.  I recommended adding valsartan 160 mg twice per day and HCTZ 25 mg 1 tablet daily in the morning.  I asked him to follow-up with me in a couple weeks for repeat BMP.    On 2/24/2020 he saw Dr. Jennie Green.  I reviewed the office note and it said that his stage III chronic kidney disease was stable with a GFR of 58.  That he would follow-up with cardiology for blood pressure control.    Mr. Amador presents today for " "follow-up.  He said his blood pressure was good for \"1 day\" and is been running 175/93-to 213/76 with heart rate in the 60s.  He is not feeling well.  He continues to have occasional headaches and pressure behind his eyes.  He tried the hydrochlorothiazide but was urinating too often so he stopped the medication.  As discussed before he was intolerant to spironolactone because of dizziness and amlodipine due to lower extremity edema in the past.  He denies chest pain, shortness of breath, PND, orthopnea, edema, palpitations, dizziness, or syncope.  Upon review of his medications he is taking isosorbide 30 mg twice per day, not once daily as previously instructed and he thinks he just missed heard the directions.      Past Medical History:   Diagnosis Date   • Atherosclerosis    • Atypical chest pain    • BPH (benign prostatic hyperplasia)    • Bursitis of right shoulder 03/2013   • CAD (coronary artery disease)    • Cataract     BILATERAL   • Cervical spondylosis 09/2016   • Chorioretinal scar     BILATERAL   • Diverticulitis    • Early satiety    • ED (erectile dysfunction)    • Esophagitis    • Functional dyspepsia    • GERD (gastroesophageal reflux disease)    • Hemorrhoids    • Hyperlipidemia    • Hypertension     probable hyperaldosteronism   • Hyperthyroidism    • Hypocalcemia 03/2018   • Hypokalemia 06/2018   • Kidney cysts 03/2018    FOLLOWED BY DR. HIRAM EDWARD   • Kidney stones    • Low back pain    • Myopia 12/2018    LEFT EYE   • OA (osteoarthritis)    • PAF (paroxysmal atrial fibrillation) (CMS/[facility])    • Seborrheic keratosis    • Trochanteric bursitis of right hip 04/2019       Past Surgical History:   Procedure Laterality Date   • APPENDECTOMY N/A    • CARDIAC CATHETERIZATION N/A 7/11/2019    Procedure: Left Heart Cath;  Surgeon: Charo Weiss MD;  Location: [facility] CATH INVASIVE LOCATION;  Service: Cardiology   • CARDIAC CATHETERIZATION N/A 7/11/2019    Procedure: Coronary angiography;  Surgeon: " Charo Weiss MD;  Location: [facility] INVASIVE LOCATION;  Service: Cardiology   • CARDIAC CATHETERIZATION N/A 7/11/2019     Left ventriculography; 30 mid LCx, 30% distal RCA  Charo Weiss MD at Located within [facility]   • CARDIAC CATHETERIZATION Left 05/18/2012     20-30% diffuse LAD, 30-40% ostial diag, small LCx with diffuse 30-50%, large RCA with 60-70% mid (normal FFR of 0.9). Angiographically it was unchanged by previous cath DR. ANSELMO FLANAGAN AT Located within [facility]   • CARDIAC CATHETERIZATION Left 05/2015     but due to persistant chest pain, he underwent placement of a ARLETTE (3.5x23 Xience).     • COLONOSCOPY N/A 09/12/2006    DIVERTICULOSIS, MODERATE EXTERNAL HEMORRHOIDS, DR. KAYLAN PINEDA AT Located within [facility]   • CORONARY ANGIOPLASTY WITH STENT PLACEMENT Left 06/01/2015    75% DISTAL RCA STENOSIS, RCA STENT, DR. JUSTIN BERKOWITZ AT Located within [facility]   • CYSTOSCOPY URETEROSCOPY LASER LITHOTRIPSY Right 12/18/2019    Procedure: RIGHT URETEROSCOPY STENT REMOVAL & STENT PLACEMENT & STONE BASKET EXTRACTION &  LASER LITHOTRIPSY;  Surgeon: Roberto Drew MD;  Location: [facility] OR;  Service: Urology   • CYSTOSCOPY W/ URETERAL STENT PLACEMENT Right 11/23/2019    Procedure: CYSTO RT STENT INSERTION;  Surgeon: Jonnathan Wolf MD;  Location: [facility] OR;  Service: Urology   • ENDOSCOPY N/A 8/14/2019    Z LINE IRREGULAR, SMALL HIATAL HERNIA, MILD INFLAMMATION WITH ERYTHEMA AND FRIABILITY IN GASTRIC BODY, DR. MARTA FLORES AT Located within [facility]   • ENDOSCOPY N/A 06/16/2015    LA GRADE B ESOPHAGITIS, ACUTE GASTRITIS, POSSIBLE PILL ESOPHAGITIS, DR. MARTA FLORES AT Located within [facility]   • ENDOSCOPY AND COLONOSCOPY N/A 06/03/2011    CHRONIC GASTRITIS, HIATAL HERNIA,OTHERWISE NORMAL EGD, DIVERTICULOSIS IN SIGMOID, NON BLEEDING INTERNAL HEMORRHOIDS, OTHERWISE WNL CY, RESCOPE IN 10 YRS, DR. MARTA FLORES AT Located within [facility]   • KIDNEY STONE SURGERY N/A        Social History     Socioeconomic History   • Marital status:      Spouse name: Not on file   • Number of children: Not on file   • Years of education: Not  on file   • Highest education level: Not on file   Tobacco Use   • Smoking status: Former Smoker     Types: Cigarettes     Last attempt to quit: 1987     Years since quittin.6   • Smokeless tobacco: Never Used   Substance and Sexual Activity   • Alcohol use: Yes     Drinks per session: 1 or 2     Binge frequency: Weekly     Comment: Occ//Caffeine use: Rare   • Drug use: No   • Sexual activity: Never     Birth control/protection: None   Social History Narrative    Caffeine use; Decaf.        Family History   Problem Relation Age of Onset   • Stroke Other    • Heart disease Father    • Diabetes Father    • Hypertension Father    • Malig Hyperthermia Neg Hx        The following portion of the patient's history were reviewed and updated as appropriate: past medical history, past surgical history, past social history, past family history, allergies, current medications, and problem list.    Review of Systems   Constitution: Positive for malaise/fatigue. Negative for chills, diaphoresis, fever, night sweats, weight gain and weight loss.   HENT: Negative for hearing loss, nosebleeds, sore throat and tinnitus.    Eyes: Positive for pain. Negative for blurred vision, double vision and visual disturbance.   Cardiovascular: Negative for chest pain, claudication, cyanosis, dyspnea on exertion, irregular heartbeat, leg swelling, near-syncope, orthopnea, palpitations, paroxysmal nocturnal dyspnea and syncope.   Respiratory: Negative for cough, hemoptysis, shortness of breath, snoring and wheezing.    Endocrine: Positive for cold intolerance. Negative for heat intolerance and polyuria.   Hematologic/Lymphatic: Negative for bleeding problem. Does not bruise/bleed easily.   Skin: Negative for color change, dry skin, flushing and itching.   Musculoskeletal: Positive for joint pain. Negative for falls, joint swelling, muscle cramps, muscle weakness and myalgias.   Gastrointestinal: Negative for abdominal pain, constipation,  heartburn, melena, nausea and vomiting.   Genitourinary: Negative for dysuria and hematuria.        Erectile dysfunction   Neurological: Positive for headaches. Negative for excessive daytime sleepiness, dizziness, light-headedness, loss of balance, numbness, paresthesias, seizures and vertigo.   Psychiatric/Behavioral: Negative for altered mental status, depression, memory loss and substance abuse. The patient does not have insomnia and is not nervous/anxious.    Allergic/Immunologic: Negative for environmental allergies.       Allergies   Allergen Reactions   • Amiodarone Nausea Only and Cough   • Penicillins          Current Outpatient Medications:   •  amitriptyline (ELAVIL) 25 MG tablet, every night., Disp: , Rfl:   •  aspirin 81 MG tablet, Take 81 mg by mouth Every Night. HOLDING FOR SX, Disp: , Rfl:   •  atorvastatin (LIPITOR) 40 MG tablet, TAKE 1 TABLET BY MOUTH EVERY DAY EVERY NIGHT, Disp: 90 tablet, Rfl: 0  •  isosorbide mononitrate (IMDUR) 60 MG 24 hr tablet, TAKE 0.5 TABLETS BY MOUTH 2 (TWO) TIMES A DAY., Disp: 90 tablet, Rfl: 0  •  metoprolol succinate XL (TOPROL-XL) 50 MG 24 hr tablet, TAKE 1 TABLET BY MOUTH EVERY DAY, Disp: 90 tablet, Rfl: 0  •  Multiple Vitamins-Minerals (MULTIVITAMIN ADULT PO), Take 1 tablet by mouth Daily., Disp: , Rfl:   •  pantoprazole (PROTONIX) 40 MG EC tablet, 40 mg 2 (Two) Times a Day., Disp: , Rfl:   •  valsartan (DIOVAN) 160 MG tablet, Take 1 tablet by mouth 2 (Two) Times a Day., Disp: 180 tablet, Rfl: 0  •  hydrALAZINE (APRESOLINE) 25 MG tablet, Take 1 tablet by mouth 3 (Three) Times a Day., Disp: 90 tablet, Rfl: 1  No current facility-administered medications for this visit.     Facility-Administered Medications Ordered in Other Visits:   •  hydrALAZINE (APRESOLINE) injection 10 mg, 10 mg, Intravenous, Once, Etta Quach APRN        Objective:     Vitals:    03/10/20 1132 03/10/20 1135   BP: (!) 194/90 (!) 190/94   BP Location: Left arm Right arm   Pulse: 74   "  Weight: 88.4 kg (194 lb 12.8 oz)    Height: 177.8 cm (70\")      Body mass index is 27.95 kg/m².    PHYSICAL EXAM:    Vitals Reviewed.   General Appearance: No acute distress, well developed and well nourished.   Eyes: Conjunctiva and lids: No erythema, swelling, or discharge. Sclera non-icteric.   HENT: Atraumatic, normocephalic. External eyes, ears, and nose normal. No hearing loss noted. Mucous membranes normal. Lips not cyanotic. Neck supple with no tenderness.  Respiratory: No signs of respiratory distress. Respiration rhythm and depth normal.   Clear to auscultation. No rales, crackles, rhonchi, or wheezing auscultated.   Cardiovascular:  Jugular Venous Pressure: Normal  Heart Rate and Rhythm: Normal, Heart Sounds: Normal S1 and S2. No S3 or S4 noted.  Murmurs: No murmurs noted. No rubs, thrills, or gallops.    Lower Extremities: No edema noted.  Gastrointestinal:  Abdomen soft, non-distended, non-tender. Normal bowel sounds.   Musculoskeletal: Normal movement of extremities  Skin and Nails: General appearance normal. No pallor, cyanosis, diaphoresis. Skin temperature normal. No clubbing of fingernails.   Psychiatric: Patient alert and oriented to person, place, and time. Speech and behavior appropriate. Normal mood and affect.       ECG 12 Lead  Date/Time: 3/10/2020 11:36 AM  Performed by: Etta Quach APRN  Authorized by: Etta Quach APRN   Comparison: compared with previous ECG from 1/27/2020  Similar to previous ECG  Rhythm: sinus rhythm  Rate: normal  BPM: 74  Conduction: conduction normal  ST Segments: ST segments normal  T Waves: T waves normal  QRS axis: normal  Other findings: poor R wave progression    Clinical impression: non-specific ECG              Assessment:       Diagnosis Plan   1. Essential hypertension     2. Chronic renal insufficiency, stage III (moderate) (CMS/HCC)     3. Coronary artery disease involving native coronary artery of native heart without angina pectoris     4. " Postoperative atrial fibrillation (CMS/[facility])            Plan:       1.  Essential Hypertension: Blood pressure remains elevated despite medication changes.  He has been intolerant to amlodipine, spironolactone, and most recently HCTZ due to adverse effects.  I recommended continuing with his current medication regimen.  His blood pressure is elevated today and have sent him over to our [facility] to receive 1 dose of IV hydralazine 10 mg.  He will then go  his prescription for hydralazine 25 mg 1 tablet 3 times a day and monitor his blood pressure at home.  He would benefit from following a low-sodium diet.  He has had a renal duplex in the past which was negative for renal artery stenosis and has mild, nonobstructive CAD.  Echocardiogram was stable.    2.  Stage III Chronic Kidney Disease: Followed by Dr. Jennie Green and felt to be stable with a GFR of 58.    3.  Coronary Artery Disease: Mild, nonobstructive CAD per cardiac catheterization 2019.  Denies anginal symptoms.  Continue goal-directed therapy with aspirin, metoprolol succinate, and atorvastatin for secondary prevention.    Coronary Artery Disease  Assessment  • The patient has no angina    Plan  • Lifestyle modifications discussed include adhering to a heart healthy diet, avoidance of tobacco products, maintenance of a healthy weight, medication compliance, regular exercise and regular monitoring of cholesterol and blood pressure    Subjective - Objective  • There has been a previous stent procedure using ARLETTE  • Current antiplatelet therapy includes aspirin 81 mg      4.  Postoperative Atrial Fibrillation: Occurred after ureter stent placement in November 2019.  He was trialed on amiodarone and developed adverse effects in which the medication was discontinued.  No recurrence of atrial fibrillation since the surgery.    5. Follow-up with me in 4 weeks, unless otherwise needed sooner.  I have asked him to call with any concerns before  then.    ADDENDUM 3/10/2020: Patient went to the [facility] and his blood pressure after the IV hydralazine was 160/85.  I went to check on him and he felt lightheaded.  I asked him to sit there for a little bit until his lightheadedness resolved and we gave him some water.  Dr. Velazquez reviewed the office note and agrees with the plan of care.    As always, it has been a pleasure to participate in your patient's care. Thank you.       Sincerely,         SAUL Freire

## 2020-03-13 RX ORDER — ATORVASTATIN CALCIUM 40 MG/1
40 TABLET, FILM COATED ORAL DAILY
Qty: 90 TABLET | Refills: 0 | Status: SHIPPED | OUTPATIENT
Start: 2020-03-13 | End: 2020-05-21 | Stop reason: SDUPTHER

## 2020-04-16 ENCOUNTER — TELEMEDICINE (OUTPATIENT)
Dept: CARDIOLOGY | Facility: CLINIC | Age: 70
End: 2020-04-16

## 2020-04-16 VITALS
SYSTOLIC BLOOD PRESSURE: 172 MMHG | DIASTOLIC BLOOD PRESSURE: 69 MMHG | BODY MASS INDEX: 27.26 KG/M2 | WEIGHT: 190.4 LBS | HEIGHT: 70 IN | HEART RATE: 69 BPM

## 2020-04-16 DIAGNOSIS — N18.30 CHRONIC RENAL INSUFFICIENCY, STAGE III (MODERATE) (HCC): ICD-10-CM

## 2020-04-16 DIAGNOSIS — I10 ESSENTIAL HYPERTENSION: Primary | ICD-10-CM

## 2020-04-16 DIAGNOSIS — I48.91 POSTOPERATIVE ATRIAL FIBRILLATION (HCC): ICD-10-CM

## 2020-04-16 DIAGNOSIS — I25.10 CORONARY ARTERY DISEASE INVOLVING NATIVE CORONARY ARTERY OF NATIVE HEART WITHOUT ANGINA PECTORIS: ICD-10-CM

## 2020-04-16 DIAGNOSIS — I97.89 POSTOPERATIVE ATRIAL FIBRILLATION (HCC): ICD-10-CM

## 2020-04-16 PROCEDURE — 99214 OFFICE O/P EST MOD 30 MIN: CPT | Performed by: NURSE PRACTITIONER

## 2020-04-16 RX ORDER — ISOSORBIDE MONONITRATE 30 MG/1
30 TABLET, EXTENDED RELEASE ORAL DAILY
Qty: 30 TABLET | Refills: 0 | Status: SHIPPED | OUTPATIENT
Start: 2020-04-16 | End: 2021-05-18 | Stop reason: SDUPTHER

## 2020-04-16 RX ORDER — HYDRALAZINE HYDROCHLORIDE 50 MG/1
50 TABLET, FILM COATED ORAL 3 TIMES DAILY
Qty: 90 TABLET | Refills: 2 | Status: SHIPPED | OUTPATIENT
Start: 2020-04-16 | End: 2020-07-27

## 2020-04-16 NOTE — PROGRESS NOTES
Telehealth Visit     Date of Office Visit: 2020  Encounter Provider: SAUL Britt  Place of Service: [facility] CARDIOLOGY  Patient Name: Kyle Amador  :1950  Primary Cardiologist: Dr. Aquiles Velazquez    Chief Complaint   Patient presents with   • Hypertension   • Follow-up   :     Dear Dr. Lea,     HPI: Kyle Amador is a pleasant 69 y.o. male who is an established patient of our practice and today is his follow visit for hypertension. Due to COVID-19 virus, I am conducting a telehealth visit via video/audio with patient and he has consented to this visit today.     He has been diagnosed with GERD, hypertension, and hyperlipidemia.    In , he was diagnosed with distal small vessel coronary artery disease per cardiac catheterization.  In May 2015, he had worsening chest discomfort and underwent repeat cardiac catheterization and had right coronary artery stent placement.  In 2018 he was having atypical chest pain and had a normal nuclear stress test.      As far as his blood pressure, he had a normal renal artery duplex in the past.  At one point he was on amlodipine 10 mg, but developed lower extremity edema and this was eventually discontinued.  In 2018, he stopped the spironolactone due to dizziness.    In 2019, he followed up in the office with Dr. Aquiles Velazquez.  He reported generalized fatigue, daytime sleepiness, headache, and chest pain.  Dr. Velazquez noted that his chest pain was chronic and she recommended decreasing the isosorbide to 30 mg daily to help with his headaches.  She felt that it may be better to increase his pantoprazole to a twice daily dosing, but would defer to PCP or GI.  His blood pressure was well controlled systolic 130s.  Irbesartan was on back order so he was changed to valsartan.    In 2019, he was hospitalized with kidney stones and pyelonephritis.  During the hospitalization he developed  "paroxysmal atrial fibrillation with RVR and was started on amiodarone and beta-blocker therapy.  He had acute kidney injury on chronic kidney disease and the spironolactone and valsartan were discontinued.    In December 2019, he followed up in our office with SAUL Anderson.  He had an upcoming ureteral stent removal and lithotripsy so she recommended that he continue with his amiodarone to prevent further atrial fibrillation.  His blood pressure was elevated which was felt to be due to his pain.    At the end of December 2019, he then followed up with SAUL Hernandez after his lithotripsy.  He reported decreased appetite, weight loss, nausea, and right flank pain.  His amiodarone was discontinued due to the GI symptoms.    In January 2020, he followed up in the office with Dr. Velazquez.  He reported some mild dyspnea and positional lightheadedness.  His blood pressure was elevated and she restarted him on valsartan 80 mg as his creatinine was 1.3.  He was to follow-up with his nephrologist, Dr. Green.    On 2/18/2020 he contacted our office stating that his blood pressures were systolic 185-200 the last few days he felt like \"my eyes are going to pop out of my head\".  I spoke with him via telephone and recommended increasing his valsartan to 80 mg twice per day.  He was to follow-up with his nephrologist, Dr. Green that following Monday.  I followed up with him via telephone and he said his blood pressure was much better controlled 147/74 and that was the best it had been in \"a long time\".  He further denied headache or eye pain.    On 2/25/2020 he contacted our office stating that he was told his kidneys were in excellent condition by Dr. Green.  His blood pressure was elevated again 180s-200s over 70-90s.  I recommended adding valsartan 160 mg twice per day and HCTZ 25 mg 1 tablet daily in the morning.  I asked him to follow-up with me in a couple weeks for repeat BMP.    On 2/24/2020 he saw Dr. Jennie Green.  " "I reviewed the office note and it said that his stage III chronic kidney disease was stable with a GFR of 58.  That he would follow-up with cardiology for blood pressure control.    On 13/2020 I saw the patient in the office for elevated blood pressure again 175//76 with heart rate in the 60s.  He had occasional eye pressure and headaches.  He stopped taking the HCTZ because he was urinating too often.     Mr. Amador presents today for follow-up.  He said his blood pressure was good for \"1 day\" and is been running 175/93-to 213/76 with heart rate in the 60s.  He is not feeling well.  He continues to have occasional headaches and pressure behind his eyes.  He tried the hydrochlorothiazide but was urinating too often so he stopped the medication.  He was given IV hydralazine in the office because his blood pressure was so high and started on oral hydralazine 25 mg twice per day.    Today is his follow-up visit.  He has had blood pressures in the past week of 120/60 and 146/65 with heart rates in the 60s and 70s.  However he has had more blood pressures in the 160-170/70 range on average.  His headaches and eye pressure have resolved.  He still exercising on the treadmill for 1 hour daily.  He often misses the afternoon hydralazine dose.  He denies chest pain, shortness of air, PND, orthopnea, palpitations, edema, dizziness, syncope, or bleeding.    Past Medical History:   Diagnosis Date   • Atherosclerosis    • Atypical chest pain    • BPH (benign prostatic hyperplasia)    • Bursitis of right shoulder 03/2013   • CAD (coronary artery disease)    • Cataract     BILATERAL   • Cervical spondylosis 09/2016   • Chorioretinal scar     BILATERAL   • Chronic renal insufficiency, stage III (moderate) (CMS/[facility])    • Diverticulitis    • Early satiety    • ED (erectile dysfunction)    • Esophagitis    • Functional dyspepsia    • GERD (gastroesophageal reflux disease)    • Hemorrhoids    • Hyperlipidemia    • Hypertension     " probable hyperaldosteronism   • Hyperthyroidism    • Hypocalcemia 03/2018   • Hypokalemia 06/2018   • Kidney cysts 03/2018    FOLLOWED BY DR. HIRAM EDWARD   • Kidney stones    • Low back pain    • Myopia 12/2018    LEFT EYE   • OA (osteoarthritis)    • PAF (paroxysmal atrial fibrillation) (CMS/HCC)    • Seborrheic keratosis    • Trochanteric bursitis of right hip 04/2019       Past Surgical History:   Procedure Laterality Date   • APPENDECTOMY N/A    • CARDIAC CATHETERIZATION N/A 7/11/2019    Procedure: Left Heart Cath;  Surgeon: Charo Weiss MD;  Location: [facility] CATH INVASIVE LOCATION;  Service: Cardiology   • CARDIAC CATHETERIZATION N/A 7/11/2019    Procedure: Coronary angiography;  Surgeon: Charo Weiss MD;  Location: [facility] CATH INVASIVE LOCATION;  Service: Cardiology   • CARDIAC CATHETERIZATION N/A 7/11/2019     Left ventriculography; 30 mid LCx, 30% distal RCA  Charo Weiss MD at [facility]   • CARDIAC CATHETERIZATION Left 05/18/2012     20-30% diffuse LAD, 30-40% ostial diag, small LCx with diffuse 30-50%, large RCA with 60-70% mid (normal FFR of 0.9). Angiographically it was unchanged by previous cath DR. ANSELMO FLANAGAN AT [facility]   • CARDIAC CATHETERIZATION Left 05/2015     but due to persistant chest pain, he underwent placement of a ARLETTE (3.5x23 Xience).     • COLONOSCOPY N/A 09/12/2006    DIVERTICULOSIS, MODERATE EXTERNAL HEMORRHOIDS, DR. KAYLAN PINEDA AT [facility]   • CORONARY ANGIOPLASTY WITH STENT PLACEMENT Left 06/01/2015    75% DISTAL RCA STENOSIS, RCA STENT, DR. JUSTIN BERKOWITZ AT [facility]   • CYSTOSCOPY URETEROSCOPY LASER LITHOTRIPSY Right 12/18/2019    Procedure: RIGHT URETEROSCOPY STENT REMOVAL & STENT PLACEMENT & STONE BASKET EXTRACTION &  LASER LITHOTRIPSY;  Surgeon: Roberto Drew MD;  Location: [facility] OR;  Service: Urology   • CYSTOSCOPY W/ URETERAL STENT PLACEMENT Right 11/23/2019    Procedure: CYSTO RT STENT INSERTION;  Surgeon: Jonnathan Wolf MD;  Location: [facility] MAIN OR;  Service:  Urology   • ENDOSCOPY N/A 2019    Z LINE IRREGULAR, SMALL HIATAL HERNIA, MILD INFLAMMATION WITH ERYTHEMA AND FRIABILITY IN GASTRIC BODY, DR. MARTA FLORES AT [facility]   • ENDOSCOPY N/A 2015    LA GRADE B ESOPHAGITIS, ACUTE GASTRITIS, POSSIBLE PILL ESOPHAGITIS, DR. MARTA FLORES AT [facility]   • ENDOSCOPY AND COLONOSCOPY N/A 2011    CHRONIC GASTRITIS, HIATAL HERNIA,OTHERWISE NORMAL EGD, DIVERTICULOSIS IN SIGMOID, NON BLEEDING INTERNAL HEMORRHOIDS, OTHERWISE WNL CY, RESCOPE IN 10 YRS, DR. MARTA FLORES AT [facility]   • KIDNEY STONE SURGERY N/A        Social History     Socioeconomic History   • Marital status:      Spouse name: Not on file   • Number of children: Not on file   • Years of education: Not on file   • Highest education level: Not on file   Tobacco Use   • Smoking status: Former Smoker     Types: Cigarettes     Last attempt to quit: 1987     Years since quittin.7   • Smokeless tobacco: Never Used   Substance and Sexual Activity   • Alcohol use: Yes     Drinks per session: 1 or 2     Binge frequency: Weekly     Comment: Occ//Caffeine use: Rare   • Drug use: No   • Sexual activity: Never     Birth control/protection: None   Social History Narrative    Caffeine use; Decaf.        Family History   Problem Relation Age of Onset   • Stroke Other    • Heart disease Father    • Diabetes Father    • Hypertension Father    • Malig Hyperthermia Neg Hx        The following portion of the patient's history were reviewed and updated as appropriate: past medical history, past surgical history, past social history, past family history, allergies, current medications, and problem list.    Review of Systems   Constitution: Negative for chills, diaphoresis, fever, malaise/fatigue, night sweats, weight gain and weight loss.   HENT: Negative for hearing loss, nosebleeds, sore throat and tinnitus.    Eyes: Negative for blurred vision, double vision, pain and visual disturbance.   Cardiovascular: Negative for chest  pain, claudication, cyanosis, dyspnea on exertion, irregular heartbeat, leg swelling, near-syncope, orthopnea, palpitations, paroxysmal nocturnal dyspnea and syncope.   Respiratory: Negative for cough, hemoptysis, shortness of breath, snoring and wheezing.    Endocrine: Negative for cold intolerance, heat intolerance and polyuria.   Hematologic/Lymphatic: Negative for bleeding problem. Does not bruise/bleed easily.   Skin: Negative for color change, dry skin, flushing and itching.   Musculoskeletal: Positive for joint pain. Negative for falls, joint swelling, muscle cramps, muscle weakness and myalgias.   Gastrointestinal: Negative for abdominal pain, constipation, heartburn, melena, nausea and vomiting.   Genitourinary: Negative for dysuria and hematuria.        Erectile dysfunction   Neurological: Negative for excessive daytime sleepiness, dizziness, headaches, light-headedness, loss of balance, numbness, paresthesias, seizures and vertigo.   Psychiatric/Behavioral: Negative for altered mental status, depression, memory loss and substance abuse. The patient does not have insomnia and is not nervous/anxious.    Allergic/Immunologic: Negative for environmental allergies.       Allergies   Allergen Reactions   • Amiodarone Nausea Only and Cough   • Penicillins          Current Outpatient Medications:   •  amitriptyline (ELAVIL) 25 MG tablet, every night., Disp: , Rfl:   •  aspirin 81 MG tablet, Take 81 mg by mouth Every Night., Disp: , Rfl:   •  atorvastatin (LIPITOR) 40 MG tablet, Take 1 tablet by mouth Daily., Disp: 90 tablet, Rfl: 0  •  hydrALAZINE (APRESOLINE) 25 MG tablet, Take 1 tablet by mouth 3 (Three) Times a Day., Disp: 90 tablet, Rfl: 1  •  isosorbide mononitrate (IMDUR) 60 MG 24 hr tablet, TAKE 0.5 TABLETS BY MOUTH daily  •  metoprolol succinate XL (TOPROL-XL) 50 MG 24 hr tablet, TAKE 1 TABLET BY MOUTH EVERY DAY, Disp: 90 tablet, Rfl: 0  •  Multiple Vitamins-Minerals (MULTIVITAMIN ADULT PO), Take 1 tablet  "by mouth Daily., Disp: , Rfl:   •  pantoprazole (PROTONIX) 40 MG EC tablet, 40 mg 2 (Two) Times a Day., Disp: , Rfl:   •  valsartan (DIOVAN) 160 MG tablet, Take 1 tablet by mouth 2 (Two) Times a Day., Disp: 180 tablet, Rfl: 0        Objective:     Vitals:    04/16/20 1437   BP: 172/69   BP Location: Left arm   Pulse: 69   Weight: 86.4 kg (190 lb 6.4 oz)   Height: 177.8 cm (70\")     Body mass index is 27.32 kg/m².    PHYSICAL EXAM:    Vitals Reviewed  General Appearance: No acute distress, well developed and well nourished.   Eyes: Conjunctivae and lids: No erythema, swelling, or discharge. Sclerae anicteric.   HENT: Atraumatic, normocephalic. External eyes, ears, and nose normal. No hearing loss noted. Mucous membranes normal. Lips not cyanotic.   Neck: Symmetrical and no masses noted.   Respiratory: No signs of respiratory distress. Respiration rhythm and depth normal.   Musculoskeletal: Normal movement of extremities.  Skin and Nails: General appearance normal. No pallor, cyanosis, diaphoresis.   Psychiatric: Patient alert and oriented to person, place, and time. Speech and behavior appropriate. Normal mood and affect.       Assessment:       Diagnosis Plan   1. Essential hypertension     2. Coronary artery disease involving native coronary artery of native heart without angina pectoris     3. Chronic renal insufficiency, stage III (moderate) (CMS/HCC)     4. Postoperative atrial fibrillation (CMS/HCC)            Plan:       1.  Essential Hypertension: His blood pressure has improved and his headaches and eye pressure have resolved.  I recommended increasing his hydralazine to 50 mg 3 times a day.  I will follow-up with him in about 6 weeks.    2.  Stage III Chronic Kidney Disease: Followed by Dr. Jennie Green and felt to be stable with a GFR of 58.    3.  Coronary Artery Disease: Mild, nonobstructive CAD per cardiac catheterization 2019.  Denies anginal symptoms.  Continue goal-directed therapy with aspirin, " metoprolol succinate, and atorvastatin for secondary prevention.    Coronary Artery Disease  Assessment  • The patient has no angina    Plan  • Lifestyle modifications discussed include adhering to a heart healthy diet, avoidance of tobacco products, maintenance of a healthy weight, medication compliance, regular exercise and regular monitoring of cholesterol and blood pressure    Subjective - Objective  • There has been a previous stent procedure using ARLETTE  • Current antiplatelet therapy includes aspirin 81 mg      4.  Postoperative Atrial Fibrillation: Occurred after ureter stent placement in November 2019.  He was trialed on amiodarone and developed adverse effects in which the medication was discontinued.  No recurrence of atrial fibrillation since the surgery and he is not on anticoagulation therapy.    5.  I will follow-up with him in approximately 6 weeks.    This patient has consented to a telehealth visit via video/audio. The visit was scheduled as a video visit to comply with patient safety concerns in accordance with CDC recommendations.  All vitals recorded within this visit are reported by the patient.  I spent 25 minutes in total including but not limited to the 12 minutes spent in direct conversation with this patient.     As always, it has been a pleasure to participate in your patient's care. Thank you.       Sincerely,         SAUL Freire      Dictated using Dragon Dictation

## 2020-05-21 ENCOUNTER — TELEMEDICINE (OUTPATIENT)
Dept: CARDIOLOGY | Facility: CLINIC | Age: 70
End: 2020-05-21

## 2020-05-21 VITALS
BODY MASS INDEX: 27.2 KG/M2 | WEIGHT: 190 LBS | SYSTOLIC BLOOD PRESSURE: 154 MMHG | HEIGHT: 70 IN | HEART RATE: 76 BPM | DIASTOLIC BLOOD PRESSURE: 58 MMHG

## 2020-05-21 DIAGNOSIS — I48.91 POSTOPERATIVE ATRIAL FIBRILLATION (HCC): ICD-10-CM

## 2020-05-21 DIAGNOSIS — N18.30 CHRONIC RENAL INSUFFICIENCY, STAGE III (MODERATE) (HCC): ICD-10-CM

## 2020-05-21 DIAGNOSIS — I25.10 CORONARY ARTERY DISEASE INVOLVING NATIVE CORONARY ARTERY OF NATIVE HEART WITHOUT ANGINA PECTORIS: ICD-10-CM

## 2020-05-21 DIAGNOSIS — I97.89 POSTOPERATIVE ATRIAL FIBRILLATION (HCC): ICD-10-CM

## 2020-05-21 DIAGNOSIS — I10 ESSENTIAL HYPERTENSION: Primary | ICD-10-CM

## 2020-05-21 PROCEDURE — 99214 OFFICE O/P EST MOD 30 MIN: CPT | Performed by: NURSE PRACTITIONER

## 2020-05-21 RX ORDER — VALSARTAN 160 MG/1
160 TABLET ORAL 2 TIMES DAILY
Qty: 180 TABLET | Refills: 2 | Status: SHIPPED | OUTPATIENT
Start: 2020-05-21 | End: 2021-02-22

## 2020-05-21 RX ORDER — ATORVASTATIN CALCIUM 40 MG/1
40 TABLET, FILM COATED ORAL DAILY
Qty: 90 TABLET | Refills: 2 | Status: SHIPPED | OUTPATIENT
Start: 2020-05-21 | End: 2021-03-02

## 2020-05-21 NOTE — PROGRESS NOTES
Telehealth Visit     Date of Office Visit: 2020  Encounter Provider: SAUL Britt  Place of Service: [facility]  Patient Name: Kyle Amador  :1950  Primary Cardiologist: Dr. Aquiles Velazquez    Chief Complaint   Patient presents with   • Hypertension   :     Dear Dr. Lea,     HPI: Kyle Amador is a pleasant 69 y.o. male who is an established patient of our practice and today is his follow visit for hypertension. Due to COVID-19 virus, I am conducting a telehealth visit via video with patient and he has consented to this visit today.     He has been diagnosed with GERD, hypertension, and hyperlipidemia.    In , he was diagnosed with distal small vessel coronary artery disease per cardiac catheterization.  In May 2015, he had worsening chest discomfort and underwent repeat cardiac catheterization and had right coronary artery stent placement.  In 2018, he was having atypical chest pain and had a normal nuclear stress test.  He has had intermittent chronic chest pain since then.    He has had a longstanding history of hypertension.  He had a normal renal artery duplex in the past.  He cannot tolerate amlodipine because of lower extremity, spironolactone due to dizziness,    In 2019, he was hospitalized with kidney stones and pyelonephritis.  During the hospitalization he developed paroxysmal atrial fibrillation with RVR and was started on amiodarone and beta-blocker therapy.  He had acute kidney injury on chronic kidney disease and valsartan was discontinued.    In 2020, Dr. Velazquez restarted him on valsartan 80 mg as his creatinine was 1.3.  He was to follow-up with his nephrologist, Dr. Green.  Since then his blood pressure has been elevated and valsartan increased to 160 mg twice per day and started on HCTZ 25 mg daily.  He eventually stopped HCTZ because he was urinating too frequently.  Now been started on hydralazine 50  mg 3 times daily.    On 2/24/2020 he saw Dr. Jennie Green.  I reviewed the office note and it said that his stage III chronic kidney disease was stable with a GFR of 58.  That he would follow-up with cardiology for blood pressure control.    Today is his follow-up video visit.  He feels much better with lower blood pressures ranging 129//66.  He has seen blood pressures in the 130s, 140s, and 150s.  Heart rates are in the 60s.  His headaches and eye pressure have completely resolved.  He is taking hydralazine 50 mg twice a day, but often misses that afternoon dosage.  I told him I think his blood pressure would be much better controlled if he would take that afternoon dose.  He is active and walks on the treadmill 1 hour daily.  He denies chest pain, shortness of air, palpitations, edema, dizziness, or syncope.  He is due for repeat blood work.    Past Medical History:   Diagnosis Date   • Atherosclerosis    • Atypical chest pain    • BPH (benign prostatic hyperplasia)    • Bursitis of right shoulder 03/2013   • CAD (coronary artery disease)    • Cataract     BILATERAL   • Cervical spondylosis 09/2016   • Chorioretinal scar     BILATERAL   • Chronic renal insufficiency, stage III (moderate) (CMS/HCC)    • Diverticulitis    • Early satiety    • ED (erectile dysfunction)    • Esophagitis    • Functional dyspepsia    • GERD (gastroesophageal reflux disease)    • Hemorrhoids    • Hyperlipidemia    • Hypertension     probable hyperaldosteronism   • Hyperthyroidism    • Hypocalcemia 03/2018   • Hypokalemia 06/2018   • Kidney cysts 03/2018    FOLLOWED BY DR. HIRAM EDWARD   • Kidney stones    • Low back pain    • Myopia 12/2018    LEFT EYE   • OA (osteoarthritis)    • PAF (paroxysmal atrial fibrillation) (CMS/HCC)    • Seborrheic keratosis    • Trochanteric bursitis of right hip 04/2019       Past Surgical History:   Procedure Laterality Date   • APPENDECTOMY N/A    • CARDIAC CATHETERIZATION N/A 7/11/2019     Procedure: Left Heart Cath;  Surgeon: Charo Weiss MD;  Location: [facility] CATH INVASIVE LOCATION;  Service: Cardiology   • CARDIAC CATHETERIZATION N/A 7/11/2019    Procedure: Coronary angiography;  Surgeon: Charo Weiss MD;  Location: [facility] CATH INVASIVE LOCATION;  Service: Cardiology   • CARDIAC CATHETERIZATION N/A 7/11/2019     Left ventriculography; 30 mid LCx, 30% distal RCA  Charo Weiss MD at [facility]   • CARDIAC CATHETERIZATION Left 05/18/2012     20-30% diffuse LAD, 30-40% ostial diag, small LCx with diffuse 30-50%, large RCA with 60-70% mid (normal FFR of 0.9). Angiographically it was unchanged by previous cath DR. ANSELMO FLANAGAN AT [facility]   • CARDIAC CATHETERIZATION Left 05/2015     but due to persistant chest pain, he underwent placement of a ARLETTE (3.5x23 Xience).     • COLONOSCOPY N/A 09/12/2006    DIVERTICULOSIS, MODERATE EXTERNAL HEMORRHOIDS, DR. KAYLAN PINEDA AT [facility]   • CORONARY ANGIOPLASTY WITH STENT PLACEMENT Left 06/01/2015    75% DISTAL RCA STENOSIS, RCA STENT, DR. JUSTIN BERKOWITZ AT [facility]   • CYSTOSCOPY URETEROSCOPY LASER LITHOTRIPSY Right 12/18/2019    Procedure: RIGHT URETEROSCOPY STENT REMOVAL & STENT PLACEMENT & STONE BASKET EXTRACTION &  LASER LITHOTRIPSY;  Surgeon: Roberto Drew MD;  Location: [facility] OR;  Service: Urology   • CYSTOSCOPY W/ URETERAL STENT PLACEMENT Right 11/23/2019    Procedure: CYSTO RT STENT INSERTION;  Surgeon: Jonnathan Wolf MD;  Location: [facility] OR;  Service: Urology   • ENDOSCOPY N/A 8/14/2019    Z LINE IRREGULAR, SMALL HIATAL HERNIA, MILD INFLAMMATION WITH ERYTHEMA AND FRIABILITY IN GASTRIC BODY, DR. MARTA FLORES AT [facility]   • ENDOSCOPY N/A 06/16/2015    LA GRADE B ESOPHAGITIS, ACUTE GASTRITIS, POSSIBLE PILL ESOPHAGITIS, DR. MARTA FLORES AT [facility]   • ENDOSCOPY AND COLONOSCOPY N/A 06/03/2011    CHRONIC GASTRITIS, HIATAL HERNIA,OTHERWISE NORMAL EGD, DIVERTICULOSIS IN SIGMOID, NON BLEEDING INTERNAL HEMORRHOIDS, OTHERWISE WNL CY, RESCOPE IN 10 YRS, DR. LOZANO  MARK AT [facility]   • KIDNEY STONE SURGERY N/A        Social History     Socioeconomic History   • Marital status:      Spouse name: Not on file   • Number of children: Not on file   • Years of education: Not on file   • Highest education level: Not on file   Tobacco Use   • Smoking status: Former Smoker     Types: Cigarettes     Last attempt to quit: 1987     Years since quittin.8   • Smokeless tobacco: Never Used   Substance and Sexual Activity   • Alcohol use: Yes     Drinks per session: 1 or 2     Binge frequency: Weekly     Comment: Occ//Caffeine use: Rare   • Drug use: No   • Sexual activity: Never     Birth control/protection: None   Social History Narrative    Caffeine use; Decaf.        Family History   Problem Relation Age of Onset   • Stroke Other    • Heart disease Father    • Diabetes Father    • Hypertension Father    • Malig Hyperthermia Neg Hx        The following portion of the patient's history were reviewed and updated as appropriate: past medical history, past surgical history, past social history, past family history, allergies, current medications, and problem list.    Review of Systems   Constitution: Negative for chills, diaphoresis, fever, malaise/fatigue, night sweats, weight gain and weight loss.   HENT: Negative for hearing loss, nosebleeds, sore throat and tinnitus.    Eyes: Negative for blurred vision, double vision, pain and visual disturbance.   Cardiovascular: Negative for chest pain, claudication, cyanosis, dyspnea on exertion, irregular heartbeat, leg swelling, near-syncope, orthopnea, palpitations, paroxysmal nocturnal dyspnea and syncope.   Respiratory: Negative for cough, hemoptysis, shortness of breath, snoring and wheezing.    Endocrine: Negative for cold intolerance, heat intolerance and polyuria.   Hematologic/Lymphatic: Negative for bleeding problem. Does not bruise/bleed easily.   Skin: Negative for color change, dry skin, flushing and itching.   Musculoskeletal:  "Positive for joint pain. Negative for falls, joint swelling, muscle cramps, muscle weakness and myalgias.   Gastrointestinal: Negative for abdominal pain, constipation, heartburn, melena, nausea and vomiting.   Genitourinary: Negative for dysuria and hematuria.        Erectile dysfunction   Neurological: Negative for excessive daytime sleepiness, dizziness, headaches, light-headedness, loss of balance, numbness, paresthesias, seizures and vertigo.   Psychiatric/Behavioral: Negative for altered mental status, depression, memory loss and substance abuse. The patient does not have insomnia and is not nervous/anxious.    Allergic/Immunologic: Negative for environmental allergies.       Allergies   Allergen Reactions   • Amiodarone Nausea Only and Cough   • Penicillins          Current Outpatient Medications:   •  amitriptyline (ELAVIL) 25 MG tablet, every night., Disp: , Rfl:   •  aspirin 81 MG tablet, Take 81 mg by mouth Every Night., Disp: , Rfl:   •  atorvastatin (LIPITOR) 40 MG tablet, Take 1 tablet by mouth Daily., Disp: 90 tablet, Rfl: 0  •  hydrALAZINE (APRESOLINE) 25 MG tablet, Take 1 tablet by mouth 3 (Three) Times a Day., Disp: 90 tablet, Rfl: 1 (mostly taking it in the morning and the p.m.)   •  isosorbide mononitrate (IMDUR) 60 MG 24 hr tablet, TAKE 0.5 TABLETS BY MOUTH daily  •  metoprolol succinate XL (TOPROL-XL) 50 MG 24 hr tablet, TAKE 1 TABLET BY MOUTH EVERY DAY, Disp: 90 tablet, Rfl: 0  •  Multiple Vitamins-Minerals (MULTIVITAMIN ADULT PO), Take 1 tablet by mouth Daily., Disp: , Rfl:   •  pantoprazole (PROTONIX) 40 MG EC tablet, 40 mg 2 (Two) Times a Day., Disp: , Rfl:   •  valsartan (DIOVAN) 160 MG tablet, Take 1 tablet by mouth 2 (Two) Times a Day., Disp: 180 tablet, Rfl: 0        Objective:     Vitals:    05/21/20 0920   BP: 154/58   BP Location: Left arm   Pulse: 76   Weight: 86.2 kg (190 lb)   Height: 177.8 cm (70\")     Body mass index is 27.26 kg/m².    PHYSICAL EXAM:    Vitals Reviewed  General " Appearance: No acute distress, well developed and well nourished.   Eyes: Conjunctivae and lids: No erythema, swelling, or discharge. Sclerae anicteric.   HENT: Atraumatic, normocephalic. External eyes, ears, and nose normal. No hearing loss noted. Mucous membranes normal. Lips not cyanotic.   Neck: Symmetrical and no masses noted.   Respiratory: No signs of respiratory distress. Respiration rhythm and depth normal.   Musculoskeletal: Normal movement of extremities.  Skin and Nails: General appearance normal. No pallor, cyanosis, diaphoresis.   Psychiatric: Patient alert and oriented to person, place, and time. Speech and behavior appropriate. Normal mood and affect.       Assessment:      No diagnosis found.       Plan:       1.  Essential Hypertension: His blood pressure has improved and his headaches and eye pressure have resolved.  On his last telehealth visit I increased his hydralazine to 50 mg 3 times daily.  He is often missing that afternoon dosage and I think if he would take that his blood pressure would be ideally controlled.  Continue with regular exercise and low-sodium diet.    2.  Stage III Chronic Kidney Disease: Followed by Dr. Jennie Green and felt to be stable with a GFR of 58.    3.  Coronary Artery Disease: Mild, nonobstructive CAD per cardiac catheterization 2019.  Denies anginal symptoms.  Continue goal-directed therapy with aspirin, metoprolol succinate, and atorvastatin for secondary prevention.    Coronary Artery Disease  Assessment  • The patient has no angina    Plan  • Lifestyle modifications discussed include adhering to a heart healthy diet, avoidance of tobacco products, maintenance of a healthy weight, medication compliance, regular exercise and regular monitoring of cholesterol and blood pressure    Subjective - Objective  • There has been a previous stent procedure using ARLETTE  • Current antiplatelet therapy includes aspirin 81 mg      4.  Postoperative Atrial Fibrillation: Occurred  after ureter stent placement in November 2019.  He was trialed on amiodarone and developed adverse effects in which the medication was discontinued.  No recurrence of atrial fibrillation since the surgery and he is not on anticoagulation therapy.    5.  Repeat blood work.  He has asked that I order it in the [facility] system and send him a copy of the orders.  He will then give those to his PCP so that he can have them done in his office.  I explained that typically PCPs will take my orders and he just wants a copy of what we need to have done.    6.  We will cancel his appointment in July and reschedule in August 2020.    This patient has consented to a telehealth visit via video/audio. The visit was scheduled as a video visit to comply with patient safety concerns in accordance with CDC recommendations.  All vitals recorded within this visit are reported by the patient.  I spent 25 minutes in total including but not limited to the 12 minutes spent in direct conversation with this patient.     As always, it has been a pleasure to participate in your patient's care. Thank you.       Sincerely,         SAUL Freire      Dictated using Dragon Dictation

## 2020-07-27 RX ORDER — HYDRALAZINE HYDROCHLORIDE 50 MG/1
TABLET, FILM COATED ORAL
Qty: 270 TABLET | Refills: 0 | Status: SHIPPED | OUTPATIENT
Start: 2020-07-27 | End: 2020-08-20 | Stop reason: SDUPTHER

## 2020-08-20 ENCOUNTER — OFFICE VISIT (OUTPATIENT)
Dept: CARDIOLOGY | Facility: CLINIC | Age: 70
End: 2020-08-20

## 2020-08-20 VITALS
HEIGHT: 70 IN | BODY MASS INDEX: 28.63 KG/M2 | SYSTOLIC BLOOD PRESSURE: 168 MMHG | WEIGHT: 200 LBS | HEART RATE: 74 BPM | DIASTOLIC BLOOD PRESSURE: 88 MMHG

## 2020-08-20 DIAGNOSIS — I10 ESSENTIAL HYPERTENSION: Primary | ICD-10-CM

## 2020-08-20 DIAGNOSIS — I25.10 CORONARY ARTERY DISEASE INVOLVING NATIVE CORONARY ARTERY OF NATIVE HEART WITHOUT ANGINA PECTORIS: ICD-10-CM

## 2020-08-20 DIAGNOSIS — I48.91 POSTOPERATIVE ATRIAL FIBRILLATION (HCC): ICD-10-CM

## 2020-08-20 DIAGNOSIS — I49.1 APC (ATRIAL PREMATURE CONTRACTIONS): ICD-10-CM

## 2020-08-20 DIAGNOSIS — I97.89 POSTOPERATIVE ATRIAL FIBRILLATION (HCC): ICD-10-CM

## 2020-08-20 DIAGNOSIS — N18.30 CHRONIC RENAL INSUFFICIENCY, STAGE III (MODERATE) (HCC): ICD-10-CM

## 2020-08-20 PROCEDURE — 93000 ELECTROCARDIOGRAM COMPLETE: CPT | Performed by: NURSE PRACTITIONER

## 2020-08-20 PROCEDURE — 99214 OFFICE O/P EST MOD 30 MIN: CPT | Performed by: NURSE PRACTITIONER

## 2020-08-20 RX ORDER — HYDRALAZINE HYDROCHLORIDE 100 MG/1
100 TABLET, FILM COATED ORAL 3 TIMES DAILY
Qty: 90 TABLET | Refills: 6 | Status: SHIPPED | OUTPATIENT
Start: 2020-08-20 | End: 2020-11-18 | Stop reason: SDUPTHER

## 2020-08-20 NOTE — PROGRESS NOTES
Date of Office Visit: 2020  Encounter Provider: SAUL Britt  Place of Service: [facility] CARDIOLOGY  Patient Name: Kyle Amador  :1950  Primary Cardiologist: Dr. Aquiles Velazquez     Chief Complaint   Patient presents with   • Hypertension   • Follow-up   :     Dear Dr. Lea,     HPI: Kyle Amador is a pleasant 69 y.o. male who presents today for cardiac follow up. He has been diagnosed with GERD, hypertension, and hyperlipidemia.     In , he was diagnosed with distal small vessel coronary artery disease per cardiac catheterization.  In May 2015, he had worsening chest discomfort and underwent repeat cardiac catheterization and had right coronary artery stent placement.  In 2018, he was having atypical chest pain and had a normal nuclear stress test.  He has had intermittent chronic chest pain since then.     He has had a longstanding history of hypertension.  He had a normal renal artery duplex in the past.  He cannot tolerate amlodipine because of lower extremity or spironolactone due to dizziness.      In 2019, he was hospitalized with kidney stones and pyelonephritis.  During the hospitalization he developed paroxysmal atrial fibrillation with RVR and was started on amiodarone and beta-blocker therapy.  He had acute kidney injury on chronic kidney disease and valsartan was discontinued.     In 2020, Dr. Velazquez restarted him on valsartan 80 mg and his creatinine was 1.3.  He was to follow-up with his nephrologist, Dr. Green.  Since then his blood pressure has been elevated and valsartan increased to 160 mg twice per day and he was started on HCTZ 25 mg daily.  He eventually stopped HCTZ because he was urinating too frequently.  He was then started on hydralazine 50 mg 3 times daily.     On 2020 he saw Dr. Jennie Green.  I reviewed the office note and it said that his stage III chronic kidney disease was stable with a GFR of  "58. Dr. Green recommended follow-up with cardiology for blood pressure control.    In May 2020, he had a video visit.  His blood pressures are ranging 129//66.  He was taking hydralazine 50 mg twice a day and not taking the afternoon dosage.  He was walking on his treadmill 1 hour daily. I recommended that he take the hydralazine 50 mg 3 times a day.     Today is his follow-up visit.    His blood pressure remains elevated.  He has checked his blood pressure over the past week which is ranged 142//70 with heart rates in the mid 60-80s.  He follows a low-sodium diet, walks on the treadmill 1 hour a day, and feels fit.  He denies anxiety or history of sleep apnea.  He is taking his afternoon dose of hydralazine \"75% of the time\".  Today his EKG shows supraventricular bigeminy which is a new finding for him.  He has a history of paroxysmal atrial fibrillation in the setting of acute illness.  He denies palpitations, chest pain, shortness of breath, dizziness, or edema.    I reviewed his blood work from 8/13/2020: Total cholesterol 114, triglycerides 67, HDL 50, and LDL 51.  CBC showed hemoglobin of 12.6 and hematocrit 39.6.  I do not have a CMP.    Past Medical History:   Diagnosis Date   • BPH (benign prostatic hyperplasia)    • Bursitis of right shoulder 03/2013   • CAD (coronary artery disease)     Status post stent to RCA   • Cataract     BILATERAL   • Cervical spondylosis 09/2016   • Chorioretinal scar     BILATERAL   • Chronic renal insufficiency, stage III (moderate) (CMS/HCC)    • Diverticulitis    • ED (erectile dysfunction)    • Esophagitis    • Functional dyspepsia    • GERD (gastroesophageal reflux disease)    • Hemorrhoids    • Hyperlipidemia    • Hypertension     probable hyperaldosteronism   • Hyperthyroidism    • Hypocalcemia 03/2018   • Hypokalemia 06/2018   • Kidney cysts 03/2018    FOLLOWED BY DR. HIRAM EDWARD   • Kidney stones    • Myopia 12/2018    LEFT EYE   • OA (osteoarthritis)    • " PAF (paroxysmal atrial fibrillation) (CMS/[facility])    • Seborrheic keratosis    • Trochanteric bursitis of right hip 04/2019       Past Surgical History:   Procedure Laterality Date   • APPENDECTOMY N/A    • CARDIAC CATHETERIZATION N/A 7/11/2019    Procedure: Left Heart Cath;  Surgeon: Charo Weiss MD;  Location: [facility] CATH INVASIVE LOCATION;  Service: Cardiology   • CARDIAC CATHETERIZATION N/A 7/11/2019    Procedure: Coronary angiography;  Surgeon: Charo Weiss MD;  Location: [facility] CATH INVASIVE LOCATION;  Service: Cardiology   • CARDIAC CATHETERIZATION N/A 7/11/2019     Left ventriculography; 30 mid LCx, 30% distal RCA  Charo Weiss MD at [facility]   • CARDIAC CATHETERIZATION Left 05/18/2012     20-30% diffuse LAD, 30-40% ostial diag, small LCx with diffuse 30-50%, large RCA with 60-70% mid (normal FFR of 0.9). Angiographically it was unchanged by previous cath DR. ANSELMO FLANAGAN AT [facility]   • CARDIAC CATHETERIZATION Left 05/2015     but due to persistant chest pain, he underwent placement of a ARLETTE (3.5x23 Xience).     • COLONOSCOPY N/A 09/12/2006    DIVERTICULOSIS, MODERATE EXTERNAL HEMORRHOIDS, DR. KAYLAN PINEDA AT [facility]   • CORONARY ANGIOPLASTY WITH STENT PLACEMENT Left 06/01/2015    75% DISTAL RCA STENOSIS, RCA STENT, DR. JUSTIN BERKOWITZ AT [facility]   • CYSTOSCOPY URETEROSCOPY LASER LITHOTRIPSY Right 12/18/2019    Procedure: RIGHT URETEROSCOPY STENT REMOVAL & STENT PLACEMENT & STONE BASKET EXTRACTION &  LASER LITHOTRIPSY;  Surgeon: Roberto Drew MD;  Location: [facility] OR;  Service: Urology   • CYSTOSCOPY W/ URETERAL STENT PLACEMENT Right 11/23/2019    Procedure: CYSTO RT STENT INSERTION;  Surgeon: Jonnathan Wolf MD;  Location: [facility] MAIN OR;  Service: Urology   • ENDOSCOPY N/A 8/14/2019    Z LINE IRREGULAR, SMALL HIATAL HERNIA, MILD INFLAMMATION WITH ERYTHEMA AND FRIABILITY IN GASTRIC BODY, DR. MARTA FLORES AT [facility]   • ENDOSCOPY N/A 06/16/2015    LA GRADE B ESOPHAGITIS, ACUTE GASTRITIS, POSSIBLE PILL  ESOPHAGITIS, DR. MARTA FLORES AT [facility]   • ENDOSCOPY AND COLONOSCOPY N/A 2011    CHRONIC GASTRITIS, HIATAL HERNIA,OTHERWISE NORMAL EGD, DIVERTICULOSIS IN SIGMOID, NON BLEEDING INTERNAL HEMORRHOIDS, OTHERWISE WNL CY, RESCOPE IN 10 YRS, DR. MARTA FLORES AT [facility]   • KIDNEY STONE SURGERY N/A        Social History     Socioeconomic History   • Marital status:      Spouse name: Not on file   • Number of children: Not on file   • Years of education: Not on file   • Highest education level: Not on file   Tobacco Use   • Smoking status: Former Smoker     Types: Cigarettes     Last attempt to quit: 1987     Years since quittin.0   • Smokeless tobacco: Never Used   Substance and Sexual Activity   • Alcohol use: Yes     Drinks per session: 1 or 2     Binge frequency: Weekly     Comment: Occ//Caffeine use: Rare   • Drug use: No   • Sexual activity: Never     Birth control/protection: None   Social History Narrative    Caffeine use; Decaf.        Family History   Problem Relation Age of Onset   • Stroke Other    • Heart disease Father    • Diabetes Father    • Hypertension Father    • Malig Hyperthermia Neg Hx        The following portion of the patient's history were reviewed and updated as appropriate: past medical history, past surgical history, past social history, past family history, allergies, current medications, and problem list.    Review of Systems   Constitution: Positive for malaise/fatigue. Negative for chills, diaphoresis, fever, night sweats, weight gain and weight loss.   HENT: Negative for hearing loss, nosebleeds, sore throat and tinnitus.    Eyes: Negative for blurred vision, double vision, pain and visual disturbance.   Cardiovascular: Negative for chest pain, claudication, cyanosis, dyspnea on exertion, irregular heartbeat, leg swelling, near-syncope, orthopnea, palpitations, paroxysmal nocturnal dyspnea and syncope.   Respiratory: Negative for cough, hemoptysis, shortness of breath, snoring  and wheezing.    Endocrine: Positive for cold intolerance and heat intolerance. Negative for polyuria.   Hematologic/Lymphatic: Negative for bleeding problem. Does not bruise/bleed easily.   Skin: Negative for color change, dry skin, flushing and itching.   Musculoskeletal: Positive for joint pain. Negative for falls, joint swelling, muscle cramps, muscle weakness and myalgias.   Gastrointestinal: Negative for abdominal pain, constipation, heartburn, melena, nausea and vomiting.   Genitourinary: Negative for dysuria and hematuria.        Erectile dysfunction   Neurological: Negative for excessive daytime sleepiness, dizziness, light-headedness, loss of balance, numbness, paresthesias, seizures and vertigo.   Psychiatric/Behavioral: Negative for altered mental status, depression, memory loss and substance abuse. The patient does not have insomnia and is not nervous/anxious.    Allergic/Immunologic: Negative for environmental allergies.       Allergies   Allergen Reactions   • Amiodarone Nausea Only and Cough   • Penicillins          Current Outpatient Medications:   •  amitriptyline (ELAVIL) 25 MG tablet, every night., Disp: , Rfl:   •  aspirin 81 MG tablet, Take 81 mg by mouth Every Night., Disp: , Rfl:   •  atorvastatin (LIPITOR) 40 MG tablet, Take 1 tablet by mouth Daily., Disp: 90 tablet, Rfl: 2  •  hydrALAZINE (APRESOLINE) 100 MG tablet, Take 1 tablet by mouth 3 (Three) Times a Day., Disp: 90 tablet, Rfl: 6  •  isosorbide mononitrate (IMDUR) 30 MG 24 hr tablet, Take 1 tablet by mouth Daily., Disp: 30 tablet, Rfl: 0  •  metoprolol succinate XL (TOPROL-XL) 50 MG 24 hr tablet, TAKE 1 TABLET BY MOUTH EVERY DAY, Disp: 90 tablet, Rfl: 0  •  Multiple Vitamins-Minerals (MULTIVITAMIN ADULT PO), Take 1 tablet by mouth Daily., Disp: , Rfl:   •  pantoprazole (PROTONIX) 40 MG EC tablet, 40 mg 2 (Two) Times a Day., Disp: , Rfl:   •  Saw Palmetto, Serenoa repens, (SAW PALMETTO PO), Take 1 tablet by mouth Daily., Disp: , Rfl:  "  •  valsartan (DIOVAN) 160 MG tablet, Take 1 tablet by mouth 2 (Two) Times a Day., Disp: 180 tablet, Rfl: 2        Objective:     Vitals:    08/20/20 1249   BP: 168/88   Pulse: 74   Weight: 90.7 kg (200 lb)   Height: 177.8 cm (70\")     Body mass index is 28.7 kg/m².    PHYSICAL EXAM:    Vitals Reviewed.   General Appearance: No acute distress, well developed and well nourished.    Eyes: Conjunctiva and lids: No erythema, swelling, or discharge. Sclera non-icteric.   HENT: Atraumatic, normocephalic. External eyes, ears, and nose normal. No hearing loss noted. Mucous membranes normal. Lips not cyanotic. Neck supple with no tenderness.  Respiratory: No signs of respiratory distress. Respiration rhythm and depth normal.   Clear to auscultation. No rales, crackles, rhonchi, or wheezing auscultated.   Cardiovascular:  Jugular Venous Pressure: Normal  Heart Rate and Rhythm: Normal, Heart Sounds: Normal S1 and S2. No S3 or S4 noted.  Murmurs: No murmurs noted. No rubs, thrills, or gallops.   Lower Extremities: No edema noted.  Gastrointestinal:  Abdomen soft, non-distended, non-tender. Normal bowel sounds. No hepatomegaly.   Musculoskeletal: Normal movement of extremities  Skin and Nails: General appearance normal. No pallor, cyanosis, diaphoresis. Skin temperature normal. No clubbing of fingernails.   Psychiatric: Patient alert and oriented to person, place, and time. Speech and behavior appropriate. Normal mood and affect.       ECG 12 Lead  Date/Time: 8/20/2020 12:45 PM  Performed by: Etta Quach APRN  Authorized by: Etta Quach APRN   Comparison: compared with previous ECG from 3/10/2020  Similar to previous ECG  Rhythm: sinus rhythm  Ectopy: atrial premature contractions and bigeminy  Rate: normal  BPM: 74  Conduction: conduction normal  ST Segments: ST segments normal  T Waves: T waves normal  QRS axis: normal  Other: no other findings    Clinical impression: abnormal EKG              Assessment:       " Diagnosis Plan   1. Essential hypertension     2. APC (atrial premature contractions)  Holter Monitor - 24 Hour   3. Chronic renal insufficiency, stage III (moderate) (CMS/HCC)     4. Coronary artery disease involving native coronary artery of native heart without angina pectoris     5. Postoperative atrial fibrillation (CMS/HCC)  Holter Monitor - 24 Hour          Plan:       1.  Essential Hypertension: I feel that he is doing all the right things and his blood pressure remains elevated.  He has been intolerant to amlodipine and spironolactone.  He is on the maximum dose of valsartan.  I recommended increasing hydralazine to 100 mg 3 times a day and recommending that he tried to always take that afternoon dosage.  He follows a low-sodium diet.    2.  Atrial Premature Contractions: Noted in a bigeminal pattern today and he is asymptomatic.  He denies any risk factors that would be causing the APCs.  I recommended a 24-hour Holter monitor to quantify the APCs.  Certainly concerned about the recurrence of atrial fibrillation.     3.  Stage III Chronic Kidney Disease: Followed by Dr. Jennie Green and felt to be stable with a GFR of 58.     4.  Coronary Artery Disease: Mild, nonobstructive CAD per cardiac catheterization 2019.  Denies anginal symptoms.  Continue goal-directed therapy with aspirin, metoprolol succinate, and atorvastatin for secondary prevention.      5.  Postoperative Atrial Fibrillation: Occurred after ureter stent placement in November 2019.  He was trialed on amiodarone and developed adverse effects in which the medication was discontinued.  No recurrence of atrial fibrillation since the surgery and he is not on anticoagulation therapy.  APCs noted today and I have ordered a Holter monitor.     6.  I have recommended follow-up with Dr. Aquiles Velazquez in 3 months, unless otherwise needed sooner.    ADDENDUM 8/26/2020:  · On 8/24/2020 he contacted our office stating that his blood pressures were still  elevated despite increasing hydralazine and he was concerned.  I added chlorthalidone 25 mg 1 tablet daily to his regimen and recommended a repeat BMP in 1 week.  · Holter monitor completed showed normal sinus rhythm with an average heart rate of 76, rare APCs, no PVCs, and no cardiac arrhythmias noted.  · Results discussed with patient.  He has not started the chlorthalidone and will have a BMP completed at his PCP office.  I will follow-up on his blood pressure and blood test results.  He will follow-up with Dr. Velazquez in November 2020.    As always, it has been a pleasure to participate in your patient's care. Thank you.       Sincerely,       SAUL Freire  [facility] Cardiology      · COVID-19 Precautions - Patient was compliant in wearing a mask. When I saw the patient, I used appropriate personal protective equipment (PPE) including mask (standard procedure).  Additionally, I used gown, gloves, and eye shield if indicated.  Hand hygiene was completed before and after seeing the patient.  · Dictated utilizing Dragon Dictation

## 2020-08-24 ENCOUNTER — TELEPHONE (OUTPATIENT)
Dept: CARDIOLOGY | Facility: CLINIC | Age: 70
End: 2020-08-24

## 2020-08-24 DIAGNOSIS — I10 ESSENTIAL HYPERTENSION: Primary | ICD-10-CM

## 2020-08-24 RX ORDER — CHLORTHALIDONE 25 MG/1
25 TABLET ORAL DAILY
Qty: 30 TABLET | Refills: 1 | Status: SHIPPED | OUTPATIENT
Start: 2020-08-24 | End: 2020-11-19

## 2020-08-24 NOTE — TELEPHONE ENCOUNTER
1.  Essential Hypertension: I feel that he is doing all the right things and his blood pressure remains elevated.  He has been intolerant to amlodipine and spironolactone.  He is on the maximum dose of valsartan.  I recommended increasing hydralazine to 100 mg 3 times a day and recommending that he tried to always take that afternoon dosage.  He follows a low-sodium diet.      Pt called stating that he started taking the increased dose of Hydralazine and by Friday evening he was extremely fatigued with no change in his blood pressure.    He would like to know what he should do

## 2020-08-24 NOTE — TELEPHONE ENCOUNTER
Add cholarthalidone 25 mg 1 tablet daily in AM. Repeat BMP in one week at main lab. Call with update in 2-3 weeks. Continue other meds at same dosages.

## 2020-08-24 NOTE — TELEPHONE ENCOUNTER
Spoke with pt and he verbalized understanding    He would like to get his blood work done at his PCP in a week  I have mailed him the lab order to get this done

## 2020-09-10 ENCOUNTER — TRANSCRIBE ORDERS (OUTPATIENT)
Dept: ADMINISTRATIVE | Facility: HOSPITAL | Age: 70
End: 2020-09-10

## 2020-09-10 ENCOUNTER — LAB (OUTPATIENT)
Dept: LAB | Facility: HOSPITAL | Age: 70
End: 2020-09-10

## 2020-09-10 DIAGNOSIS — I10 ESSENTIAL HYPERTENSION, MALIGNANT: Primary | ICD-10-CM

## 2020-09-10 DIAGNOSIS — I10 ESSENTIAL HYPERTENSION, MALIGNANT: ICD-10-CM

## 2020-09-10 LAB
ANION GAP SERPL CALCULATED.3IONS-SCNC: 7.9 MMOL/L (ref 5–15)
BUN SERPL-MCNC: 35 MG/DL (ref 8–23)
BUN/CREAT SERPL: 23.8 (ref 7–25)
CALCIUM SPEC-SCNC: 9.6 MG/DL (ref 8.6–10.5)
CHLORIDE SERPL-SCNC: 103 MMOL/L (ref 98–107)
CO2 SERPL-SCNC: 27.1 MMOL/L (ref 22–29)
CREAT SERPL-MCNC: 1.47 MG/DL (ref 0.76–1.27)
GFR SERPL CREATININE-BSD FRML MDRD: 47 ML/MIN/1.73
GLUCOSE SERPL-MCNC: 93 MG/DL (ref 65–99)
POTASSIUM SERPL-SCNC: 3.9 MMOL/L (ref 3.5–5.2)
SODIUM SERPL-SCNC: 138 MMOL/L (ref 136–145)

## 2020-09-10 PROCEDURE — 80048 BASIC METABOLIC PNL TOTAL CA: CPT

## 2020-09-10 PROCEDURE — 36415 COLL VENOUS BLD VENIPUNCTURE: CPT

## 2020-11-10 NOTE — PROGRESS NOTES
RM:________     PCP: Harinder Lea DO    : 1950  AGE: 70 y.o.  EST PATIENT   REASON FOR VISIT/  CC:    BP Readings from Last 3 Encounters:   20 168/88   20 154/58   20 172/69        WT: ____________ BP: __________L __________R HR______    CHEST PAIN: _____________    SOA: _____________PALPS: _______________     LIGHTHEADED: ___________FATIGUE: ________________ EDEMA __________    ALLERGIES:Amiodarone and Penicillins SMOKING HISTORY:  Social History     Tobacco Use   • Smoking status: Former Smoker     Types: Cigarettes     Quit date: 1987     Years since quittin.2   • Smokeless tobacco: Never Used   Substance Use Topics   • Alcohol use: Yes     Drinks per session: 1 or 2     Binge frequency: Weekly     Comment: Occ//Caffeine use: Rare   • Drug use: No     CAFFEINE USE_________________  ALCOHOL ______________________    Below is the patient's most recent value for Albumin, ALT, AST, BUN, Calcium, Chloride, Cholesterol, CO2, Creatinine, GFR, Glucose, HDL, Hematocrit, Hemoglobin, Hemoglobin A1C, LDL, Magnesium, Phosphorus, Platelets, Potassium, PSA, Sodium, Triglycerides, TSH and WBC.   Lab Results   Component Value Date    ALBUMIN 2.90 (L) 2019    ALT 17 2019    AST 32 2019    BUN 35 (H) 09/10/2020    CALCIUM 9.6 09/10/2020     09/10/2020    CHOL 100 2019    CO2 27.1 09/10/2020    CREATININE 1.47 (H) 09/10/2020    HDL 42 2019    HCT 32.2 (L) 2019    HGB 10.7 (L) 2019    HGBA1C 5.40 2019    LDL 41 2019    MG 2.0 2019    PHOS 2.1 (L) 2019     (L) 2019    K 3.9 09/10/2020     09/10/2020    TRIG 87 2019    TSH 0.658 2019    WBC 10.47 2019          NEW DIAGNOSIS/ SURGERY/ HOSP OR ED VISITS: ______________________    __________________________________________________________________      RECENT LABS OR DIAGNOSTIC TESTING:   _____________________________    __________________________________________________________________      ASSESSMENT/ PLAN: _______________________________________________    __________________________________________________________________

## 2020-11-13 RX ORDER — HYDRALAZINE HYDROCHLORIDE 50 MG/1
TABLET, FILM COATED ORAL
Qty: 270 TABLET | Refills: 0 | OUTPATIENT
Start: 2020-11-13

## 2020-11-19 ENCOUNTER — OFFICE VISIT (OUTPATIENT)
Dept: CARDIOLOGY | Facility: CLINIC | Age: 70
End: 2020-11-19

## 2020-11-19 VITALS
BODY MASS INDEX: 28.92 KG/M2 | HEART RATE: 81 BPM | OXYGEN SATURATION: 98 % | RESPIRATION RATE: 16 BRPM | HEIGHT: 70 IN | SYSTOLIC BLOOD PRESSURE: 138 MMHG | DIASTOLIC BLOOD PRESSURE: 82 MMHG | WEIGHT: 202 LBS

## 2020-11-19 DIAGNOSIS — I10 ESSENTIAL HYPERTENSION: ICD-10-CM

## 2020-11-19 DIAGNOSIS — I97.89 POSTOPERATIVE ATRIAL FIBRILLATION (HCC): ICD-10-CM

## 2020-11-19 DIAGNOSIS — I25.10 CORONARY ARTERY DISEASE INVOLVING NATIVE CORONARY ARTERY OF NATIVE HEART WITHOUT ANGINA PECTORIS: Primary | ICD-10-CM

## 2020-11-19 DIAGNOSIS — I48.91 POSTOPERATIVE ATRIAL FIBRILLATION (HCC): ICD-10-CM

## 2020-11-19 DIAGNOSIS — N18.30 CHRONIC RENAL IMPAIRMENT, STAGE 3 (MODERATE), UNSPECIFIED WHETHER STAGE 3A OR 3B CKD (HCC): ICD-10-CM

## 2020-11-19 PROCEDURE — 99213 OFFICE O/P EST LOW 20 MIN: CPT | Performed by: INTERNAL MEDICINE

## 2020-11-19 PROCEDURE — 93000 ELECTROCARDIOGRAM COMPLETE: CPT | Performed by: INTERNAL MEDICINE

## 2020-11-19 RX ORDER — HYDRALAZINE HYDROCHLORIDE 100 MG/1
100 TABLET, FILM COATED ORAL 2 TIMES DAILY
Qty: 180 TABLET | Refills: 1 | Status: SHIPPED | OUTPATIENT
Start: 2020-11-19 | End: 2021-05-10

## 2020-11-19 NOTE — PROGRESS NOTES
Date of Office Visit: 2020  Encounter Provider: Aquiles Velazquez MD  Place of Service: [facility] CARDIOLOGY  Patient Name: Kyle Amador  :1950    Chief Complaint   Patient presents with   • Atrial Fibrillation   :     HPI: Kyle Amador is a 70 y.o. male who presents today to followup. I have reviewed prior notes and there are no changes except for any new updates described below.    He has history of fairly chronic chest pain. He was found to have distal small vessel disease by catheterization in . In , he had another catheterization due to persistent pain and he had a normal FFR of the right coronary artery. In May 2015, his chest pain got much worse and he underwent repeat cardiac catheterization and the right coronary artery was stented. Despite this, his pain continued to worsen and ultimately, he was diagnosed with severe esophagitis. He had a normal Myoview stress in 2018 due to atypical chest pain.  He was hospitalized in 2019 with chest pain again, and underwent coronary angiography; he had 30% disease in the circumflex and distal RCA.  Again, it was felt that his pain was GI in etiology.    He also has a history of severe hypertension. He had terrible leg swelling with 10mg amlodipine but had been tolerating 5mg daily. At some point, that was stopped, however. He had a normal renal artery duplex.  He has refused a sleep apnea evaluation.  He was noted to be hypokalemic and was placed on spironolactone.    In 2019, he was hospitalized with sepsis, GEORGES on CKD, kidney stones, and hypotension.  He had a brief episode of atrial fibrillation in that setting.  He was placed on oral amiodarone, which we stopped after one month.  His spironolactone and valsartan were stopped (spirono was added to his allergy list, but I removed it as he's not allergic).  In 2020, his ARB was resumed and HCTZ was started; it was ultimately changed  to chlorthalidone. He stopped it, though, due to urinary frequency.    He denies chest pain, dyspnea, lightheadedness, palpitations or syncope.  He has been out of hydralazine for one day.    Past Medical History:   Diagnosis Date   • BPH (benign prostatic hyperplasia)    • Bursitis of right shoulder 03/2013   • CAD (coronary artery disease)     Status post stent to RCA   • Cataract     BILATERAL   • Cervical spondylosis 09/2016   • Chorioretinal scar     BILATERAL   • Chronic renal insufficiency, stage III (moderate)    • Diverticulitis    • ED (erectile dysfunction)    • Esophagitis    • Functional dyspepsia    • GERD (gastroesophageal reflux disease)    • Hemorrhoids    • Hyperlipidemia    • Hypertension     probable hyperaldosteronism   • Hyperthyroidism    • Hypocalcemia 03/2018   • Hypokalemia 06/2018   • Kidney cysts 03/2018    FOLLOWED BY DR. HIRAM EDWARD   • Kidney stones    • Myopia 12/2018    LEFT EYE   • OA (osteoarthritis)    • PAF (paroxysmal atrial fibrillation) (CMS/[facility])    • Seborrheic keratosis    • Trochanteric bursitis of right hip 04/2019       Past Surgical History:   Procedure Laterality Date   • APPENDECTOMY N/A    • CARDIAC CATHETERIZATION N/A 7/11/2019    Procedure: Left Heart Cath;  Surgeon: Charo Weiss MD;  Location: [facility] CATH INVASIVE LOCATION;  Service: Cardiology   • CARDIAC CATHETERIZATION N/A 7/11/2019    Procedure: Coronary angiography;  Surgeon: Charo Weiss MD;  Location:  [facility] CATH INVASIVE LOCATION;  Service: Cardiology   • CARDIAC CATHETERIZATION N/A 7/11/2019     Left ventriculography; 30 mid LCx, 30% distal RCA  Charo Weiss MD at [facility]   • CARDIAC CATHETERIZATION Left 05/18/2012     20-30% diffuse LAD, 30-40% ostial diag, small LCx with diffuse 30-50%, large RCA with 60-70% mid (normal FFR of 0.9). Angiographically it was unchanged by previous cath DR. ANSELMO FLANAGAN AT [facility]   • CARDIAC CATHETERIZATION Left 05/2015     but due to persistant chest pain, he underwent  placement of a ARLETTE (3.5x23 Xience).     • COLONOSCOPY N/A 2006    DIVERTICULOSIS, MODERATE EXTERNAL HEMORRHOIDS, DR. KAYLAN PINEDA AT [facility]   • CORONARY ANGIOPLASTY WITH STENT PLACEMENT Left 2015    75% DISTAL RCA STENOSIS, RCA STENT, DR. JUSTIN BERKOWITZ AT [facility]   • CYSTOSCOPY URETEROSCOPY LASER LITHOTRIPSY Right 2019    Procedure: RIGHT URETEROSCOPY STENT REMOVAL & STENT PLACEMENT & STONE BASKET EXTRACTION &  LASER LITHOTRIPSY;  Surgeon: Roberto Drew MD;  Location: [facility];  Service: Urology   • CYSTOSCOPY W/ URETERAL STENT PLACEMENT Right 2019    Procedure: CYSTO RT STENT INSERTION;  Surgeon: Jonnathan Wolf MD;  Location: [facility];  Service: Urology   • ENDOSCOPY N/A 2019    Z LINE IRREGULAR, SMALL HIATAL HERNIA, MILD INFLAMMATION WITH ERYTHEMA AND FRIABILITY IN GASTRIC BODY, DR. MARTA FLORES AT [facility]   • ENDOSCOPY N/A 2015    LA GRADE B ESOPHAGITIS, ACUTE GASTRITIS, POSSIBLE PILL ESOPHAGITIS, DR. MATRA FLORES AT [facility]   • ENDOSCOPY AND COLONOSCOPY N/A 2011    CHRONIC GASTRITIS, HIATAL HERNIA,OTHERWISE NORMAL EGD, DIVERTICULOSIS IN SIGMOID, NON BLEEDING INTERNAL HEMORRHOIDS, OTHERWISE WNL CY, RESCOPE IN 10 YRS, DR. MARTA FLORES AT [facility]   • KIDNEY STONE SURGERY N/A        Social History     Socioeconomic History   • Marital status:      Spouse name: Not on file   • Number of children: Not on file   • Years of education: Not on file   • Highest education level: Not on file   Tobacco Use   • Smoking status: Former Smoker     Types: Cigarettes     Quit date: 1987     Years since quittin.3   • Smokeless tobacco: Never Used   Substance and Sexual Activity   • Alcohol use: Yes     Drinks per session: 1 or 2     Binge frequency: Weekly     Comment: Occ//Caffeine use: Rare   • Drug use: No   • Sexual activity: Never     Birth control/protection: None   Social History Narrative    Caffeine use; Decaf.        Family History   Problem Relation Age of  "Onset   • Stroke Other    • Heart disease Father    • Diabetes Father    • Hypertension Father    • Malig Hyperthermia Neg Hx        Review of Systems   Cardiovascular: Negative for chest pain and palpitations.   Neurological: Positive for headaches.   All other systems reviewed and are negative.      Allergies   Allergen Reactions   • Amiodarone Nausea Only and Cough   • Penicillins          Current Outpatient Medications:   •  amitriptyline (ELAVIL) 25 MG tablet, every night., Disp: , Rfl:   •  aspirin 81 MG tablet, Take 81 mg by mouth Every Night., Disp: , Rfl:   •  atorvastatin (LIPITOR) 40 MG tablet, Take 1 tablet by mouth Daily., Disp: 90 tablet, Rfl: 2  •  isosorbide mononitrate (IMDUR) 30 MG 24 hr tablet, Take 1 tablet by mouth Daily., Disp: 30 tablet, Rfl: 0  •  metoprolol succinate XL (TOPROL-XL) 50 MG 24 hr tablet, TAKE 1 TABLET BY MOUTH EVERY DAY, Disp: 90 tablet, Rfl: 0  •  Multiple Vitamins-Minerals (MULTIVITAMIN ADULT PO), Take 1 tablet by mouth Daily., Disp: , Rfl:   •  pantoprazole (PROTONIX) 40 MG EC tablet, 40 mg 2 (Two) Times a Day., Disp: , Rfl:   •  Saw Palmetto, Serenoa repens, (SAW PALMETTO PO), Take 1 tablet by mouth Daily., Disp: , Rfl:   •  valsartan (DIOVAN) 160 MG tablet, Take 1 tablet by mouth 2 (Two) Times a Day., Disp: 180 tablet, Rfl: 2  •  hydrALAZINE (APRESOLINE) 100 MG tablet, Take 1 tablet by mouth 2 (Two) Times a Day., Disp: 180 tablet, Rfl: 1     Objective:     Vitals:    11/19/20 1004   BP: 138/82   Pulse: 81   Resp: 16   SpO2: 98%   Weight: 91.6 kg (202 lb)   Height: 177.8 cm (70\")     Body mass index is 28.98 kg/m².    Physical Exam   Constitutional: He is oriented to person, place, and time. He appears well-developed and well-nourished.   HENT:   Head: Normocephalic.   Masked   Eyes: Conjunctivae and EOM are normal.   Neck: Normal range of motion. No JVD present.   Cardiovascular: Normal rate, regular rhythm, normal heart sounds and intact distal pulses.   No murmur " heard.  Pulmonary/Chest: Effort normal and breath sounds normal.   Abdominal: Soft. There is no abdominal tenderness.   Musculoskeletal: Normal range of motion.         General: No edema.   Neurological: He is alert and oriented to person, place, and time. No cranial nerve deficit.   Skin: Skin is warm and dry. No rash noted.   Psychiatric: He has a normal mood and affect. His behavior is normal. Judgment and thought content normal.   Vitals reviewed.        ECG 12 Lead    Date/Time: 11/19/2020 10:09 AM  Performed by: Aquiles Velazquez MD  Authorized by: Aquiles Velazquez MD   Comparison: compared with previous ECG   Similar to previous ECG  Comparison to previous ECG: Tons of artifact; patient could not hold still  Rhythm: sinus rhythm  Conduction: conduction normal  ST Segments: ST segments normal  T Waves: T waves normal  QRS axis: normal  Other: no other findings    Clinical impression: normal ECG              Assessment:       Diagnosis Plan   1. Coronary artery disease involving native coronary artery of native heart without angina pectoris  ECG 12 Lead   2. Essential hypertension     3. Chronic renal impairment, stage 3 (moderate), unspecified whether stage 3a or 3b CKD     4. Postoperative atrial fibrillation (CMS/HCC)            Plan:      1.  Coronary Artery Disease  Assessment  • The patient has no angina  • He has chronic chest pain and small vessel disease.  He's on aspirin and atorvastatin.      Subjective - Objective  • There has been a previous stent procedure using ARLETTE 2015  • Current antiplatelet therapy includes aspirin 81 mg      2/3.  His BP is great for him.  We will continue with his current regimen.    4.  In November 2019 he had sepsis/GEORGES/pyelo/renal stones, and he had a very brief episode of AF. He took one month of amiodarone.  He will remain on metoprolol. He does not require OAC in the absence of recurrence.    Sincerely,       Aquiles Velazquez MD

## 2021-02-22 RX ORDER — VALSARTAN 160 MG/1
TABLET ORAL
Qty: 180 TABLET | Refills: 3 | Status: SHIPPED | OUTPATIENT
Start: 2021-02-22 | End: 2022-02-21

## 2021-03-02 RX ORDER — ATORVASTATIN CALCIUM 40 MG/1
TABLET, FILM COATED ORAL
Qty: 90 TABLET | Refills: 0 | Status: SHIPPED | OUTPATIENT
Start: 2021-03-02 | End: 2021-05-18 | Stop reason: SDUPTHER

## 2021-03-09 DIAGNOSIS — Z23 IMMUNIZATION DUE: ICD-10-CM

## 2021-05-10 RX ORDER — HYDRALAZINE HYDROCHLORIDE 100 MG/1
TABLET, FILM COATED ORAL
Qty: 180 TABLET | Refills: 0 | Status: SHIPPED | OUTPATIENT
Start: 2021-05-10 | End: 2021-05-18 | Stop reason: SDUPTHER

## 2021-05-18 ENCOUNTER — OFFICE VISIT (OUTPATIENT)
Dept: CARDIOLOGY | Facility: CLINIC | Age: 71
End: 2021-05-18

## 2021-05-18 VITALS
SYSTOLIC BLOOD PRESSURE: 148 MMHG | BODY MASS INDEX: 29.06 KG/M2 | WEIGHT: 203 LBS | HEART RATE: 81 BPM | HEIGHT: 70 IN | DIASTOLIC BLOOD PRESSURE: 78 MMHG

## 2021-05-18 DIAGNOSIS — N18.31 CHRONIC RENAL IMPAIRMENT, STAGE 3A (HCC): ICD-10-CM

## 2021-05-18 DIAGNOSIS — I10 ESSENTIAL HYPERTENSION: ICD-10-CM

## 2021-05-18 DIAGNOSIS — I97.89 POSTOPERATIVE ATRIAL FIBRILLATION (HCC): ICD-10-CM

## 2021-05-18 DIAGNOSIS — I48.91 POSTOPERATIVE ATRIAL FIBRILLATION (HCC): ICD-10-CM

## 2021-05-18 DIAGNOSIS — I25.10 CORONARY ARTERY DISEASE INVOLVING NATIVE CORONARY ARTERY OF NATIVE HEART WITHOUT ANGINA PECTORIS: Primary | ICD-10-CM

## 2021-05-18 DIAGNOSIS — R06.09 DYSPNEA ON EXERTION: ICD-10-CM

## 2021-05-18 PROBLEM — N20.0 CALCULUS OF KIDNEY: Status: RESOLVED | Noted: 2021-05-18 | Resolved: 2021-05-18

## 2021-05-18 PROBLEM — I49.1 APC (ATRIAL PREMATURE CONTRACTIONS): Status: RESOLVED | Noted: 2020-08-20 | Resolved: 2021-05-18

## 2021-05-18 PROBLEM — N20.0 CALCULUS OF KIDNEY: Status: ACTIVE | Noted: 2021-05-18

## 2021-05-18 PROCEDURE — 93000 ELECTROCARDIOGRAM COMPLETE: CPT | Performed by: NURSE PRACTITIONER

## 2021-05-18 PROCEDURE — 99214 OFFICE O/P EST MOD 30 MIN: CPT | Performed by: NURSE PRACTITIONER

## 2021-05-18 RX ORDER — ATORVASTATIN CALCIUM 40 MG/1
40 TABLET, FILM COATED ORAL DAILY
Qty: 90 TABLET | Refills: 3 | Status: SHIPPED | OUTPATIENT
Start: 2021-05-18 | End: 2022-05-23

## 2021-05-18 RX ORDER — METOPROLOL SUCCINATE 50 MG/1
50 TABLET, EXTENDED RELEASE ORAL DAILY
Qty: 90 TABLET | Refills: 3 | Status: SHIPPED | OUTPATIENT
Start: 2021-05-18 | End: 2022-05-23

## 2021-05-18 RX ORDER — ISOSORBIDE MONONITRATE 30 MG/1
30 TABLET, EXTENDED RELEASE ORAL DAILY
Qty: 90 TABLET | Refills: 3 | Status: SHIPPED | OUTPATIENT
Start: 2021-05-18 | End: 2021-06-21 | Stop reason: HOSPADM

## 2021-05-18 RX ORDER — HYDRALAZINE HYDROCHLORIDE 100 MG/1
100 TABLET, FILM COATED ORAL 2 TIMES DAILY
Qty: 180 TABLET | Refills: 3 | Status: SHIPPED | OUTPATIENT
Start: 2021-05-18 | End: 2022-06-23 | Stop reason: SDUPTHER

## 2021-05-18 NOTE — PROGRESS NOTES
Date of Office Visit:2021  Encounter Provider: SAUL Britt  Place of Service: [facility] CARDIOLOGY  Patient Name: Kyle Amador  :1950  Primary Cardiologist: Dr. Aquiles Velazquez     Chief Complaint   Patient presents with   • Follow-up   • Coronary Artery Disease   • Hypertension       HPI: Kyle Amador is a pleasant 70 y.o. male who presents today for cardiac follow up. He has been diagnosed with GERD, hypertension, and hyperlipidemia.     In , he was diagnosed with distal small vessel coronary artery disease per cardiac catheterization.  In May 2015, he had worsening chest discomfort and underwent repeat cardiac catheterization and had right coronary artery stent placement.  In 2018, he was having atypical chest pain and had a normal nuclear stress test. He's had intermittent chronic chest pain since then.     He's had a longstanding history of hypertension.  He had a normal renal artery duplex in the past.  He cannot tolerate amlodipine because of lower extremity or spironolactone due to dizziness.      In 2019, he was hospitalized with kidney stones and pyelonephritis.  During the hospitalization he developed paroxysmal atrial fibrillation with RVR and was started on amiodarone and beta-blocker therapy.  Since then he has had acute on chronic kidney disease stage III and his nephrologist is Dr. Green.    Since 2020, we have been managing his blood pressure which is intermittently elevated at times.  He follows a low-sodium diet and walks on the treadmill 1 hour daily.  On his last visit he was noted to have supraventricular bigeminy and I increased his hydralazine to 100 mg 3 times daily.  His blood pressure remained elevated and I added chlorthalidone 25 mg 1 tablet daily.  He ended up stopping the chlorthalidone due to increased urinary frequency Holter monitor completed showed normal sinus rhythm with an average heart rate of  76, rare APCs, no PVCs, and no cardiac arrhythmias noted.    Today is his follow-up visit.  His blood pressure once again is elevated and he has not been checking at home.  Upon review of his medications he is taking the hydralazine twice per day not 3 times daily.  He did not have the COVID-19 virus and has received both vaccinations.  He reports some mild dyspnea with exertion.  He denies chest pain, palpitations, edema, dizziness, syncope, or bleeding.    Past Medical History:   Diagnosis Date   • APC (atrial premature contractions) 8/20/2020   • BPH (benign prostatic hyperplasia)    • Bursitis of right shoulder 03/2013   • CAD (coronary artery disease)     Status post stent to RCA   • Cataract     BILATERAL   • Cervical spondylosis 09/2016   • Chorioretinal scar     BILATERAL   • Chronic renal insufficiency, stage III (moderate) (CMS/[facility])    • Diverticulitis    • ED (erectile dysfunction)    • Esophagitis    • Functional dyspepsia    • GERD (gastroesophageal reflux disease)    • Hemorrhoids    • Hyperlipidemia    • Hypertension     probable hyperaldosteronism   • Hyperthyroidism    • Hypocalcemia 03/2018   • Hypokalemia 06/2018   • Kidney cysts 03/2018    FOLLOWED BY DR. HIRAM EDWARD   • Kidney stones    • Myopia 12/2018    LEFT EYE   • OA (osteoarthritis)    • PAF (paroxysmal atrial fibrillation) (CMS/[facility])    • Seborrheic keratosis    • Trochanteric bursitis of right hip 04/2019       Past Surgical History:   Procedure Laterality Date   • APPENDECTOMY N/A    • CARDIAC CATHETERIZATION N/A 7/11/2019    Procedure: Left Heart Cath;  Surgeon: Charo Weiss MD;  Location:  [facility] CATH INVASIVE LOCATION;  Service: Cardiology   • CARDIAC CATHETERIZATION N/A 7/11/2019    Procedure: Coronary angiography;  Surgeon: Charo Weiss MD;  Location:  [facility] CATH INVASIVE LOCATION;  Service: Cardiology   • CARDIAC CATHETERIZATION N/A 7/11/2019     Left ventriculography; 30 mid LCx, 30% distal RCA  [facility]o Weiss MD at [facility]   •  CARDIAC CATHETERIZATION Left 2012     20-30% diffuse LAD, 30-40% ostial diag, small LCx with diffuse 30-50%, large RCA with 60-70% mid (normal FFR of 0.9). Angiographically it was unchanged by previous cath DR. ANSELMO FLANAGAN AT [facility]   • CARDIAC CATHETERIZATION Left 2015     but due to persistant chest pain, he underwent placement of a ARLETTE (3.5x23 Xience).     • COLONOSCOPY N/A 2006    DIVERTICULOSIS, MODERATE EXTERNAL HEMORRHOIDS, DR. KAYLAN PINEDA AT [facility]   • CORONARY ANGIOPLASTY WITH STENT PLACEMENT Left 2015    75% DISTAL RCA STENOSIS, RCA STENT, DR. JUSTIN BERKOWITZ AT [facility]   • CYSTOSCOPY URETEROSCOPY LASER LITHOTRIPSY Right 2019    Procedure: RIGHT URETEROSCOPY STENT REMOVAL & STENT PLACEMENT & STONE BASKET EXTRACTION &  LASER LITHOTRIPSY;  Surgeon: Roberto Drew MD;  Location: [facility];  Service: Urology   • CYSTOSCOPY W/ URETERAL STENT PLACEMENT Right 2019    Procedure: CYSTO RT STENT INSERTION;  Surgeon: Jonnathan Wolf MD;  Location: [facility] OR;  Service: Urology   • ENDOSCOPY N/A 2019    Z LINE IRREGULAR, SMALL HIATAL HERNIA, MILD INFLAMMATION WITH ERYTHEMA AND FRIABILITY IN GASTRIC BODY, DR. MARTA FLORES AT [facility]   • ENDOSCOPY N/A 2015    LA GRADE B ESOPHAGITIS, ACUTE GASTRITIS, POSSIBLE PILL ESOPHAGITIS, DR. MARTA FLORES AT [facility]   • ENDOSCOPY AND COLONOSCOPY N/A 2011    CHRONIC GASTRITIS, HIATAL HERNIA,OTHERWISE NORMAL EGD, DIVERTICULOSIS IN SIGMOID, NON BLEEDING INTERNAL HEMORRHOIDS, OTHERWISE WNL CY, RESCOPE IN 10 YRS, DR. MARTA FLORES AT [facility]   • KIDNEY STONE SURGERY N/A        Social History     Socioeconomic History   • Marital status:      Spouse name: Not on file   • Number of children: Not on file   • Years of education: Not on file   • Highest education level: Not on file   Tobacco Use   • Smoking status: Former Smoker     Types: Cigarettes     Quit date: 1987     Years since quittin.7   • Smokeless tobacco: Never Used    Substance and Sexual Activity   • Alcohol use: Yes     Comment: Occ//Caffeine use: Rare   • Drug use: No   • Sexual activity: Never     Birth control/protection: None       Family History   Problem Relation Age of Onset   • Stroke Other    • Heart disease Father    • Diabetes Father    • Hypertension Father    • Malig Hyperthermia Neg Hx        The following portion of the patient's history were reviewed and updated as appropriate: past medical history, past surgical history, past social history, past family history, allergies, current medications, and problem list.    Review of Systems   Constitutional: Negative for chills, diaphoresis, fever, malaise/fatigue, night sweats, weight gain and weight loss.   HENT: Negative for hearing loss, nosebleeds, sore throat and tinnitus.    Eyes: Negative for blurred vision, double vision, pain and visual disturbance.   Cardiovascular: Positive for dyspnea on exertion. Negative for chest pain, claudication, cyanosis, irregular heartbeat, leg swelling, near-syncope, orthopnea, palpitations, paroxysmal nocturnal dyspnea and syncope.   Respiratory: Negative for cough, hemoptysis, shortness of breath, snoring and wheezing.    Endocrine: Negative for cold intolerance, heat intolerance and polyuria.   Hematologic/Lymphatic: Negative for bleeding problem. Does not bruise/bleed easily.   Skin: Negative for color change, dry skin, flushing and itching.   Musculoskeletal: Positive for joint pain. Negative for falls, joint swelling, muscle cramps, muscle weakness and myalgias.   Gastrointestinal: Negative for abdominal pain, constipation, heartburn, melena, nausea and vomiting.   Genitourinary: Negative for dysuria and hematuria.        Erectile dysfunction   Neurological: Negative for excessive daytime sleepiness, dizziness, light-headedness, loss of balance, numbness, paresthesias, seizures and vertigo.   Psychiatric/Behavioral: Negative for altered mental status, depression, memory  "loss and substance abuse. The patient does not have insomnia and is not nervous/anxious.    Allergic/Immunologic: Negative for environmental allergies.       Allergies   Allergen Reactions   • Amiodarone Nausea Only and Cough   • Penicillins          Current Outpatient Medications:   •  amitriptyline (ELAVIL) 25 MG tablet, every night., Disp: , Rfl:   •  aspirin 81 MG tablet, Take 81 mg by mouth Every Night., Disp: , Rfl:   •  atorvastatin (LIPITOR) 40 MG tablet, Take 1 tablet by mouth Daily., Disp: 90 tablet, Rfl: 3  •  hydrALAZINE (APRESOLINE) 100 MG tablet, Take 1 tablet by mouth 2 (Two) Times a Day., Disp: 180 tablet, Rfl: 3  •  isosorbide mononitrate (IMDUR) 30 MG 24 hr tablet, Take 1 tablet by mouth Daily., Disp: 90 tablet, Rfl: 3  •  metoprolol succinate XL (TOPROL-XL) 50 MG 24 hr tablet, Take 1 tablet by mouth Daily., Disp: 90 tablet, Rfl: 3  •  Multiple Vitamins-Minerals (MULTIVITAMIN ADULT PO), Take 1 tablet by mouth Daily., Disp: , Rfl:   •  pantoprazole (PROTONIX) 40 MG EC tablet, 40 mg 2 (Two) Times a Day., Disp: , Rfl:   •  Saw Palmetto, Serenoa repens, (SAW PALMETTO PO), Take 1 tablet by mouth Daily., Disp: , Rfl:   •  valsartan (DIOVAN) 160 MG tablet, Take 1 tablet by mouth twice daily, Disp: 180 tablet, Rfl: 3        Objective:     Vitals:    05/18/21 0906 05/18/21 0911   BP: 148/78 148/78   BP Location: Left arm Right arm   Pulse: 81    Weight: 92.1 kg (203 lb)    Height: 177.8 cm (70\")      Body mass index is 29.13 kg/m².    PHYSICAL EXAM:    Vitals Reviewed.   General Appearance: No acute distress, well developed and well nourished.    Eyes: Conjunctiva and lids: No erythema, swelling, or discharge. Sclera non-icteric.   HENT: Atraumatic, normocephalic. External eyes, ears, and nose normal. No hearing loss noted. Mucous membranes normal. Lips not cyanotic. Neck supple with no tenderness.  Respiratory: No signs of respiratory distress. Respiration rhythm and depth normal.   Clear to auscultation. " No rales, crackles, rhonchi, or wheezing auscultated.   Cardiovascular:  Jugular Venous Pressure: Normal  Heart Rate and Rhythm: Normal, Heart Sounds: Normal S1 and S2. No S3 or S4 noted.  Murmurs: No murmurs noted. No rubs, thrills, or gallops.   Lower Extremities: No edema noted.  Gastrointestinal:  Abdomen soft, non-distended, non-tender. Normal bowel sounds. No hepatomegaly.   Musculoskeletal: Normal movement of extremities  Skin and Nails: General appearance normal. No pallor, cyanosis, diaphoresis. Skin temperature normal. No clubbing of fingernails.   Psychiatric: Patient alert and oriented to person, place, and time. Speech and behavior appropriate. Normal mood and affect.       ECG 12 Lead    Date/Time: 5/18/2021 9:06 AM  Performed by: Etta Quach APRN  Authorized by: Etta Quach APRN   Comparison: compared with previous ECG from 8/20/2020  Similar to previous ECG  Rhythm: sinus rhythm  Rate: normal  BPM: 81  Conduction: conduction normal  ST Segments: ST segments normal  T Waves: T waves normal  QRS axis: normal  Other: no other findings    Clinical impression: normal ECG              Assessment:       Diagnosis Plan   1. Coronary artery disease involving native coronary artery of native heart without angina pectoris     2. Essential hypertension     3. Chronic renal impairment, stage 3a (CMS/HCC)     4. Postoperative atrial fibrillation (CMS/HCC)     5. Dyspnea on exertion            Plan:       1.  Coronary Artery Disease: Mild, nonobstructive CAD per cardiac catheterization 2019.  Denies anginal symptoms.  Continue goal-directed therapy with aspirin, metoprolol succinate, and atorvastatin for secondary prevention.    2.  Essential Hypertension: His blood pressure is elevated once again today.  I have made multiple changes over the years and he is not taking the medications as previously directed.  He has been intolerant to amlodipine, spironolactone, and chlorthalidone.  On his last visit I  recommended that he increase his hydralazine to 100 mg 3 times a day and he is only taking it twice per day.  He will monitor his blood pressures and if they are running above 140/90 he will contact the office.       3.  Stage III Chronic Kidney Disease: Followed by Dr. Jennie Green.     4. Postoperative Atrial Fibrillation: Occurred after ureter stent placement in November 2019.  He was placed on amiodarone and developed adverse effects in which the medication was discontinued.  No recurrence of atrial fibrillation since the surgery and he is not on anticoagulation therapy.  APCs have been noted in the past on EKGs, but follow-up Holter monitor showed rare PACs.    5.  Dyspnea on occasion: Continue to monitor.    6.  I recommended a 1 year follow-up visit and to call with elevated blood pressure readings.  He prefers to see Dr. Velazquez every 6 months.  I have refilled his cardiac medications today.    As always, it has been a pleasure to participate in your patient's care. Thank you.       Sincerely,       SAUL Freire  [facility]      · COVID-19 Precautions - Patient was compliant in wearing a mask. When I saw the patient, I used appropriate personal protective equipment (PPE) including mask (standard procedure).  Additionally, I used gown, gloves, and eye shield if indicated.  Hand hygiene was completed before and after seeing the patient.  · Dictated utilizing Dragon Dictation

## 2021-06-20 ENCOUNTER — HOSPITAL ENCOUNTER (OUTPATIENT)
Facility: HOSPITAL | Age: 71
Setting detail: OBSERVATION
Discharge: HOME | End: 2021-06-21
Attending: EMERGENCY MEDICINE | Admitting: INTERNAL MEDICINE

## 2021-06-20 ENCOUNTER — APPOINTMENT (OUTPATIENT)
Dept: GENERAL RADIOLOGY | Facility: HOSPITAL | Age: 71
End: 2021-06-20

## 2021-06-20 DIAGNOSIS — R07.9 CHEST PAIN, UNSPECIFIED TYPE: Primary | ICD-10-CM

## 2021-06-20 LAB
ALBUMIN SERPL-MCNC: 4.4 G/DL (ref 3.5–5.2)
ALBUMIN/GLOB SERPL: 1.9 G/DL
ALP SERPL-CCNC: 77 U/L (ref 39–117)
ALT SERPL W P-5'-P-CCNC: 18 U/L (ref 1–41)
ANION GAP SERPL CALCULATED.3IONS-SCNC: 9.7 MMOL/L (ref 5–15)
APTT PPP: 27.7 SECONDS (ref 22.7–35.4)
AST SERPL-CCNC: 19 U/L (ref 1–40)
BASOPHILS # BLD AUTO: 0.04 10*3/MM3 (ref 0–0.2)
BASOPHILS NFR BLD AUTO: 0.5 % (ref 0–1.5)
BILIRUB SERPL-MCNC: 0.4 MG/DL (ref 0–1.2)
BUN SERPL-MCNC: 22 MG/DL (ref 8–23)
BUN/CREAT SERPL: 17.1 (ref 7–25)
CALCIUM SPEC-SCNC: 9 MG/DL (ref 8.6–10.5)
CHLORIDE SERPL-SCNC: 104 MMOL/L (ref 98–107)
CO2 SERPL-SCNC: 26.3 MMOL/L (ref 22–29)
CREAT SERPL-MCNC: 1.29 MG/DL (ref 0.76–1.27)
DEPRECATED RDW RBC AUTO: 44.6 FL (ref 37–54)
EOSINOPHIL # BLD AUTO: 0.22 10*3/MM3 (ref 0–0.4)
EOSINOPHIL NFR BLD AUTO: 2.8 % (ref 0.3–6.2)
ERYTHROCYTE [DISTWIDTH] IN BLOOD BY AUTOMATED COUNT: 13.1 % (ref 12.3–15.4)
GFR SERPL CREATININE-BSD FRML MDRD: 55 ML/MIN/1.73
GLOBULIN UR ELPH-MCNC: 2.3 GM/DL
GLUCOSE SERPL-MCNC: 120 MG/DL (ref 65–99)
HCT VFR BLD AUTO: 41.6 % (ref 37.5–51)
HGB BLD-MCNC: 13.8 G/DL (ref 13–17.7)
IMM GRANULOCYTES # BLD AUTO: 0.03 10*3/MM3 (ref 0–0.05)
IMM GRANULOCYTES NFR BLD AUTO: 0.4 % (ref 0–0.5)
INR PPP: 1.01 (ref 0.9–1.1)
LYMPHOCYTES # BLD AUTO: 1.56 10*3/MM3 (ref 0.7–3.1)
LYMPHOCYTES NFR BLD AUTO: 19.7 % (ref 19.6–45.3)
MAGNESIUM SERPL-MCNC: 2.1 MG/DL (ref 1.6–2.4)
MCH RBC QN AUTO: 30.8 PG (ref 26.6–33)
MCHC RBC AUTO-ENTMCNC: 33.2 G/DL (ref 31.5–35.7)
MCV RBC AUTO: 92.9 FL (ref 79–97)
MONOCYTES # BLD AUTO: 0.62 10*3/MM3 (ref 0.1–0.9)
MONOCYTES NFR BLD AUTO: 7.8 % (ref 5–12)
NEUTROPHILS NFR BLD AUTO: 5.43 10*3/MM3 (ref 1.7–7)
NEUTROPHILS NFR BLD AUTO: 68.8 % (ref 42.7–76)
NRBC BLD AUTO-RTO: 0 /100 WBC (ref 0–0.2)
NT-PROBNP SERPL-MCNC: 114.2 PG/ML (ref 0–900)
PLATELET # BLD AUTO: 262 10*3/MM3 (ref 140–450)
PMV BLD AUTO: 8.8 FL (ref 6–12)
POTASSIUM SERPL-SCNC: 3.7 MMOL/L (ref 3.5–5.2)
PROT SERPL-MCNC: 6.7 G/DL (ref 6–8.5)
PROTHROMBIN TIME: 13.1 SECONDS (ref 11.7–14.2)
RBC # BLD AUTO: 4.48 10*6/MM3 (ref 4.14–5.8)
SODIUM SERPL-SCNC: 140 MMOL/L (ref 136–145)
TROPONIN T SERPL-MCNC: <0.01 NG/ML (ref 0–0.03)
WBC # BLD AUTO: 7.9 10*3/MM3 (ref 3.4–10.8)

## 2021-06-20 PROCEDURE — 85730 THROMBOPLASTIN TIME PARTIAL: CPT | Performed by: EMERGENCY MEDICINE

## 2021-06-20 PROCEDURE — 93005 ELECTROCARDIOGRAM TRACING: CPT | Performed by: EMERGENCY MEDICINE

## 2021-06-20 PROCEDURE — 71045 X-RAY EXAM CHEST 1 VIEW: CPT

## 2021-06-20 PROCEDURE — 99284 EMERGENCY DEPT VISIT MOD MDM: CPT

## 2021-06-20 PROCEDURE — 85025 COMPLETE CBC W/AUTO DIFF WBC: CPT

## 2021-06-20 PROCEDURE — 83880 ASSAY OF NATRIURETIC PEPTIDE: CPT | Performed by: EMERGENCY MEDICINE

## 2021-06-20 PROCEDURE — 93010 ELECTROCARDIOGRAM REPORT: CPT | Performed by: INTERNAL MEDICINE

## 2021-06-20 PROCEDURE — 80053 COMPREHEN METABOLIC PANEL: CPT

## 2021-06-20 PROCEDURE — 84484 ASSAY OF TROPONIN QUANT: CPT

## 2021-06-20 PROCEDURE — 83735 ASSAY OF MAGNESIUM: CPT | Performed by: EMERGENCY MEDICINE

## 2021-06-20 PROCEDURE — 93005 ELECTROCARDIOGRAM TRACING: CPT

## 2021-06-20 PROCEDURE — 85610 PROTHROMBIN TIME: CPT | Performed by: EMERGENCY MEDICINE

## 2021-06-20 RX ORDER — SODIUM CHLORIDE 0.9 % (FLUSH) 0.9 %
10 SYRINGE (ML) INJECTION AS NEEDED
Status: DISCONTINUED | OUTPATIENT
Start: 2021-06-20 | End: 2021-06-21 | Stop reason: HOSPADM

## 2021-06-20 RX ORDER — MORPHINE SULFATE 2 MG/ML
2 INJECTION, SOLUTION INTRAMUSCULAR; INTRAVENOUS ONCE
Status: COMPLETED | OUTPATIENT
Start: 2021-06-20 | End: 2021-06-21

## 2021-06-20 RX ORDER — ASPIRIN 81 MG/1
324 TABLET, CHEWABLE ORAL ONCE
Status: COMPLETED | OUTPATIENT
Start: 2021-06-20 | End: 2021-06-20

## 2021-06-20 RX ORDER — ONDANSETRON 2 MG/ML
4 INJECTION INTRAMUSCULAR; INTRAVENOUS ONCE
Status: COMPLETED | OUTPATIENT
Start: 2021-06-20 | End: 2021-06-21

## 2021-06-20 RX ORDER — NITROGLYCERIN 0.4 MG/1
0.4 TABLET SUBLINGUAL
Status: DISCONTINUED | OUTPATIENT
Start: 2021-06-20 | End: 2021-06-21 | Stop reason: HOSPADM

## 2021-06-20 RX ADMIN — ASPIRIN 324 MG: 81 TABLET, CHEWABLE ORAL at 23:34

## 2021-06-20 RX ADMIN — NITROGLYCERIN 0.4 MG: 0.4 TABLET SUBLINGUAL at 23:35

## 2021-06-21 ENCOUNTER — APPOINTMENT (OUTPATIENT)
Dept: NUCLEAR MEDICINE | Facility: HOSPITAL | Age: 71
End: 2021-06-21

## 2021-06-21 VITALS
BODY MASS INDEX: 28.24 KG/M2 | SYSTOLIC BLOOD PRESSURE: 151 MMHG | HEIGHT: 70 IN | RESPIRATION RATE: 16 BRPM | TEMPERATURE: 98.4 F | DIASTOLIC BLOOD PRESSURE: 87 MMHG | OXYGEN SATURATION: 95 % | HEART RATE: 74 BPM | WEIGHT: 197.3 LBS

## 2021-06-21 PROBLEM — R07.9 CHEST PAIN: Status: ACTIVE | Noted: 2021-06-21

## 2021-06-21 LAB
BH CV REST NUCLEAR ISOTOPE DOSE: 8.2 MCI
BH CV STRESS BP STAGE 1: NORMAL
BH CV STRESS BP STAGE 2: NORMAL
BH CV STRESS BP STAGE 3: NORMAL
BH CV STRESS DURATION MIN STAGE 1: 3
BH CV STRESS DURATION MIN STAGE 2: 3
BH CV STRESS DURATION MIN STAGE 3: 2
BH CV STRESS DURATION SEC STAGE 1: 0
BH CV STRESS DURATION SEC STAGE 2: 0
BH CV STRESS DURATION SEC STAGE 3: 30
BH CV STRESS GRADE STAGE 1: 10
BH CV STRESS GRADE STAGE 2: 12
BH CV STRESS GRADE STAGE 3: 14
BH CV STRESS HR STAGE 1: 93
BH CV STRESS HR STAGE 2: 111
BH CV STRESS HR STAGE 3: 141
BH CV STRESS METS STAGE 1: 5
BH CV STRESS METS STAGE 2: 7.5
BH CV STRESS METS STAGE 3: 10
BH CV STRESS NUCLEAR ISOTOPE DOSE: 27.4 MCI
BH CV STRESS PROTOCOL 1: NORMAL
BH CV STRESS RECOVERY BP: NORMAL MMHG
BH CV STRESS RECOVERY HR: 83 BPM
BH CV STRESS SPEED STAGE 1: 1.7
BH CV STRESS SPEED STAGE 2: 2.5
BH CV STRESS SPEED STAGE 3: 3.4
BH CV STRESS STAGE 1: 1
BH CV STRESS STAGE 2: 2
BH CV STRESS STAGE 3: 3
HOLD SPECIMEN: NORMAL
HOLD SPECIMEN: NORMAL
LV EF NUC BP: 76 %
MAXIMAL PREDICTED HEART RATE: 149 BPM
PERCENT MAX PREDICTED HR: 94.63 %
QT INTERVAL: 365 MS
SARS-COV-2 ORF1AB RESP QL NAA+PROBE: NOT DETECTED
STRESS BASELINE BP: NORMAL MMHG
STRESS BASELINE HR: 73 BPM
STRESS PERCENT HR: 111 %
STRESS POST ESTIMATED WORKLOAD: 10.2 METS
STRESS POST EXERCISE DUR MIN: 8 MIN
STRESS POST EXERCISE DUR SEC: 30 SEC
STRESS POST PEAK BP: NORMAL MMHG
STRESS POST PEAK HR: 141 BPM
STRESS TARGET HR: 127 BPM
TROPONIN T SERPL-MCNC: <0.01 NG/ML (ref 0–0.03)
TROPONIN T SERPL-MCNC: <0.01 NG/ML (ref 0–0.03)
WHOLE BLOOD HOLD SPECIMEN: NORMAL

## 2021-06-21 PROCEDURE — 25010000002 MORPHINE PER 10 MG: Performed by: EMERGENCY MEDICINE

## 2021-06-21 PROCEDURE — 93016 CV STRESS TEST SUPVJ ONLY: CPT | Performed by: INTERNAL MEDICINE

## 2021-06-21 PROCEDURE — G0378 HOSPITAL OBSERVATION PER HR: HCPCS

## 2021-06-21 PROCEDURE — 96375 TX/PRO/DX INJ NEW DRUG ADDON: CPT

## 2021-06-21 PROCEDURE — U0005 INFEC AGEN DETEC AMPLI PROBE: HCPCS | Performed by: EMERGENCY MEDICINE

## 2021-06-21 PROCEDURE — A9500 TC99M SESTAMIBI: HCPCS | Performed by: INTERNAL MEDICINE

## 2021-06-21 PROCEDURE — 25010000002 ONDANSETRON PER 1 MG: Performed by: EMERGENCY MEDICINE

## 2021-06-21 PROCEDURE — 84484 ASSAY OF TROPONIN QUANT: CPT | Performed by: INTERNAL MEDICINE

## 2021-06-21 PROCEDURE — 0 TECHNETIUM SESTAMIBI: Performed by: INTERNAL MEDICINE

## 2021-06-21 PROCEDURE — 99235 HOSP IP/OBS SAME DATE MOD 70: CPT | Performed by: INTERNAL MEDICINE

## 2021-06-21 PROCEDURE — 78452 HT MUSCLE IMAGE SPECT MULT: CPT | Performed by: INTERNAL MEDICINE

## 2021-06-21 PROCEDURE — 78452 HT MUSCLE IMAGE SPECT MULT: CPT

## 2021-06-21 PROCEDURE — 93018 CV STRESS TEST I&R ONLY: CPT | Performed by: INTERNAL MEDICINE

## 2021-06-21 PROCEDURE — 96374 THER/PROPH/DIAG INJ IV PUSH: CPT

## 2021-06-21 PROCEDURE — U0004 COV-19 TEST NON-CDC HGH THRU: HCPCS | Performed by: EMERGENCY MEDICINE

## 2021-06-21 PROCEDURE — 93017 CV STRESS TEST TRACING ONLY: CPT

## 2021-06-21 RX ORDER — ISOSORBIDE MONONITRATE 30 MG/1
30 TABLET, EXTENDED RELEASE ORAL DAILY
Status: DISCONTINUED | OUTPATIENT
Start: 2021-06-21 | End: 2021-06-21

## 2021-06-21 RX ORDER — ATORVASTATIN CALCIUM 20 MG/1
40 TABLET, FILM COATED ORAL DAILY
Status: DISCONTINUED | OUTPATIENT
Start: 2021-06-21 | End: 2021-06-21 | Stop reason: HOSPADM

## 2021-06-21 RX ORDER — MULTIPLE VITAMINS W/ MINERALS TAB 9MG-400MCG
1 TAB ORAL DAILY
Status: DISCONTINUED | OUTPATIENT
Start: 2021-06-21 | End: 2021-06-21 | Stop reason: HOSPADM

## 2021-06-21 RX ORDER — HYDRALAZINE HYDROCHLORIDE 50 MG/1
100 TABLET, FILM COATED ORAL 2 TIMES DAILY
Status: DISCONTINUED | OUTPATIENT
Start: 2021-06-21 | End: 2021-06-21 | Stop reason: HOSPADM

## 2021-06-21 RX ORDER — VALSARTAN 160 MG/1
160 TABLET ORAL 2 TIMES DAILY
Status: DISCONTINUED | OUTPATIENT
Start: 2021-06-21 | End: 2021-06-21 | Stop reason: HOSPADM

## 2021-06-21 RX ORDER — PANTOPRAZOLE SODIUM 40 MG/1
40 TABLET, DELAYED RELEASE ORAL 2 TIMES DAILY
Status: DISCONTINUED | OUTPATIENT
Start: 2021-06-21 | End: 2021-06-21 | Stop reason: HOSPADM

## 2021-06-21 RX ORDER — SODIUM CHLORIDE 0.9 % (FLUSH) 0.9 %
10 SYRINGE (ML) INJECTION AS NEEDED
Status: DISCONTINUED | OUTPATIENT
Start: 2021-06-21 | End: 2021-06-21 | Stop reason: HOSPADM

## 2021-06-21 RX ORDER — SODIUM CHLORIDE 0.9 % (FLUSH) 0.9 %
10 SYRINGE (ML) INJECTION EVERY 12 HOURS SCHEDULED
Status: DISCONTINUED | OUTPATIENT
Start: 2021-06-21 | End: 2021-06-21 | Stop reason: HOSPADM

## 2021-06-21 RX ORDER — ACETAMINOPHEN 325 MG/1
650 TABLET ORAL EVERY 4 HOURS PRN
Status: DISCONTINUED | OUTPATIENT
Start: 2021-06-21 | End: 2021-06-21 | Stop reason: HOSPADM

## 2021-06-21 RX ORDER — ISOSORBIDE MONONITRATE 60 MG/1
60 TABLET, EXTENDED RELEASE ORAL DAILY
Status: DISCONTINUED | OUTPATIENT
Start: 2021-06-22 | End: 2021-06-21 | Stop reason: HOSPADM

## 2021-06-21 RX ORDER — ISOSORBIDE MONONITRATE 60 MG/1
60 TABLET, EXTENDED RELEASE ORAL DAILY
Qty: 30 TABLET | Refills: 6 | Status: SHIPPED | OUTPATIENT
Start: 2021-06-22 | End: 2021-09-07 | Stop reason: SDUPTHER

## 2021-06-21 RX ORDER — AMITRIPTYLINE HYDROCHLORIDE 25 MG/1
25 TABLET, FILM COATED ORAL NIGHTLY
Status: DISCONTINUED | OUTPATIENT
Start: 2021-06-21 | End: 2021-06-21 | Stop reason: HOSPADM

## 2021-06-21 RX ADMIN — MULTIPLE VITAMINS W/ MINERALS TAB 1 TABLET: TAB at 13:25

## 2021-06-21 RX ADMIN — HYDRALAZINE HYDROCHLORIDE 100 MG: 50 TABLET, FILM COATED ORAL at 13:25

## 2021-06-21 RX ADMIN — SODIUM CHLORIDE, PRESERVATIVE FREE 10 ML: 5 INJECTION INTRAVENOUS at 13:25

## 2021-06-21 RX ADMIN — PANTOPRAZOLE SODIUM 40 MG: 40 TABLET, DELAYED RELEASE ORAL at 13:24

## 2021-06-21 RX ADMIN — ONDANSETRON 4 MG: 2 INJECTION INTRAMUSCULAR; INTRAVENOUS at 00:03

## 2021-06-21 RX ADMIN — ATORVASTATIN CALCIUM 40 MG: 20 TABLET, FILM COATED ORAL at 13:24

## 2021-06-21 RX ADMIN — VALSARTAN 160 MG: 160 TABLET, FILM COATED ORAL at 13:25

## 2021-06-21 RX ADMIN — TECHNETIUM TC 99M SESTAMIBI 1 DOSE: 1 INJECTION INTRAVENOUS at 10:16

## 2021-06-21 RX ADMIN — TECHNETIUM TC 99M SESTAMIBI 1 DOSE: 1 INJECTION INTRAVENOUS at 12:08

## 2021-06-21 RX ADMIN — MORPHINE SULFATE 2 MG: 2 INJECTION, SOLUTION INTRAMUSCULAR; INTRAVENOUS at 00:04

## 2021-06-21 RX ADMIN — NITROGLYCERIN 0.4 MG: 0.4 TABLET SUBLINGUAL at 00:25

## 2021-06-21 NOTE — ED PROVIDER NOTES
EMERGENCY DEPARTMENT ENCOUNTER    Room Number:  2207/1  Date of encounter:  6/21/2021  PCP: Harnider Lea DO  Historian: Patient      HPI:  Chief Complaint: Chest pain  A complete HPI/ROS/PMH/PSH/SH/FH are unobtainable due to: None    Context: Kyle Amador is a 70 y.o. male who presents to the ED c/o left-sided chest pain he describes as tightness and burning which started approximately 1 hour prior to arrival.  Patient states that pain has improved.  Denies shortness of breath, nausea vomiting, diaphoresis.  Patient has prior history of coronary artery disease and has been stented in the past.  Patient also history of paroxysmal A. fib.  Patient also reports some slight left facial numbness.      PAST MEDICAL HISTORY  Active Ambulatory Problems     Diagnosis Date Noted   • CAD (coronary artery disease)    • Hypertension    • Internal hemorrhoids with complication 10/09/2019   • Postoperative atrial fibrillation (CMS/[facility]) 11/26/2019   • Chronic renal insufficiency, stage III (moderate) (CMS/[facility]) 12/16/2019     Resolved Ambulatory Problems     Diagnosis Date Noted   • Precordial pain 02/05/2018   • Dizziness 02/13/2019   • Elevated BUN 02/13/2019   • Ureteral stone with hydronephrosis 11/23/2019   • GEORGES (acute kidney injury) (CMS/[facility]) 11/23/2019   • Severe sepsis (CMS/[facility]) 11/24/2019   • Acute pyelonephritis 11/24/2019   • Hypotension 11/24/2019   • Calculus of kidney and ureter 12/16/2019   • APC (atrial premature contractions) 08/20/2020   • Calculus of kidney 05/18/2021     Past Medical History:   Diagnosis Date   • BPH (benign prostatic hyperplasia)    • Bursitis of right shoulder 03/2013   • Cataract    • Cervical spondylosis 09/2016   • Chorioretinal scar    • Diverticulitis    • ED (erectile dysfunction)    • Esophagitis    • Functional dyspepsia    • GERD (gastroesophageal reflux disease)    • Hemorrhoids    • Hyperlipidemia    • Hyperthyroidism    • Hypocalcemia 03/2018   • Hypokalemia 06/2018    • Kidney cysts 03/2018   • Kidney stones    • Myopia 12/2018   • OA (osteoarthritis)    • PAF (paroxysmal atrial fibrillation) (CMS/[facility])    • Seborrheic keratosis    • Trochanteric bursitis of right hip 04/2019         PAST SURGICAL HISTORY  Past Surgical History:   Procedure Laterality Date   • APPENDECTOMY N/A    • CARDIAC CATHETERIZATION N/A 7/11/2019    Procedure: Left Heart Cath;  Surgeon: Charo Weiss MD;  Location: [facility] CATH INVASIVE LOCATION;  Service: Cardiology   • CARDIAC CATHETERIZATION N/A 7/11/2019    Procedure: Coronary angiography;  Surgeon: Charo Weiss MD;  Location: [facility] CATH INVASIVE LOCATION;  Service: Cardiology   • CARDIAC CATHETERIZATION N/A 7/11/2019     Left ventriculography; 30 mid LCx, 30% distal RCA  Charo Weiss MD at [facility]   • CARDIAC CATHETERIZATION Left 05/18/2012     20-30% diffuse LAD, 30-40% ostial diag, small LCx with diffuse 30-50%, large RCA with 60-70% mid (normal FFR of 0.9). Angiographically it was unchanged by previous cath DR. ANSELMO FLANAGAN AT [facility]   • CARDIAC CATHETERIZATION Left 05/2015     but due to persistant chest pain, he underwent placement of a ARLETTE (3.5x23 Xience).     • COLONOSCOPY N/A 09/12/2006    DIVERTICULOSIS, MODERATE EXTERNAL HEMORRHOIDS, DR. KAYLAN PINEDA AT [facility]   • CORONARY ANGIOPLASTY WITH STENT PLACEMENT Left 06/01/2015    75% DISTAL RCA STENOSIS, RCA STENT, DR. JUSTIN BERKOWITZ AT [facility]   • CYSTOSCOPY URETEROSCOPY LASER LITHOTRIPSY Right 12/18/2019    Procedure: RIGHT URETEROSCOPY STENT REMOVAL & STENT PLACEMENT & STONE BASKET EXTRACTION &  LASER LITHOTRIPSY;  Surgeon: Roberto Drew MD;  Location: [facility] MAIN OR;  Service: Urology   • CYSTOSCOPY W/ URETERAL STENT PLACEMENT Right 11/23/2019    Procedure: CYSTO RT STENT INSERTION;  Surgeon: Jonnathan Wolf MD;  Location: [facility] MAIN OR;  Service: Urology   • ENDOSCOPY N/A 8/14/2019    Z LINE IRREGULAR, SMALL HIATAL HERNIA, MILD INFLAMMATION WITH ERYTHEMA AND FRIABILITY IN GASTRIC BODY,  DR. MARTA FLORES AT [facility]   • ENDOSCOPY N/A 2015    LA GRADE B ESOPHAGITIS, ACUTE GASTRITIS, POSSIBLE PILL ESOPHAGITIS, DR. MARTA FLORES AT [facility]   • ENDOSCOPY AND COLONOSCOPY N/A 2011    CHRONIC GASTRITIS, HIATAL HERNIA,OTHERWISE NORMAL EGD, DIVERTICULOSIS IN SIGMOID, NON BLEEDING INTERNAL HEMORRHOIDS, OTHERWISE WNL CY, RESCOPE IN 10 YRS, DR. MARTA FLORES AT [facility]   • KIDNEY STONE SURGERY N/A          FAMILY HISTORY  Family History   Problem Relation Age of Onset   • Stroke Other    • Heart disease Father    • Diabetes Father    • Hypertension Father    • Malig Hyperthermia Neg Hx          SOCIAL HISTORY  Social History     Socioeconomic History   • Marital status:      Spouse name: Not on file   • Number of children: Not on file   • Years of education: Not on file   • Highest education level: Not on file   Tobacco Use   • Smoking status: Former Smoker     Types: Cigarettes     Quit date: 1987     Years since quittin.8   • Smokeless tobacco: Never Used   Substance and Sexual Activity   • Alcohol use: Yes     Comment: Occ//Caffeine use: Rare   • Drug use: No   • Sexual activity: Never     Birth control/protection: None         ALLERGIES  Amiodarone and Penicillins        REVIEW OF SYSTEMS  Review of Systems     All systems reviewed and negative except for those discussed in HPI.       PHYSICAL EXAM    I have reviewed the triage vital signs and nursing notes.    ED Triage Vitals   Temp Heart Rate Resp BP SpO2   216 21 2310 21 2317 21 2310 21 2310   98.3 °F (36.8 °C) 79 18 161/92 96 %      Temp src Heart Rate Source Patient Position BP Location FiO2 (%)   21 -- -- -- --   Tympanic           Physical Exam  GENERAL: not distressed  HENT: nares patent  EYES: no scleral icterus  CV: regular rhythm, regular rate  RESPIRATORY: normal effort  ABDOMEN: soft  MUSCULOSKELETAL: no deformity  NEURO: alert, moves all extremities, follows commands - cn intact, strength  equal bl, coordination intact  SKIN: warm, dry        LAB RESULTS  Recent Results (from the past 24 hour(s))   ECG 12 Lead    Collection Time: 06/20/21 10:30 PM   Result Value Ref Range    QT Interval 365 ms   Comprehensive Metabolic Panel    Collection Time: 06/20/21 10:50 PM    Specimen: Blood   Result Value Ref Range    Glucose 120 (H) 65 - 99 mg/dL    BUN 22 8 - 23 mg/dL    Creatinine 1.29 (H) 0.76 - 1.27 mg/dL    Sodium 140 136 - 145 mmol/L    Potassium 3.7 3.5 - 5.2 mmol/L    Chloride 104 98 - 107 mmol/L    CO2 26.3 22.0 - 29.0 mmol/L    Calcium 9.0 8.6 - 10.5 mg/dL    Total Protein 6.7 6.0 - 8.5 g/dL    Albumin 4.40 3.50 - 5.20 g/dL    ALT (SGPT) 18 1 - 41 U/L    AST (SGOT) 19 1 - 40 U/L    Alkaline Phosphatase 77 39 - 117 U/L    Total Bilirubin 0.4 0.0 - 1.2 mg/dL    eGFR Non African Amer 55 (L) >60 mL/min/1.73    Globulin 2.3 gm/dL    A/G Ratio 1.9 g/dL    BUN/Creatinine Ratio 17.1 7.0 - 25.0    Anion Gap 9.7 5.0 - 15.0 mmol/L   Troponin    Collection Time: 06/20/21 10:50 PM    Specimen: Blood   Result Value Ref Range    Troponin T <0.010 0.000 - 0.030 ng/mL   Green Top (Gel)    Collection Time: 06/20/21 10:50 PM   Result Value Ref Range    Extra Tube Hold for add-ons.    Lavender Top    Collection Time: 06/20/21 10:50 PM   Result Value Ref Range    Extra Tube hold for add-on    Gold Top - SST    Collection Time: 06/20/21 10:50 PM   Result Value Ref Range    Extra Tube Hold for add-ons.    CBC Auto Differential    Collection Time: 06/20/21 10:50 PM    Specimen: Blood   Result Value Ref Range    WBC 7.90 3.40 - 10.80 10*3/mm3    RBC 4.48 4.14 - 5.80 10*6/mm3    Hemoglobin 13.8 13.0 - 17.7 g/dL    Hematocrit 41.6 37.5 - 51.0 %    MCV 92.9 79.0 - 97.0 fL    MCH 30.8 26.6 - 33.0 pg    MCHC 33.2 31.5 - 35.7 g/dL    RDW 13.1 12.3 - 15.4 %    RDW-SD 44.6 37.0 - 54.0 fl    MPV 8.8 6.0 - 12.0 fL    Platelets 262 140 - 450 10*3/mm3    Neutrophil % 68.8 42.7 - 76.0 %    Lymphocyte % 19.7 19.6 - 45.3 %    Monocyte %  7.8 5.0 - 12.0 %    Eosinophil % 2.8 0.3 - 6.2 %    Basophil % 0.5 0.0 - 1.5 %    Immature Grans % 0.4 0.0 - 0.5 %    Neutrophils, Absolute 5.43 1.70 - 7.00 10*3/mm3    Lymphocytes, Absolute 1.56 0.70 - 3.10 10*3/mm3    Monocytes, Absolute 0.62 0.10 - 0.90 10*3/mm3    Eosinophils, Absolute 0.22 0.00 - 0.40 10*3/mm3    Basophils, Absolute 0.04 0.00 - 0.20 10*3/mm3    Immature Grans, Absolute 0.03 0.00 - 0.05 10*3/mm3    nRBC 0.0 0.0 - 0.2 /100 WBC   BNP    Collection Time: 06/20/21 10:50 PM    Specimen: Blood   Result Value Ref Range    proBNP 114.2 0.0 - 900.0 pg/mL   Magnesium    Collection Time: 06/20/21 10:50 PM    Specimen: Blood   Result Value Ref Range    Magnesium 2.1 1.6 - 2.4 mg/dL   Troponin    Collection Time: 06/20/21 10:51 PM    Specimen: Blood   Result Value Ref Range    Troponin T <0.010 0.000 - 0.030 ng/mL   Protime-INR    Collection Time: 06/20/21 11:20 PM    Specimen: Blood   Result Value Ref Range    Protime 13.1 11.7 - 14.2 Seconds    INR 1.01 0.90 - 1.10   aPTT    Collection Time: 06/20/21 11:20 PM    Specimen: Blood   Result Value Ref Range    PTT 27.7 22.7 - 35.4 seconds   Troponin    Collection Time: 06/21/21  4:35 AM    Specimen: Blood   Result Value Ref Range    Troponin T <0.010 0.000 - 0.030 ng/mL       Ordered the above labs and independently reviewed the results.        RADIOLOGY  XR Chest 1 View    Result Date: 6/20/2021  Patient: RAMONA SMITH  Time Out: 23:03 Exam(s): FILM CXR 1 VIEW EXAM:   XR Chest, 1 View CLINICAL HISTORY:    Reason for exam: Chest Pain Triage Protocol. TECHNIQUE:   Frontal view of the chest. COMPARISON:   11 25 19 FINDINGS:   Borderline cardiac enlargement.  Negative for focal consolidation, pneumothorax or pleural fluid collections.     Electronically signed by Solomon Gar DO on 06-20-21 at 2303      I ordered the above noted radiological studies. Reviewed by me and discussed with radiologist.  See dictation for official radiology  interpretation.      PROCEDURES    Procedures      MEDICATIONS GIVEN IN ER    Medications   sodium chloride 0.9 % flush 10 mL (has no administration in time range)   nitroglycerin (NITROSTAT) SL tablet 0.4 mg (0.4 mg Sublingual Given 6/21/21 0025)   aspirin chewable tablet 324 mg (324 mg Oral Given 6/20/21 2334)   morphine injection 2 mg (2 mg Intravenous Given 6/21/21 0004)   ondansetron (ZOFRAN) injection 4 mg (4 mg Intravenous Given 6/21/21 0003)         PROGRESS, DATA ANALYSIS, CONSULTS, AND MEDICAL DECISION MAKING    All labs have been independently reviewed by me.  All radiology studies have been reviewed by me and discussed with radiologist dictating the report.   EKG's independently viewed and interpreted by me.  Discussion below represents my analysis of pertinent findings related to patient's condition, differential diagnosis, treatment plan and final disposition.        ED Course as of Jun 21 0710   Sun Jun 20, 2021   2318 EKG          EKG time: 2230  Rhythm/Rate: Sinus rhythm rate 91  P waves and CA: Prolonged CA  QRS, axis: Narrow regular  ST and T waves: Normal    Interpreted Contemporaneously by me, independently viewed  No significant changed compared to prior EKG from 11/25/2019      [TJ]   Mon Jun 21, 2021   0027 HEART SCORE:    History #1  (Highly suspicious 2, Moderately suspicious 1, Slightly or non-suspicious 0)    ECG #0  (Significant ST depression 2,  Nonspecific repol disturbance 1, Normal 0)    Age #2  (> or = 65 2, 46-65 1,  < or = 45 0)    Risk factors #2  (hypercholesterolemia, HTN, DM, smoking, pos fam hx, obesity)  (> or = to 3 RF 2, 1 or 2 1, No risk factors 0)    Troponin #0  (> or = 3x normal limit 2, 1-3x normal limit 1, < or = Normal limit 0)    HEART Score Key:  Scores 0-3: 0.9-1.7% risk of adverse cardiac event. In the HEART Score study, these patients were discharged (0.99% in the retrospective study, 1.7% in the prospective study)  Scores 4-6: 12-16.6% risk of adverse cardiac  event. In the HEART Score study, these patients were admitted to the hospital. (11.6% retrospective, 16.6% prospective)  Scores =7: 50-65% risk of adverse cardiac event. In the HEART Score study, these patients were candidates for early invasive measures. (65.2% retrospective, 50.1% prospective)      This patient's HEART score is 5        [TJ]      ED Course User Index  [TJ] Elias Duran MD           PPE: Both the patient and I wore a surgical mask throughout the entire patient encounter. I wore protective goggles.     AS OF 07:10 EDT VITALS:    BP - 153/81  HR - 73  TEMP - 98.1 °F (36.7 °C) (Oral)  O2 SATS - 95%        DIAGNOSIS  Final diagnoses:   Chest pain, unspecified type         DISPOSITION  admit           Elias Duran MD  06/21/21 2273

## 2021-06-21 NOTE — H&P
Patient Name: Kyle Amador  Age/Sex: 70 y.o. male  : 1950  MRN: [id number]    Date of Admission: 2021  Date of Encounter Visit: 21  Encounter Provider: Sanya Jaime MD  Place of Service: [facility] CARDIOLOGY      Referring Provider: Ana Joseph MD  Patient Care Team:  Harinder Lea DO as PCP - General  Harinder Lea DO as PCP - Family Medicine  Jennie Green MD as Consulting Physician (Nephrology)  Aquiles Velazquez MD as Consulting Physician (Cardiology)    Subjective:     Admitted for: Chest Pain     Chief Complaint: Chest pain       History of Present Illness:   70 y.o. male patient of Dr Velazquez with a history of small vessel coronary disease and previous RCA stent in , GERD, hypertension, CKD (followed by Dr Green), and hyperlipidemia.      He has had issues tolerating anti hypertensives in the past. Amlodipine causes LE edema and spironolactone caused dizziness.     His blood pressures have been intermittently elevated since 2020.  He was also noted to have supraventricular bigeminy.  His Hydralazine was increased to 100 mg TID.  His pressure remained elevated and chlorthalidone was added .      He presented to the ED last night with complaints of left sided chest pain/tightness.  The patient states it was present at rest and did not seem to increase with activity.  He has been active at home and is denied any chest pain with activity.     He denied any diaphoresis, nausea, vomiting, or SOA. He did complain of left facial numbness.  Labs showed creatinine 1.29, and troponin negative x 2. He received  gm, NTG SL, and morphine. EKG showed NSR.  He states that the pain improved but he thinks it may be the morphine.      Previous testing:   Holter Monitor 2020  Study Findings    Patient diary submitted.No symptoms reported during the monitoring period. No complications noted. The predominant rhythm noted  during the testing period was sinus rhythm. Premature atrial contractions occured rarely. There was no evidence of atrial arrhythmias. There were no episodes of supraventricular tachycardia. Premature ventricular contractions did not appear during monitoring. There were no episodes of ventricular tachycardia. Sinoatrial node conduction was normal. No atrioventricular block noted.   Study Impressions    A normal monitor study.     ECHO 11/26/2019  · Left ventricular systolic function is normal. Estimated EF appears to be in the range of 66 - 70%. All left ventricular wall segments contract normally. The left ventricular cavity is small. Left ventricular wall thickness is consistent with mild concentric hypertrophy. Left ventricular diastolic function is normal.  · Normal left atrial size noted.  · Estimated right ventricular systolic pressure from tricuspid regurgitation is normal (<35 mmHg).  · The inferior vena cava is normally sized. Partial IVC inspiratory collapse of less than 50% noted.    Cardiac Catheterization 7/11/2019  FINDINGS:     1. HEMODYNAMICS:  /5, /70.     2. LEFT VENTRICULOGRAPHY: EF 60-65, LVEDP 4-8mmHg     3. CORONARY ANGIOGRAPHY:   Left main: Medium caliber LMCA which is normal. Bifurcates to give LAD and circumflex.   LAD: Medium caliber LAD which is heavily calcified. There is mild mid LAD plaque. There is a large caliber first diagonal which is normal.  Left circumflex: Medium caliber. Mild stenosis, <40% in the mid segment. The first and second OM branches are normal.  RCA: Large caliebr, dominant RCA which is has a 30-40% mid vessel stenosis. The distal RCA stent is widely patent. The rPDA and PL are medium caliber are normal.      SUMMARY: No evidence of obstructive CAD. The RCA stent is widely patent.      RECOMMENDATIONS: BP management. Risk factor modification.       Past Medical History:  Past Medical History:   Diagnosis Date   • APC (atrial premature contractions)  8/20/2020   • BPH (benign prostatic hyperplasia)    • Bursitis of right shoulder 03/2013   • CAD (coronary artery disease)     Status post stent to RCA   • Cataract     BILATERAL   • Cervical spondylosis 09/2016   • Chorioretinal scar     BILATERAL   • Chronic renal insufficiency, stage III (moderate) (CMS/[facility])    • Diverticulitis    • ED (erectile dysfunction)    • Esophagitis    • Functional dyspepsia    • GERD (gastroesophageal reflux disease)    • Hemorrhoids    • Hyperlipidemia    • Hypertension     probable hyperaldosteronism   • Hyperthyroidism    • Hypocalcemia 03/2018   • Hypokalemia 06/2018   • Kidney cysts 03/2018    FOLLOWED BY DR. HIRAM EDWARD   • Kidney stones    • Myopia 12/2018    LEFT EYE   • OA (osteoarthritis)    • PAF (paroxysmal atrial fibrillation) (CMS/[facility])    • Seborrheic keratosis    • Trochanteric bursitis of right hip 04/2019       Past Surgical History:   Procedure Laterality Date   • APPENDECTOMY N/A    • CARDIAC CATHETERIZATION N/A 7/11/2019    Procedure: Left Heart Cath;  Surgeon: Charo Weiss MD;  Location:  [facility];  Service: Cardiology   • CARDIAC CATHETERIZATION N/A 7/11/2019    Procedure: Coronary angiography;  Surgeon: Charo Weiss MD;  Location:  [facility];  Service: Cardiology   • CARDIAC CATHETERIZATION N/A 7/11/2019     Left ventriculography; 30 mid LCx, 30% distal RCA  Charo Weiss MD at [facility]   • CARDIAC CATHETERIZATION Left 05/18/2012     20-30% diffuse LAD, 30-40% ostial diag, small LCx with diffuse 30-50%, large RCA with 60-70% mid (normal FFR of 0.9). Angiographically it was unchanged by previous cath DR. ANSELMO FLANAGAN AT [facility]   • CARDIAC CATHETERIZATION Left 05/2015     but due to persistant chest pain, he underwent placement of a ARLETTE (3.5x23 Xience).     • COLONOSCOPY N/A 09/12/2006    DIVERTICULOSIS, MODERATE EXTERNAL HEMORRHOIDS, DR. KAYLAN PINEDA AT [facility]   • CORONARY ANGIOPLASTY WITH STENT PLACEMENT Left 06/01/2015     75% DISTAL RCA STENOSIS, RCA STENT, DR. JUSTIN BERKOWITZ AT [facility]   • CYSTOSCOPY URETEROSCOPY LASER LITHOTRIPSY Right 12/18/2019    Procedure: RIGHT URETEROSCOPY STENT REMOVAL & STENT PLACEMENT & STONE BASKET EXTRACTION &  LASER LITHOTRIPSY;  Surgeon: Roberto Drew MD;  Location: [facility] MAIN OR;  Service: Urology   • CYSTOSCOPY W/ URETERAL STENT PLACEMENT Right 11/23/2019    Procedure: CYSTO RT STENT INSERTION;  Surgeon: Jonnathan Wolf MD;  Location: [facility] MAIN OR;  Service: Urology   • ENDOSCOPY N/A 8/14/2019    Z LINE IRREGULAR, SMALL HIATAL HERNIA, MILD INFLAMMATION WITH ERYTHEMA AND FRIABILITY IN GASTRIC BODY, DR. MARTA FLORES AT [facility]   • ENDOSCOPY N/A 06/16/2015    LA GRADE B ESOPHAGITIS, ACUTE GASTRITIS, POSSIBLE PILL ESOPHAGITIS, DR. MARTA FLORES AT [facility]   • ENDOSCOPY AND COLONOSCOPY N/A 06/03/2011    CHRONIC GASTRITIS, HIATAL HERNIA,OTHERWISE NORMAL EGD, DIVERTICULOSIS IN SIGMOID, NON BLEEDING INTERNAL HEMORRHOIDS, OTHERWISE WNL CY, RESCOPE IN 10 YRS, DR. MARTA FLORES AT [facility]   • KIDNEY STONE SURGERY N/A        Home Medications:   Medications Prior to Admission   Medication Sig Dispense Refill Last Dose   • amitriptyline (ELAVIL) 25 MG tablet every night.   6/21/2021 at Unknown time   • aspirin 81 MG tablet Take 81 mg by mouth Every Night.   6/21/2021 at Unknown time   • atorvastatin (LIPITOR) 40 MG tablet Take 1 tablet by mouth Daily. 90 tablet 3 6/21/2021 at Unknown time   • hydrALAZINE (APRESOLINE) 100 MG tablet Take 1 tablet by mouth 2 (Two) Times a Day. 180 tablet 3 6/20/2021 at Unknown time   • isosorbide mononitrate (IMDUR) 30 MG 24 hr tablet Take 1 tablet by mouth Daily. 90 tablet 3 6/20/2021 at Unknown time   • metoprolol succinate XL (TOPROL-XL) 50 MG 24 hr tablet Take 1 tablet by mouth Daily. 90 tablet 3 6/20/2021 at Unknown time   • Multiple Vitamins-Minerals (MULTIVITAMIN ADULT PO) Take 1 tablet by mouth Daily.   6/20/2021 at Unknown time   • pantoprazole (PROTONIX) 40 MG EC tablet 40 mg 2  "(Two) Times a Day.   2021 at Unknown time   • Saw Palmetto, Serenoa repens, (SAW PALMETTO PO) Take 1 tablet by mouth Daily.   2021 at Unknown time   • valsartan (DIOVAN) 160 MG tablet Take 1 tablet by mouth twice daily 180 tablet 3 2021 at Unknown time       Allergies:  Allergies   Allergen Reactions   • Amiodarone Nausea Only and Cough   • Penicillins        Past Social History:  Social History     Socioeconomic History   • Marital status:      Spouse name: Not on file   • Number of children: Not on file   • Years of education: Not on file   • Highest education level: Not on file   Tobacco Use   • Smoking status: Former Smoker     Types: Cigarettes     Quit date: 1987     Years since quittin.8   • Smokeless tobacco: Never Used   Substance and Sexual Activity   • Alcohol use: Yes     Comment: Occ//Caffeine use: Rare   • Drug use: No   • Sexual activity: Never     Birth control/protection: None        Past Family History:  Family History   Problem Relation Age of Onset   • Stroke Other    • Heart disease Father    • Diabetes Father    • Hypertension Father    • Malig Hyperthermia Neg Hx          Review of Systems:  All systems reviewed. Pertinent positives identified in HPI. All other systems are negative.       Objective:     Objective:  Temp:  [98.1 °F (36.7 °C)-98.3 °F (36.8 °C)] 98.1 °F (36.7 °C)  Heart Rate:  [70-84] 73  Resp:  [18] 18  BP: (153-162)/(81-94) 153/81  No intake or output data in the 24 hours ending 21 0957  Body mass index is 28.31 kg/m².      21  2319 21  0136   Weight: 90.3 kg (199 lb) 89.5 kg (197 lb 4.8 oz)           Physical Exam:   Temp:  [98.1 °F (36.7 °C)-98.3 °F (36.8 °C)] 98.1 °F (36.7 °C)  Heart Rate:  [70-84] 73  Resp:  [18] 18  BP: (153-162)/(81-94) 153/81  No intake or output data in the 24 hours ending 21 0957  Flowsheet Rows      First Filed Value   Admission Height  177.8 cm (70\") Documented at 2021 2319   Admission " Weight  90.3 kg (199 lb) Documented at 06/20/2021 2319          General Appearance:    Alert, cooperative, in no acute distress   Head:    Normocephalic, without obvious abnormality, atraumatic       Neck/Lymph   No adenopathy, supple, no thyromegaly, no carotid bruit, no    JVD   Lungs:     Clear to auscultation bilaterally, no wheezes, rales, or     rhonchi    Cardiac:    Normal rate, regular rhythm, no murmur, no rub, no gallop   Chest Wall:    No abnormalities observed   GI:     Normal bowel sounds, soft, nontender, nondistended,            no rebound tenderness   Extremities:   No cyanosis, clubbing, or edema   Circulatory/Peripheral Vascular :   Pulses palpable and equal bilaterally   Integumentary:   No bleeding or rash. Normal temperature       Neurologic:   Cranial nerves 2 - 12 grossly intact, sensation intact               Lab Review:     Results from last 7 days   Lab Units 06/20/21  2250   SODIUM mmol/L 140   POTASSIUM mmol/L 3.7   CHLORIDE mmol/L 104   CO2 mmol/L 26.3   BUN mg/dL 22   CREATININE mg/dL 1.29*   GLUCOSE mg/dL 120*   CALCIUM mg/dL 9.0       Results from last 7 days   Lab Units 06/21/21  0435 06/20/21  2251 06/20/21  2250   TROPONIN T ng/mL <0.010 <0.010 <0.010     Results from last 7 days   Lab Units 06/20/21  2250   WBC 10*3/mm3 7.90   HEMOGLOBIN g/dL 13.8   HEMATOCRIT % 41.6   PLATELETS 10*3/mm3 262     Results from last 7 days   Lab Units 06/20/21  2320   INR  1.01   APTT seconds 27.7         Results from last 7 days   Lab Units 06/20/21  2250   MAGNESIUM mg/dL 2.1         Results from last 7 days   Lab Units 06/20/21  2250   PROBNP pg/mL 114.2               Imaging:    Imaging Results (Most Recent)     Procedure Component Value Units Date/Time    XR Chest 1 View [[id number]] Collected: 06/20/21 2304     Updated: 06/20/21 2304    Narrative:        Patient: RAMONA SMITH  Time Out: 23:03  Exam(s): FILM CXR 1 VIEW     EXAM:    XR Chest, 1 View    CLINICAL HISTORY:     Reason for exam:  Chest Pain Triage Protocol.    TECHNIQUE:    Frontal view of the chest.    COMPARISON:    11 25 19    FINDINGS:      Borderline cardiac enlargement.  Negative for focal consolidation,   pneumothorax or pleural fluid collections.      Impression:          Electronically signed by Solomon Gar DO on 06-20-21 at 2303          Results for orders placed during the hospital encounter of 11/23/19    Adult Transthoracic Echo Complete W/ Cont if Necessary Per Protocol    Interpretation Summary  · Left ventricular systolic function is normal. Estimated EF appears to be in the range of 66 - 70%. All left ventricular wall segments contract normally. The left ventricular cavity is small. Left ventricular wall thickness is consistent with mild concentric hypertrophy. Left ventricular diastolic function is normal.  · Normal left atrial size noted.  · Estimated right ventricular systolic pressure from tricuspid regurgitation is normal (<35 mmHg).  · The inferior vena cava is normally sized. Partial IVC inspiratory collapse of less than 50% noted.      Telemetry      EKG:         BASELINE:       I personally viewed and interpreted the patient's EKG/Telemetry data.    Assessment:   Assessment/Plan   1.  Chest pain  2.  Coronary disease with history of PCI to the RCA  3.  Essential hypertension: Blood pressure is better today.  4.  Chronic kidney disease  5.  Hyperlipidemia    -Overall I think he is doing well.  Unclear if his chest pain is angina.  He has it at rest but not with activity.  His biomarkers are negative.  His electrocardiogram shows no changes from baseline  -I have also reviewed his coronary anatomy from his catheterization in 2019.  He has no significant CAD of his left system.  He had a prior PCI to the RCA but that looked actually great on that catheterization as well.  I think it is unlikely that this is a problem  -I am going to double his Imdur and get him set up for a stress test today with perfusion imaging.   I do not think he needs catheterization      Thank you for allowing me to participate in the care of Kyle MIGUE Perry. Feel free to contact me directly with any further questions or concerns.    Sanya Jaime MD  [facility]  06/21/21  09:57 EDT

## 2021-06-21 NOTE — PLAN OF CARE
Goal Outcome Evaluation:     VSS, no complaints of chest pain or shortness or air. Stress test negative. Follow up with Dr Jaime in 2 weeks

## 2021-06-21 NOTE — PROGRESS NOTES
Pharmacist Services - Discharge Medication Counseling    Kyle Amador was counseled on medications prior to discharge. Counseling points included the followin) Explained indication of each medication, and patient's need for these medications.  2) Went over dosing and frequency of each medication.  3) Discussed any special administration, storage, or monitoring instructions with medications.  4) Discussed all important drug interactions, including over-the-counter medications and supplements.  5) Explained possible side effects for each medication.  6) Instructed the patient not to begin or discontinue any medications without informing his/her physician/pharmacist.    Patient expressed understanding and had no further questions.      Uzma Oscar, PharmD

## 2021-06-21 NOTE — ED NOTES
Pt to ED triage from home c/o chest pain. Pt states the pain started around 2130 this evening. Pt has cardiac hx c stents. Pt rates pain as 4/10 at this time.     Patient was placed in face mask during first look triage.  Patient was wearing a face mask throughout encounter.  I wore personal protective equipment throughout the encounter.  Hand hygiene was performed before and after patient encounter.      Jonh Bhatt RN  06/20/21 4915

## 2021-06-22 NOTE — DISCHARGE SUMMARY
Date of Discharge:  6/21/2021  Date of Admit: 6/20/2021    Discharge Diagnosis:  1.  Chest pain  2.  Coronary disease with history of PCI to the RCA  3.  Essential hypertension  4.  Chronic kidney disease  5.  Hyperlipidemia    Hospital Course:   70 y.o. male patient of Dr Velazquez with a history of small vessel coronary disease and previous RCA stent in 2015, GERD, hypertension, CKD (followed by Dr Green), and hyperlipidemia.       His blood pressures have been intermittently elevated since January 2020.  He was also noted to have supraventricular bigeminy.  His Hydralazine was increased to 100 mg TID.  His pressure remained elevated and chlorthalidone was added .       He presented to the ED last night with complaints of left sided chest pain/tightness.  The patient states it was present at rest and did not seem to increase with activity.  He has been active at home and is denied any chest pain with activity.      He denied any diaphoresis, nausea, vomiting, or SOA. He did complain of left facial numbness.  Labs showed creatinine 1.29, and troponin negative x 2. He received  gm, NTG SL, and morphine.    Perfusion stress test was normal. Her Imdur was increased to 60mg daily and she was subsequently discharged home today    Procedures Performed  6/21/2021  · Myocardial perfusion imaging indicates a normal myocardial perfusion study with no evidence of ischemia.  · Left ventricular ejection fraction is hyperdynamic (Calculated EF > 70%).  · Findings consistent with a normal ECG stress test.  · Impressions are consistent with a low risk study.       Consults     Date and Time Order Name Status Description    6/21/2021 12:13 AM Cardiology (on-call MD unless specified) Completed           Pertinent Test Results:      Discharge Physical Exam:  Temp:  [97.9 °F (36.6 °C)-98.4 °F (36.9 °C)] 98.4 °F (36.9 °C)  Heart Rate:  [66-84] 74  Resp:  [16-18] 16  BP: (126-162)/(74-94) 151/87    Intake/Output Summary (Last 24  "hours) at 6/21/2021 2037  Last data filed at 6/21/2021 1132  Gross per 24 hour   Intake 0 ml   Output --   Net 0 ml     Flowsheet Rows      First Filed Value   Admission Height  177.8 cm (70\") Documented at 06/20/2021 2319   Admission Weight  90.3 kg (199 lb) Documented at 06/20/2021 2319          General Appearance:    Alert, cooperative, in no acute distress   Head:    Normocephalic, without obvious abnormality, atraumatic       Neck/Lymph   No adenopathy, supple, no thyromegaly, no carotid bruit, no    JVD   Lungs:     Clear to auscultation bilaterally, no wheezes, rales, or     rhonchi    Cardiac:    Normal rate, regular rhythm, no murmur, no rub, no gallop   Chest Wall:    No abnormalities observed   GI:     Normal bowel sounds, soft, nontender, nondistended,            no rebound tenderness   Extremities:   No cyanosis, clubbing, or edema   Circulatory/Peripheral Vascular :   Pulses palpable and equal bilaterally   Integumentary:   No bleeding or rash. Normal temperature       Neurologic:   Cranial nerves 2 - 12 grossly intact, sensation intact               Discharge Medications     Discharge Medications      Changes to Medications      Instructions Start Date   isosorbide mononitrate 60 MG 24 hr tablet  Commonly known as: IMDUR  What changed:   · medication strength  · how much to take   60 mg, Oral, Daily   Start Date: June 22, 2021        Continue These Medications      Instructions Start Date   amitriptyline 25 MG tablet  Commonly known as: ELAVIL   Nightly      aspirin 81 MG tablet   81 mg, Oral, Nightly      atorvastatin 40 MG tablet  Commonly known as: LIPITOR   40 mg, Oral, Daily      hydrALAZINE 100 MG tablet  Commonly known as: APRESOLINE   100 mg, Oral, 2 Times Daily      metoprolol succinate XL 50 MG 24 hr tablet  Commonly known as: TOPROL-XL   50 mg, Oral, Daily      multivitamin with minerals tablet tablet   1 tablet, Oral, Daily      pantoprazole 40 MG EC tablet  Commonly known as: PROTONIX   " 40 mg, 2 Times Daily      SAW PALMETTO PO   1 tablet, Oral, Daily      valsartan 160 MG tablet  Commonly known as: DIOVAN   Take 1 tablet by mouth twice daily             Discharge Diet: Cardiac     Activity at Discharge: ad jones    Discharge disposition:Home    Condition on Discharge: Good    Follow-up Appointments  Future Appointments   Date Time Provider Department Center   12/1/2021  9:15 AM Aquiles Velazquez MD [id number]         Test Results Pending at Discharge       Sanya Jaime MD  06/21/21  20:37 EDT    Greater than 30 min spent in reviewing records, discussion and examination of the patient and discussion with other members of the patient's medical team.     Dictated utilizing Dragon dictation

## 2021-07-08 ENCOUNTER — TELEPHONE (OUTPATIENT)
Dept: CARDIOLOGY | Facility: CLINIC | Age: 71
End: 2021-07-08

## 2021-07-08 ENCOUNTER — OFFICE VISIT (OUTPATIENT)
Dept: CARDIOLOGY | Facility: CLINIC | Age: 71
End: 2021-07-08

## 2021-07-08 VITALS
HEART RATE: 83 BPM | BODY MASS INDEX: 28.52 KG/M2 | WEIGHT: 199.2 LBS | DIASTOLIC BLOOD PRESSURE: 68 MMHG | SYSTOLIC BLOOD PRESSURE: 122 MMHG | HEIGHT: 70 IN

## 2021-07-08 DIAGNOSIS — I10 ESSENTIAL HYPERTENSION: ICD-10-CM

## 2021-07-08 DIAGNOSIS — I48.91 POSTOPERATIVE ATRIAL FIBRILLATION (HCC): ICD-10-CM

## 2021-07-08 DIAGNOSIS — R07.2 PRECORDIAL PAIN: ICD-10-CM

## 2021-07-08 DIAGNOSIS — I97.89 POSTOPERATIVE ATRIAL FIBRILLATION (HCC): ICD-10-CM

## 2021-07-08 DIAGNOSIS — N18.31 CHRONIC RENAL IMPAIRMENT, STAGE 3A (HCC): ICD-10-CM

## 2021-07-08 DIAGNOSIS — I25.10 CORONARY ARTERY DISEASE INVOLVING NATIVE CORONARY ARTERY OF NATIVE HEART WITHOUT ANGINA PECTORIS: Primary | ICD-10-CM

## 2021-07-08 PROCEDURE — 99214 OFFICE O/P EST MOD 30 MIN: CPT | Performed by: NURSE PRACTITIONER

## 2021-07-08 PROCEDURE — 93000 ELECTROCARDIOGRAM COMPLETE: CPT | Performed by: NURSE PRACTITIONER

## 2021-07-08 RX ORDER — RANOLAZINE 1000 MG/1
1000 TABLET, EXTENDED RELEASE ORAL 2 TIMES DAILY
Qty: 60 TABLET | Refills: 1 | Status: SHIPPED | OUTPATIENT
Start: 2021-07-08 | End: 2021-07-08

## 2021-07-08 NOTE — TELEPHONE ENCOUNTER
Spoke with pt and he will continue on the isosorbide 30 mg twice daily and will call to give us an update in a few weeks. Pt verbalized understanding

## 2021-07-08 NOTE — TELEPHONE ENCOUNTER
Pt is calling today regarding the new RX you sent over. He states the prescription is $100 for a 30 day supply and he doesn't want to pay that so he will stay on the imdur. If you would like to discuss another option he can be reached at [phone]

## 2021-07-08 NOTE — PROGRESS NOTES
Date of Office Visit: 2021  Encounter Provider: SAUL Britt  Place of Service: [facility] CARDIOLOGY  Patient Name: Kyle Amador  :1950  Primary Cardiologist: Dr. Aquiles Velazquez     Chief Complaint   Patient presents with   • Coronary Artery Disease   • Chest Pain   • Hospital Follow Up Visit       HPI: Kyle Amador is a pleasant 70 y.o. male who presents today for follow up on chest pain. I have reviewed his medical records. He has been diagnosed with GERD, hypertension, and hyperlipidemia.     In , he was diagnosed with distal small vessel coronary artery disease per cardiac catheterization.  In May 2015, he had worsening chest discomfort. He underwent repeat cardiac catheterization and had right coronary artery stent placed.      In 2018, he was having atypical chest pain and had a normal nuclear stress test. He's had intermittent chronic chest pain intermittently.      He's had a longstanding history of hypertension.  He had a normal renal artery duplex in the past.  He cannot tolerate amlodipine because of lower extremity or spironolactone due to dizziness.      In 2019, he was hospitalized with kidney stones and pyelonephritis.  During the hospitalization he developed paroxysmal atrial fibrillation with RVR and was started on amiodarone and beta-blocker therapy.  Since then he has had acute on chronic kidney disease stage III and his nephrologist is Dr. Green.    Since 2020, we have been managing his blood pressure which has been intermittently elevated at times.  He has been diagnosed with supraventricular bigeminy.  In , Holter monitor showed the following: normal sinus rhythm with an average heart rate of 76, rare APCs, no PVCs, and no cardiac arrhythmias noted.    In May 2021, he followed up with me in the office.  His blood pressure was elevated at 148/78.  Upon review of his medications, he was taking his  hydralazine only twice per day, not 3 times daily as previously recommended.  I recommended that he increase the hydralazine to 3 times per day.    In June 2021, he was sitting at home and developed a sharp burning chest discomfort.  He presented to the [facility] [place] emergency department and his symptoms resolved after 2 sublingual nitroglycerin.  A nuclear stress test was normal.  His isosorbide was increased to 60 mg daily and he was discharged.    He presents today for his follow-up visit.  He denies any further chest pain.  He is experiencing headaches from the increased isosorbide.  His blood pressure is well controlled today in the office.  At home his blood pressure is running 120-130s/50-60s.  He denies shortness of air, palpitations, edema, dizziness, syncope, or bleeding.    Past Medical History:   Diagnosis Date   • APC (atrial premature contractions) 8/20/2020   • BPH (benign prostatic hyperplasia)    • Bursitis of right shoulder 03/2013   • CAD (coronary artery disease)     Status post stent to RCA   • Cataract     BILATERAL   • Cervical spondylosis 09/2016   • Chorioretinal scar     BILATERAL   • Chronic renal insufficiency, stage III (moderate) (CMS/HCC)    • Diverticulitis    • ED (erectile dysfunction)    • Esophagitis    • Functional dyspepsia    • GERD (gastroesophageal reflux disease)    • Hemorrhoids    • Hyperlipidemia    • Hypertension     probable hyperaldosteronism   • Hyperthyroidism    • Hypocalcemia 03/2018   • Hypokalemia 06/2018   • Kidney cysts 03/2018    FOLLOWED BY DR. HIRAM EDWARD   • Kidney stones    • Myopia 12/2018    LEFT EYE   • OA (osteoarthritis)    • PAF (paroxysmal atrial fibrillation) (CMS/HCC)    • Seborrheic keratosis    • Trochanteric bursitis of right hip 04/2019       Past Surgical History:   Procedure Laterality Date   • APPENDECTOMY N/A    • CARDIAC CATHETERIZATION N/A 7/11/2019    Procedure: Left Heart Cath;  Surgeon: Charo Weiss MD;  Location: [facility] INVASIVE  LOCATION;  Service: Cardiology   • CARDIAC CATHETERIZATION N/A 7/11/2019    Procedure: Coronary angiography;  Surgeon: Charo Weiss MD;  Location:  [facility] INVASIVE LOCATION;  Service: Cardiology   • CARDIAC CATHETERIZATION N/A 7/11/2019     Left ventriculography; 30 mid LCx, 30% distal RCA  Charo Weiss MD at [facility]   • CARDIAC CATHETERIZATION Left 05/18/2012     20-30% diffuse LAD, 30-40% ostial diag, small LCx with diffuse 30-50%, large RCA with 60-70% mid (normal FFR of 0.9). Angiographically it was unchanged by previous cath DR. ANSELMO FLANAGAN AT [facility]   • CARDIAC CATHETERIZATION Left 05/2015     but due to persistant chest pain, he underwent placement of a ARLETTE (3.5x23 Xience).     • COLONOSCOPY N/A 09/12/2006    DIVERTICULOSIS, MODERATE EXTERNAL HEMORRHOIDS, DR. KAYLAN PINEDA AT [facility]   • CORONARY ANGIOPLASTY WITH STENT PLACEMENT Left 06/01/2015    75% DISTAL RCA STENOSIS, RCA STENT, DR. JUSTIN BERKOWITZ AT [facility]   • CYSTOSCOPY URETEROSCOPY LASER LITHOTRIPSY Right 12/18/2019    Procedure: RIGHT URETEROSCOPY STENT REMOVAL & STENT PLACEMENT & STONE BASKET EXTRACTION &  LASER LITHOTRIPSY;  Surgeon: Roberto Drew MD;  Location: [facility] OR;  Service: Urology   • CYSTOSCOPY W/ URETERAL STENT PLACEMENT Right 11/23/2019    Procedure: CYSTO RT STENT INSERTION;  Surgeon: Jonnathan Wolf MD;  Location: [facility] MAIN OR;  Service: Urology   • ENDOSCOPY N/A 8/14/2019    Z LINE IRREGULAR, SMALL HIATAL HERNIA, MILD INFLAMMATION WITH ERYTHEMA AND FRIABILITY IN GASTRIC BODY, DR. MARTA FLORES AT [facility]   • ENDOSCOPY N/A 06/16/2015    LA GRADE B ESOPHAGITIS, ACUTE GASTRITIS, POSSIBLE PILL ESOPHAGITIS, DR. MARTA FLORES AT [facility]   • ENDOSCOPY AND COLONOSCOPY N/A 06/03/2011    CHRONIC GASTRITIS, HIATAL HERNIA,OTHERWISE NORMAL EGD, DIVERTICULOSIS IN SIGMOID, NON BLEEDING INTERNAL HEMORRHOIDS, OTHERWISE WNL CY, RESCOPE IN 10 YRS, DR. MARTA FLORES AT [facility]   • KIDNEY STONE SURGERY N/A        Social History     Socioeconomic History   •  Marital status:      Spouse name: Not on file   • Number of children: Not on file   • Years of education: Not on file   • Highest education level: Not on file   Tobacco Use   • Smoking status: Former Smoker     Types: Cigarettes     Quit date: 1987     Years since quittin.9   • Smokeless tobacco: Never Used   Substance and Sexual Activity   • Alcohol use: Yes     Comment: Occ//Caffeine use: Rare   • Drug use: No   • Sexual activity: Never     Birth control/protection: None       Family History   Problem Relation Age of Onset   • Stroke Other    • Heart disease Father    • Diabetes Father    • Hypertension Father    • Malig Hyperthermia Neg Hx        The following portion of the patient's history were reviewed and updated as appropriate: past medical history, past surgical history, past social history, past family history, allergies, current medications, and problem list.    Review of Systems   Constitutional: Negative for chills, diaphoresis, fever, malaise/fatigue, night sweats, weight gain and weight loss.   HENT: Negative for hearing loss, nosebleeds, sore throat and tinnitus.    Eyes: Negative for blurred vision, double vision, pain and visual disturbance.   Cardiovascular: Negative for chest pain, claudication, cyanosis, dyspnea on exertion, irregular heartbeat, leg swelling, near-syncope, orthopnea, palpitations, paroxysmal nocturnal dyspnea and syncope.   Respiratory: Negative for cough, hemoptysis, shortness of breath, snoring and wheezing.    Endocrine: Negative for cold intolerance, heat intolerance and polyuria.   Hematologic/Lymphatic: Negative for bleeding problem. Bruises/bleeds easily.   Skin: Negative for color change, dry skin, flushing and itching.   Musculoskeletal: Positive for joint pain. Negative for falls, joint swelling, muscle cramps, muscle weakness and myalgias.   Gastrointestinal: Negative for abdominal pain, constipation, heartburn, melena, nausea and vomiting.  "  Genitourinary: Negative for dysuria and hematuria.        Erectile dysfunction   Neurological: Negative for excessive daytime sleepiness, dizziness, light-headedness, loss of balance, numbness, paresthesias, seizures and vertigo.   Psychiatric/Behavioral: Negative for altered mental status, depression, memory loss and substance abuse. The patient does not have insomnia and is not nervous/anxious.    Allergic/Immunologic: Negative for environmental allergies.       Allergies   Allergen Reactions   • Amiodarone Nausea Only and Cough   • Penicillins          Current Outpatient Medications:   •  amitriptyline (ELAVIL) 25 MG tablet, every night., Disp: , Rfl:   •  aspirin 81 MG tablet, Take 81 mg by mouth Every Night., Disp: , Rfl:   •  atorvastatin (LIPITOR) 40 MG tablet, Take 1 tablet by mouth Daily., Disp: 90 tablet, Rfl: 3  •  hydrALAZINE (APRESOLINE) 100 MG tablet, Take 1 tablet by mouth 2 (Two) Times a Day., Disp: 180 tablet, Rfl: 3  •  isosorbide mononitrate (IMDUR) 60 MG 24 hr tablet, Take 1 tablet by mouth Daily., Disp: 30 tablet, Rfl: 6  •  metoprolol succinate XL (TOPROL-XL) 50 MG 24 hr tablet, Take 1 tablet by mouth Daily., Disp: 90 tablet, Rfl: 3  •  Multiple Vitamins-Minerals (MULTIVITAMIN ADULT PO), Take 1 tablet by mouth Daily., Disp: , Rfl:   •  pantoprazole (PROTONIX) 40 MG EC tablet, 40 mg 2 (Two) Times a Day., Disp: , Rfl:   •  Saw Palmetto, Serenoa repens, (SAW PALMETTO PO), Take 1 tablet by mouth Daily., Disp: , Rfl:   •  valsartan (DIOVAN) 160 MG tablet, Take 1 tablet by mouth twice daily, Disp: 180 tablet, Rfl: 3        Objective:     Vitals:    07/08/21 0934 07/08/21 0938   BP: 122/68 122/68   BP Location: Left arm Right arm   Pulse: 83    Weight: 90.4 kg (199 lb 3.2 oz)    Height: 177.8 cm (70\")      Body mass index is 28.58 kg/m².    PHYSICAL EXAM:    Vitals Reviewed.   General Appearance: No acute distress, well developed and well nourished.    Eyes: Conjunctiva and lids: No erythema, " swelling, or discharge. Sclera non-icteric.   HENT: Atraumatic, normocephalic. External eyes, ears, and nose normal. No hearing loss noted. Mucous membranes normal. Lips not cyanotic. Neck supple with no tenderness.  Respiratory: No signs of respiratory distress. Respiration rhythm and depth normal.   Clear to auscultation. No rales, crackles, rhonchi, or wheezing auscultated.   Cardiovascular:  Jugular Venous Pressure: Normal  Heart Rate and Rhythm: Normal, Heart Sounds: Normal S1 and S2. No S3 or S4 noted.  Murmurs: No murmurs noted. No rubs, thrills, or gallops.   Lower Extremities: No edema noted.  Gastrointestinal:  Abdomen distended.  Musculoskeletal: Normal movement of extremities  Skin and Nails: General appearance normal. No pallor, cyanosis, diaphoresis. Skin temperature normal. No clubbing of fingernails.   Psychiatric: Patient alert and oriented to person, place, and time. Speech and behavior appropriate. Normal mood and affect.       ECG 12 Lead    Date/Time: 7/8/2021 9:34 AM  Performed by: Etta Quach APRN  Authorized by: Etta Quach APRN   Comparison: compared with previous ECG from 6/20/2021  Comparison to previous ECG: Sinus rhythm with first-degree AV block  Rhythm: sinus rhythm  Ectopy: atrial premature contractions  Rate: normal  BPM: 83  Conduction: conduction normal  ST Segments: ST segments normal  T Waves: T waves normal  QRS axis: normal  Other: no other findings    Clinical impression: non-specific ECG              Assessment:       Diagnosis Plan   1. Coronary artery disease involving native coronary artery of native heart without angina pectoris     2. Precordial pain     3. Essential hypertension     4. Chronic renal impairment, stage 3a (CMS/HCC)     5. Postoperative atrial fibrillation (CMS/[facility], located within [facility])            Plan:       1/2.  Coronary Artery Disease: History of stent placement to the right coronary artery.  In 2019, coronary angiography showed mild, nonobstructive CAD.  He  was recently hospitalized for chest pain and nuclear stress test was normal.  Isosorbide was increased to 60 mg daily and he has worsening headaches.  He says he even had headaches on the lower dose of isosorbide.  I recommended stopping isosorbide and trying ranolazine 1000 mg twice per day.  Continue goal-directed therapy with aspirin, metoprolol succinate, and atorvastatin for secondary prevention.    3.  Essential Hypertension: Blood pressure goal 120s/80s or less.  Blood pressure is better controlled on hydralazine 3 times daily and valsartan.     4.  Stage III Chronic Kidney Disease: Followed by Dr. Jennie Green.     5. Postoperative Atrial Fibrillation: Occurred after ureter stent placement in November 2019.  He was placed on amiodarone and developed adverse effects in which the medication was discontinued.  No recurrence of atrial fibrillation since the surgery and he is not on anticoagulation therapy.  APCs have been noted in the past on EKGs, but follow-up Holter monitor showed rare PACs.    6.  He will keep his scheduled appointment with Dr. Aquiles Velazquez in December 2021.  I have asked him to call with any further concerns.    ADDENDUM:  Patient called back this afternoon and said the ranolazine was too expensive.  He prefers to stay on isosorbide.  I recommended that he try taking isosorbide 30 mg twice per day and taking Tylenol for the headaches.  I will call to check on him in a couple of weeks.    As always, it has been a pleasure to participate in your patient's care. Thank you.       Sincerely,       SAUL Freire  [facility]      · COVID-19 Precautions - Patient was compliant in wearing a mask. When I saw the patient, I used appropriate personal protective equipment (PPE) including mask (standard procedure).  Additionally, I used gown, gloves, and eye shield if indicated.  Hand hygiene was completed before and after seeing the patient.  · Dictated utilizing  Dragon Dictation  I spent 35 minutes reviewing her medical records/testing/previous office notes/labs, face-to-face interaction with patient, physical examination, formulating the plan of care, and discussion of plan of care with patient.

## 2021-07-08 NOTE — TELEPHONE ENCOUNTER
Thank you for the update.  We will continue with isosorbide 60 mg daily.  Ask him to start taking isosorbide 30 mg twice per day and see if that helps with his headache.  Please ask him to update me in a few weeks.  He can take Tylenol for the headaches.

## 2021-07-23 ENCOUNTER — TELEPHONE (OUTPATIENT)
Dept: CARDIOLOGY | Facility: CLINIC | Age: 71
End: 2021-07-23

## 2021-07-23 NOTE — TELEPHONE ENCOUNTER
Spoke with pt and he states he is feeling about the same. He took half for about a week and it didn't make any difference. Dr Velazquez had recommended him taking 60 mg all at once in the evenings and that is what he has been doing. He still has a dull headache but denies any chest pain at this time. He said he is adjusting to the medication and there are no new changes.

## 2021-07-23 NOTE — TELEPHONE ENCOUNTER
Headache still there on isosorbide 60 mg once daily. Chest pain gone. Ranolazine is not affordable.

## 2021-07-23 NOTE — TELEPHONE ENCOUNTER
Please call patient.    He was not able to afford the ranolazine  Call to see if he has any further chest pain on isosorbide  Check to see if his headaches improved with splitting the isosorbide to 30 mg twice per day    Thank you.

## 2021-07-27 NOTE — TELEPHONE ENCOUNTER
I spoke with patient via telephone.  He is not having any further chest pain on isosorbide 60 mg daily.  I explained Dr. Velazquez's recommendations.  He said the headache is tolerable and will take Tylenol as needed.  He will call with any concerns and follow-up with Dr. Velazquez in December 2021.

## 2021-09-07 RX ORDER — ISOSORBIDE MONONITRATE 60 MG/1
60 TABLET, EXTENDED RELEASE ORAL DAILY
Qty: 90 TABLET | Refills: 0 | Status: SHIPPED | OUTPATIENT
Start: 2021-09-07 | End: 2021-12-01 | Stop reason: SDUPTHER

## 2021-12-01 ENCOUNTER — OFFICE VISIT (OUTPATIENT)
Dept: CARDIOLOGY | Facility: CLINIC | Age: 71
End: 2021-12-01

## 2021-12-01 VITALS
HEART RATE: 80 BPM | SYSTOLIC BLOOD PRESSURE: 142 MMHG | HEIGHT: 70 IN | DIASTOLIC BLOOD PRESSURE: 78 MMHG | BODY MASS INDEX: 28.63 KG/M2 | WEIGHT: 200 LBS

## 2021-12-01 DIAGNOSIS — I10 PRIMARY HYPERTENSION: ICD-10-CM

## 2021-12-01 DIAGNOSIS — E78.2 MIXED HYPERLIPIDEMIA: ICD-10-CM

## 2021-12-01 DIAGNOSIS — I25.118 CORONARY ARTERY DISEASE OF NATIVE ARTERY OF NATIVE HEART WITH STABLE ANGINA PECTORIS (HCC): Primary | ICD-10-CM

## 2021-12-01 DIAGNOSIS — N18.31 CHRONIC RENAL IMPAIRMENT, STAGE 3A (HCC): ICD-10-CM

## 2021-12-01 DIAGNOSIS — I49.1 APC (ATRIAL PREMATURE CONTRACTIONS): ICD-10-CM

## 2021-12-01 DIAGNOSIS — I97.89 POSTOPERATIVE ATRIAL FIBRILLATION (HCC): ICD-10-CM

## 2021-12-01 DIAGNOSIS — I48.91 POSTOPERATIVE ATRIAL FIBRILLATION (HCC): ICD-10-CM

## 2021-12-01 PROCEDURE — 93000 ELECTROCARDIOGRAM COMPLETE: CPT | Performed by: INTERNAL MEDICINE

## 2021-12-01 PROCEDURE — 99213 OFFICE O/P EST LOW 20 MIN: CPT | Performed by: INTERNAL MEDICINE

## 2021-12-01 RX ORDER — ISOSORBIDE MONONITRATE 60 MG/1
60 TABLET, EXTENDED RELEASE ORAL DAILY
Qty: 90 TABLET | Refills: 3 | Status: SHIPPED | OUTPATIENT
Start: 2021-12-01 | End: 2022-12-19

## 2021-12-01 NOTE — PROGRESS NOTES
Date of Office Visit: 2021  Encounter Provider: Aquiles Velazquez MD  Place of Service: [facility] CARDIOLOGY  Patient Name: Kyle Amador  :1950    Chief Complaint   Patient presents with   • Coronary Artery Disease   :     HPI: Kyle Amador is a 71 y.o. male who presents today to follow up. I have reviewed prior notes and there are no changes except for any new updates described below.    He has history of fairly chronic chest pain. He was found to have distal small vessel disease by catheterization in . In , he had another catheterization due to persistent pain and he had a normal FFR of the right coronary artery. In May 2015, his chest pain got much worse and he underwent repeat cardiac catheterization and the right coronary artery was stented. Despite this, his pain continued to worsen and ultimately, he was diagnosed with severe esophagitis. He had a normal Myoview stress in 2018 due to atypical chest pain.  He was hospitalized in 2019 with chest pain again, and underwent coronary angiography; he had 30% disease in the circumflex and distal RCA.  Again, it was felt that his pain was GI in etiology.    He also has a history of severe hypertension. He had terrible leg swelling with 10mg amlodipine but had been tolerating 5mg daily. At some point, that was stopped, however. He had a normal renal artery duplex.  He has refused a sleep apnea evaluation.  He was noted to be hypokalemic and was placed on spironolactone.    In 2019, he was hospitalized with sepsis, GEORGES on CKD, kidney stones, and hypotension.  He had a brief episode of atrial fibrillation in that setting.  He was placed on oral amiodarone, which we stopped after one month.  His spironolactone and valsartan were stopped (spirono was added to his allergy list, but I removed it as he's not allergic).  In 2020, his ARB was resumed and HCTZ was started; it was ultimately  changed to chlorthalidone. He stopped it, though, due to urinary frequency.    He presented in June 2021 with atypical chest pain; a perfusion stress test was negative. His ISMN was increased; we subsequently tried to add ranolazine but it was cost-prohibitive.    He denies chest pain, dyspnea, lightheadedness, palpitations or syncope.  He's doing well and goes fishing regularly.    Past Medical History:   Diagnosis Date   • APC (atrial premature contractions) 8/20/2020   • BPH (benign prostatic hyperplasia)    • Bursitis of right shoulder 03/2013   • CAD (coronary artery disease)     Status post stent to RCA   • Cataract     BILATERAL   • Cervical spondylosis 09/2016   • Chorioretinal scar     BILATERAL   • Chronic renal insufficiency, stage III (moderate) ([facility])    • Diverticulitis    • ED (erectile dysfunction)    • Esophagitis    • Functional dyspepsia    • GERD (gastroesophageal reflux disease)    • Hemorrhoids    • Hyperlipidemia    • Hypertension     probable hyperaldosteronism   • Hyperthyroidism    • Hypocalcemia 03/2018   • Hypokalemia 06/2018   • Kidney cysts 03/2018    FOLLOWED BY DR. HIRAM EDWARD   • Kidney stones    • Myopia 12/2018    LEFT EYE   • OA (osteoarthritis)    • PAF (paroxysmal atrial fibrillation) ([facility])    • Seborrheic keratosis    • Trochanteric bursitis of right hip 04/2019       Past Surgical History:   Procedure Laterality Date   • APPENDECTOMY N/A    • CARDIAC CATHETERIZATION N/A 7/11/2019    Procedure: Left Heart Cath;  Surgeon: Charo Weiss MD;  Location:  [facility] CATH INVASIVE LOCATION;  Service: Cardiology   • CARDIAC CATHETERIZATION N/A 7/11/2019    Procedure: Coronary angiography;  Surgeon: Charo Weiss MD;  Location:  [facility] CATH INVASIVE LOCATION;  Service: Cardiology   • CARDIAC CATHETERIZATION N/A 7/11/2019     Left ventriculography; 30 mid LCx, 30% distal RCA  Charo Weiss MD at [facility]   • CARDIAC CATHETERIZATION Left 05/18/2012     20-30% diffuse LAD, 30-40% ostial diag,  small LCx with diffuse 30-50%, large RCA with 60-70% mid (normal FFR of 0.9). Angiographically it was unchanged by previous cath DR. ANSELMO FLANAGAN AT [facility]   • CARDIAC CATHETERIZATION Left 2015     but due to persistant chest pain, he underwent placement of a ARLETTE (3.5x23 Xience).     • COLONOSCOPY N/A 2006    DIVERTICULOSIS, MODERATE EXTERNAL HEMORRHOIDS, DR. KAYLAN PINEDA AT [facility]   • CORONARY ANGIOPLASTY WITH STENT PLACEMENT Left 2015    75% DISTAL RCA STENOSIS, RCA STENT, DR. JUSTIN BERKOWITZ AT [facility]   • CYSTOSCOPY URETEROSCOPY LASER LITHOTRIPSY Right 2019    Procedure: RIGHT URETEROSCOPY STENT REMOVAL & STENT PLACEMENT & STONE BASKET EXTRACTION &  LASER LITHOTRIPSY;  Surgeon: Roberto Drew MD;  Location: [facility];  Service: Urology   • CYSTOSCOPY W/ URETERAL STENT PLACEMENT Right 2019    Procedure: CYSTO RT STENT INSERTION;  Surgeon: Jonnathan Wolf MD;  Location: [facility];  Service: Urology   • ENDOSCOPY N/A 2019    Z LINE IRREGULAR, SMALL HIATAL HERNIA, MILD INFLAMMATION WITH ERYTHEMA AND FRIABILITY IN GASTRIC BODY, DR. MARTA FLORES AT [facility]   • ENDOSCOPY N/A 2015    LA GRADE B ESOPHAGITIS, ACUTE GASTRITIS, POSSIBLE PILL ESOPHAGITIS, DR. MARTA FLORES AT [facility]   • ENDOSCOPY AND COLONOSCOPY N/A 2011    CHRONIC GASTRITIS, HIATAL HERNIA,OTHERWISE NORMAL EGD, DIVERTICULOSIS IN SIGMOID, NON BLEEDING INTERNAL HEMORRHOIDS, OTHERWISE WNL CY, RESCOPE IN 10 YRS, DR. MARTA FLORES AT [facility]   • KIDNEY STONE SURGERY N/A        Social History     Socioeconomic History   • Marital status:    Tobacco Use   • Smoking status: Former Smoker     Types: Cigarettes     Quit date: 1987     Years since quittin.3   • Smokeless tobacco: Never Used   Vaping Use   • Vaping Use: Never used   Substance and Sexual Activity   • Alcohol use: Yes     Comment: Occ//Caffeine use: Rare   • Drug use: No   • Sexual activity: Never     Birth control/protection: None       Family  "History   Problem Relation Age of Onset   • Stroke Other    • Heart disease Father    • Diabetes Father    • Hypertension Father    • Malig Hyperthermia Neg Hx        Review of Systems   Constitutional: Positive for chills.   Cardiovascular: Negative for chest pain and palpitations.   Hematologic/Lymphatic: Bruises/bleeds easily.   Musculoskeletal: Positive for joint pain.   Neurological: Positive for headaches.   All other systems reviewed and are negative.      Allergies   Allergen Reactions   • Amiodarone Nausea Only and Cough   • Penicillins          Current Outpatient Medications:   •  amitriptyline (ELAVIL) 25 MG tablet, every night., Disp: , Rfl:   •  aspirin 81 MG tablet, Take 81 mg by mouth Every Night., Disp: , Rfl:   •  atorvastatin (LIPITOR) 40 MG tablet, Take 1 tablet by mouth Daily., Disp: 90 tablet, Rfl: 3  •  hydrALAZINE (APRESOLINE) 100 MG tablet, Take 1 tablet by mouth 2 (Two) Times a Day., Disp: 180 tablet, Rfl: 3  •  isosorbide mononitrate (IMDUR) 60 MG 24 hr tablet, Take 1 tablet by mouth Daily., Disp: 90 tablet, Rfl: 0  •  metoprolol succinate XL (TOPROL-XL) 50 MG 24 hr tablet, Take 1 tablet by mouth Daily., Disp: 90 tablet, Rfl: 3  •  Multiple Vitamins-Minerals (MULTIVITAMIN ADULT PO), Take 1 tablet by mouth Daily., Disp: , Rfl:   •  pantoprazole (PROTONIX) 40 MG EC tablet, 40 mg 2 (Two) Times a Day., Disp: , Rfl:   •  Saw Palmetto, Serenoa repens, (SAW PALMETTO PO), Take 1 tablet by mouth Daily., Disp: , Rfl:   •  valsartan (DIOVAN) 160 MG tablet, Take 1 tablet by mouth twice daily, Disp: 180 tablet, Rfl: 3     Objective:     Vitals:    12/01/21 0926   BP: 142/78   BP Location: Left arm   Pulse: 80   Weight: 90.7 kg (200 lb)   Height: 177.8 cm (70\")     Body mass index is 28.7 kg/m².    Physical Exam  Vitals reviewed.   Constitutional:       General: He is not in acute distress.     Appearance: He is well-developed.   HENT:      Head: Normocephalic.      Nose: Nose normal.      Mouth/Throat: "      Comments: Masked  Eyes:      Conjunctiva/sclera: Conjunctivae normal.   Neck:      Vascular: No JVD.   Cardiovascular:      Rate and Rhythm: Normal rate and regular rhythm.      Pulses: Normal pulses and intact distal pulses.      Heart sounds: Normal heart sounds. No murmur heard.      Pulmonary:      Effort: Pulmonary effort is normal.      Breath sounds: Normal breath sounds.   Abdominal:      Palpations: Abdomen is soft.      Tenderness: There is no abdominal tenderness.   Musculoskeletal:         General: No swelling. Normal range of motion.      Cervical back: Normal range of motion.   Skin:     General: Skin is warm and dry.      Findings: No rash.   Neurological:      Mental Status: He is alert and oriented to person, place, and time.      Cranial Nerves: No cranial nerve deficit.   Psychiatric:         Behavior: Behavior normal.         Thought Content: Thought content normal.           ECG 12 Lead    Date/Time: 12/1/2021 9:47 AM  Performed by: Aquiles Velazquez MD  Authorized by: Aquiles Velazquez MD   Comparison: compared with previous ECG   Similar to previous ECG  Rhythm: sinus rhythm  Ectopy: atrial premature contractions  Conduction: conduction normal  ST Segments: ST segments normal  T Waves: T waves normal  QRS axis: normal  Other: no other findings    Clinical impression: non-specific ECG           Assessment:       Diagnosis Plan   1. Coronary artery disease of native artery of native heart with stable angina pectoris ([facility])  ECG 12 Lead   2. Mixed hyperlipidemia     3. Primary hypertension     4. Chronic renal impairment, stage 3a ([facility])     5. APC (atrial premature contractions)     6. Postoperative atrial fibrillation ([facility])        Plan:      1/2.  Coronary Artery Disease  Assessment  • The patient has no angina  • He has chronic chest pain and small vessel disease.  He's on aspirin and atorvastatin.      Subjective - Objective  • There has been a previous stent procedure using ARLETTE 2015  • Current  antiplatelet therapy includes aspirin 81 mg      He is on atorvastatin.     3/4.  His BP is great for him.  We will continue with his current regimen.    5/6.  In November 2019 he had sepsis/GEORGES/pyelo/renal stones, and he had a very brief episode of AF. He took one month of amiodarone.  He will remain on metoprolol. He does not require OAC in the absence of recurrence. He has occasional asymptomatic PACs.    Sincerely,       Aquiles Velazquez MD

## 2022-02-21 RX ORDER — VALSARTAN 160 MG/1
TABLET ORAL
Qty: 180 TABLET | Refills: 0 | Status: SHIPPED | OUTPATIENT
Start: 2022-02-21 | End: 2022-05-17

## 2022-05-17 RX ORDER — VALSARTAN 160 MG/1
TABLET ORAL
Qty: 180 TABLET | Refills: 0 | Status: SHIPPED | OUTPATIENT
Start: 2022-05-17 | End: 2022-06-23 | Stop reason: SDUPTHER

## 2022-05-17 NOTE — TELEPHONE ENCOUNTER
received a refill request for pt's Valsartan 160 mg bid.      Last Office note from 12/01/21 pt HPI stated that pt's spironolactone and Valsartan were stopped.  Please see pending rx.

## 2022-05-23 RX ORDER — METOPROLOL SUCCINATE 50 MG/1
TABLET, EXTENDED RELEASE ORAL
Qty: 90 TABLET | Refills: 0 | Status: SHIPPED | OUTPATIENT
Start: 2022-05-23 | End: 2022-06-23 | Stop reason: SDUPTHER

## 2022-05-23 RX ORDER — ATORVASTATIN CALCIUM 40 MG/1
TABLET, FILM COATED ORAL
Qty: 90 TABLET | Refills: 0 | Status: SHIPPED | OUTPATIENT
Start: 2022-05-23 | End: 2022-06-23 | Stop reason: SDUPTHER

## 2022-06-23 ENCOUNTER — OFFICE VISIT (OUTPATIENT)
Dept: CARDIOLOGY | Facility: CLINIC | Age: 72
End: 2022-06-23

## 2022-06-23 VITALS
SYSTOLIC BLOOD PRESSURE: 160 MMHG | HEART RATE: 73 BPM | DIASTOLIC BLOOD PRESSURE: 92 MMHG | HEIGHT: 70 IN | BODY MASS INDEX: 27.77 KG/M2 | WEIGHT: 194 LBS

## 2022-06-23 DIAGNOSIS — M54.50 ACUTE MIDLINE LOW BACK PAIN WITHOUT SCIATICA: ICD-10-CM

## 2022-06-23 DIAGNOSIS — I49.1 APC (ATRIAL PREMATURE CONTRACTIONS): ICD-10-CM

## 2022-06-23 DIAGNOSIS — N18.31 CHRONIC RENAL IMPAIRMENT, STAGE 3A: ICD-10-CM

## 2022-06-23 DIAGNOSIS — I48.0 PAROXYSMAL ATRIAL FIBRILLATION: ICD-10-CM

## 2022-06-23 DIAGNOSIS — I10 PRIMARY HYPERTENSION: ICD-10-CM

## 2022-06-23 DIAGNOSIS — E78.2 MIXED HYPERLIPIDEMIA: ICD-10-CM

## 2022-06-23 DIAGNOSIS — I25.10 CORONARY ARTERY DISEASE INVOLVING NATIVE CORONARY ARTERY OF NATIVE HEART WITHOUT ANGINA PECTORIS: Primary | ICD-10-CM

## 2022-06-23 PROBLEM — R07.9 CHEST PAIN: Status: RESOLVED | Noted: 2021-06-21 | Resolved: 2022-06-23

## 2022-06-23 PROBLEM — N20.0 CALCULUS OF KIDNEY: Status: ACTIVE | Noted: 2022-06-23

## 2022-06-23 PROBLEM — N20.0 CALCULUS OF KIDNEY: Status: RESOLVED | Noted: 2022-06-23 | Resolved: 2022-06-23

## 2022-06-23 PROBLEM — I48.91 ATRIAL FIBRILLATION (HCC): Status: ACTIVE | Noted: 2019-11-26

## 2022-06-23 PROBLEM — I48.91 ATRIAL FIBRILLATION: Status: RESOLVED | Noted: 2019-11-26 | Resolved: 2022-06-23

## 2022-06-23 PROCEDURE — 93000 ELECTROCARDIOGRAM COMPLETE: CPT | Performed by: NURSE PRACTITIONER

## 2022-06-23 PROCEDURE — 99214 OFFICE O/P EST MOD 30 MIN: CPT | Performed by: NURSE PRACTITIONER

## 2022-06-23 RX ORDER — METOPROLOL SUCCINATE 50 MG/1
50 TABLET, EXTENDED RELEASE ORAL DAILY
Qty: 90 TABLET | Refills: 3 | Status: SHIPPED | OUTPATIENT
Start: 2022-06-23 | End: 2022-09-13 | Stop reason: SDUPTHER

## 2022-06-23 RX ORDER — VALSARTAN 160 MG/1
160 TABLET ORAL 2 TIMES DAILY
Qty: 180 TABLET | Refills: 3 | Status: SHIPPED | OUTPATIENT
Start: 2022-06-23

## 2022-06-23 RX ORDER — ATORVASTATIN CALCIUM 40 MG/1
40 TABLET, FILM COATED ORAL DAILY
Qty: 90 TABLET | Refills: 3 | Status: SHIPPED | OUTPATIENT
Start: 2022-06-23

## 2022-06-23 RX ORDER — HYDRALAZINE HYDROCHLORIDE 100 MG/1
100 TABLET, FILM COATED ORAL 2 TIMES DAILY
Qty: 180 TABLET | Refills: 3 | Status: SHIPPED | OUTPATIENT
Start: 2022-06-23

## 2022-06-23 NOTE — PROGRESS NOTES
Date of Office Visit: 2022  Encounter Provider: SAUL Britt  Place of Service: [facility] CARDIOLOGY  Patient Name: Kyle Amador  :1950  Primary Cardiologist: Dr. Aquiles Velazquez     Chief Complaint   Patient presents with   • Coronary Artery Disease   • Follow-up   :     HPI: Kyle Amador is a pleasant 71 y.o. male who presents today for follow-up on coronary artery disease.  I have reviewed his medical records.    He has been diagnosed with hypertension, hyperlipidemia, and GERD.  He had a normal renal artery duplex in the past.  He was unable to tolerate amlodipine 10 mg daily because of lower extremity edema and was unable to tolerate spironolactone due to dizziness.  He has known chronic kidney disease stage III and follows with Dr. Green.    In , he was diagnosed with distal small vessel coronary artery disease per cardiac catheterization.  In May 2015, he had worsening chest discomfort. He underwent repeat cardiac catheterization and underwent RCA stent placement.  Over the years he has had atypical chest pain with normal stress test.    In 2019, he was hospitalized with sepsis, GEORGES on CKD, kidney stones, and hypertension.  He had a brief episode of atrial fibrillation in that setting.  He was placed on oral amiodarone which was discontinued after 1 month.  His spironolactone and valsartan were discontinued.  In 2020, ARB was resumed and HCTZ was started and then changed to chlorthalidone.  He stopped the chlorthalidone due to urinary frequency.    In 2021, he was having atypical chest pain and myocardial perfusion study was normal.  His isosorbide was increased.    He presents today for his follow-up visit.  His blood pressure is elevated today in the office and he thinks it is from low back pain that he has been having acutely over the past couple of weeks.  He has been seen his PCP and a chiropractor.  Blood pressure at home  typically runs 130/60-70s.  He notes bruising bilaterally to his arms.  He denies chest pain, shortness of air, palpitations, edema, dizziness, syncope, or bleeding.      Past Medical History:   Diagnosis Date   • APC (atrial premature contractions) 8/20/2020   • BPH (benign prostatic hyperplasia)    • Bursitis of right shoulder 03/2013   • CAD (coronary artery disease)     Status post stent to RCA   • Cataract     BILATERAL   • Cervical spondylosis 09/2016   • Chorioretinal scar     BILATERAL   • Chronic renal insufficiency, stage III (moderate) ([facility])    • Diverticulitis    • ED (erectile dysfunction)    • Esophagitis    • Functional dyspepsia    • GERD (gastroesophageal reflux disease)    • Hemorrhoids    • Hyperlipidemia    • Hypertension     probable hyperaldosteronism   • Hyperthyroidism    • Hypocalcemia 03/2018   • Hypokalemia 06/2018   • Kidney cysts 03/2018    FOLLOWED BY DR. HIRAM EDWARD   • Kidney stones    • Myopia 12/2018    LEFT EYE   • OA (osteoarthritis)    • PAF (paroxysmal atrial fibrillation) ([facility])    • Seborrheic keratosis    • Trochanteric bursitis of right hip 04/2019       Past Surgical History:   Procedure Laterality Date   • APPENDECTOMY N/A    • CARDIAC CATHETERIZATION N/A 7/11/2019    Procedure: Left Heart Cath;  Surgeon: Charo Weiss MD;  Location: [facility] CATH INVASIVE LOCATION;  Service: Cardiology   • CARDIAC CATHETERIZATION N/A 7/11/2019    Procedure: Coronary angiography;  Surgeon: Charo Weiss MD;  Location: [facility] CATH INVASIVE LOCATION;  Service: Cardiology   • CARDIAC CATHETERIZATION N/A 7/11/2019     Left ventriculography; 30 mid LCx, 30% distal RCA  Charo Weiss MD at [facility]   • CARDIAC CATHETERIZATION Left 05/18/2012     20-30% diffuse LAD, 30-40% ostial diag, small LCx with diffuse 30-50%, large RCA with 60-70% mid (normal FFR of 0.9). Angiographically it was unchanged by previous cath DR. ANSELMO FLANAGAN AT [facility]   • CARDIAC CATHETERIZATION Left 05/2015     but due to persistant  chest pain, he underwent placement of a ARLETTE (3.5x23 Xience).     • COLONOSCOPY N/A 2006    DIVERTICULOSIS, MODERATE EXTERNAL HEMORRHOIDS, DR. KAYLAN PINEDA AT [facility]   • CORONARY ANGIOPLASTY WITH STENT PLACEMENT Left 2015    75% DISTAL RCA STENOSIS, RCA STENT, DR. JUSTIN BERKOWITZ AT [facility]   • CYSTOSCOPY URETEROSCOPY LASER LITHOTRIPSY Right 2019    Procedure: RIGHT URETEROSCOPY STENT REMOVAL & STENT PLACEMENT & STONE BASKET EXTRACTION &  LASER LITHOTRIPSY;  Surgeon: Roberto Drew MD;  Location: [facility];  Service: Urology   • CYSTOSCOPY W/ URETERAL STENT PLACEMENT Right 2019    Procedure: CYSTO RT STENT INSERTION;  Surgeon: Jonnathan Wolf MD;  Location: [facility];  Service: Urology   • ENDOSCOPY N/A 2019    Z LINE IRREGULAR, SMALL HIATAL HERNIA, MILD INFLAMMATION WITH ERYTHEMA AND FRIABILITY IN GASTRIC BODY, DR. MARTA FLORES AT [facility]   • ENDOSCOPY N/A 2015    LA GRADE B ESOPHAGITIS, ACUTE GASTRITIS, POSSIBLE PILL ESOPHAGITIS, DR. MARTA FLORES AT [facility]   • ENDOSCOPY AND COLONOSCOPY N/A 2011    CHRONIC GASTRITIS, HIATAL HERNIA,OTHERWISE NORMAL EGD, DIVERTICULOSIS IN SIGMOID, NON BLEEDING INTERNAL HEMORRHOIDS, OTHERWISE WNL CY, RESCOPE IN 10 YRS, DR. MARTA FLORES AT [facility]   • KIDNEY STONE SURGERY N/A        Social History     Socioeconomic History   • Marital status:    Tobacco Use   • Smoking status: Former Smoker     Types: Cigarettes     Quit date: 1987     Years since quittin.8   • Smokeless tobacco: Never Used   Vaping Use   • Vaping Use: Never used   Substance and Sexual Activity   • Alcohol use: Yes     Comment: Occ//Caffeine use: Rare   • Drug use: No   • Sexual activity: Never     Birth control/protection: None       Family History   Problem Relation Age of Onset   • Stroke Other    • Heart disease Father    • Diabetes Father    • Hypertension Father    • Malig Hyperthermia Neg Hx        The following portion of the patient's history were  "reviewed and updated as appropriate: past medical history, past surgical history, past social history, past family history, allergies, current medications, and problem list.    Review of Systems   Constitutional: Negative.   Cardiovascular: Negative.    Respiratory: Negative.    Hematologic/Lymphatic: Bruises/bleeds easily.   Musculoskeletal: Positive for back pain, joint pain and myalgias.   Neurological: Negative.        Allergies   Allergen Reactions   • Amiodarone Nausea Only and Cough   • Penicillins          Current Outpatient Medications:   •  amitriptyline (ELAVIL) 25 MG tablet, every night., Disp: , Rfl:   •  aspirin 81 MG tablet, Take 81 mg by mouth Every Night., Disp: , Rfl:   •  atorvastatin (LIPITOR) 40 MG tablet, Take 1 tablet by mouth Daily., Disp: 90 tablet, Rfl: 3  •  hydrALAZINE (APRESOLINE) 100 MG tablet, Take 1 tablet by mouth 2 (Two) Times a Day., Disp: 180 tablet, Rfl: 3  •  isosorbide mononitrate (IMDUR) 60 MG 24 hr tablet, Take 1 tablet by mouth Daily., Disp: 90 tablet, Rfl: 3  •  metoprolol succinate XL (TOPROL-XL) 50 MG 24 hr tablet, Take 1 tablet by mouth Daily., Disp: 90 tablet, Rfl: 3  •  Multiple Vitamins-Minerals (MULTIVITAMIN ADULT PO), Take 1 tablet by mouth Daily., Disp: , Rfl:   •  pantoprazole (PROTONIX) 40 MG EC tablet, 40 mg 2 (Two) Times a Day., Disp: , Rfl:   •  Saw Palmetto, Serenoa repens, (SAW PALMETTO PO), Take 1 tablet by mouth Daily., Disp: , Rfl:   •  valsartan (DIOVAN) 160 MG tablet, Take 1 tablet by mouth 2 (Two) Times a Day., Disp: 180 tablet, Rfl: 3        Objective:     Vitals:    06/23/22 0956 06/23/22 1008 06/23/22 1027   BP: 160/96 156/92 160/92   BP Location: Left arm Right arm Left arm   Patient Position:   Sitting   Pulse: 73     Weight: 88 kg (194 lb)     Height: 177.8 cm (70\")       Body mass index is 27.84 kg/m².    PHYSICAL EXAM:    Vitals Reviewed.   General Appearance: No acute distress, well developed and well nourished.    Eyes: Conjunctiva and lids: " No erythema, swelling, or discharge. Sclera non-icteric. Glasses.   HENT: Atraumatic, normocephalic. External eyes, ears, and nose normal. No hearing loss noted. Mucous membranes normal. Lips not cyanotic. Neck supple with no tenderness. Wearing mask.   Respiratory: No signs of respiratory distress. Respiration rhythm and depth normal.   Clear to auscultation. No rales, crackles, rhonchi, or wheezing auscultated.   Cardiovascular:  Jugular Venous Pressure: Normal  Heart Rate and Rhythm: Normal, Heart Sounds: Normal S1 and S2. No S3 or S4 noted.  Murmurs: No murmurs noted. No rubs, thrills, or gallops.   Lower Extremities: No edema noted.  Gastrointestinal:  Abdomen soft, non-distended, non-tender.    Musculoskeletal: Normal movement of extremities.  Skin and Nails: General appearance normal. No pallor, cyanosis, diaphoresis. Skin temperature normal. No clubbing of fingernails.  Bruising of arms noted bilaterally.  Psychiatric: Patient alert and oriented to person, place, and time. Speech and behavior appropriate. Normal mood and affect.       ECG 12 Lead    Date/Time: 6/23/2022 10:05 AM  Performed by: Etta Quach APRN  Authorized by: Etta Quach APRN   Comparison: compared with previous ECG from 12/1/2021  Similar to previous ECG  Rhythm: sinus rhythm  Ectopy: atrial premature contractions  Rate: normal  BPM: 73  Conduction: conduction normal  ST Segments: ST segments normal  T Waves: T waves normal  QRS axis: normal    Clinical impression: normal ECG              Assessment:       Diagnosis Plan   1. Coronary artery disease involving native coronary artery of native heart without angina pectoris     2. APC (atrial premature contractions)     3. Paroxysmal atrial fibrillation (HCC)     4. Primary hypertension     5. Mixed hyperlipidemia     6. Chronic renal impairment, stage 3a (HCC)     7. Acute midline low back pain without sciatica            Plan:       1.  Coronary Artery Disease: Previously  diagnosed with small vessel disease and stent placement to the RCA in 2015.  Normal stress test in 2021.  Denies angina.  Continue metoprolol, isosorbide, aspirin, and atorvastatin.    2.  Single APC noted on today's EKG and he is asymptomatic.    3.  Paroxysmal Atrial Fibrillation: Occurred in the setting of the hospitalization in 2019.  No documented recurrence.  Denies palpitations.  He is not on anticoagulation due to the one-time brief episode.  Continue metoprolol.    4.  Hypertension: Blood pressure goal of 120s/80s or less recommended.  BP elevated today and he thinks it is due to increased stress and the low back pain.  He will continue to monitor and call with any further concerns.    5.  Hyperlipidemia: Goal LDL less than 70.  Remains on atorvastatin.    6.  Chronic Kidney Disease stage III: Follows with Dr. Green.    7.  Acute back pain: Defer to PCP.    8.  I recommended a 1 year follow-up visit with Dr. Aquiles Velazquez.  He prefers to see her every 6 months and appointment will be made.      As always, it has been a pleasure to participate in your patient's care. Thank you.       Sincerely,         SAUL Freire  [facility] Cardiology      · Dictated utilizing Dragon Dictation  · COVID-19 Precautions - Patient was compliant in wearing a mask. When I saw the patient, I used appropriate personal protective equipment (PPE) including mask and eye shield (standard procedure).  Additionally, I used gown and gloves if indicated.  Hand hygiene was completed before and after seeing the patient.

## 2022-09-13 ENCOUNTER — LAB (OUTPATIENT)
Dept: LAB | Facility: HOSPITAL | Age: 72
End: 2022-09-13

## 2022-09-13 ENCOUNTER — OFFICE VISIT (OUTPATIENT)
Dept: CARDIOLOGY | Facility: CLINIC | Age: 72
End: 2022-09-13

## 2022-09-13 VITALS
SYSTOLIC BLOOD PRESSURE: 160 MMHG | HEIGHT: 70 IN | WEIGHT: 195 LBS | DIASTOLIC BLOOD PRESSURE: 100 MMHG | OXYGEN SATURATION: 98 % | BODY MASS INDEX: 27.92 KG/M2 | HEART RATE: 81 BPM

## 2022-09-13 DIAGNOSIS — I25.10 CORONARY ARTERY DISEASE INVOLVING NATIVE CORONARY ARTERY OF NATIVE HEART WITHOUT ANGINA PECTORIS: Primary | ICD-10-CM

## 2022-09-13 DIAGNOSIS — I49.1 PAC (PREMATURE ATRIAL CONTRACTION): ICD-10-CM

## 2022-09-13 DIAGNOSIS — R00.2 PALPITATIONS: ICD-10-CM

## 2022-09-13 DIAGNOSIS — I25.10 CORONARY ARTERY DISEASE INVOLVING NATIVE CORONARY ARTERY OF NATIVE HEART WITHOUT ANGINA PECTORIS: ICD-10-CM

## 2022-09-13 DIAGNOSIS — E78.2 MIXED HYPERLIPIDEMIA: ICD-10-CM

## 2022-09-13 DIAGNOSIS — I48.0 PAROXYSMAL ATRIAL FIBRILLATION: ICD-10-CM

## 2022-09-13 DIAGNOSIS — N18.31 CHRONIC RENAL IMPAIRMENT, STAGE 3A: ICD-10-CM

## 2022-09-13 DIAGNOSIS — I10 PRIMARY HYPERTENSION: ICD-10-CM

## 2022-09-13 LAB
ALBUMIN SERPL-MCNC: 4.3 G/DL (ref 3.5–5.2)
ALBUMIN/GLOB SERPL: 2 G/DL
ALP SERPL-CCNC: 74 U/L (ref 39–117)
ALT SERPL W P-5'-P-CCNC: 17 U/L (ref 1–41)
ANION GAP SERPL CALCULATED.3IONS-SCNC: 8.6 MMOL/L (ref 5–15)
AST SERPL-CCNC: 17 U/L (ref 1–40)
BILIRUB SERPL-MCNC: 0.7 MG/DL (ref 0–1.2)
BUN SERPL-MCNC: 26 MG/DL (ref 8–23)
BUN/CREAT SERPL: 24.8 (ref 7–25)
CALCIUM SPEC-SCNC: 9.7 MG/DL (ref 8.6–10.5)
CHLORIDE SERPL-SCNC: 103 MMOL/L (ref 98–107)
CHOLEST SERPL-MCNC: 129 MG/DL (ref 0–200)
CO2 SERPL-SCNC: 29.4 MMOL/L (ref 22–29)
CREAT SERPL-MCNC: 1.05 MG/DL (ref 0.76–1.27)
EGFRCR SERPLBLD CKD-EPI 2021: 75.4 ML/MIN/1.73
GLOBULIN UR ELPH-MCNC: 2.2 GM/DL
GLUCOSE SERPL-MCNC: 98 MG/DL (ref 65–99)
HDLC SERPL-MCNC: 60 MG/DL (ref 40–60)
LDLC SERPL CALC-MCNC: 55 MG/DL (ref 0–100)
LDLC/HDLC SERPL: 0.92 {RATIO}
POTASSIUM SERPL-SCNC: 4.2 MMOL/L (ref 3.5–5.2)
PROT SERPL-MCNC: 6.5 G/DL (ref 6–8.5)
SODIUM SERPL-SCNC: 141 MMOL/L (ref 136–145)
TRIGL SERPL-MCNC: 68 MG/DL (ref 0–150)
VLDLC SERPL-MCNC: 14 MG/DL (ref 5–40)

## 2022-09-13 PROCEDURE — 80061 LIPID PANEL: CPT

## 2022-09-13 PROCEDURE — 99214 OFFICE O/P EST MOD 30 MIN: CPT | Performed by: NURSE PRACTITIONER

## 2022-09-13 PROCEDURE — 80053 COMPREHEN METABOLIC PANEL: CPT

## 2022-09-13 PROCEDURE — 36415 COLL VENOUS BLD VENIPUNCTURE: CPT

## 2022-09-13 PROCEDURE — 93000 ELECTROCARDIOGRAM COMPLETE: CPT | Performed by: NURSE PRACTITIONER

## 2022-09-13 RX ORDER — METOPROLOL SUCCINATE 100 MG/1
100 TABLET, EXTENDED RELEASE ORAL NIGHTLY
Qty: 90 TABLET | Refills: 3 | Status: SHIPPED | OUTPATIENT
Start: 2022-09-13

## 2022-09-13 NOTE — PROGRESS NOTES
Please let him know that his cholesterol and kidney function are normal.     Thanks!  SAUL Ferreira

## 2022-09-13 NOTE — PROGRESS NOTES
[facility] CARDIOLOGY  [address]  [facility] [address]  [facility] [address]  Phone: [phone]      Patient Name: Kyle Amador  :1950  Age: 71 y.o.  Primary Cardiologist: Aquiles Velazquez MD  Encounter Provider:  SAUL Kearns      Chief Complaint     Chief Complaint: Follow-up, Atrial Fibrillation, Coronary Artery Disease, and Hypertension      SUBJECTIVE     History of Present Illness:  Kyle Amador is a 71 y.o.  male whose medical history includes hypertension, hyperlipidemia, CKD 3 and GERD.  He is followed in our office by Dr. Velazquez for coronary artery disease.     09/15/22 Follow-up:  He is here for 3-month follow-up and I am seeing him for the first time today.  His BP is elevated in office today but he reports BP at home 120-140/50-70 with heart rate 70s.  He has been feeling episodes of his heart racing and he has lightheadedness when bending over.  He denies chest pain, dyspnea with exertion, orthopnea, or leg swelling.    Below is a summary of pertinent cardiology findings:  • Coronary artery disease:  cardiac catheterization showed small vessel coronary artery disease.  • May 2015 cardiac catheterization for chest discomfort showed disease of the RCA treated with stent.  • 2021 myocardial perfusion stress study for atypical chest pain showed no evidence of ischemia.  His isosorbide was increased.  • Paroxysmal atrial fibrillation: 2019 he had 1 brief episode of atrial fibrillation in the setting of sepsis.  He was initially treated with amiodarone which was discontinued after 1 month.  • Medication intolerances: Leg swelling with amlodipine, dizziness with spironolactone, and urinary frequency with chlorthalidone.    Past Medical History:   Diagnosis Date   • APC (atrial premature contractions) 2020   • BPH (benign prostatic hyperplasia)    • Bursitis of right shoulder 2013   • CAD (coronary artery disease)    • Cataract    •  Cervical spondylosis 2016   • Chorioretinal scar    • Chronic renal insufficiency, stage III (moderate) ([facility])    • Diverticulitis    • ED (erectile dysfunction)    • Esophagitis    • Functional dyspepsia    • GERD (gastroesophageal reflux disease)    • Hemorrhoids    • Hyperlipidemia    • Hypertension    • Hyperthyroidism    • Hypocalcemia 2018   • Hypokalemia 2018   • Kidney cysts 2018   • Kidney stones    • Myopia 2018   • OA (osteoarthritis)    • PAF (paroxysmal atrial fibrillation) ([facility])    • Seborrheic keratosis    • Trochanteric bursitis of right hip 2019       Past Surgical History:  • Appendectomy  • Cystoscopy with right ureteral stent placement for kidney stones 2019      Social History     Socioeconomic History   • Marital status:    Tobacco Use   • Smoking status: Former Smoker     Types: Cigarettes     Quit date: 1987     Years since quittin.1   • Smokeless tobacco: Never Used   Vaping Use   • Vaping Use: Never used   Substance and Sexual Activity   • Alcohol use: Yes     Comment: Occ//Caffeine use: Rare   • Drug use: No   • Sexual activity: Never     Birth control/protection: None         Review of Systems     Review of Systems   Cardiovascular: Positive for palpitations. Negative for chest pain, claudication, cyanosis, dyspnea on exertion, irregular heartbeat, leg swelling, near-syncope, orthopnea, paroxysmal nocturnal dyspnea and syncope.   Neurological: Positive for light-headedness.       Medications     Allergies as of 2022 - Reviewed 2022   Allergen Reaction Noted   • Amiodarone Nausea Only and Cough 2020   • Penicillins  2016       Current Outpatient Medications on File Prior to Visit   Medication Sig   • amitriptyline (ELAVIL) 25 MG tablet every night.   • aspirin 81 MG tablet Take 81 mg by mouth Every Night.   • atorvastatin (LIPITOR) 40 MG tablet Take 1 tablet by mouth Daily.   • hydrALAZINE (APRESOLINE) 100 MG tablet Take 1  "tablet by mouth 2 (Two) Times a Day.   • isosorbide mononitrate (IMDUR) 60 MG 24 hr tablet Take 1 tablet by mouth Daily.   • Multiple Vitamins-Minerals (MULTIVITAMIN ADULT PO) Take 1 tablet by mouth Daily.   • Saw Palmetto, Serenoa repens, (SAW PALMETTO PO) Take 1 tablet by mouth Daily.   • valsartan (DIOVAN) 160 MG tablet Take 1 tablet by mouth 2 (Two) Times a Day.     No current facility-administered medications on file prior to visit.          OBJECTIVE     Vital Signs:   /100   Pulse 81   Ht 177.8 cm (70\")   Wt 88.5 kg (195 lb)   SpO2 98%   BMI 27.98 kg/m²       Weight:  Wt Readings from Last 3 Encounters:   09/13/22 88.5 kg (195 lb)   06/23/22 88 kg (194 lb)   12/01/21 90.7 kg (200 lb)     Body mass index is 27.98 kg/m².      Physical Exam     Physical Exam  Constitutional:       General: He is not in acute distress.  HENT:      Head: Normocephalic and atraumatic.      Mouth/Throat:      Mouth: Mucous membranes are moist.   Eyes:      General: No scleral icterus.     Extraocular Movements: Extraocular movements intact.      Conjunctiva/sclera: Conjunctivae normal.      Pupils: Pupils are equal, round, and reactive to light.   Cardiovascular:      Rate and Rhythm: Normal rate and regular rhythm.      Pulses: Normal pulses.      Heart sounds: S1 normal and S2 normal.   Pulmonary:      Effort: No respiratory distress.      Breath sounds: Normal breath sounds. No wheezing, rhonchi or rales.   Abdominal:      General: Bowel sounds are normal. There is no distension.      Palpations: Abdomen is soft.      Tenderness: There is no abdominal tenderness.   Musculoskeletal:         General: Normal range of motion.      Cervical back: Normal range of motion and neck supple.      Right lower leg: No edema.      Left lower leg: No edema.   Skin:     General: Skin is warm and dry.      Coloration: Skin is not jaundiced.   Neurological:      Mental Status: He is alert and oriented to person, place, and time. "   Psychiatric:         Mood and Affect: Mood normal.         Reviewed Data     Result Review :  The following data was reviewed by SAUL Kearns on 09/15/22:  • Labs 03/1/2022:  cr 1.2, K 3.9, otherwise unremarkable BMP, Hgb 14.1, Plt 261      ECG 12 Lead    Date/Time: 9/13/2022 11:38 AM  Performed by: Aissatou Kearney APRN  Authorized by: Aissatou Kearney APRN   Comparison: compared with previous ECG from 6/23/2022  Similar to previous ECG  Rhythm: sinus rhythm  Ectopy: atrial premature contractions  Rate: normal  BPM: 73  Conduction: conduction normal  ST Segments: ST segments normal    Clinical impression: abnormal EKG            Assessment and Plan        Assessment and Plan     Assessment:  1. Coronary artery disease involving native coronary artery of native heart without angina pectoris    2. Paroxysmal atrial fibrillation (HCC)    3. Primary hypertension    4. Mixed hyperlipidemia    5. Chronic renal impairment, stage 3a (HCC)    6. PAC (premature atrial contraction)    7. Palpitations         1. Coronary artery disease: 2010 cardiac catheterization showed small vessel coronary artery disease.  He had chest pain in May 2015 and had a stent to the RCA.  He had chest pain in June 2021 but had a nonischemic myocardial perfusion stress study and his isosorbide was increased at that time.  No ECG changes noted today and he denies chest pain.  His medical therapy includes 81 mg aspirin daily, 40 mg Lipitor daily, 60 mg isosorbide daily, metoprolol succinate daily, and 160 mg valsartan daily.  2. Paroxysmal atrial fibrillation: He had 1 very brief episode of A. fib in the setting of sepsis in November 2019.  He is in sinus rhythm today.  He does describe episodes of his heart racing.  He is not on anticoagulation.  3. Hypertension: Poorly controlled in office today.  He has had multiple drug intolerances which include amlodipine spironolactone, and  chlorthalidone.  4. Hyperlipidemia: He is treated with 40 mg atorvastatin daily.  5. Chronic renal insufficiency: His renal function was stable when checked in March 2022.  6. PACs: Noted on ECG today.  He has been complaining of his heart racing at times.      Plan:  1. I will increase his metoprolol succinate to 100 mg nightly for better blood pressure and heart rate control.  2. I will check a 24-hour Holter monitor to assess for rate control and arrhythmia.  3. I will see him in 4 weeks and he will keep his January 2023 appointment with Dr. Velazquez.      Follow Up:  Return in about 4 weeks (around 10/11/2022) for Follow up with Geri; keep January 2023 appt with Dr. Velazquez.  Orders Placed This Encounter   Procedures   • Comprehensive Metabolic Panel   • Lipid Panel   • Holter Monitor - 24 Hour   • ECG 12 Lead      New Medications Ordered This Visit   Medications   • metoprolol succinate XL (TOPROL-XL) 100 MG 24 hr tablet     Sig: Take 1 tablet by mouth Every Night.     Dispense:  90 tablet     Refill:  3         Thank you the opportunity to participate in this patient's care.    SAUL Joshi    This office note has been dictated.

## 2022-09-29 ENCOUNTER — TELEPHONE (OUTPATIENT)
Dept: CARDIOLOGY | Facility: CLINIC | Age: 72
End: 2022-09-29

## 2022-09-29 NOTE — TELEPHONE ENCOUNTER
"Pt called back. He said that his B/P is running \"about normal\" for him. It averages in the 130s/60s. HR is running 65-80. He does not feel any palpitations.    Thank you,    Lilly De León RN  [place] [facility]  09/29/22 11:11 EDT    "

## 2022-09-29 NOTE — TELEPHONE ENCOUNTER
Please call and check on his BP and palpitations on higher dose of metoprolol.     Thanks!  SAUL Ferreira

## 2022-12-19 RX ORDER — ISOSORBIDE MONONITRATE 60 MG/1
TABLET, EXTENDED RELEASE ORAL
Qty: 90 TABLET | Refills: 0 | Status: SHIPPED | OUTPATIENT
Start: 2022-12-19 | End: 2023-04-04

## 2023-03-09 ENCOUNTER — OFFICE VISIT (OUTPATIENT)
Dept: CARDIOLOGY | Facility: CLINIC | Age: 73
End: 2023-03-09
Payer: MEDICARE

## 2023-03-09 VITALS
BODY MASS INDEX: 28.49 KG/M2 | DIASTOLIC BLOOD PRESSURE: 80 MMHG | WEIGHT: 199 LBS | HEIGHT: 70 IN | SYSTOLIC BLOOD PRESSURE: 140 MMHG | HEART RATE: 77 BPM

## 2023-03-09 DIAGNOSIS — I25.10 CORONARY ARTERY DISEASE INVOLVING NATIVE CORONARY ARTERY OF NATIVE HEART WITHOUT ANGINA PECTORIS: Primary | ICD-10-CM

## 2023-03-09 DIAGNOSIS — I97.89 POSTOPERATIVE ATRIAL FIBRILLATION: ICD-10-CM

## 2023-03-09 DIAGNOSIS — I48.91 POSTOPERATIVE ATRIAL FIBRILLATION: ICD-10-CM

## 2023-03-09 DIAGNOSIS — N18.31 CHRONIC RENAL IMPAIRMENT, STAGE 3A: ICD-10-CM

## 2023-03-09 DIAGNOSIS — I49.1 APC (ATRIAL PREMATURE CONTRACTIONS): ICD-10-CM

## 2023-03-09 DIAGNOSIS — I10 PRIMARY HYPERTENSION: ICD-10-CM

## 2023-03-09 DIAGNOSIS — E78.2 MIXED HYPERLIPIDEMIA: ICD-10-CM

## 2023-03-09 PROCEDURE — 99214 OFFICE O/P EST MOD 30 MIN: CPT | Performed by: INTERNAL MEDICINE

## 2023-03-09 PROCEDURE — 93000 ELECTROCARDIOGRAM COMPLETE: CPT | Performed by: INTERNAL MEDICINE

## 2023-03-09 RX ORDER — PANTOPRAZOLE SODIUM 40 MG/1
TABLET, DELAYED RELEASE ORAL
COMMUNITY

## 2023-03-09 NOTE — PROGRESS NOTES
Date of Office Visit: 2023  Encounter Provider: Aquiles Velazquez MD  Place of Service: [facility] CARDIOLOGY  Patient Name: Kyle Amador  :1950    Chief Complaint   Patient presents with   • Coronary Artery Disease   :     HPI: Kyle Amador is a 72 y.o. male who presents today to follow up. I have reviewed prior notes and there are no changes except for any new updates described below.    He has history of fairly chronic chest pain. He was found to have distal small vessel disease by catheterization in . In , he had another catheterization due to persistent pain and he had a normal FFR of the right coronary artery. In May 2015, his chest pain got much worse and he underwent repeat cardiac catheterization and the right coronary artery was stented. Despite this, his pain continued to worsen and ultimately, he was diagnosed with severe esophagitis. He had a normal Myoview stress in 2018 due to atypical chest pain.  He was hospitalized in 2019 with chest pain again, and underwent coronary angiography; he had 30% disease in the circumflex and distal RCA.  Again, it was felt that his pain was GI in etiology.    He also has a history of severe hypertension; he has had a normal renal artery duplex. He had terrible leg swelling with amlodipine. He reported an intolerance to spironolactone.     In 2019, he was hospitalized with sepsis, GEORGES on CKD, kidney stones, and hypotension.  He had a brief episode of atrial fibrillation in that setting.  He was placed on oral amiodarone, which we stopped after one month.  His spironolactone and valsartan were stopped (spirono was added to his allergy list, but I removed it as he's not allergic).  In 2020, his ARB was resumed and HCTZ was started; it was ultimately changed to chlorthalidone. He stopped it, though, due to urinary frequency.    He presented in 2021 with atypical chest pain; a  perfusion stress test was negative. His ISMN was increased; we subsequently tried to add ranolazine but it was cost-prohibitive.    He denies chest pain, dyspnea, lightheadedness, palpitations or syncope. From a cardiovascular standpoint, he's doing well.    Past Medical History:   Diagnosis Date   • APC (atrial premature contractions) 8/20/2020   • BPH (benign prostatic hyperplasia)    • Bursitis of right shoulder 03/2013   • CAD (coronary artery disease)     Status post stent to RCA   • Cataract     BILATERAL   • Cervical spondylosis 09/2016   • Chorioretinal scar     BILATERAL   • Chronic renal insufficiency, stage III (moderate) ([facility])    • Diverticulitis    • ED (erectile dysfunction)    • Esophagitis    • Functional dyspepsia    • GERD (gastroesophageal reflux disease)    • Hemorrhoids    • Hyperlipidemia    • Hypertension     probable hyperaldosteronism   • Hyperthyroidism    • Hypocalcemia 03/2018   • Hypokalemia 06/2018   • Kidney cysts 03/2018    FOLLOWED BY DR. HIRAM EDWARD   • Kidney stones    • Myopia 12/2018    LEFT EYE   • OA (osteoarthritis)    • PAF (paroxysmal atrial fibrillation) ([facility])    • Seborrheic keratosis    • Trochanteric bursitis of right hip 04/2019       Past Surgical History:   Procedure Laterality Date   • APPENDECTOMY N/A    • CARDIAC CATHETERIZATION N/A 7/11/2019    Procedure: Left Heart Cath;  Surgeon: Charo Weiss MD;  Location: [facility] CATH INVASIVE LOCATION;  Service: Cardiology   • CARDIAC CATHETERIZATION N/A 7/11/2019    Procedure: Coronary angiography;  Surgeon: Charo Weiss MD;  Location:  [facility] CATH INVASIVE LOCATION;  Service: Cardiology   • CARDIAC CATHETERIZATION N/A 7/11/2019     Left ventriculography; 30 mid LCx, 30% distal RCA  Charo Weiss MD at [facility]   • CARDIAC CATHETERIZATION Left 05/18/2012     20-30% diffuse LAD, 30-40% ostial diag, small LCx with diffuse 30-50%, large RCA with 60-70% mid (normal FFR of 0.9). Angiographically it was unchanged by previous cath  DR. ANSELMO FLANAGAN AT [facility]   • CARDIAC CATHETERIZATION Left 2015     but due to persistant chest pain, he underwent placement of a ARLETTE (3.5x23 Xience).     • COLONOSCOPY N/A 2006    DIVERTICULOSIS, MODERATE EXTERNAL HEMORRHOIDS, DR. KAYLAN PINEDA AT [facility]   • CORONARY ANGIOPLASTY WITH STENT PLACEMENT Left 2015    75% DISTAL RCA STENOSIS, RCA STENT, DR. JUSTIN BERKOWITZ AT [facility]   • CYSTOSCOPY URETEROSCOPY LASER LITHOTRIPSY Right 2019    Procedure: RIGHT URETEROSCOPY STENT REMOVAL & STENT PLACEMENT & STONE BASKET EXTRACTION &  LASER LITHOTRIPSY;  Surgeon: Roberto Drew MD;  Location: [facility] OR;  Service: Urology   • CYSTOSCOPY W/ URETERAL STENT PLACEMENT Right 2019    Procedure: CYSTO RT STENT INSERTION;  Surgeon: Jonnathan Wolf MD;  Location: [facility] OR;  Service: Urology   • ENDOSCOPY N/A 2019    Z LINE IRREGULAR, SMALL HIATAL HERNIA, MILD INFLAMMATION WITH ERYTHEMA AND FRIABILITY IN GASTRIC BODY, DR. MARTA FLORES AT [facility]   • ENDOSCOPY N/A 2015    LA GRADE B ESOPHAGITIS, ACUTE GASTRITIS, POSSIBLE PILL ESOPHAGITIS, DR. MARTA FLORES AT [facility]   • ENDOSCOPY AND COLONOSCOPY N/A 2011    CHRONIC GASTRITIS, HIATAL HERNIA,OTHERWISE NORMAL EGD, DIVERTICULOSIS IN SIGMOID, NON BLEEDING INTERNAL HEMORRHOIDS, OTHERWISE WNL CY, RESCOPE IN 10 YRS, DR. MARTA FLORES AT [facility]   • KIDNEY STONE SURGERY N/A        Social History     Socioeconomic History   • Marital status:    Tobacco Use   • Smoking status: Former     Types: Cigarettes     Quit date: 1987     Years since quittin.6   • Smokeless tobacco: Never   Vaping Use   • Vaping Use: Never used   Substance and Sexual Activity   • Alcohol use: Yes     Comment: Occ//Caffeine use: Rare   • Drug use: No   • Sexual activity: Never     Birth control/protection: None       Family History   Problem Relation Age of Onset   • Stroke Other    • Heart disease Father    • Diabetes Father    • Hypertension Father    • Etienne  "Hyperthermia Neg Hx        Review of Systems   Cardiovascular: Negative for chest pain and palpitations.   Hematologic/Lymphatic: Bruises/bleeds easily.   Musculoskeletal: Positive for joint pain.   Neurological: Positive for headaches.   All other systems reviewed and are negative.      Allergies   Allergen Reactions   • Amiodarone Nausea Only and Cough   • Penicillins          Current Outpatient Medications:   •  amitriptyline (ELAVIL) 25 MG tablet, every night., Disp: , Rfl:   •  aspirin 81 MG tablet, Take 1 tablet by mouth Every Night., Disp: , Rfl:   •  atorvastatin (LIPITOR) 40 MG tablet, Take 1 tablet by mouth Daily., Disp: 90 tablet, Rfl: 3  •  hydrALAZINE (APRESOLINE) 100 MG tablet, Take 1 tablet by mouth 2 (Two) Times a Day., Disp: 180 tablet, Rfl: 3  •  isosorbide mononitrate (IMDUR) 60 MG 24 hr tablet, Take 1 tablet by mouth once daily, Disp: 90 tablet, Rfl: 0  •  metoprolol succinate XL (TOPROL-XL) 100 MG 24 hr tablet, Take 1 tablet by mouth Every Night., Disp: 90 tablet, Rfl: 3  •  Multiple Vitamins-Minerals (MULTIVITAMIN ADULT PO), Take 1 tablet by mouth Daily., Disp: , Rfl:   •  pantoprazole (PROTONIX) 40 MG EC tablet, pantoprazole 40 mg tablet,delayed release  TAKE 1 TABLET BY MOUTH TWICE DAILY, Disp: , Rfl:   •  Saw Palmetto, Serenoa repens, (SAW PALMETTO PO), Take 1 tablet by mouth Daily., Disp: , Rfl:   •  valsartan (DIOVAN) 160 MG tablet, Take 1 tablet by mouth 2 (Two) Times a Day., Disp: 180 tablet, Rfl: 3     Objective:     Vitals:    03/09/23 1131 03/09/23 1150   BP: 152/90 140/80   BP Location: Left arm    Pulse: 77    Weight: 90.3 kg (199 lb)    Height: 177.8 cm (70\")      Body mass index is 28.55 kg/m².    Physical Exam  Vitals reviewed.   Constitutional:       General: He is not in acute distress.     Appearance: He is well-developed.   HENT:      Head: Normocephalic.      Nose: Nose normal.      Mouth/Throat:      Comments: Masked  Eyes:      Conjunctiva/sclera: Conjunctivae normal. "   Neck:      Vascular: No JVD.   Cardiovascular:      Rate and Rhythm: Normal rate and regular rhythm.      Pulses: Normal pulses and intact distal pulses.      Heart sounds: Normal heart sounds. No murmur heard.  Pulmonary:      Effort: Pulmonary effort is normal.      Breath sounds: Normal breath sounds.   Abdominal:      Palpations: Abdomen is soft.      Tenderness: There is no abdominal tenderness.   Musculoskeletal:         General: No swelling. Normal range of motion.      Cervical back: Normal range of motion.   Skin:     General: Skin is warm and dry.      Findings: No rash.   Neurological:      General: No focal deficit present.      Mental Status: He is alert.      Cranial Nerves: No cranial nerve deficit.   Psychiatric:         Mood and Affect: Mood normal.         Behavior: Behavior normal.           ECG 12 Lead    Date/Time: 3/9/2023 11:46 AM  Performed by: Aquiles Velazquez MD  Authorized by: Aquiles Velazquez MD   Comparison: compared with previous ECG   Similar to previous ECG  Rhythm: sinus rhythm  Conduction: conduction normal  ST Segments: ST segments normal  T Waves: T waves normal  QRS axis: normal  Other: no other findings    Clinical impression: normal ECG           Assessment:       Diagnosis Plan   1. Coronary artery disease involving native coronary artery of native heart without angina pectoris        2. Mixed hyperlipidemia        3. Primary hypertension        4. Chronic renal impairment, stage 3a ([facility])        5. APC (atrial premature contractions)        6. Postoperative atrial fibrillation ([facility])           Plan:      1/2.  Coronary Artery Disease  Assessment  • The patient has no angina    Subjective - Objective  • There has been a previous stent procedure using ARLETTE 2015  • Current antiplatelet therapy includes aspirin 81 mg      He has chronic chest pain and does have small vessel disease, but he's also had a lot of non-cardiac chest pain.  He's on aspirin and atorvastatin. We wanted to  start ranolazine but it was cost-prohibitive. He's on metoprolol; he does not tolerate amlodipine.    He is on atorvastatin.     3/4.  His BP is well controlled, for him.  We will continue with his current regimen. He has a lot of medication intolerances (CCB, thiazides, spironolactone).     5/6.  In November 2019 he had sepsis/GEORGES/pyelo/renal stones, and he had a very brief episode of AF. He took one month of amiodarone.  He will remain on metoprolol. He does not require OAC in the absence of recurrence. He has occasional asymptomatic PACs.    Sincerely,       Aquiles Velazquez MD

## 2023-04-04 RX ORDER — ISOSORBIDE MONONITRATE 60 MG/1
TABLET, EXTENDED RELEASE ORAL
Qty: 90 TABLET | Refills: 0 | Status: SHIPPED | OUTPATIENT
Start: 2023-04-04

## 2023-06-06 NOTE — TELEPHONE ENCOUNTER
Problem: Adult Behavioral Health Plan of Care  Goal: Plan of Care Review  Outcome: Progressing  Flowsheets (Taken 6/5/2023 1808)  Patient Agreement with Plan of Care: agrees   Goal Outcome Evaluation:  Pt a little dismissive and guarded with assessment questions, he denied all MH symptoms. He presented with flat and blunted affect consistent with mood, contracted for safety, was calm but restless and pacing. He was med compliant, no prn given this shift. Family visited and visit went well. Pt BG before dinner was 143, ate well with good appetite and drunk adequately.    Dr. Jennie Green is a [facility] provider and her note is in epic under care everywhere

## 2023-08-14 RX ORDER — ATORVASTATIN CALCIUM 40 MG/1
TABLET, FILM COATED ORAL
Qty: 90 TABLET | Refills: 1 | Status: SHIPPED | OUTPATIENT
Start: 2023-08-14

## 2023-08-14 RX ORDER — HYDRALAZINE HYDROCHLORIDE 100 MG/1
TABLET, FILM COATED ORAL
Qty: 180 TABLET | Refills: 1 | Status: SHIPPED | OUTPATIENT
Start: 2023-08-14

## 2023-08-14 RX ORDER — VALSARTAN 160 MG/1
TABLET ORAL
Qty: 180 TABLET | Refills: 1 | Status: SHIPPED | OUTPATIENT
Start: 2023-08-14

## 2023-08-17 RX ORDER — METOPROLOL SUCCINATE 100 MG/1
TABLET, EXTENDED RELEASE ORAL
Qty: 90 TABLET | Refills: 3 | Status: SHIPPED | OUTPATIENT
Start: 2023-08-17

## 2023-09-15 RX ORDER — ISOSORBIDE MONONITRATE 60 MG/1
TABLET, EXTENDED RELEASE ORAL
Qty: 90 TABLET | Refills: 0 | Status: SHIPPED | OUTPATIENT
Start: 2023-09-15

## 2023-09-25 ENCOUNTER — APPOINTMENT (OUTPATIENT)
Dept: CT IMAGING | Facility: HOSPITAL | Age: 73
End: 2023-09-25
Payer: MEDICARE

## 2023-09-25 ENCOUNTER — TELEPHONE (OUTPATIENT)
Dept: CARDIOLOGY | Facility: CLINIC | Age: 73
End: 2023-09-25
Payer: MEDICARE

## 2023-09-25 ENCOUNTER — HOSPITAL ENCOUNTER (OUTPATIENT)
Facility: HOSPITAL | Age: 73
Setting detail: OBSERVATION
Discharge: HOME | End: 2023-09-26
Attending: EMERGENCY MEDICINE | Admitting: INTERNAL MEDICINE
Payer: MEDICARE

## 2023-09-25 ENCOUNTER — APPOINTMENT (OUTPATIENT)
Dept: GENERAL RADIOLOGY | Facility: HOSPITAL | Age: 73
End: 2023-09-25
Payer: MEDICARE

## 2023-09-25 ENCOUNTER — NURSE TRIAGE (OUTPATIENT)
Dept: CALL CENTER | Facility: HOSPITAL | Age: 73
End: 2023-09-25

## 2023-09-25 DIAGNOSIS — I21.4 NSTEMI (NON-ST ELEVATED MYOCARDIAL INFARCTION): Primary | ICD-10-CM

## 2023-09-25 DIAGNOSIS — K80.20 CALCULUS OF GALLBLADDER WITHOUT CHOLECYSTITIS WITHOUT OBSTRUCTION: ICD-10-CM

## 2023-09-25 DIAGNOSIS — I48.92 PAROXYSMAL ATRIAL FLUTTER: ICD-10-CM

## 2023-09-25 PROBLEM — I48.0 PAF (PAROXYSMAL ATRIAL FIBRILLATION): Status: ACTIVE | Noted: 2023-09-25

## 2023-09-25 LAB
ALBUMIN SERPL-MCNC: 4.4 G/DL (ref 3.5–5.2)
ALBUMIN/GLOB SERPL: 1.7 G/DL
ALP SERPL-CCNC: 81 U/L (ref 39–117)
ALT SERPL W P-5'-P-CCNC: 21 U/L (ref 1–41)
ANION GAP SERPL CALCULATED.3IONS-SCNC: 11.3 MMOL/L (ref 5–15)
AST SERPL-CCNC: 24 U/L (ref 1–40)
BASOPHILS # BLD AUTO: 0.03 10*3/MM3 (ref 0–0.2)
BASOPHILS NFR BLD AUTO: 0.3 % (ref 0–1.5)
BILIRUB SERPL-MCNC: 0.4 MG/DL (ref 0–1.2)
BUN SERPL-MCNC: 20 MG/DL (ref 8–23)
BUN/CREAT SERPL: 16.1 (ref 7–25)
CALCIUM SPEC-SCNC: 9.4 MG/DL (ref 8.6–10.5)
CHLORIDE SERPL-SCNC: 104 MMOL/L (ref 98–107)
CO2 SERPL-SCNC: 23.7 MMOL/L (ref 22–29)
CREAT SERPL-MCNC: 1.24 MG/DL (ref 0.76–1.27)
D DIMER PPP FEU-MCNC: 2.84 MCGFEU/ML (ref 0–0.73)
DEPRECATED RDW RBC AUTO: 43.6 FL (ref 37–54)
EGFRCR SERPLBLD CKD-EPI 2021: 61.4 ML/MIN/1.73
EOSINOPHIL # BLD AUTO: 0.12 10*3/MM3 (ref 0–0.4)
EOSINOPHIL NFR BLD AUTO: 1 % (ref 0.3–6.2)
ERYTHROCYTE [DISTWIDTH] IN BLOOD BY AUTOMATED COUNT: 12.8 % (ref 12.3–15.4)
GEN 5 2HR TROPONIN T REFLEX: 150 NG/L
GLOBULIN UR ELPH-MCNC: 2.6 GM/DL
GLUCOSE SERPL-MCNC: 131 MG/DL (ref 65–99)
HCT VFR BLD AUTO: 46.6 % (ref 37.5–51)
HGB BLD-MCNC: 15.2 G/DL (ref 13–17.7)
IMM GRANULOCYTES # BLD AUTO: 0.01 10*3/MM3 (ref 0–0.05)
IMM GRANULOCYTES NFR BLD AUTO: 0.1 % (ref 0–0.5)
INR PPP: 1
INR PPP: 1 (ref 0.91–1.09)
LYMPHOCYTES # BLD AUTO: 1.4 10*3/MM3 (ref 0.7–3.1)
LYMPHOCYTES NFR BLD AUTO: 12 % (ref 19.6–45.3)
MAGNESIUM SERPL-MCNC: 2.2 MG/DL (ref 1.6–2.4)
MCH RBC QN AUTO: 30 PG (ref 26.6–33)
MCHC RBC AUTO-ENTMCNC: 32.6 G/DL (ref 31.5–35.7)
MCV RBC AUTO: 91.9 FL (ref 79–97)
MONOCYTES # BLD AUTO: 0.82 10*3/MM3 (ref 0.1–0.9)
MONOCYTES NFR BLD AUTO: 7 % (ref 5–12)
NEUTROPHILS NFR BLD AUTO: 79.6 % (ref 42.7–76)
NEUTROPHILS NFR BLD AUTO: 9.33 10*3/MM3 (ref 1.7–7)
NT-PROBNP SERPL-MCNC: 391.2 PG/ML (ref 0–900)
PLATELET # BLD AUTO: 298 10*3/MM3 (ref 140–450)
PMV BLD AUTO: 8.8 FL (ref 6–12)
POTASSIUM SERPL-SCNC: 3.1 MMOL/L (ref 3.5–5.2)
PROT SERPL-MCNC: 7 G/DL (ref 6–8.5)
PROTHROMBIN TIME: 11 SECONDS (ref 10–13.8)
PROTHROMBIN TIME: 11 SECONDS (ref 11–15)
QT INTERVAL: 336 MS
QT INTERVAL: 343 MS
QTC INTERVAL: 440 MS
QTC INTERVAL: 518 MS
RBC # BLD AUTO: 5.07 10*6/MM3 (ref 4.14–5.8)
SODIUM SERPL-SCNC: 139 MMOL/L (ref 136–145)
TROPONIN T DELTA: -17 NG/L
TROPONIN T SERPL HS-MCNC: 167 NG/L
WBC NRBC COR # BLD: 11.71 10*3/MM3 (ref 3.4–10.8)

## 2023-09-25 PROCEDURE — 25510000001 IOPAMIDOL PER 1 ML: Performed by: EMERGENCY MEDICINE

## 2023-09-25 PROCEDURE — 71275 CT ANGIOGRAPHY CHEST: CPT

## 2023-09-25 PROCEDURE — 96361 HYDRATE IV INFUSION ADD-ON: CPT

## 2023-09-25 PROCEDURE — G0378 HOSPITAL OBSERVATION PER HR: HCPCS

## 2023-09-25 PROCEDURE — 83880 ASSAY OF NATRIURETIC PEPTIDE: CPT | Performed by: EMERGENCY MEDICINE

## 2023-09-25 PROCEDURE — 83735 ASSAY OF MAGNESIUM: CPT | Performed by: EMERGENCY MEDICINE

## 2023-09-25 PROCEDURE — 85610 PROTHROMBIN TIME: CPT

## 2023-09-25 PROCEDURE — 36415 COLL VENOUS BLD VENIPUNCTURE: CPT

## 2023-09-25 PROCEDURE — 93010 ELECTROCARDIOGRAM REPORT: CPT | Performed by: INTERNAL MEDICINE

## 2023-09-25 PROCEDURE — 96372 THER/PROPH/DIAG INJ SC/IM: CPT

## 2023-09-25 PROCEDURE — 85379 FIBRIN DEGRADATION QUANT: CPT | Performed by: EMERGENCY MEDICINE

## 2023-09-25 PROCEDURE — 96375 TX/PRO/DX INJ NEW DRUG ADDON: CPT

## 2023-09-25 PROCEDURE — 25010000002 ENOXAPARIN PER 10 MG: Performed by: EMERGENCY MEDICINE

## 2023-09-25 PROCEDURE — 80053 COMPREHEN METABOLIC PANEL: CPT | Performed by: EMERGENCY MEDICINE

## 2023-09-25 PROCEDURE — 96374 THER/PROPH/DIAG INJ IV PUSH: CPT

## 2023-09-25 PROCEDURE — 99284 EMERGENCY DEPT VISIT MOD MDM: CPT | Performed by: EMERGENCY MEDICINE

## 2023-09-25 PROCEDURE — 85610 PROTHROMBIN TIME: CPT | Performed by: EMERGENCY MEDICINE

## 2023-09-25 PROCEDURE — 84484 ASSAY OF TROPONIN QUANT: CPT | Performed by: EMERGENCY MEDICINE

## 2023-09-25 PROCEDURE — 71045 X-RAY EXAM CHEST 1 VIEW: CPT

## 2023-09-25 PROCEDURE — 85025 COMPLETE CBC W/AUTO DIFF WBC: CPT | Performed by: EMERGENCY MEDICINE

## 2023-09-25 PROCEDURE — 93005 ELECTROCARDIOGRAM TRACING: CPT | Performed by: EMERGENCY MEDICINE

## 2023-09-25 PROCEDURE — 99285 EMERGENCY DEPT VISIT HI MDM: CPT

## 2023-09-25 RX ORDER — ASPIRIN 325 MG
325 TABLET ORAL DAILY
Status: DISCONTINUED | OUTPATIENT
Start: 2023-09-26 | End: 2023-09-26

## 2023-09-25 RX ORDER — BISACODYL 5 MG/1
5 TABLET, DELAYED RELEASE ORAL DAILY PRN
Status: DISCONTINUED | OUTPATIENT
Start: 2023-09-25 | End: 2023-09-26 | Stop reason: HOSPADM

## 2023-09-25 RX ORDER — POTASSIUM CHLORIDE 750 MG/1
40 TABLET, FILM COATED, EXTENDED RELEASE ORAL ONCE
Status: COMPLETED | OUTPATIENT
Start: 2023-09-25 | End: 2023-09-25

## 2023-09-25 RX ORDER — PANTOPRAZOLE SODIUM 40 MG/10ML
40 INJECTION, POWDER, LYOPHILIZED, FOR SOLUTION INTRAVENOUS ONCE
Status: COMPLETED | OUTPATIENT
Start: 2023-09-25 | End: 2023-09-25

## 2023-09-25 RX ORDER — BISACODYL 10 MG
10 SUPPOSITORY, RECTAL RECTAL DAILY PRN
Status: DISCONTINUED | OUTPATIENT
Start: 2023-09-25 | End: 2023-09-26 | Stop reason: HOSPADM

## 2023-09-25 RX ORDER — ISOSORBIDE MONONITRATE 60 MG/1
60 TABLET, EXTENDED RELEASE ORAL DAILY
Status: DISCONTINUED | OUTPATIENT
Start: 2023-09-26 | End: 2023-09-26 | Stop reason: HOSPADM

## 2023-09-25 RX ORDER — DILTIAZEM HYDROCHLORIDE 5 MG/ML
10 INJECTION INTRAVENOUS ONCE
Status: COMPLETED | OUTPATIENT
Start: 2023-09-25 | End: 2023-09-25

## 2023-09-25 RX ORDER — ATORVASTATIN CALCIUM 20 MG/1
40 TABLET, FILM COATED ORAL DAILY
Status: DISCONTINUED | OUTPATIENT
Start: 2023-09-26 | End: 2023-09-26 | Stop reason: HOSPADM

## 2023-09-25 RX ORDER — ASPIRIN 325 MG
325 TABLET ORAL ONCE
Status: COMPLETED | OUTPATIENT
Start: 2023-09-25 | End: 2023-09-25

## 2023-09-25 RX ORDER — ACETAMINOPHEN 160 MG/5ML
650 SOLUTION ORAL EVERY 4 HOURS PRN
Status: DISCONTINUED | OUTPATIENT
Start: 2023-09-25 | End: 2023-09-26 | Stop reason: HOSPADM

## 2023-09-25 RX ORDER — NITROGLYCERIN 0.4 MG/1
0.4 TABLET SUBLINGUAL
Status: DISCONTINUED | OUTPATIENT
Start: 2023-09-25 | End: 2023-09-25

## 2023-09-25 RX ORDER — SODIUM CHLORIDE 0.9 % (FLUSH) 0.9 %
10 SYRINGE (ML) INJECTION AS NEEDED
Status: DISCONTINUED | OUTPATIENT
Start: 2023-09-25 | End: 2023-09-26 | Stop reason: HOSPADM

## 2023-09-25 RX ORDER — AMITRIPTYLINE HYDROCHLORIDE 25 MG/1
25 TABLET, FILM COATED ORAL NIGHTLY
Status: DISCONTINUED | OUTPATIENT
Start: 2023-09-25 | End: 2023-09-26 | Stop reason: HOSPADM

## 2023-09-25 RX ORDER — SODIUM CHLORIDE 9 MG/ML
40 INJECTION, SOLUTION INTRAVENOUS AS NEEDED
Status: DISCONTINUED | OUTPATIENT
Start: 2023-09-25 | End: 2023-09-26 | Stop reason: HOSPADM

## 2023-09-25 RX ORDER — ONDANSETRON 2 MG/ML
4 INJECTION INTRAMUSCULAR; INTRAVENOUS EVERY 6 HOURS PRN
Status: DISCONTINUED | OUTPATIENT
Start: 2023-09-25 | End: 2023-09-26 | Stop reason: HOSPADM

## 2023-09-25 RX ORDER — PANTOPRAZOLE SODIUM 40 MG/1
40 TABLET, DELAYED RELEASE ORAL
Status: DISCONTINUED | OUTPATIENT
Start: 2023-09-26 | End: 2023-09-26 | Stop reason: HOSPADM

## 2023-09-25 RX ORDER — SODIUM CHLORIDE 9 MG/ML
100 INJECTION, SOLUTION INTRAVENOUS CONTINUOUS
Status: DISCONTINUED | OUTPATIENT
Start: 2023-09-25 | End: 2023-09-26 | Stop reason: HOSPADM

## 2023-09-25 RX ORDER — MULTIPLE VITAMINS W/ MINERALS TAB 9MG-400MCG
1 TAB ORAL DAILY
Status: DISCONTINUED | OUTPATIENT
Start: 2023-09-26 | End: 2023-09-26 | Stop reason: HOSPADM

## 2023-09-25 RX ORDER — ACETAMINOPHEN 325 MG/1
650 TABLET ORAL EVERY 4 HOURS PRN
Status: DISCONTINUED | OUTPATIENT
Start: 2023-09-25 | End: 2023-09-26 | Stop reason: HOSPADM

## 2023-09-25 RX ORDER — ENOXAPARIN SODIUM 100 MG/ML
1 INJECTION SUBCUTANEOUS EVERY 12 HOURS
Status: DISCONTINUED | OUTPATIENT
Start: 2023-09-26 | End: 2023-09-26

## 2023-09-25 RX ORDER — SODIUM CHLORIDE 0.9 % (FLUSH) 0.9 %
10 SYRINGE (ML) INJECTION EVERY 12 HOURS SCHEDULED
Status: DISCONTINUED | OUTPATIENT
Start: 2023-09-25 | End: 2023-09-26 | Stop reason: HOSPADM

## 2023-09-25 RX ORDER — ACETAMINOPHEN 650 MG/1
650 SUPPOSITORY RECTAL EVERY 4 HOURS PRN
Status: DISCONTINUED | OUTPATIENT
Start: 2023-09-25 | End: 2023-09-26 | Stop reason: HOSPADM

## 2023-09-25 RX ORDER — POLYETHYLENE GLYCOL 3350 17 G/17G
17 POWDER, FOR SOLUTION ORAL DAILY PRN
Status: DISCONTINUED | OUTPATIENT
Start: 2023-09-25 | End: 2023-09-26 | Stop reason: HOSPADM

## 2023-09-25 RX ORDER — NITROGLYCERIN 0.4 MG/1
0.4 TABLET SUBLINGUAL
Status: DISCONTINUED | OUTPATIENT
Start: 2023-09-25 | End: 2023-09-26 | Stop reason: HOSPADM

## 2023-09-25 RX ORDER — HYDRALAZINE HYDROCHLORIDE 50 MG/1
100 TABLET, FILM COATED ORAL 2 TIMES DAILY
Status: DISCONTINUED | OUTPATIENT
Start: 2023-09-25 | End: 2023-09-26 | Stop reason: HOSPADM

## 2023-09-25 RX ORDER — METOPROLOL SUCCINATE 100 MG/1
100 TABLET, EXTENDED RELEASE ORAL NIGHTLY
Status: DISCONTINUED | OUTPATIENT
Start: 2023-09-25 | End: 2023-09-26 | Stop reason: HOSPADM

## 2023-09-25 RX ORDER — AMOXICILLIN 250 MG
2 CAPSULE ORAL 2 TIMES DAILY
Status: DISCONTINUED | OUTPATIENT
Start: 2023-09-25 | End: 2023-09-26 | Stop reason: HOSPADM

## 2023-09-25 RX ORDER — ENOXAPARIN SODIUM 100 MG/ML
1 INJECTION SUBCUTANEOUS ONCE
Status: COMPLETED | OUTPATIENT
Start: 2023-09-25 | End: 2023-09-25

## 2023-09-25 RX ORDER — VALSARTAN 160 MG/1
160 TABLET ORAL 2 TIMES DAILY
Status: DISCONTINUED | OUTPATIENT
Start: 2023-09-25 | End: 2023-09-26 | Stop reason: HOSPADM

## 2023-09-25 RX ADMIN — IOPAMIDOL 100 ML: 755 INJECTION, SOLUTION INTRAVENOUS at 18:28

## 2023-09-25 RX ADMIN — Medication 10 ML: at 22:47

## 2023-09-25 RX ADMIN — ASPIRIN 325 MG: 325 TABLET ORAL at 17:00

## 2023-09-25 RX ADMIN — METOPROLOL SUCCINATE 100 MG: 100 TABLET, EXTENDED RELEASE ORAL at 22:47

## 2023-09-25 RX ADMIN — PANTOPRAZOLE SODIUM 40 MG: 40 INJECTION, POWDER, FOR SOLUTION INTRAVENOUS at 17:13

## 2023-09-25 RX ADMIN — AMITRIPTYLINE HYDROCHLORIDE 25 MG: 25 TABLET, FILM COATED ORAL at 22:46

## 2023-09-25 RX ADMIN — SODIUM CHLORIDE 100 ML/HR: 9 INJECTION, SOLUTION INTRAVENOUS at 17:13

## 2023-09-25 RX ADMIN — POTASSIUM CHLORIDE 40 MEQ: 750 TABLET, EXTENDED RELEASE ORAL at 17:53

## 2023-09-25 RX ADMIN — VALSARTAN 160 MG: 160 TABLET, FILM COATED ORAL at 22:47

## 2023-09-25 RX ADMIN — HYDRALAZINE HYDROCHLORIDE 100 MG: 50 TABLET, FILM COATED ORAL at 22:47

## 2023-09-25 RX ADMIN — ENOXAPARIN SODIUM 90 MG: 100 INJECTION SUBCUTANEOUS at 17:54

## 2023-09-25 RX ADMIN — SODIUM CHLORIDE 100 ML/HR: 9 INJECTION, SOLUTION INTRAVENOUS at 22:47

## 2023-09-25 RX ADMIN — SODIUM CHLORIDE 250 ML: 9 INJECTION, SOLUTION INTRAVENOUS at 17:54

## 2023-09-25 RX ADMIN — DILTIAZEM HYDROCHLORIDE 10 MG: 5 INJECTION INTRAVENOUS at 16:57

## 2023-09-25 NOTE — TELEPHONE ENCOUNTER
Hub- He is calling the cardiac office - send the call to the triage line- He is calling for the Cardiology office [facility], called the back line [phone] has been having Bp 173/102, heart rate of 141- Advised ER , he declined, declined triage as well.

## 2023-09-25 NOTE — FSED PROVIDER NOTE
Subjective     Chest Pain    Review of Systems   Cardiovascular:  Positive for chest pain.     Past Medical History:   Diagnosis Date   • APC (atrial premature contractions) 8/20/2020   • BPH (benign prostatic hyperplasia)    • Bursitis of right shoulder 03/2013   • CAD (coronary artery disease)     Status post stent to RCA   • Cataract     BILATERAL   • Cervical spondylosis 09/2016   • Chorioretinal scar     BILATERAL   • Chronic renal insufficiency, stage III (moderate)    • Diverticulitis    • ED (erectile dysfunction)    • Esophagitis    • Functional dyspepsia    • GERD (gastroesophageal reflux disease)    • Hemorrhoids    • Hyperlipidemia    • Hypertension     probable hyperaldosteronism   • Hyperthyroidism    • Hypocalcemia 03/2018   • Hypokalemia 06/2018   • Kidney cysts 03/2018    FOLLOWED BY DR. HIRAM EDWARD   • Kidney stones    • Myopia 12/2018    LEFT EYE   • OA (osteoarthritis)    • PAF (paroxysmal atrial fibrillation)    • Seborrheic keratosis    • Trochanteric bursitis of right hip 04/2019       Allergies   Allergen Reactions   • Amiodarone Nausea Only and Cough   • Penicillins        Past Surgical History:   Procedure Laterality Date   • APPENDECTOMY N/A    • CARDIAC CATHETERIZATION N/A 7/11/2019    Procedure: Left Heart Cath;  Surgeon: Charo Weiss MD;  Location: [facility] CATH INVASIVE LOCATION;  Service: Cardiology   • CARDIAC CATHETERIZATION N/A 7/11/2019    Procedure: Coronary angiography;  Surgeon: Charo Weiss MD;  Location:  [facility] CATH INVASIVE LOCATION;  Service: Cardiology   • CARDIAC CATHETERIZATION N/A 7/11/2019     Left ventriculography; 30 mid LCx, 30% distal RCA  Charo Weiss MD at [facility]   • CARDIAC CATHETERIZATION Left 05/18/2012     20-30% diffuse LAD, 30-40% ostial diag, small LCx with diffuse 30-50%, large RCA with 60-70% mid (normal FFR of 0.9). Angiographically it was unchanged by previous cath DR. ANSELMO FLANAGAN AT [facility]   • CARDIAC CATHETERIZATION Left 05/2015     but due to  persistant chest pain, he underwent placement of a ARLETTE (3.5x23 Xience).     • COLONOSCOPY N/A 2006    DIVERTICULOSIS, MODERATE EXTERNAL HEMORRHOIDS, DR. KAYLAN PINEDA AT [facility]   • CORONARY ANGIOPLASTY WITH STENT PLACEMENT Left 2015    75% DISTAL RCA STENOSIS, RCA STENT, DR. JUSTIN BERKOWITZ AT [facility]   • CYSTOSCOPY URETEROSCOPY LASER LITHOTRIPSY Right 2019    Procedure: RIGHT URETEROSCOPY STENT REMOVAL & STENT PLACEMENT & STONE BASKET EXTRACTION &  LASER LITHOTRIPSY;  Surgeon: Roberto Drew MD;  Location: [facility];  Service: Urology   • CYSTOSCOPY W/ URETERAL STENT PLACEMENT Right 2019    Procedure: CYSTO RT STENT INSERTION;  Surgeon: Jonnathan Wolf MD;  Location: [facility];  Service: Urology   • ENDOSCOPY N/A 2019    Z LINE IRREGULAR, SMALL HIATAL HERNIA, MILD INFLAMMATION WITH ERYTHEMA AND FRIABILITY IN GASTRIC BODY, DR. MARTA FLORES AT [facility]   • ENDOSCOPY N/A 2015    LA GRADE B ESOPHAGITIS, ACUTE GASTRITIS, POSSIBLE PILL ESOPHAGITIS, DR. MARTA FLORES AT [facility]   • ENDOSCOPY AND COLONOSCOPY N/A 2011    CHRONIC GASTRITIS, HIATAL HERNIA,OTHERWISE NORMAL EGD, DIVERTICULOSIS IN SIGMOID, NON BLEEDING INTERNAL HEMORRHOIDS, OTHERWISE WNL CY, RESCOPE IN 10 YRS, DR. MARTA FLORES AT [facility]   • KIDNEY STONE SURGERY N/A        Family History   Problem Relation Age of Onset   • Stroke Other    • Heart disease Father    • Diabetes Father    • Hypertension Father    • Malig Hyperthermia Neg Hx        Social History     Socioeconomic History   • Marital status:    Tobacco Use   • Smoking status: Former     Types: Cigarettes     Quit date: 1987     Years since quittin.1   • Smokeless tobacco: Never   Vaping Use   • Vaping Use: Never used   Substance and Sexual Activity   • Alcohol use: Yes     Comment: Occ//Caffeine use: Rare   • Drug use: No   • Sexual activity: Never     Birth control/protection: None           Objective   Physical Exam    ECG 12  Lead      Date/Time: 9/25/2023 4:53 PM  Performed by: Adaam May MD  Authorized by: Adama May MD   Interpreted by physician  Rhythm: atrial flutter  Rate: tachycardic  BPM: 137  QRS axis: normal  Conduction: conduction normal  ST Segments: ST segments normal  T Waves: T waves normal  Other findings: PRWP and prolonged QTc interval  Q waves: III and aVF  Clinical impression: abnormal ECG and dysrhythmia - atrial    ECG 12 Lead      Date/Time: 9/25/2023 5:11 PM  Performed by: Adama May MD  Authorized by: Adama May MD   Interpreted by physician  Rhythm: sinus tachycardia  Rate: tachycardic  BPM: 103  QRS axis: left  Conduction: conduction normal  ST Segments: ST segments normal  T Waves: T waves normal  Other findings: PRWP  Q waves: III and aVF  Clinical impression: abnormal ECG             ED Course  ED Course as of 09/26/23 0656   Mon Sep 25, 2023   1842 [facility] hospitalist paged for admit. [SD]   1949 D/w Dr. Heard, [facility] hospitalist accepting. Pt stable EMS transport. [SD]      ED Course User Index  [SD] Adama May MD                                           Medical Decision Making  Amount and/or Complexity of Data Reviewed  Labs: ordered.  Radiology: ordered.  ECG/medicine tests: ordered and independent interpretation performed.    Risk  OTC drugs.  Prescription drug management.        Final diagnoses:   None       ED Disposition  ED Disposition       None            No follow-up provider specified.       Medication List      No changes were made to your prescriptions during this visit.

## 2023-09-25 NOTE — FSED PROVIDER NOTE
Subjective   History of Present Illness  73yo male pmh significant htn/hyperlipidemia/cad/paroxysmal atrial fibrillation/gerd/ckd, presents ED c/o acute onset tachycardia/palpitations 1300hrs today associated soa/dizziness.  ROS neg fever/chills/cough/abd pain/syncope.  Pt reports check bp reading 140/100 and HR>140bpm.  Pt on subsequent interview reports brief episode substernal chest pain onset 1100hrs characterized as dull/nonradiating/neg exac or relieve factors/neg associated symptoms with spontaneous resolution after several minutes; pt denies chest pain at time of ED examination.    History provided by:  Patient  Palpitations  Palpitations quality:  Fast  Onset quality:  Sudden  Associated symptoms: dizziness and shortness of breath    Associated symptoms: no chest pain and no cough      Review of Systems   Constitutional: Negative.    HENT: Negative.     Eyes: Negative.    Respiratory:  Positive for shortness of breath. Negative for cough.    Cardiovascular:  Positive for palpitations. Negative for chest pain and leg swelling.   Gastrointestinal: Negative.    Genitourinary: Negative.    Musculoskeletal: Negative.    Skin: Negative.    Neurological:  Positive for dizziness and light-headedness. Negative for syncope.   All other systems reviewed and are negative.    Past Medical History:   Diagnosis Date    APC (atrial premature contractions) 8/20/2020    BPH (benign prostatic hyperplasia)     Bursitis of right shoulder 03/2013    CAD (coronary artery disease)     Status post stent to RCA    Cataract     BILATERAL    Cervical spondylosis 09/2016    Chorioretinal scar     BILATERAL    Chronic renal insufficiency, stage III (moderate)     Diverticulitis     ED (erectile dysfunction)     Esophagitis     Functional dyspepsia     GERD (gastroesophageal reflux disease)     Hemorrhoids     Hyperlipidemia     Hypertension     probable hyperaldosteronism    Hyperthyroidism     Hypocalcemia 03/2018    Hypokalemia  06/2018    Kidney cysts 03/2018    FOLLOWED BY DR. HIRAM EDWARD    Kidney stones     Myopia 12/2018    LEFT EYE    OA (osteoarthritis)     PAF (paroxysmal atrial fibrillation)     Seborrheic keratosis     Trochanteric bursitis of right hip 04/2019       Allergies   Allergen Reactions    Amiodarone Nausea Only and Cough    Penicillins        Past Surgical History:   Procedure Laterality Date    APPENDECTOMY N/A     CARDIAC CATHETERIZATION N/A 7/11/2019    Procedure: Left Heart Cath;  Surgeon: Charo Weiss MD;  Location: [facility] CATH INVASIVE LOCATION;  Service: Cardiology    CARDIAC CATHETERIZATION N/A 7/11/2019    Procedure: Coronary angiography;  Surgeon: Charo Weiss MD;  Location: [facility] CATH INVASIVE LOCATION;  Service: Cardiology    CARDIAC CATHETERIZATION N/A 7/11/2019     Left ventriculography; 30 mid LCx, 30% distal RCA  Charo Weiss MD at [facility]    CARDIAC CATHETERIZATION Left 05/18/2012     20-30% diffuse LAD, 30-40% ostial diag, small LCx with diffuse 30-50%, large RCA with 60-70% mid (normal FFR of 0.9). Angiographically it was unchanged by previous cath DR. ANSELMO FLANAGAN AT [facility]    CARDIAC CATHETERIZATION Left 05/2015     but due to persistant chest pain, he underwent placement of a ARLETTE (3.5x23 Xience).      COLONOSCOPY N/A 09/12/2006    DIVERTICULOSIS, MODERATE EXTERNAL HEMORRHOIDS, DR. KAYLAN PINEDA AT [facility]    CORONARY ANGIOPLASTY WITH STENT PLACEMENT Left 06/01/2015    75% DISTAL RCA STENOSIS, RCA STENT, DR. JUSTIN BERKOWITZ AT [facility]    CYSTOSCOPY URETEROSCOPY LASER LITHOTRIPSY Right 12/18/2019    Procedure: RIGHT URETEROSCOPY STENT REMOVAL & STENT PLACEMENT & STONE BASKET EXTRACTION &  LASER LITHOTRIPSY;  Surgeon: Roberto Drew MD;  Location: [facility] MAIN OR;  Service: Urology    CYSTOSCOPY W/ URETERAL STENT PLACEMENT Right 11/23/2019    Procedure: CYSTO RT STENT INSERTION;  Surgeon: Jonnathan Wolf MD;  Location: [facility] MAIN OR;  Service: Urology    ENDOSCOPY N/A 8/14/2019    Z LINE  IRREGULAR, SMALL HIATAL HERNIA, MILD INFLAMMATION WITH ERYTHEMA AND FRIABILITY IN GASTRIC BODY, DR. MARTA FLORES AT [facility]    ENDOSCOPY N/A 2015    LA GRADE B ESOPHAGITIS, ACUTE GASTRITIS, POSSIBLE PILL ESOPHAGITIS, DR. MARTA FLORES AT [facility]    ENDOSCOPY AND COLONOSCOPY N/A 2011    CHRONIC GASTRITIS, HIATAL HERNIA,OTHERWISE NORMAL EGD, DIVERTICULOSIS IN SIGMOID, NON BLEEDING INTERNAL HEMORRHOIDS, OTHERWISE WNL CY, RESCOPE IN 10 YRS, DR. MARTA FLORES AT [facility]    KIDNEY STONE SURGERY N/A        Family History   Problem Relation Age of Onset    Stroke Other     Heart disease Father     Diabetes Father     Hypertension Father     Malig Hyperthermia Neg Hx        Social History     Socioeconomic History    Marital status:    Tobacco Use    Smoking status: Former     Types: Cigarettes     Quit date: 1987     Years since quittin.1    Smokeless tobacco: Never   Vaping Use    Vaping Use: Never used   Substance and Sexual Activity    Alcohol use: Yes     Comment: Occ//Caffeine use: Rare    Drug use: No    Sexual activity: Never     Birth control/protection: None           Objective   Physical Exam  Vitals and nursing note reviewed.   Constitutional:       Appearance: Normal appearance.   HENT:      Head: Normocephalic and atraumatic.      Right Ear: External ear normal.      Left Ear: External ear normal.      Nose: Nose normal.      Mouth/Throat:      Mouth: Mucous membranes are moist.      Pharynx: Oropharynx is clear. No oropharyngeal exudate or posterior oropharyngeal erythema.   Eyes:      Pupils: Pupils are equal, round, and reactive to light.   Cardiovascular:      Rate and Rhythm: Regular rhythm. Tachycardia present.      Pulses: Normal pulses.      Heart sounds: Normal heart sounds. No murmur heard.    No friction rub. No gallop.   Pulmonary:      Effort: Pulmonary effort is normal. No respiratory distress.      Breath sounds: Normal breath sounds. No wheezing, rhonchi or rales.   Abdominal:       General: Abdomen is flat. Bowel sounds are normal. There is no distension.      Palpations: Abdomen is soft.      Tenderness: There is no abdominal tenderness. There is no guarding or rebound.   Musculoskeletal:         General: No swelling or deformity.      Cervical back: Normal range of motion and neck supple. No rigidity.      Right lower leg: No edema.      Left lower leg: No edema.   Lymphadenopathy:      Cervical: No cervical adenopathy.   Skin:     General: Skin is warm and dry.   Neurological:      General: No focal deficit present.      Mental Status: He is alert and oriented to person, place, and time.      GCS: GCS eye subscore is 4. GCS verbal subscore is 5. GCS motor subscore is 6.      Sensory: Sensation is intact.      Motor: Motor function is intact.       Procedures           ED Course  ED Course as of 09/25/23 1853   Mon Sep 25, 2023   1842 [facility] hospitalist paged for admit. [SD]   1849 D/w Dr. Heard, [facility] hospitalist accepting. Pt stable EMS transport. [SD]      ED Course User Index  [SD] Adama May MD      Labs Reviewed   COMPREHENSIVE METABOLIC PANEL - Abnormal; Notable for the following components:       Result Value    Glucose 131 (*)     Potassium 3.1 (*)     All other components within normal limits    Narrative:     GFR Normal >60  Chronic Kidney Disease <60  Kidney Failure <15    The GFR formula is only valid for adults with stable renal function between ages 18 and 70.   TROPONIN - Abnormal; Notable for the following components:    HS Troponin T 167 (*)     All other components within normal limits    Narrative:     High Sensitive Troponin T Reference Range:  <10.0 ng/L- Negative Female for AMI  <15.0 ng/L- Negative Male for AMI  >=10 - Abnormal Female indicating possible myocardial injury.  >=15 - Abnormal Male indicating possible myocardial injury.   Clinicians would have to utilize clinical acumen, EKG, Troponin, and serial changes to determine if it is an Acute Myocardial  "Infarction or myocardial injury due to an underlying chronic condition.        CBC WITH AUTO DIFFERENTIAL - Abnormal; Notable for the following components:    WBC 11.71 (*)     Neutrophil % 79.6 (*)     Lymphocyte % 12.0 (*)     Neutrophils, Absolute 9.33 (*)     All other components within normal limits   D-DIMER, QUANTITATIVE - Abnormal; Notable for the following components:    D-Dimer, Quantitative 2.84 (*)     All other components within normal limits    Narrative:     According to the assay 's published package insert, a normal (<0.50 MCGFEU/mL) D-dimer result in conjunction with a non-high clinical probability assessment, excludes deep vein thrombosis (DVT) and pulmonary embolism (PE) with high sensitivity.    D-dimer values increase with age and this can make VTE exclusion of an older population difficult. To address this, the [employer], based on best available evidence and recent guidelines, recommends that clinicians use age-adjusted D-dimer thresholds in patients greater than 50 years of age with: a) a low probability of PE who do not meet all Pulmonary Embolism Rule Out Criteria, or b) in those with intermediate probability of PE.   The formula for an age-adjusted D-dimer cut-off is \"age/100\".  For example, a 60 year old patient would have an age-adjusted cut-off of 0.60 MCGFEU/mL and an 80 year old 0.80 MCGFEU/mL.   BNP (IN-HOUSE) - Normal    Narrative:     Among patients with dyspnea, NT-proBNP is highly sensitive for the detection of acute congestive heart failure. In addition NT-proBNP of <300 pg/ml effectively rules out acute congestive heart failure with 99% negative predictive value.     MAGNESIUM - Normal   POCT PROTIME - INR - Normal   LAB PROTIME-INR, FINGERSTICK   HIGH SENSITIVITIY TROPONIN T 2HR   CBC AND DIFFERENTIAL    Narrative:     The following orders were created for panel order CBC & Differential.  Procedure                               Abnormality      "    Status                     ---------                               -----------         ------                     CBC Auto Differential[[id number]]        Abnormal            Final result                 Please view results for these tests on the individual orders.     XR Chest 1 View    Result Date: 9/25/2023  Narrative: CHEST SINGLE VIEW  HISTORY: Chest pain, elevated heart rate.  COMPARISON: AP chest 06/20/2021.  FINDINGS: Heart size is borderline enlarged and is without change. The lungs are clear and there is no evidence for pulmonary edema or pleural effusion or infiltrate.      Impression: No interval change or evidence for active disease in the chest.  This report was finalized on 9/25/2023 6:35 PM by Dr. Dev Francisco M.D.      CT Angiogram Chest Pulmonary Embolism    Result Date: 9/25/2023  Narrative: CT ANGIOGRAM CHEST WITH IV CONTRAST  HISTORY: Heart palpitations. Elevated heart rate. Chest discomfort. Positive D-dimer.  TECHNIQUE: CT angio chest includes axial imaging from the thoracic inlet to the upper abdomen with IV contrast.  COMPARISON: CT angio chest 06/11/2018.  FINDINGS: There is no intrapulmonary arterial filling defect to diagnose a pulmonary embolus. Thoracic aorta exhibits normal appearance. There are mild arthroscopic calcifications. There is no evidence for aortic dissection. Mild atherosclerotic calcifications are present and there are coronary arterial calcifications. No endotracheal or central endobronchial lesion is evident. There are calcified mediastinal lymph nodes. There is no evidence for mediastinal or hilar subha enlargement. Small calcified nodules are present in the posterior aspect of the left upper lobe. There is no suspicious pulmonary nodule or pleural effusion. There is multilevel bridging endplate spur formation throughout the thoracic spine which is essentially ankylosed. Imaging through the upper abdomen demonstrates density layering dependently within  gallbladder consistent with tiny stones..      Impression: 1. No evidence for pulmonary thromboembolic disease or acute abnormality in the chest. 2. Atherosclerotic disease with coronary arterial calcifications. 3. Cholelithiasis. 4. Multilevel bridging endplate spur formation within the thoracic spine which appears ankylosed.  Radiation dose reduction techniques were utilized, including automated exposure control and exposure modulation based on body size.                                 SALLY?DS?-VASc Score for Atrial Fibrillation Stroke Risk - MDCalc  Calculated on Sep 25 2023 5:38 PM  3 points -> Stroke risk was 3.2% per year in >90,000 patients (the [place] Atrial Fibrillation Cohort Study) and 4.6% risk of stroke/TIA/systemic embolism. One recommendation suggests a 0 score for men or 1 score for women (no clinical risk factors) is “low” risk and may not require anticoagulation; a 1 score for men or 2 score for women is “low-moderate” risk and should consider antiplatelet or anticoagulation; and a score ?2 for men or ?3 for women is “moderate-high” risk and should otherwise be an anticoagulation candidate.          Medical Decision Making  Problems Addressed:  NSTEMI (non-ST elevated myocardial infarction): complicated acute illness or injury  Paroxysmal atrial flutter: complicated acute illness or injury    Amount and/or Complexity of Data Reviewed  Labs: ordered.  Radiology: ordered.  ECG/medicine tests: ordered and independent interpretation performed.    Risk  OTC drugs.  Prescription drug management.  Decision regarding hospitalization.        Final diagnoses:   NSTEMI (non-ST elevated myocardial infarction)   Paroxysmal atrial flutter   Calculus of gallbladder without cholecystitis without obstruction       ED Disposition  ED Disposition       ED Disposition   Decision to Admit    Condition   --    Comment   Level of Care: Telemetry [5]   Admitting Physician: BEVERLEY BOONE [[id number]]   Attending Physician:  BEVERLEY BOONE [[id number]]   Patient Class: Observation [104]                 No follow-up provider specified.       Medication List      No changes were made to your prescriptions during this visit.

## 2023-09-25 NOTE — ED NOTES
Nursing report ED to floor  Kyle Amador  72 y.o.  male    HPI :   Chief Complaint   Patient presents with    Palpitations       Admitting doctor:   Monique Heard MD    Admitting diagnosis:   The primary encounter diagnosis was NSTEMI (non-ST elevated myocardial infarction). Diagnoses of Paroxysmal atrial flutter and Calculus of gallbladder without cholecystitis without obstruction were also pertinent to this visit.    Code status:   Current Code Status       Date Active Code Status Order ID Comments User Context       Prior            Allergies:   Amiodarone and Penicillins    Isolation:   No active isolations    Intake and Output  No intake or output data in the 24 hours ending 09/25/23 1954    Weight:       09/25/23  1645   Weight: 88.5 kg (195 lb)       Most recent vitals:   Vitals:    09/25/23 1800 09/25/23 1830 09/25/23 1900 09/25/23 1930   BP: 175/88 169/78 168/82 157/84   BP Location:       Patient Position:       Pulse: 101 96 93 92   Resp: 18 17 16 16   Temp:       TempSrc:       SpO2: 95% 98% 99% 98%   Weight:       Height:           Active LDAs/IV Access:   Lines, Drains & Airways       Active LDAs       Name Placement date Placement time Site Days    Peripheral IV 09/25/23 1650 Left Forearm 09/25/23  1650  Forearm  less than 1    Ureteral Drain/Stent Right ureter 6 Fr. 12/18/19  --  Right ureter  1377                    Labs (abnormal labs have a star):   Labs Reviewed   COMPREHENSIVE METABOLIC PANEL - Abnormal; Notable for the following components:       Result Value    Glucose 131 (*)     Potassium 3.1 (*)     All other components within normal limits    Narrative:     GFR Normal >60  Chronic Kidney Disease <60  Kidney Failure <15    The GFR formula is only valid for adults with stable renal function between ages 18 and 70.   TROPONIN - Abnormal; Notable for the following components:    HS Troponin T 167 (*)     All other components within normal limits    Narrative:     High Sensitive Troponin T  "Reference Range:  <10.0 ng/L- Negative Female for AMI  <15.0 ng/L- Negative Male for AMI  >=10 - Abnormal Female indicating possible myocardial injury.  >=15 - Abnormal Male indicating possible myocardial injury.   Clinicians would have to utilize clinical acumen, EKG, Troponin, and serial changes to determine if it is an Acute Myocardial Infarction or myocardial injury due to an underlying chronic condition.        CBC WITH AUTO DIFFERENTIAL - Abnormal; Notable for the following components:    WBC 11.71 (*)     Neutrophil % 79.6 (*)     Lymphocyte % 12.0 (*)     Neutrophils, Absolute 9.33 (*)     All other components within normal limits   D-DIMER, QUANTITATIVE - Abnormal; Notable for the following components:    D-Dimer, Quantitative 2.84 (*)     All other components within normal limits    Narrative:     According to the assay 's published package insert, a normal (<0.50 MCGFEU/mL) D-dimer result in conjunction with a non-high clinical probability assessment, excludes deep vein thrombosis (DVT) and pulmonary embolism (PE) with high sensitivity.    D-dimer values increase with age and this can make VTE exclusion of an older population difficult. To address this, the [employer], based on best available evidence and recent guidelines, recommends that clinicians use age-adjusted D-dimer thresholds in patients greater than 50 years of age with: a) a low probability of PE who do not meet all Pulmonary Embolism Rule Out Criteria, or b) in those with intermediate probability of PE.   The formula for an age-adjusted D-dimer cut-off is \"age/100\".  For example, a 60 year old patient would have an age-adjusted cut-off of 0.60 MCGFEU/mL and an 80 year old 0.80 MCGFEU/mL.   HIGH SENSITIVITIY TROPONIN T 2HR - Abnormal; Notable for the following components:    HS Troponin T 150 (*)     Troponin T Delta -17 (*)     All other components within normal limits    Narrative:     High Sensitive Troponin " T Reference Range:  <10.0 ng/L- Negative Female for AMI  <15.0 ng/L- Negative Male for AMI  >=10 - Abnormal Female indicating possible myocardial injury.  >=15 - Abnormal Male indicating possible myocardial injury.   Clinicians would have to utilize clinical acumen, EKG, Troponin, and serial changes to determine if it is an Acute Myocardial Infarction or myocardial injury due to an underlying chronic condition.        BNP (IN-HOUSE) - Normal    Narrative:     Among patients with dyspnea, NT-proBNP is highly sensitive for the detection of acute congestive heart failure. In addition NT-proBNP of <300 pg/ml effectively rules out acute congestive heart failure with 99% negative predictive value.     MAGNESIUM - Normal   POCT PROTIME - INR - Normal   LAB PROTIME-INR, FINGERSTICK   CBC AND DIFFERENTIAL    Narrative:     The following orders were created for panel order CBC & Differential.  Procedure                               Abnormality         Status                     ---------                               -----------         ------                     CBC Auto Differential[[id number]]        Abnormal            Final result                 Please view results for these tests on the individual orders.       EKG:   ECG 12 Lead Rhythm Change   Preliminary Result   HEART RATE= 103  bpm   RR Interval= 583  ms   FL Interval= 194  ms   P Horizontal Axis= -6  deg   P Front Axis= 20  deg   QRSD Interval= 86  ms   QT Interval= 336  ms   QTcB= 440  ms   QRS Axis= -17  deg   T Wave Axis= 62  deg   - ABNORMAL ECG -   Sinus tachycardia   Inferior infarct, old   Electronically Signed By:    Date and Time of Study: 2023-09-25 17:09:28      ECG 12 Lead ED Triage Standing Order; Chest Pain   Preliminary Result   HEART RATE= 137  bpm   RR Interval= 438  ms   FL Interval= 93  ms   P Horizontal Axis= 6  deg   P Front Axis= 0  deg   QRSD Interval= 125  ms   QT Interval= 343  ms   QTcB= 518  ms   QRS Axis= -23  deg   T Wave Axis= 81   deg   - ABNORMAL ECG -   Sinus tachycardia   Probable left ventricular hypertrophy   Inferior infarct, old   Prolonged QT interval   Electronically Signed By:    Date and Time of Study: 2023-09-25 16:44:46      ECG 12 Lead    (Results Pending)   ECG 12 Lead    (Results Pending)       Meds given in ED:   Medications   sodium chloride 0.9 % flush 10 mL (has no administration in time range)   sodium chloride 0.9 % infusion (0 mL/hr Intravenous Stopped 9/25/23 1926)   nitroglycerin (NITROSTAT) SL tablet 0.4 mg (has no administration in time range)   dilTIAZem (CARDIZEM) 100 mg in 100 mL NS infusion (ADV) (0 mg/hr Intravenous Hold 9/25/23 1718)   aspirin tablet 325 mg (325 mg Oral Given 9/25/23 1700)   pantoprazole (PROTONIX) injection 40 mg (40 mg Intravenous Given 9/25/23 1713)   dilTIAZem (CARDIZEM) injection 10 mg (10 mg Intravenous Given 9/25/23 1657)   potassium chloride (K-DUR,KLOR-CON) ER tablet 40 mEq (40 mEq Oral Given 9/25/23 1753)   sodium chloride 0.9 % bolus 250 mL (0 mL Intravenous Stopped 9/25/23 1926)   Enoxaparin Sodium (LOVENOX) syringe 90 mg (90 mg Subcutaneous Given 9/25/23 1754)   iopamidol (ISOVUE-370) 76 % injection 100 mL (100 mL Intravenous Given 9/25/23 1828)       Imaging results:  XR Chest 1 View    Result Date: 9/25/2023  No interval change or evidence for active disease in the chest.  This report was finalized on 9/25/2023 6:35 PM by Dr. Dev Francisco M.D.      CT Angiogram Chest Pulmonary Embolism    Result Date: 9/25/2023  1. No evidence for pulmonary thromboembolic disease or acute abnormality in the chest. 2. Atherosclerotic disease with coronary arterial calcifications. 3. Cholelithiasis. 4. Multilevel bridging endplate spur formation within the thoracic spine which appears ankylosed.  Radiation dose reduction techniques were utilized, including automated exposure control and exposure modulation based on body size.   This report was finalized on 9/25/2023 7:03 PM by Dr. Méndez  REBA Francisco       Ambulatory status:   - up ad jones    Social issues:   Social History     Socioeconomic History    Marital status:    Tobacco Use    Smoking status: Former     Types: Cigarettes     Quit date: 1987     Years since quittin.1    Smokeless tobacco: Never   Vaping Use    Vaping Use: Never used   Substance and Sexual Activity    Alcohol use: Yes     Comment: Occ//Caffeine use: Rare    Drug use: No    Sexual activity: Never     Birth control/protection: None       NIH Stroke Scale:       Ranjan Orourke RN  23 19:54 EDT

## 2023-09-25 NOTE — Clinical Note
Level of Care: Telemetry [5]   Diagnosis: NSTEMI (non-ST elevated myocardial infarction) [295160]   Admitting Physician: BEVERLEY BOONE [[id number]]   Attending Physician: BEVERLEY BOONE [[id number]]

## 2023-09-25 NOTE — TELEPHONE ENCOUNTER
I called pt back and gave him provider's recommendations.  He'll be heading to our ED shortly.    Thank you,    Cheryl Leo, ARIEL  Triage [facility] [place]  09/25/23 15:54 EDT

## 2023-09-25 NOTE — TELEPHONE ENCOUNTER
"Pt called the office with concerns over his VS and symptoms that began last night.  Last evening, pt felt nauseous but he went to bed and slept in late today.  Over the past few hours, he's checked his BP multiple times:  -170/101,   -173/133,     Pt endorses dyspnea at rest, lightheadedness, dizziness, fatigue, feeling \"cruddy\", and a headache.  When asked about any blurry vision, pt states, \"not real bad blurry vision\".  When asked about any CP, he shares \"not really\".    He's currently taking:  - Isosorbide 60 mg QD  - Metoprolol succinate 100 mg QPM  - Valsartan 160 mg BID  - Hydralazine 100 mg BID  - ASA 81 mg QD    Based on pt's symptoms, elevated BP and HR, I encouraged pt to go to the ED to be evaluated.  I educated him that he might be in an arrhythmia that would need to be treated with IV medications, or at least he would need an evaluation at the least due to his symptoms.  Pt wanted to hear provider's input prior to moving forward with an ED visit.    Do you have any recommendations for this patient?    Thank you,    Cheryl Leo RN  [facility] [place] Triage  09/25/23  15:26 EDT    "

## 2023-09-25 NOTE — TELEPHONE ENCOUNTER
"Hub- He is calling the cardiac office - send the call to the triage line- He is calling for the Cardiology office [facility], called the back line [id number] has been having Bp 173/102, heart rate of 141- Advised ER , he declined, declined triage as well.   Reason for Disposition   Requesting regular office appointment    Additional Information   Negative: [1] Caller is not with the adult (patient) AND [2] reporting urgent symptoms   Negative: Lab result questions   Negative: Medication questions   Negative: Caller can't be reached by phone   Negative: Caller has already spoken to PCP or another triager   Negative: RN needs further essential information from caller in order to complete triage   Negative: [1] Caller requesting NON-URGENT health information AND [2] PCP's office is the best resource   Negative: Health Information question, no triage required and triager able to answer question   Negative: General information question, no triage required and triager able to answer question   Negative: Question about upcoming scheduled test, no triage required and triager able to answer question    Answer Assessment - Initial Assessment Questions  1. REASON FOR CALL or QUESTION: \"What is your reason for calling today?\" or \"How can I best help you?\" or \"What question do you have that I can help answer?\"      See note.    Protocols used: Information Only Call - No Triage-ADULT-    "

## 2023-09-26 ENCOUNTER — APPOINTMENT (OUTPATIENT)
Dept: CARDIOLOGY | Facility: HOSPITAL | Age: 73
End: 2023-09-26
Payer: MEDICARE

## 2023-09-26 ENCOUNTER — READMISSION MANAGEMENT (OUTPATIENT)
Dept: CALL CENTER | Facility: HOSPITAL | Age: 73
End: 2023-09-26
Payer: MEDICARE

## 2023-09-26 VITALS
DIASTOLIC BLOOD PRESSURE: 87 MMHG | TEMPERATURE: 98.2 F | WEIGHT: 193 LBS | HEART RATE: 73 BPM | RESPIRATION RATE: 18 BRPM | BODY MASS INDEX: 27.63 KG/M2 | OXYGEN SATURATION: 97 % | SYSTOLIC BLOOD PRESSURE: 134 MMHG | HEIGHT: 70 IN

## 2023-09-26 PROBLEM — I48.92 PAROXYSMAL ATRIAL FLUTTER: Status: ACTIVE | Noted: 2023-09-26

## 2023-09-26 PROBLEM — I21.A1 TYPE 2 MYOCARDIAL INFARCTION: Status: ACTIVE | Noted: 2023-09-26

## 2023-09-26 PROBLEM — I49.9 CARDIAC ARRHYTHMIA: Status: ACTIVE | Noted: 2023-09-26

## 2023-09-26 LAB
ALBUMIN SERPL-MCNC: 3.5 G/DL (ref 3.5–5.2)
ALBUMIN/GLOB SERPL: 1.5 G/DL
ALP SERPL-CCNC: 72 U/L (ref 39–117)
ALT SERPL W P-5'-P-CCNC: 20 U/L (ref 1–41)
ANION GAP SERPL CALCULATED.3IONS-SCNC: 12 MMOL/L (ref 5–15)
AORTIC DIMENSIONLESS INDEX: 1 (DI)
ASCENDING AORTA: 3.5 CM
AST SERPL-CCNC: 27 U/L (ref 1–40)
BASOPHILS # BLD AUTO: 0.03 10*3/MM3 (ref 0–0.2)
BASOPHILS NFR BLD AUTO: 0.4 % (ref 0–1.5)
BH CV ECHO MEAS - ACS: 1.77 CM
BH CV ECHO MEAS - AO MAX PG: 4.9 MMHG
BH CV ECHO MEAS - AO MEAN PG: 3 MMHG
BH CV ECHO MEAS - AO ROOT DIAM: 2.8 CM
BH CV ECHO MEAS - AO V2 MAX: 111 CM/SEC
BH CV ECHO MEAS - AO V2 VTI: 24.2 CM
BH CV ECHO MEAS - AVA(I,D): 3.3 CM2
BH CV ECHO MEAS - EDV(CUBED): 110.6 ML
BH CV ECHO MEAS - EDV(MOD-SP2): 126 ML
BH CV ECHO MEAS - EDV(MOD-SP4): 122 ML
BH CV ECHO MEAS - EF(MOD-BP): 70.3 %
BH CV ECHO MEAS - EF(MOD-SP2): 67.5 %
BH CV ECHO MEAS - EF(MOD-SP4): 73.8 %
BH CV ECHO MEAS - ESV(CUBED): 47.1 ML
BH CV ECHO MEAS - ESV(MOD-SP2): 41 ML
BH CV ECHO MEAS - ESV(MOD-SP4): 32 ML
BH CV ECHO MEAS - FS: 24.8 %
BH CV ECHO MEAS - IVS/LVPW: 1 CM
BH CV ECHO MEAS - IVSD: 1.1 CM
BH CV ECHO MEAS - LAT PEAK E' VEL: 6.3 CM/SEC
BH CV ECHO MEAS - LV DIASTOLIC VOL/BSA (35-75): 59.3 CM2
BH CV ECHO MEAS - LV MASS(C)D: 194 GRAMS
BH CV ECHO MEAS - LV MAX PG: 4.2 MMHG
BH CV ECHO MEAS - LV MEAN PG: 1.76 MMHG
BH CV ECHO MEAS - LV SYSTOLIC VOL/BSA (12-30): 15.6 CM2
BH CV ECHO MEAS - LV V1 MAX: 102.8 CM/SEC
BH CV ECHO MEAS - LV V1 VTI: 25 CM
BH CV ECHO MEAS - LVIDD: 4.8 CM
BH CV ECHO MEAS - LVIDS: 3.6 CM
BH CV ECHO MEAS - LVOT AREA: 3.2 CM2
BH CV ECHO MEAS - LVOT DIAM: 2.03 CM
BH CV ECHO MEAS - LVPWD: 1.1 CM
BH CV ECHO MEAS - MED PEAK E' VEL: 5.7 CM/SEC
BH CV ECHO MEAS - MV A DUR: 0.14 SEC
BH CV ECHO MEAS - MV A MAX VEL: 98.1 CM/SEC
BH CV ECHO MEAS - MV DEC SLOPE: 376.9 CM/SEC2
BH CV ECHO MEAS - MV DEC TIME: 0.22 SEC
BH CV ECHO MEAS - MV E MAX VEL: 69.4 CM/SEC
BH CV ECHO MEAS - MV E/A: 0.71
BH CV ECHO MEAS - MV MAX PG: 4 MMHG
BH CV ECHO MEAS - MV MEAN PG: 1.64 MMHG
BH CV ECHO MEAS - MV P1/2T: 66.4 MSEC
BH CV ECHO MEAS - MV V2 VTI: 29.6 CM
BH CV ECHO MEAS - MVA(P1/2T): 3.3 CM2
BH CV ECHO MEAS - MVA(VTI): 2.7 CM2
BH CV ECHO MEAS - PA ACC TIME: 0.11 SEC
BH CV ECHO MEAS - PA V2 MAX: 81.4 CM/SEC
BH CV ECHO MEAS - PULM A REVS DUR: 0.14 SEC
BH CV ECHO MEAS - PULM A REVS VEL: 31.9 CM/SEC
BH CV ECHO MEAS - PULM DIAS VEL: 34.8 CM/SEC
BH CV ECHO MEAS - PULM S/D: 1.21
BH CV ECHO MEAS - PULM SYS VEL: 41.9 CM/SEC
BH CV ECHO MEAS - QP/QS: 0.53
BH CV ECHO MEAS - RV MAX PG: 1.38 MMHG
BH CV ECHO MEAS - RV V1 MAX: 58.7 CM/SEC
BH CV ECHO MEAS - RV V1 VTI: 12.6 CM
BH CV ECHO MEAS - RVOT DIAM: 2.07 CM
BH CV ECHO MEAS - SI(MOD-SP2): 41.3 ML/M2
BH CV ECHO MEAS - SI(MOD-SP4): 43.8 ML/M2
BH CV ECHO MEAS - SUP REN AO DIAM: 2.4 CM
BH CV ECHO MEAS - SV(LVOT): 80.5 ML
BH CV ECHO MEAS - SV(MOD-SP2): 85 ML
BH CV ECHO MEAS - SV(MOD-SP4): 90 ML
BH CV ECHO MEAS - SV(RVOT): 42.3 ML
BH CV ECHO MEAS - TAPSE (>1.6): 2.17 CM
BH CV ECHO MEASUREMENTS AVERAGE E/E' RATIO: 11.57
BH CV LOWER VASCULAR LEFT COMMON FEMORAL AUGMENT: NORMAL
BH CV LOWER VASCULAR LEFT COMMON FEMORAL COMPETENT: NORMAL
BH CV LOWER VASCULAR LEFT COMMON FEMORAL COMPRESS: NORMAL
BH CV LOWER VASCULAR LEFT COMMON FEMORAL PHASIC: NORMAL
BH CV LOWER VASCULAR LEFT COMMON FEMORAL SPONT: NORMAL
BH CV LOWER VASCULAR LEFT DISTAL FEMORAL COMPRESS: NORMAL
BH CV LOWER VASCULAR LEFT GASTRONEMIUS COMPRESS: NORMAL
BH CV LOWER VASCULAR LEFT GREATER SAPH AK COMPRESS: NORMAL
BH CV LOWER VASCULAR LEFT GREATER SAPH BK COMPRESS: NORMAL
BH CV LOWER VASCULAR LEFT LESSER SAPH COMPRESS: NORMAL
BH CV LOWER VASCULAR LEFT MID FEMORAL AUGMENT: NORMAL
BH CV LOWER VASCULAR LEFT MID FEMORAL COMPETENT: NORMAL
BH CV LOWER VASCULAR LEFT MID FEMORAL COMPRESS: NORMAL
BH CV LOWER VASCULAR LEFT MID FEMORAL PHASIC: NORMAL
BH CV LOWER VASCULAR LEFT MID FEMORAL SPONT: NORMAL
BH CV LOWER VASCULAR LEFT PERONEAL COMPRESS: NORMAL
BH CV LOWER VASCULAR LEFT POPLITEAL AUGMENT: NORMAL
BH CV LOWER VASCULAR LEFT POPLITEAL COMPETENT: NORMAL
BH CV LOWER VASCULAR LEFT POPLITEAL COMPRESS: NORMAL
BH CV LOWER VASCULAR LEFT POPLITEAL PHASIC: NORMAL
BH CV LOWER VASCULAR LEFT POPLITEAL SPONT: NORMAL
BH CV LOWER VASCULAR LEFT POSTERIOR TIBIAL COMPRESS: NORMAL
BH CV LOWER VASCULAR LEFT PROFUNDA FEMORAL COMPRESS: NORMAL
BH CV LOWER VASCULAR LEFT PROXIMAL FEMORAL COMPRESS: NORMAL
BH CV LOWER VASCULAR LEFT SAPHENOFEMORAL JUNCTION COMPRESS: NORMAL
BH CV LOWER VASCULAR RIGHT COMMON FEMORAL AUGMENT: NORMAL
BH CV LOWER VASCULAR RIGHT COMMON FEMORAL COMPETENT: NORMAL
BH CV LOWER VASCULAR RIGHT COMMON FEMORAL COMPRESS: NORMAL
BH CV LOWER VASCULAR RIGHT COMMON FEMORAL PHASIC: NORMAL
BH CV LOWER VASCULAR RIGHT COMMON FEMORAL SPONT: NORMAL
BH CV LOWER VASCULAR RIGHT DISTAL FEMORAL COMPRESS: NORMAL
BH CV LOWER VASCULAR RIGHT GASTRONEMIUS COMPRESS: NORMAL
BH CV LOWER VASCULAR RIGHT GREATER SAPH AK COMPRESS: NORMAL
BH CV LOWER VASCULAR RIGHT GREATER SAPH BK COMPRESS: NORMAL
BH CV LOWER VASCULAR RIGHT LESSER SAPH COMPRESS: NORMAL
BH CV LOWER VASCULAR RIGHT MID FEMORAL AUGMENT: NORMAL
BH CV LOWER VASCULAR RIGHT MID FEMORAL COMPETENT: NORMAL
BH CV LOWER VASCULAR RIGHT MID FEMORAL COMPRESS: NORMAL
BH CV LOWER VASCULAR RIGHT MID FEMORAL PHASIC: NORMAL
BH CV LOWER VASCULAR RIGHT MID FEMORAL SPONT: NORMAL
BH CV LOWER VASCULAR RIGHT PERONEAL COMPRESS: NORMAL
BH CV LOWER VASCULAR RIGHT POPLITEAL AUGMENT: NORMAL
BH CV LOWER VASCULAR RIGHT POPLITEAL COMPETENT: NORMAL
BH CV LOWER VASCULAR RIGHT POPLITEAL COMPRESS: NORMAL
BH CV LOWER VASCULAR RIGHT POPLITEAL PHASIC: NORMAL
BH CV LOWER VASCULAR RIGHT POPLITEAL SPONT: NORMAL
BH CV LOWER VASCULAR RIGHT POSTERIOR TIBIAL COMPRESS: NORMAL
BH CV LOWER VASCULAR RIGHT PROFUNDA FEMORAL COMPRESS: NORMAL
BH CV LOWER VASCULAR RIGHT PROXIMAL FEMORAL COMPRESS: NORMAL
BH CV LOWER VASCULAR RIGHT SAPHENOFEMORAL JUNCTION COMPRESS: NORMAL
BH CV XLRA - RV BASE: 3.8 CM
BH CV XLRA - RV LENGTH: 8.1 CM
BH CV XLRA - RV MID: 2.5 CM
BH CV XLRA - TDI S': 12.5 CM/SEC
BILIRUB SERPL-MCNC: 0.3 MG/DL (ref 0–1.2)
BUN SERPL-MCNC: 16 MG/DL (ref 8–23)
BUN/CREAT SERPL: 16.2 (ref 7–25)
CALCIUM SPEC-SCNC: 8.7 MG/DL (ref 8.6–10.5)
CHLORIDE SERPL-SCNC: 108 MMOL/L (ref 98–107)
CO2 SERPL-SCNC: 24 MMOL/L (ref 22–29)
CREAT SERPL-MCNC: 0.99 MG/DL (ref 0.76–1.27)
DEPRECATED RDW RBC AUTO: 41.2 FL (ref 37–54)
EGFRCR SERPLBLD CKD-EPI 2021: 80.4 ML/MIN/1.73
EOSINOPHIL # BLD AUTO: 0.2 10*3/MM3 (ref 0–0.4)
EOSINOPHIL NFR BLD AUTO: 2.6 % (ref 0.3–6.2)
ERYTHROCYTE [DISTWIDTH] IN BLOOD BY AUTOMATED COUNT: 12.4 % (ref 12.3–15.4)
GLOBULIN UR ELPH-MCNC: 2.3 GM/DL
GLUCOSE SERPL-MCNC: 102 MG/DL (ref 65–99)
HCT VFR BLD AUTO: 39.8 % (ref 37.5–51)
HGB BLD-MCNC: 13.3 G/DL (ref 13–17.7)
IMM GRANULOCYTES # BLD AUTO: 0.03 10*3/MM3 (ref 0–0.05)
IMM GRANULOCYTES NFR BLD AUTO: 0.4 % (ref 0–0.5)
LEFT ATRIUM VOLUME INDEX: 18.4 ML/M2
LYMPHOCYTES # BLD AUTO: 1.63 10*3/MM3 (ref 0.7–3.1)
LYMPHOCYTES NFR BLD AUTO: 21.4 % (ref 19.6–45.3)
MCH RBC QN AUTO: 30.5 PG (ref 26.6–33)
MCHC RBC AUTO-ENTMCNC: 33.4 G/DL (ref 31.5–35.7)
MCV RBC AUTO: 91.3 FL (ref 79–97)
MONOCYTES # BLD AUTO: 0.68 10*3/MM3 (ref 0.1–0.9)
MONOCYTES NFR BLD AUTO: 8.9 % (ref 5–12)
NEUTROPHILS NFR BLD AUTO: 5.04 10*3/MM3 (ref 1.7–7)
NEUTROPHILS NFR BLD AUTO: 66.3 % (ref 42.7–76)
NRBC BLD AUTO-RTO: 0 /100 WBC (ref 0–0.2)
PLATELET # BLD AUTO: 255 10*3/MM3 (ref 140–450)
PMV BLD AUTO: 9 FL (ref 6–12)
POTASSIUM SERPL-SCNC: 3.4 MMOL/L (ref 3.5–5.2)
PROT SERPL-MCNC: 5.8 G/DL (ref 6–8.5)
RBC # BLD AUTO: 4.36 10*6/MM3 (ref 4.14–5.8)
SINUS: 3.3 CM
SODIUM SERPL-SCNC: 144 MMOL/L (ref 136–145)
STJ: 2.8 CM
TROPONIN T SERPL HS-MCNC: 86 NG/L
WBC NRBC COR # BLD: 7.61 10*3/MM3 (ref 3.4–10.8)

## 2023-09-26 PROCEDURE — 93306 TTE W/DOPPLER COMPLETE: CPT | Performed by: INTERNAL MEDICINE

## 2023-09-26 PROCEDURE — 99214 OFFICE O/P EST MOD 30 MIN: CPT | Performed by: STUDENT IN AN ORGANIZED HEALTH CARE EDUCATION/TRAINING PROGRAM

## 2023-09-26 PROCEDURE — 93306 TTE W/DOPPLER COMPLETE: CPT

## 2023-09-26 PROCEDURE — G0378 HOSPITAL OBSERVATION PER HR: HCPCS

## 2023-09-26 PROCEDURE — 80053 COMPREHEN METABOLIC PANEL: CPT | Performed by: INTERNAL MEDICINE

## 2023-09-26 PROCEDURE — 25510000001 PERFLUTREN (DEFINITY) 8.476 MG IN SODIUM CHLORIDE (PF) 0.9 % 10 ML INJECTION: Performed by: STUDENT IN AN ORGANIZED HEALTH CARE EDUCATION/TRAINING PROGRAM

## 2023-09-26 PROCEDURE — 96361 HYDRATE IV INFUSION ADD-ON: CPT

## 2023-09-26 PROCEDURE — 93970 EXTREMITY STUDY: CPT

## 2023-09-26 PROCEDURE — 84484 ASSAY OF TROPONIN QUANT: CPT | Performed by: INTERNAL MEDICINE

## 2023-09-26 PROCEDURE — 85025 COMPLETE CBC W/AUTO DIFF WBC: CPT | Performed by: INTERNAL MEDICINE

## 2023-09-26 RX ADMIN — ISOSORBIDE MONONITRATE 60 MG: 60 TABLET, EXTENDED RELEASE ORAL at 08:13

## 2023-09-26 RX ADMIN — PERFLUTREN 2 ML: 6.52 INJECTION, SUSPENSION INTRAVENOUS at 10:42

## 2023-09-26 RX ADMIN — Medication 10 ML: at 08:13

## 2023-09-26 RX ADMIN — PANTOPRAZOLE SODIUM 40 MG: 40 TABLET, DELAYED RELEASE ORAL at 05:52

## 2023-09-26 RX ADMIN — ASPIRIN 325 MG: 325 TABLET ORAL at 08:13

## 2023-09-26 RX ADMIN — HYDRALAZINE HYDROCHLORIDE 100 MG: 50 TABLET, FILM COATED ORAL at 08:13

## 2023-09-26 RX ADMIN — ATORVASTATIN CALCIUM 40 MG: 20 TABLET, FILM COATED ORAL at 08:16

## 2023-09-26 RX ADMIN — MULTIPLE VITAMINS W/ MINERALS TAB 1 TABLET: TAB at 08:13

## 2023-09-26 RX ADMIN — VALSARTAN 160 MG: 160 TABLET, FILM COATED ORAL at 08:13

## 2023-09-26 RX ADMIN — SODIUM CHLORIDE 100 ML/HR: 9 INJECTION, SOLUTION INTRAVENOUS at 08:38

## 2023-09-26 NOTE — H&P
Internal medicine history and physical  INTERNAL MEDICINE   [facility]       Patient Identification:  Name: Kyle Amador  Age: 72 y.o.  Sex: male  :  1950  MRN: [id number]                   Primary Care Physician: Harinder Lea DO                               Date of admission:2023    Chief Complaint:  Feeling his heart pounding and running away in his chest since 10:00 this morning.    History of Present Illness:   Patient is a 72-year-old male who has past medical history remarkable for coronary artery disease, chronic kidney disease, history of kidney stones for which he has had procedures and sepsis due to kidney stones resulting in atrial fibrillation about 4 5 years ago which was considered to be related to sepsis and has been not on any anticoagulation therapy, hypertension, hypothyroidism and dyslipidemia was in his usual state of his health until couple days ago when he started feeling not well but could not describe exactly what caused his symptoms.  He was having some abdominal symptoms and just did not feel good.  He went to bed this way  night woke up early morning at around 430 today as he has plans to go for fishing with his friend.  The way he felt this morning he decided that he probably will be able to go for fishing and after communicating with his friend he laid down and went to bed again.  He woke up 10:00 in the morning felt like his heart is racing in his chest and is about to come out and felt pressure and tightness in the chest.  He continued to have the symptoms and his symptoms got worse at around 1:00 and he had associated shortness of breath and dizziness along with palpitation and pounding rapid heart rate.  He came to the emergency room at [facility] and was found to have atrial flutter with rapid ventricular response and was treated with IV Cardizem and was placed on Cardizem drip with improvement in his heart rate and  conversion to sinus tachycardia.  Patient was noted to have elevated blood pressure at that time.  Initial troponin was noted to be elevated patient had CT scan of the chest PE protocol which did not show any pulmonary embolism but did show incidentally cholelithiasis and changes in his thoracic spine and coronary artery disease with calcification.  Patient heart rate is much improved and he is feeling better and is being admitted to the hospital for palpitation with paroxysmal atrial flutter with rapid ventricular response and elevated troponin in the setting of known coronary artery disease.  Patient recalls seeing his cardiologist for 5 months ago at that time he was supposedly doing very well and was told to come back in I will follow-up in a year.  Patient denies any orthopnea PND or swelling of his legs until couple days ago when he started feeling poorly but did not have much decrease in his functional capacity.      Past Medical History:  Past Medical History:   Diagnosis Date    APC (atrial premature contractions) 8/20/2020    BPH (benign prostatic hyperplasia)     Bursitis of right shoulder 03/2013    CAD (coronary artery disease)     Status post stent to RCA    Cataract     BILATERAL    Cervical spondylosis 09/2016    Chorioretinal scar     BILATERAL    Chronic renal insufficiency, stage III (moderate)     Diverticulitis     ED (erectile dysfunction)     Esophagitis     Functional dyspepsia     GERD (gastroesophageal reflux disease)     Hemorrhoids     Hyperlipidemia     Hypertension     probable hyperaldosteronism    Hyperthyroidism     Hypocalcemia 03/2018    Hypokalemia 06/2018    Kidney cysts 03/2018    FOLLOWED BY DR. HIRAM EDWARD    Kidney stones     Myopia 12/2018    LEFT EYE    OA (osteoarthritis)     PAF (paroxysmal atrial fibrillation)     Seborrheic keratosis     Trochanteric bursitis of right hip 04/2019     Past Surgical History:  Past Surgical History:   Procedure Laterality Date     APPENDECTOMY N/A     CARDIAC CATHETERIZATION N/A 7/11/2019    Procedure: Left Heart Cath;  Surgeon: Charo Weiss MD;  Location: [facility] CATH INVASIVE LOCATION;  Service: Cardiology    CARDIAC CATHETERIZATION N/A 7/11/2019    Procedure: Coronary angiography;  Surgeon: Charo Weiss MD;  Location: [facility] CATH INVASIVE LOCATION;  Service: Cardiology    CARDIAC CATHETERIZATION N/A 7/11/2019     Left ventriculography; 30 mid LCx, 30% distal RCA  Charo Weiss MD at [facility]    CARDIAC CATHETERIZATION Left 05/18/2012     20-30% diffuse LAD, 30-40% ostial diag, small LCx with diffuse 30-50%, large RCA with 60-70% mid (normal FFR of 0.9). Angiographically it was unchanged by previous cath DR. ANSELMO FLANAGAN AT [facility]    CARDIAC CATHETERIZATION Left 05/2015     but due to persistant chest pain, he underwent placement of a ARLETTE (3.5x23 Xience).      COLONOSCOPY N/A 09/12/2006    DIVERTICULOSIS, MODERATE EXTERNAL HEMORRHOIDS, DR. KAYLAN PINEDA AT [facility]    CORONARY ANGIOPLASTY WITH STENT PLACEMENT Left 06/01/2015    75% DISTAL RCA STENOSIS, RCA STENT, DR. JUSTIN BERKOWITZ AT [facility]    CYSTOSCOPY URETEROSCOPY LASER LITHOTRIPSY Right 12/18/2019    Procedure: RIGHT URETEROSCOPY STENT REMOVAL & STENT PLACEMENT & STONE BASKET EXTRACTION &  LASER LITHOTRIPSY;  Surgeon: Roberto Drew MD;  Location: [facility] OR;  Service: Urology    CYSTOSCOPY W/ URETERAL STENT PLACEMENT Right 11/23/2019    Procedure: CYSTO RT STENT INSERTION;  Surgeon: Jonnathan Wolf MD;  Location: [facility] MAIN OR;  Service: Urology    ENDOSCOPY N/A 8/14/2019    Z LINE IRREGULAR, SMALL HIATAL HERNIA, MILD INFLAMMATION WITH ERYTHEMA AND FRIABILITY IN GASTRIC BODY, DR. MARTA FLORES AT [facility]    ENDOSCOPY N/A 06/16/2015    LA GRADE B ESOPHAGITIS, ACUTE GASTRITIS, POSSIBLE PILL ESOPHAGITIS, DR. MARTA FLORES AT [facility]    ENDOSCOPY AND COLONOSCOPY N/A 06/03/2011    CHRONIC GASTRITIS, HIATAL HERNIA,OTHERWISE NORMAL EGD, DIVERTICULOSIS IN SIGMOID, NON BLEEDING INTERNAL HEMORRHOIDS,  OTHERWISE WNL CY, RESCOPE IN 10 YRS, DR. MARTA FLORES AT [facility]    KIDNEY STONE SURGERY N/A       Home Meds:  Medications Prior to Admission   Medication Sig Dispense Refill Last Dose    amitriptyline (ELAVIL) 25 MG tablet every night.   2023    aspirin 81 MG tablet Take 1 tablet by mouth Every Night.   2023    atorvastatin (LIPITOR) 40 MG tablet Take 1 tablet by mouth once daily 90 tablet 1 2023    hydrALAZINE (APRESOLINE) 100 MG tablet Take 1 tablet by mouth twice daily 180 tablet 1 2023    isosorbide mononitrate (IMDUR) 60 MG 24 hr tablet Take 1 tablet by mouth once daily 90 tablet 0 2023    metoprolol succinate XL (TOPROL-XL) 100 MG 24 hr tablet TAKE 1 TABLET BY MOUTH ONCE DAILY AT NIGHT 90 tablet 3 2023    Multiple Vitamins-Minerals (MULTIVITAMIN ADULT PO) Take 1 tablet by mouth Daily.   2023    pantoprazole (PROTONIX) 40 MG EC tablet pantoprazole 40 mg tablet,delayed release   TAKE 1 TABLET BY MOUTH TWICE DAILY   2023    Saw Palmetto, Serenoa repens, (SAW PALMETTO PO) Take 1 tablet by mouth Daily.   2023    valsartan (DIOVAN) 160 MG tablet Take 1 tablet by mouth twice daily 180 tablet 1 2023     Current Meds:     Current Facility-Administered Medications:     dilTIAZem (CARDIZEM) 100 mg in 100 mL NS infusion (ADV), 5-15 mg/hr, Intravenous, Titrated, Adama May MD, Held at 23 1718    nitroglycerin (NITROSTAT) SL tablet 0.4 mg, 0.4 mg, Sublingual, Q5 Min PRN, Adama May MD    sodium chloride 0.9 % flush 10 mL, 10 mL, Intravenous, PRN, Adama May MD    sodium chloride 0.9 % infusion, 100 mL/hr, Intravenous, Continuous, Adama May MD, Stopped at 23 1926  Allergies:  Allergies   Allergen Reactions    Amiodarone Nausea Only and Cough    Penicillins      Social History:   Social History     Tobacco Use    Smoking status: Former     Types: Cigarettes     Quit date: 1987     Years since quittin.1    Smokeless tobacco: Never  "  Substance Use Topics    Alcohol use: Yes     Comment: Occ//Caffeine use: Rare      Family History:  Family History   Problem Relation Age of Onset    Stroke Other     Heart disease Father     Diabetes Father     Hypertension Father     Malig Hyperthermia Neg Hx           Review of Systems  See history of present illness and past medical history.  Constitutional: Negative.    HENT: Negative.     Eyes: Negative.    Respiratory:  Positive for shortness of breath. Negative for cough.    Cardiovascular:  Positive for palpitations. Negative for chest pain and leg swelling.   Gastrointestinal: Negative.    Genitourinary: Negative.    Musculoskeletal: Negative.    Skin: Negative.    Neurological:  Positive for dizziness and light-headedness. Negative for syncope.   All other systems reviewed and are negative.    Vitals:   /89   Pulse 88   Temp 97.9 °F (36.6 °C)   Resp 16   Ht 177.8 cm (70\")   Wt 88.5 kg (195 lb)   SpO2 98%   BMI 27.98 kg/m²   I/O: No intake or output data in the 24 hours ending 09/25/23 2047  Exam:  Patient is examined using the personal protective equipment as per guidelines from infection control for this particular patient as enacted.  Hand washing was performed before and after patient interaction.  General Appearance:    Alert, cooperative, no distress, appears stated age   Head:    Normocephalic, without obvious abnormality, atraumatic   Eyes:    PERRL, conjunctiva/corneas clear, EOM's intact, both eyes   Ears:    Normal external ear canals, both ears   Nose:   Nares normal, septum midline, mucosa normal, no drainage    or sinus tenderness   Throat:   Lips, tongue, gums normal; oral mucosa pink and moist   Neck:   Supple, symmetrical, trachea midline, no adenopathy;     thyroid:  no enlargement/tenderness/nodules; no carotid    bruit or JVD   Back:     Symmetric, no curvature, ROM normal, no CVA tenderness   Lungs:     Clear to auscultation bilaterally, respirations unlabored   Chest " Wall:    No tenderness or deformity    Heart:  S1-S2 regular at this time   Abdomen:   Soft nontender   Extremities:   Extremities normal, atraumatic, no cyanosis or edema   Pulses:   Pulses palpable in all extremities; symmetric all extremities   Skin:   Skin color normal, Skin is warm and dry,  no rashes or palpable lesions   Neurologic: Alert and oriented and grossly nonfocal       Data Review:      I reviewed the patient's new clinical results.  Results from last 7 days   Lab Units 09/25/23  1652   WBC 10*3/mm3 11.71*   HEMOGLOBIN g/dL 15.2   PLATELETS 10*3/mm3 298     Results from last 7 days   Lab Units 09/25/23  1652   SODIUM mmol/L 139   POTASSIUM mmol/L 3.1*   CHLORIDE mmol/L 104   CO2 mmol/L 23.7   BUN mg/dL 20   CREATININE mg/dL 1.24   CALCIUM mg/dL 9.4   GLUCOSE mg/dL 131*     XR Chest 1 View    Result Date: 9/25/2023  No interval change or evidence for active disease in the chest.  This report was finalized on 9/25/2023 6:35 PM by Dr. Dev Francisco M.D.      CT Angiogram Chest Pulmonary Embolism    Result Date: 9/25/2023  1. No evidence for pulmonary thromboembolic disease or acute abnormality in the chest. 2. Atherosclerotic disease with coronary arterial calcifications. 3. Cholelithiasis. 4. Multilevel bridging endplate spur formation within the thoracic spine which appears ankylosed.  Radiation dose reduction techniques were utilized, including automated exposure control and exposure modulation based on body size.   This report was finalized on 9/25/2023 7:03 PM by Dr. Dev Francisco M.D.     ECG 12 Lead Rhythm Change   Final Result   HEART RATE= 103  bpm   RR Interval= 583  ms   OK Interval= 194  ms   P Horizontal Axis= -6  deg   P Front Axis= 20  deg   QRSD Interval= 86  ms   QT Interval= 336  ms   QTcB= 440  ms   QRS Axis= -17  deg   T Wave Axis= 62  deg   - ABNORMAL ECG -   Sinus tachycardia   Inferior infarct, old   When compared with ECG of 20-Jun-2021 22:30:49,   Sinus tachcyardia has  repalced atrial flutter   Electronically Signed By: Moi Poole ([facility]) 25-Sep-2023 20:57:17   Date and Time of Study: 2023-09-25 17:09:28      ECG 12 Lead ED Triage Standing Order; Chest Pain   Final Result   HEART RATE= 137  bpm   RR Interval= 438  ms   MS Interval= 93  ms   P Horizontal Axis= 6  deg   P Front Axis= 0  deg   QRSD Interval= 125  ms   QT Interval= 343  ms   QTcB= 518  ms   QRS Axis= -23  deg   T Wave Axis= 81  deg   - ABNORMAL ECG -   Atrial flutter   Probable left ventricular hypertrophy   Inferior infarct, old   Prolonged QT interval   When compared with ECG of 20-Jun-2021 22:30:49,   Atrial flutter has replaced sinus rhythm   Electronically Signed By: Moi Poole ([facility]) 25-Sep-2023 20:57:22   Date and Time of Study: 2023-09-25 16:44:46      SCANNED - TELEMETRY     Final Result      SCANNED - TELEMETRY     Final Result        Microbiology Results (last 10 days)       ** No results found for the last 240 hours. **            Assessment:  Active Hospital Problems    Diagnosis  POA    **NSTEMI (non-ST elevated myocardial infarction) [I21.4]  Yes    PAF (paroxysmal atrial fibrillation) [I48.0]  Unknown    Mixed hyperlipidemia [E78.2]  Yes    Chronic renal insufficiency, stage III (moderate) [N18.30]  Yes    Hypertension [I10]  Yes    CAD (coronary artery disease) [I25.10]  Yes     cath 5/2012 with 20-30% diffuse LAD, 30-40% ostial diag, small LCx with diffuse 30-50%, large RCA with 60-70% mid (normal FFR of 0.9). Angiographically it was unchanged by cath 5/2015 but due to persistant chest pain, he underwent placement of a ARLETTE (3.5x23 Xience)       cholelithiasis  Medical decision making/care plan: See admitting orders  Paroxysmal atrial fibrillation with atrial flutter and rapid ventricular response presenting as palpitations with chest pain and elevated troponin in the setting of known coronary artery disease with EKG showing evidence of atrial flutter with rapid ventricular response-plan is to  admit the patient continue with IV Cardizem start his home medications consisting of metoprolol to control his heart rate and blood pressure and check serial troponins and consult cardiology service.  Continue with aspirin and therapeutic Lovenox for possibility of evolving non-ST segment elevation MI/atrial flutter and fibrillation until seen by cardiology service.  Poorly controlled hypertension-continue losartan hydralazine and Lopressor and monitor and watch for hypotension.  Coronary artery disease with history of left heart catheterization in stenting of RCA with elevated troponin with T delta of -17(troponin going down from initial value of 167-150 in the ER) unclear if this is acute coronary syndrome or tachycardia induced elevation in troponin.  Continue with statin aspirin and therapeutic Lovenox with as needed nitrates until patient is seen by cardiology service and check morning troponin.  Chronic renal insufficiency with preserved creatinine-monitor renal function especially after having CT scan of the chest PE protocol with contrast use and avoid nephrotoxic agent and hypotensive episodes.  Cholelithiasis-asymptomatic plan is to monitor.  Monique Heard MD   9/25/2023  20:47 EDT    Parts of this note may be an electronic transcription/translation of spoken language to printed text using the Dragon dictation system.

## 2023-09-26 NOTE — DISCHARGE SUMMARY
Patient Name: Kyle Amador  : 1950  MRN: [id number]    Date of Admission: 2023  Date of Discharge:  2023  Primary Care Physician: Harinder Lea DO      Chief Complaint:   Palpitations      Discharge Diagnoses     Active Hospital Problems    Diagnosis  POA    **NSTEMI (non-ST elevated myocardial infarction) [I21.4]  Yes    Paroxysmal atrial flutter [I48.92]  Yes    Cardiac arrhythmia [I49.9]  Yes    Type 2 myocardial infarction [I21.A1]  Yes    PAF (paroxysmal atrial fibrillation) [I48.0]  Yes    Mixed hyperlipidemia [E78.2]  Yes    Chronic renal insufficiency, stage III (moderate) [N18.30]  Yes    Hypertension [I10]  Yes    CAD (coronary artery disease) [I25.10]  Yes      Resolved Hospital Problems   No resolved problems to display.      Results for orders placed during the hospital encounter of 23    Adult Transthoracic Echo Complete W/ Cont if Necessary Per Protocol    Interpretation Summary    Left ventricular systolic function is normal. Left ventricular ejection fraction appears to be 66 - 70%. Normal left ventricular cavity size noted. Left ventricular wall thickness is consistent with mild concentric hypertrophy. All left ventricular wall segments contract normally. Left ventricular diastolic function is consistent with (grade I) impaired relaxation.      Hospital Course     Mr. Amador is a 72 y.o. male with a history of NSTEMI, HTN, CAD who presented to [facility] ER yesterday with palpitations, tachycardia, shortness of breath. He was found to be in atrial flutter with RVR requiring diltiazem.  Troponin was elevated up to 167. Cardiology has evaluated patient and suspected type 2 demand ischemia secondary to severe hypertension and atrial flutter. Heart rate now in SR and BP now stable. Echocardiogram reviewed above. Chest discomfort improved with resolution of HTN and atrial flutter. He has been initiated on eliquis to continue his home beta blocker.  Patient will have BLE dopplers prior to discharge given elevated ddimer with no PE on CTA. Chest pain this afternoon is very mild and cardiology is aware but does not advise further workup. Otherwise he has been cleared for discharge by cardiology today.        Day of Discharge     Subjective:  No new complaints. Tired and mild headache. CP is mild and intermittent. SOB improved.     Physical Exam:  Temp:  [97.9 °F (36.6 °C)-98.5 °F (36.9 °C)] 98.2 °F (36.8 °C)  Heart Rate:  [] 73  Resp:  [16-20] 18  BP: (134-187)/() 134/87  Body mass index is 27.69 kg/m².  Physical Exam  Vitals reviewed.   Constitutional:       Appearance: He is well-developed.   HENT:      Head: Normocephalic and atraumatic.      Mouth/Throat:      Mouth: Mucous membranes are moist.   Cardiovascular:      Rate and Rhythm: Normal rate and regular rhythm.   Pulmonary:      Effort: Pulmonary effort is normal. No respiratory distress.      Breath sounds: Normal breath sounds.   Abdominal:      General: Bowel sounds are normal. There is no distension.      Palpations: Abdomen is soft.      Tenderness: There is no abdominal tenderness.   Skin:     General: Skin is warm and dry.   Neurological:      General: No focal deficit present.      Mental Status: He is alert and oriented to person, place, and time.   Psychiatric:         Mood and Affect: Mood normal.         Behavior: Behavior normal.         Thought Content: Thought content normal.       Consultants     Consult Orders (all) (From admission, onward)       Start     Ordered    09/25/23 2050  Inpatient Cardiology Consult  Once        Specialty:  Cardiology  Provider:  Aquiles Velazquez MD    09/25/23 2050                  Procedures     * Surgery not found *    Imaging Results (All)       Procedure Component Value Units Date/Time    CT Angiogram Chest Pulmonary Embolism [[id number]] Collected: 09/25/23 1849     Updated: 09/25/23 1906    Narrative:      CT ANGIOGRAM CHEST WITH IV CONTRAST      HISTORY: Heart palpitations. Elevated heart rate. Chest discomfort.  Positive D-dimer.     TECHNIQUE: CT angio chest includes axial imaging from the thoracic inlet  to the upper abdomen with IV contrast.      COMPARISON: CT angio chest 06/11/2018.     FINDINGS: There is no intrapulmonary arterial filling defect to diagnose  a pulmonary embolus. Thoracic aorta exhibits normal appearance. There  are mild arthroscopic calcifications. There is no evidence for aortic  dissection. Mild atherosclerotic calcifications are present and there  are coronary arterial calcifications. No endotracheal or central  endobronchial lesion is evident. There are calcified mediastinal lymph  nodes. There is no evidence for mediastinal or hilar subha enlargement.  Small calcified nodules are present in the posterior aspect of the left  upper lobe. There is no suspicious pulmonary nodule or pleural effusion.  There is multilevel bridging endplate spur formation throughout the  thoracic spine which is essentially ankylosed. Imaging through the upper  abdomen demonstrates density layering dependently within gallbladder  consistent with tiny stones..       Impression:      1. No evidence for pulmonary thromboembolic disease or acute abnormality  in the chest.  2. Atherosclerotic disease with coronary arterial calcifications.  3. Cholelithiasis.  4. Multilevel bridging endplate spur formation within the thoracic spine  which appears ankylosed.     Radiation dose reduction techniques were utilized, including automated  exposure control and exposure modulation based on body size.        This report was finalized on 9/25/2023 7:03 PM by Dr. Dev Francisco M.D.       XR Chest 1 View [[id number]] Collected: 09/25/23 1732     Updated: 09/25/23 1838    Narrative:      CHEST SINGLE VIEW     HISTORY: Chest pain, elevated heart rate.      COMPARISON: AP chest 06/20/2021.      FINDINGS: Heart size is borderline enlarged and is without change.  The  lungs are clear and there is no evidence for pulmonary edema or pleural  effusion or infiltrate.       Impression:      No interval change or evidence for active disease in the  chest.     This report was finalized on 9/25/2023 6:35 PM by Dr. Dev Francisco M.D.             Results for orders placed during the hospital encounter of 03/21/18    Duplex Renal Artery - Bilateral Complete CAR    Interpretation Summary  · Normal right renal artery.  · Normal left renal artery.    Results for orders placed during the hospital encounter of 09/25/23    Adult Transthoracic Echo Complete W/ Cont if Necessary Per Protocol    Interpretation Summary    Left ventricular systolic function is normal. Left ventricular ejection fraction appears to be 66 - 70%. Normal left ventricular cavity size noted. Left ventricular wall thickness is consistent with mild concentric hypertrophy. All left ventricular wall segments contract normally. Left ventricular diastolic function is consistent with (grade I) impaired relaxation.    Pertinent Labs     Results from last 7 days   Lab Units 09/26/23  0338 09/25/23  1652   WBC 10*3/mm3 7.61 11.71*   HEMOGLOBIN g/dL 13.3 15.2   PLATELETS 10*3/mm3 255 298     Results from last 7 days   Lab Units 09/26/23  0338 09/25/23  1652   SODIUM mmol/L 144 139   POTASSIUM mmol/L 3.4* 3.1*   CHLORIDE mmol/L 108* 104   CO2 mmol/L 24.0 23.7   BUN mg/dL 16 20   CREATININE mg/dL 0.99 1.24   GLUCOSE mg/dL 102* 131*   EGFR mL/min/1.73 80.4 61.4     Results from last 7 days   Lab Units 09/26/23  0338 09/25/23  1652   ALBUMIN g/dL 3.5 4.4   BILIRUBIN mg/dL 0.3 0.4   ALK PHOS U/L 72 81   AST (SGOT) U/L 27 24   ALT (SGPT) U/L 20 21     Results from last 7 days   Lab Units 09/26/23  0338 09/25/23  1652   CALCIUM mg/dL 8.7 9.4   ALBUMIN g/dL 3.5 4.4   MAGNESIUM mg/dL  --  2.2       Results from last 7 days   Lab Units 09/26/23  0338 09/25/23  1911 09/25/23  1741 09/25/23  1652   HSTROP T ng/L 86* 150*  --  167*    PROBNP pg/mL  --   --   --  391.2   D DIMER QUANT MCGFEU/mL  --   --  2.84*  --            Invalid input(s): LDLCALC          Test Results Pending at Discharge       Discharge Details        Discharge Medications        New Medications        Instructions Start Date   apixaban 5 MG tablet tablet  Commonly known as: ELIQUIS   5 mg, Oral, Every 12 Hours Scheduled             Continue These Medications        Instructions Start Date   amitriptyline 25 MG tablet  Commonly known as: ELAVIL   Nightly      aspirin 81 MG tablet   81 mg, Oral, Nightly      atorvastatin 40 MG tablet  Commonly known as: LIPITOR   Take 1 tablet by mouth once daily      hydrALAZINE 100 MG tablet  Commonly known as: APRESOLINE   Take 1 tablet by mouth twice daily      isosorbide mononitrate 60 MG 24 hr tablet  Commonly known as: IMDUR   Take 1 tablet by mouth once daily      metoprolol succinate  MG 24 hr tablet  Commonly known as: TOPROL-XL   TAKE 1 TABLET BY MOUTH ONCE DAILY AT NIGHT      multivitamin with minerals tablet tablet   1 tablet, Oral, Daily      pantoprazole 40 MG EC tablet  Commonly known as: PROTONIX   pantoprazole 40 mg tablet,delayed release   TAKE 1 TABLET BY MOUTH TWICE DAILY      valsartan 160 MG tablet  Commonly known as: DIOVAN   Take 1 tablet by mouth twice daily             Stop These Medications      SAW PALMETTO PO              Allergies   Allergen Reactions    Amiodarone Nausea Only and Cough    Penicillins        Discharge Disposition:  Home or Self Care      Discharge Diet:  Diet Order   Procedures    NPO Diet NPO Type: Sips with Meds, Ice Chips       Discharge Activity:       CODE STATUS:    Code Status and Medical Interventions:   Ordered at: 09/25/23 2050     Code Status (Patient has no pulse and is not breathing):    CPR (Attempt to Resuscitate)     Medical Interventions (Patient has pulse or is breathing):    Full Support       Future Appointments   Date Time Provider Department Center   3/11/2024 11:00  AM Etta Quach APRN [facility]      Follow-up Information       Harinder Lea DO .    Specialty: Family Medicine  Contact information:  [address] [place] [id number] [phone]                             Time Spent on Discharge:  Greater than 55 minutes      SAUL Cage  [facility]  09/26/23  15:49 EDT

## 2023-09-26 NOTE — CASE MANAGEMENT/SOCIAL WORK
Discharge Planning Assessment  [facility]     Patient Name: Kyle Amador  MRN: [id number]  Today's Date: 9/26/2023    Admit Date: 9/25/2023    Plan: home; denies needs   Discharge Needs Assessment       Row Name 09/26/23 1413       Living Environment    People in Home spouse    Current Living Arrangements home    Potentially Unsafe Housing Conditions none    Primary Care Provided by self    Provides Primary Care For no one    Family Caregiver if Needed spouse    Quality of Family Relationships involved;helpful    Able to Return to Prior Arrangements yes       Resource/Environmental Concerns    Resource/Environmental Concerns none       Food Insecurity    Within the past 12 months, you worried that your food would run out before you got the money to buy more. Never true    Within the past 12 months, the food you bought just didn't last and you didn't have money to get more. Never true       Transition Planning    Patient/Family Anticipates Transition to home with family    Patient/Family Anticipated Services at Transition none    Transportation Anticipated family or friend will provide       Discharge Needs Assessment    Readmission Within the Last 30 Days no previous admission in last 30 days    Equipment Currently Used at Home none    Concerns to be Addressed denies needs/concerns at this time;no discharge needs identified    Anticipated Changes Related to Illness none    Equipment Needed After Discharge none    Provided Post Acute Provider List? N/A                   Discharge Plan       Row Name 09/26/23 1414       Plan    Plan home; denies needs    Patient/Family in Agreement with Plan yes    Plan Comments Spoke with pt and spouse bedside. Confirmed facesheet correct. Explained role of CCP. Pt is IADLs no DME used. No HH or SNF history. Pts PCP is Dr. Lea and uses [facility] in [place] with no issues. Pt plans home, no needs. CCP to follow.                  Continued Care and Services - Admitted Since  9/25/2023    Coordination has not been started for this encounter.       Expected Discharge Date and Time       Expected Discharge Date Expected Discharge Time    Sep 26, 2023            Demographic Summary    No documentation.                  Functional Status    No documentation.                  Psychosocial    No documentation.                  Abuse/Neglect    No documentation.                  Legal    No documentation.                  Substance Abuse    No documentation.                  Patient Forms    No documentation.                     LEILANI Milan

## 2023-09-26 NOTE — PROGRESS NOTES
Name: Kyle Amador ADMIT: 2023   : 1950  PCP: Harinder Lea DO    MRN: [id number] LOS: 0 days   AGE/SEX: 72 y.o. male  ROOM: [facility]     Subjective   Subjective    He is feeling better but still has a headache and mild CP. No SOB or palpitations       Objective   Objective   Vital Signs  Temp:  [97.9 °F (36.6 °C)-98.5 °F (36.9 °C)] 98.5 °F (36.9 °C)  Heart Rate:  [] 64  Resp:  [16-20] 16  BP: (150-187)/() 159/88  SpO2:  [95 %-99 %] 97 %  on   ;   Device (Oxygen Therapy): room air  Body mass index is 27.76 kg/m².  Physical Exam  Vitals reviewed.   Constitutional:       Appearance: He is well-developed. He is not ill-appearing.   HENT:      Head: Normocephalic and atraumatic.      Mouth/Throat:      Mouth: Mucous membranes are moist.   Cardiovascular:      Rate and Rhythm: Normal rate and regular rhythm. Rhythm irregular.   Pulmonary:      Effort: Pulmonary effort is normal. No respiratory distress.      Breath sounds: Normal breath sounds.   Abdominal:      General: Bowel sounds are normal. There is no distension.      Palpations: Abdomen is soft.      Tenderness: There is no abdominal tenderness.   Skin:     General: Skin is warm and dry.   Neurological:      General: No focal deficit present.      Mental Status: He is alert and oriented to person, place, and time.   Psychiatric:         Mood and Affect: Mood normal.         Behavior: Behavior normal.         Thought Content: Thought content normal.       Results Review     I reviewed the patient's new clinical results.  Results from last 7 days   Lab Units 23  0338 23  1652   WBC 10*3/mm3 7.61 11.71*   HEMOGLOBIN g/dL 13.3 15.2   PLATELETS 10*3/mm3 255 298     Results from last 7 days   Lab Units 23  0338 23  1652   SODIUM mmol/L 144 139   POTASSIUM mmol/L 3.4* 3.1*   CHLORIDE mmol/L 108* 104   CO2 mmol/L 24.0 23.7   BUN mg/dL 16 20   CREATININE mg/dL 0.99 1.24   GLUCOSE mg/dL 102* 131*   EGFR  mL/min/1.73 80.4 61.4     Results from last 7 days   Lab Units 09/26/23  0338 09/25/23  1652   ALBUMIN g/dL 3.5 4.4   BILIRUBIN mg/dL 0.3 0.4   ALK PHOS U/L 72 81   AST (SGOT) U/L 27 24   ALT (SGPT) U/L 20 21     Results from last 7 days   Lab Units 09/26/23  0338 09/25/23  1652   CALCIUM mg/dL 8.7 9.4   ALBUMIN g/dL 3.5 4.4   MAGNESIUM mg/dL  --  2.2       No results found for: HGBA1C, POCGLU    XR Chest 1 View    Result Date: 9/25/2023  No interval change or evidence for active disease in the chest.  This report was finalized on 9/25/2023 6:35 PM by Dr. Dev Francisco M.D.      CT Angiogram Chest Pulmonary Embolism    Result Date: 9/25/2023  1. No evidence for pulmonary thromboembolic disease or acute abnormality in the chest. 2. Atherosclerotic disease with coronary arterial calcifications. 3. Cholelithiasis. 4. Multilevel bridging endplate spur formation within the thoracic spine which appears ankylosed.  Radiation dose reduction techniques were utilized, including automated exposure control and exposure modulation based on body size.   This report was finalized on 9/25/2023 7:03 PM by Dr. Dev Francisco M.D.       I have personally reviewed all medications:  Scheduled Medications  amitriptyline, 25 mg, Oral, Nightly  aspirin, 325 mg, Oral, Daily  atorvastatin, 40 mg, Oral, Daily  enoxaparin, 1 mg/kg, Subcutaneous, Q12H  hydrALAZINE, 100 mg, Oral, BID  isosorbide mononitrate, 60 mg, Oral, Daily  metoprolol succinate XL, 100 mg, Oral, Nightly  multivitamin with minerals, 1 tablet, Oral, Daily  pantoprazole, 40 mg, Oral, BID AC  senna-docusate sodium, 2 tablet, Oral, BID  sodium chloride, 10 mL, Intravenous, Q12H  valsartan, 160 mg, Oral, BID    Infusions  dilTIAZem, 5-15 mg/hr, Last Rate: Stopped (09/25/23 1718)  sodium chloride, 100 mL/hr, Last Rate: 100 mL/hr (09/26/23 0324)    Diet  NPO Diet NPO Type: Sips with Meds, Ice Chips    I have personally reviewed:  [x]  Laboratory   [x]  Microbiology   [x]   Radiology   [x]  EKG/Telemetry  [x]  Cardiology/Vascular   []  Pathology    []  Records       Assessment/Plan     Active Hospital Problems    Diagnosis  POA    **NSTEMI (non-ST elevated myocardial infarction) [I21.4]  Yes    PAF (paroxysmal atrial fibrillation) [I48.0]  Unknown    Mixed hyperlipidemia [E78.2]  Yes    Chronic renal insufficiency, stage III (moderate) [N18.30]  Yes    Hypertension [I10]  Yes    CAD (coronary artery disease) [I25.10]  Yes      Resolved Hospital Problems   No resolved problems to display.       72 y.o. male admitted with NSTEMI (non-ST elevated myocardial infarction). Hx CAD who presented to [facility] ER yesterday with palpitations, tachycardia, shortness of breath found to be in atrial flutter with RVR requiring diltiazem.  Troponin was elevated up to 167.     Type II NSTEMI/ CAD/ atrial flutter RVR  Cardiology this is appreciated.  Troponin is elevated secondary to demand with atrial flutter RVR and severe hypertension /100 on admission..   Tropol added. Eliquis 5mg BID added  Echocardiogram reviewed above. Possible ok for DC today per cardiology     HTN  Continue losartan and hydralazine, BP improved. BB added.   Crt stable    Ddimer elevated- CTA chest without PE. Check BLE dopplers but will be started on eliquis for above issues.     Hypokalemia- replace per protocol    Eliquis for DVT prophylaxis.  Full code.  Discussed with patient and nursing staff.  Anticipate discharge home with family today or tomorrow..      SAUL Cage  [facility]  09/26/23  08:21 EDT

## 2023-09-26 NOTE — CONSULTS
Date of Hospital Visit: 23  Encounter Provider: Maxi Beverly MD  Place of Service: [facility] CARDIOLOGY  Patient Name: Kyle Amador  :1950  Referral Provider: Harinder Lea, *    Chief complaint tachycardia/palpitations    History of Present Illness   Kyle Amador is a 72 year old man, followed by Dr. Velazquez, with CAD s/p prior PCI to RCA, episode of AF in 2019 without recurrence, diverticulitis, GERD, HLD, HTN, hyperthyroidism, OA and BPH who presented to [facility] ER on 23 with complaints acute onset of tachycardia/palpitations and associated shortness of breath and dizziness. Pt denied any fever, chills, cough, abd pain or syncope. Pt reported his BP was 140/100 and HR greater than 140. Pt also reported a brief episode of substernal chest pain that he described as dull and non radiating.     On arrival, he was afebrile, tachycardic to the 130s, respiratory rate 20, blood pressure 187/100, O2 saturations 97% on room air.  Blood work with high-sensitivity troponin 167 -> 150 -> 86. D dimer 2.84. CTA chest was negative for PE.  Initial EKG with atrial flutter at a rate of about 140.  He was given IV diltiazem and repeat EKG with sinus tachycardia with a rate of 103 and without any ST-T changes.    Of note, patient was last seen in the office on 3/9/23 by Dr. Velazquez for follow up. He denied any chest pain, dyspnea, lightheadedness, palpitations or syncope. He was doing well from a cardiac standpoint. No changes were made. Per notes, he has had chronic non-cardiac chest pain.    Currently he is chest pain-free and feels back to baseline.      Stress test 21  Myocardial perfusion imaging indicates a normal myocardial perfusion study with no evidence of ischemia.  Left ventricular ejection fraction is hyperdynamic (Calculated EF > 70%).  Findings consistent with a normal ECG stress test.  Impressions are consistent with a low risk study.    ECHO  11/26/19    Left ventricular systolic function is normal. Estimated EF appears to be in the range of 66 - 70%. All left ventricular wall segments contract normally. The left ventricular cavity is small. Left ventricular wall thickness is consistent with mild concentric hypertrophy. Left ventricular diastolic function is normal.  Normal left atrial size noted.  Estimated right ventricular systolic pressure from tricuspid regurgitation is normal (<35 mmHg).  The inferior vena cava is normally sized. Partial IVC inspiratory collapse of less than 50% noted.             CATH 7/11/19  SUMMARY: No evidence of obstructive CAD. The RCA stent is widely patent.      RECOMMENDATIONS: BP management. Risk factor modification.        Past Medical History:   Diagnosis Date    APC (atrial premature contractions) 8/20/2020    BPH (benign prostatic hyperplasia)     Bursitis of right shoulder 03/2013    CAD (coronary artery disease)     Status post stent to RCA    Cataract     BILATERAL    Cervical spondylosis 09/2016    Chorioretinal scar     BILATERAL    Chronic renal insufficiency, stage III (moderate)     Diverticulitis     ED (erectile dysfunction)     Esophagitis     Functional dyspepsia     GERD (gastroesophageal reflux disease)     Hemorrhoids     Hyperlipidemia     Hypertension     probable hyperaldosteronism    Hyperthyroidism     Hypocalcemia 03/2018    Hypokalemia 06/2018    Kidney cysts 03/2018    FOLLOWED BY DR. HIRAM EDWARD    Kidney stones     Myopia 12/2018    LEFT EYE    OA (osteoarthritis)     PAF (paroxysmal atrial fibrillation)     Seborrheic keratosis     Trochanteric bursitis of right hip 04/2019       Past Surgical History:   Procedure Laterality Date    APPENDECTOMY N/A     CARDIAC CATHETERIZATION N/A 7/11/2019    Procedure: Left Heart Cath;  Surgeon: Charo Weiss MD;  Location: [facility] INVASIVE LOCATION;  Service: Cardiology    CARDIAC CATHETERIZATION N/A 7/11/2019    Procedure: Coronary angiography;   Surgeon: Charo Weiss MD;  Location: [facility] CATH INVASIVE LOCATION;  Service: Cardiology    CARDIAC CATHETERIZATION N/A 7/11/2019     Left ventriculography; 30 mid LCx, 30% distal RCA  Charo Weiss MD at [facility]    CARDIAC CATHETERIZATION Left 05/18/2012     20-30% diffuse LAD, 30-40% ostial diag, small LCx with diffuse 30-50%, large RCA with 60-70% mid (normal FFR of 0.9). Angiographically it was unchanged by previous cath DR. ANSELMO FLANAGAN AT [facility]    CARDIAC CATHETERIZATION Left 05/2015     but due to persistant chest pain, he underwent placement of a ARLETTE (3.5x23 Xience).      COLONOSCOPY N/A 09/12/2006    DIVERTICULOSIS, MODERATE EXTERNAL HEMORRHOIDS, DR. KAYLAN PINEDA AT [facility]    CORONARY ANGIOPLASTY WITH STENT PLACEMENT Left 06/01/2015    75% DISTAL RCA STENOSIS, RCA STENT, DR. JUSTIN BERKOWITZ AT [facility]    CYSTOSCOPY URETEROSCOPY LASER LITHOTRIPSY Right 12/18/2019    Procedure: RIGHT URETEROSCOPY STENT REMOVAL & STENT PLACEMENT & STONE BASKET EXTRACTION &  LASER LITHOTRIPSY;  Surgeon: Roberto Drew MD;  Location: [facility] OR;  Service: Urology    CYSTOSCOPY W/ URETERAL STENT PLACEMENT Right 11/23/2019    Procedure: CYSTO RT STENT INSERTION;  Surgeon: Jonnathan Wolf MD;  Location: [facility] MAIN OR;  Service: Urology    ENDOSCOPY N/A 8/14/2019    Z LINE IRREGULAR, SMALL HIATAL HERNIA, MILD INFLAMMATION WITH ERYTHEMA AND FRIABILITY IN GASTRIC BODY, DR. MARTA FLORES AT [facility]    ENDOSCOPY N/A 06/16/2015    LA GRADE B ESOPHAGITIS, ACUTE GASTRITIS, POSSIBLE PILL ESOPHAGITIS, DR. MARTA FLORES AT [facility]    ENDOSCOPY AND COLONOSCOPY N/A 06/03/2011    CHRONIC GASTRITIS, HIATAL HERNIA,OTHERWISE NORMAL EGD, DIVERTICULOSIS IN SIGMOID, NON BLEEDING INTERNAL HEMORRHOIDS, OTHERWISE WNL CY, RESCOPE IN 10 YRS, DR. MARTA FLORES AT [facility]    KIDNEY STONE SURGERY N/A        Medications Prior to Admission   Medication Sig Dispense Refill Last Dose    amitriptyline (ELAVIL) 25 MG tablet every night.   9/24/2023    aspirin 81 MG tablet  Take 1 tablet by mouth Every Night.   9/24/2023    atorvastatin (LIPITOR) 40 MG tablet Take 1 tablet by mouth once daily 90 tablet 1 9/24/2023    hydrALAZINE (APRESOLINE) 100 MG tablet Take 1 tablet by mouth twice daily 180 tablet 1 9/25/2023    isosorbide mononitrate (IMDUR) 60 MG 24 hr tablet Take 1 tablet by mouth once daily 90 tablet 0 9/24/2023    metoprolol succinate XL (TOPROL-XL) 100 MG 24 hr tablet TAKE 1 TABLET BY MOUTH ONCE DAILY AT NIGHT 90 tablet 3 9/24/2023    Multiple Vitamins-Minerals (MULTIVITAMIN ADULT PO) Take 1 tablet by mouth Daily.   9/25/2023    pantoprazole (PROTONIX) 40 MG EC tablet pantoprazole 40 mg tablet,delayed release   TAKE 1 TABLET BY MOUTH TWICE DAILY   9/25/2023    Saw Palmetto, Serenoa repens, (SAW PALMETTO PO) Take 1 tablet by mouth Daily.   9/25/2023    valsartan (DIOVAN) 160 MG tablet Take 1 tablet by mouth twice daily 180 tablet 1 9/25/2023       Current Meds  Scheduled Meds:amitriptyline, 25 mg, Oral, Nightly  aspirin, 325 mg, Oral, Daily  atorvastatin, 40 mg, Oral, Daily  enoxaparin, 1 mg/kg, Subcutaneous, Q12H  hydrALAZINE, 100 mg, Oral, BID  isosorbide mononitrate, 60 mg, Oral, Daily  metoprolol succinate XL, 100 mg, Oral, Nightly  multivitamin with minerals, 1 tablet, Oral, Daily  pantoprazole, 40 mg, Oral, BID AC  senna-docusate sodium, 2 tablet, Oral, BID  sodium chloride, 10 mL, Intravenous, Q12H  valsartan, 160 mg, Oral, BID      Continuous Infusions:dilTIAZem, 5-15 mg/hr, Last Rate: Stopped (09/25/23 1718)  sodium chloride, 100 mL/hr, Last Rate: 100 mL/hr (09/26/23 0324)      PRN Meds:.  acetaminophen **OR** acetaminophen **OR** acetaminophen    senna-docusate sodium **AND** polyethylene glycol **AND** bisacodyl **AND** bisacodyl    nitroglycerin    ondansetron    sodium chloride    sodium chloride    sodium chloride    Allergies as of 09/25/2023 - Reviewed 09/25/2023   Allergen Reaction Noted    Amiodarone Nausea Only and Cough 01/27/2020    Penicillins  02/23/2016  "      Social History     Socioeconomic History    Marital status:    Tobacco Use    Smoking status: Former     Types: Cigarettes     Quit date: 1987     Years since quittin.1    Smokeless tobacco: Never   Vaping Use    Vaping Use: Never used   Substance and Sexual Activity    Alcohol use: Yes     Comment: Occ//Caffeine use: Rare    Drug use: No    Sexual activity: Never     Birth control/protection: None       Family History   Problem Relation Age of Onset    Stroke Other     Heart disease Father     Diabetes Father     Hypertension Father     Malig Hyperthermia Neg Hx        REVIEW OF SYSTEMS:   12 point ROS was performed and is negative except as outlined in HPI          Objective:   Temp:  [97.9 °F (36.6 °C)-98.5 °F (36.9 °C)] 98.5 °F (36.9 °C)  Heart Rate:  [] 64  Resp:  [16-20] 16  BP: (150-187)/() 159/88  Body mass index is 27.76 kg/m².  Flowsheet Rows      Flowsheet Row First Filed Value   Admission Height 177.8 cm (70\") Documented at 2023 1645   Admission Weight 88.5 kg (195 lb) Documented at 2023 1645          Vitals:    23 0757   BP: 159/88   Pulse: 64   Resp: 16   Temp: 98.5 °F (36.9 °C)   SpO2: 97%       GEN: no distress, alert and oriented  HEENT: NACT, EOMI, moist mucous membranes  Lungs: CTAB, no wheezes, rales or rhonchi  CV: normal rate, regular rhythm, normal S1, S2, no murmurs, +2 radial pulses b/l, no carotid bruit  Abdomen: soft, nontender, nondistended, NABS  Extremities: no edema  Skin: no rash, warm, dry  Heme/Lymph: no bruising  Psych: organized thought, normal behavior and affect      Lab Review:   Results from last 7 days   Lab Units 23  0338   SODIUM mmol/L 144   POTASSIUM mmol/L 3.4*   CHLORIDE mmol/L 108*   CO2 mmol/L 24.0   BUN mg/dL 16   CREATININE mg/dL 0.99   CALCIUM mg/dL 8.7   BILIRUBIN mg/dL 0.3   ALK PHOS U/L 72   ALT (SGPT) U/L 20   AST (SGOT) U/L 27   GLUCOSE mg/dL 102*     Results from last 7 days   Lab Units 23  0338 " 09/25/23  1911 09/25/23  1652   HSTROP T ng/L 86* 150* 167*     Results from last 7 days   Lab Units 09/26/23  0338 09/25/23  1652   WBC 10*3/mm3 7.61 11.71*   HEMOGLOBIN g/dL 13.3 15.2   HEMATOCRIT % 39.8 46.6   PLATELETS 10*3/mm3 255 298     Results from last 7 days   Lab Units 09/25/23  1751 09/25/23  1746   INR  1.0 1.00     Results from last 7 days   Lab Units 09/25/23  1652   MAGNESIUM mg/dL 2.2                     I personally viewed and interpreted the patient's EKG/Telemetry data)      NSTEMI (non-ST elevated myocardial infarction)    CAD (coronary artery disease)    Hypertension    Chronic renal insufficiency, stage III (moderate)    Mixed hyperlipidemia    PAF (paroxysmal atrial fibrillation)    Assessment and Plan:  #Atrial flutter  #Type II NSTEMI  #CAD status post prior PCI  #Hypertension  #Hyperlipidemia  #Hypothyroidism    72 year old man, followed by Dr. Velazquez, with CAD s/p prior PCI to RCA, episode of AF in 2019 without recurrence, diverticulitis, GERD, HLD, HTN, hyperthyroidism, OA and BPH who presented to [facility] ER on 9/25/23 with complaints acute onset of tachycardia/palpitations and associated shortness of breath and dizziness, found to be in atrial flutter with RVR, requiring diltiazem.  His troponins were elevated up to 167.  His discomfort resolved with resolution of his atrial flutter.    His elevated troponin is secondary to demand in the setting of atrial flutter with RVR and severe hypertension.    - Continue Toprol 100 mg daily and home anti-hypertensives  - CHADSVASC is 3, will start Eliquis 5mg BID  - echocardiogram, if normal will d/c home    Maxi Beverly MD  09/26/23  08:31 EDT

## 2023-09-27 NOTE — OUTREACH NOTE
Prep Survey      Flowsheet Row Responses   [facility] facility patient discharged from? [place]   Is LACE score < 7 ? Yes   Eligibility Readm Mgmt   Discharge diagnosis *NSTEMI (non-ST elevated myocardial infarction)   Does the patient have one of the following disease processes/diagnoses(primary or secondary)? Acute MI (STEMI,NSTEMI)   Does the patient have Home health ordered? No   Is there a DME ordered? No   Medication alerts for this patient Eliquis   Prep survey completed? Yes            Concepcion KENNEDY - Registered Nurse

## 2023-09-27 NOTE — CASE MANAGEMENT/SOCIAL WORK
Case Management Discharge Note      Final Note: DC Home    Provided Post Acute Provider List?: N/A    Selected Continued Care - Discharged on 9/26/2023 Admission date: 9/25/2023 - Discharge disposition: Home or Self Care      Destination    No services have been selected for the patient.                Durable Medical Equipment    No services have been selected for the patient.                Dialysis/Infusion    No services have been selected for the patient.                Home Medical Care    No services have been selected for the patient.                Therapy    No services have been selected for the patient.                Community Resources    No services have been selected for the patient.                Community & DME    No services have been selected for the patient.                         Final Discharge Disposition Code: 01 - home or self-care

## 2023-09-29 ENCOUNTER — TELEPHONE (OUTPATIENT)
Dept: CARDIOLOGY | Facility: CLINIC | Age: 73
End: 2023-09-29

## 2023-09-29 ENCOUNTER — READMISSION MANAGEMENT (OUTPATIENT)
Dept: CALL CENTER | Facility: HOSPITAL | Age: 73
End: 2023-09-29
Payer: MEDICARE

## 2023-09-29 NOTE — OUTREACH NOTE
AMI Week 1 Survey      Flowsheet Row Responses   [facility] patient discharged from? [place]   Does the patient have one of the following disease processes/diagnoses(primary or secondary)? Acute MI (STEMI,NSTEMI)   Week 1 attempt successful? Yes   Call start time 1344   Call end time 1346   Discharge diagnosis *NSTEMI (non-ST elevated myocardial infarction)   Is patient permission given to speak with other caregiver? Yes   Person spoke with today (if not patient) and relationship wife   Meds reviewed with patient/caregiver? Yes   Is the patient having any side effects they believe may be caused by any medication additions or changes? No   Does the patient have all prescriptions related to this admission filled (includes statins,anticoagulants,HTN meds,anti-arrhythmia meds) Yes   Is the patient taking all medications as directed (includes completed medication regime)? Yes   Does the patient have a primary care provider?  Yes   Does the patient have an appointment with their PCP,cardiologist,or clinic within 7 days of discharge? No   What is preventing the patient from scheduling follow up appointments within 7 days of discharge? Waiting on return call   Nursing Interventions Advised patient to make appointment   Psychosocial issues? No   Did the patient receive a copy of their discharge instructions? Yes   Nursing interventions Reviewed instructions with patient   What is the patient's perception of their health status since discharge? Returned to baseline/stable   Nursing interventions Nurse provided patient education   Is the patient/caregiver able to teach back signs and symptoms of when to call for help immediately: Sudden chest discomfort, Sudden discomfort in arms, back, neck or jaw, Shortness of breath at any time   Is the patient/caregiver able to teach back lifestyle changes to help prevent MIs Heart healthy diet   Is the patient/caregiver able to teach back ways to prevent a second heart attack:  Take medications, Follow up with MD   If the patient is a current smoker, are they able to teach back resources for cessation? Not a smoker   Is the patient/caregiver able to teach back the hierarchy of who to call/visit for symptoms/problems? PCP, Specialist, Home health nurse, Urgent Care, ED, 911 Yes   Week 1 call completed? Yes   Revoked No further contact(revokes)-requires comment   Is the patient interested in additional calls from an ambulatory ? No   Call end time [id number]            Antonia POSADA - Registered Nurse

## 2023-09-29 NOTE — TELEPHONE ENCOUNTER
Caller: Kyle Amador    Relationship to patient: Self    Best call back number: [phone]    New or established patient?  [] New  [x] Established    Date of discharge: 9/26/23    Facility discharged from: [facility]    Diagnosis/Symptoms: PALPITATIONS    Length of stay (If applicable): 1 DAY    Specialty Only: Did you see a [facility] provider?    [x] Yes  [] No  If so, who? DR. GARCIA    PT IS CALLING TO SCHEDULE A HOSPITAL FOLLOW UP APPT WITH DR. MOTA. THERE ARE NO INSTRUCTIONS IN THE NOTES ON WHEN TO SCHEDULE APPT FOR PT, PLEASE ADVISE ON SCHEDULING.

## 2023-10-03 NOTE — TELEPHONE ENCOUNTER
Caller: Kyle Amador    Relationship: Self    Best call back number: [phone]    What is the best time to reach you: ANYTIME    Who are you requesting to speak with (clinical staff, provider,  specific staff member): ANYONE    What was the call regarding: PT IS CALLING AGAIN BECAUSE HE HASN'T HEARD ANYTHING ABOUT SCHEDULING A HOSPITAL FOLLOW UP FROM ANYONE YET.    Is it okay if the provider responds through [employer]: NO

## 2023-10-04 NOTE — TELEPHONE ENCOUNTER
Caller: Kyle Amador    Relationship to patient: Self    Best call back number: [phone]    Chief complaint: TRYING TO SCHEDULE HOSPITAL FOLLOW UP    Type of visit: HOSPITAL FOLLOW UP    Requested date: 2 WEEKS    If rescheduling, when is the original appointment: NONE    Additional notes:PATIENT HAS CALLED SEVERAL TIMES

## 2023-10-23 ENCOUNTER — TELEPHONE (OUTPATIENT)
Dept: CARDIOLOGY | Facility: CLINIC | Age: 73
End: 2023-10-23
Payer: MEDICARE

## 2023-10-23 NOTE — TELEPHONE ENCOUNTER
Patient called and stated that he was recently in the hospital and placed on Eliquis.  He stated that he went to get it filled and it is going to cost too much.  He is wanting to know if there is anything else that he can try.    Called and left a message asking patient to call the office back and let us know what is covered by insurance.      Perry

## 2023-10-23 NOTE — TELEPHONE ENCOUNTER
He could try rivaroxaban (Xarelto) or dabigatran.  If none of those are affordable, he could be placed on warfarin which is inexpensive, but requires blood monitoring at the hospital.    He really needs a hospital follow-up visit with me this week if possible.  Or early next week.  Thank you

## 2023-10-25 NOTE — TELEPHONE ENCOUNTER
Patient returned call.  He is going to call his insurance to inquire about xarelto or pradaxa.  I have made him a hospital follow up appt with SDLITO on 10/30 here at main office.    He will run out of eliquis tomorrow.  I have sat some samples for him to come .  I let him know it is important that he remain on this medication.  He can discuss this more at his upcoming appt.    Eliquis 5 mg tablet: LOT #[id number] EXP: 5/2025    Patient verbalizes understanding of all.    Caitlin Danielle Cardiology Triage  10/25/23 09:43 EDT

## 2023-10-30 ENCOUNTER — LAB (OUTPATIENT)
Dept: LAB | Facility: HOSPITAL | Age: 73
End: 2023-10-30
Payer: MEDICARE

## 2023-10-30 ENCOUNTER — OFFICE VISIT (OUTPATIENT)
Dept: CARDIOLOGY | Facility: CLINIC | Age: 73
End: 2023-10-30
Payer: MEDICARE

## 2023-10-30 VITALS
SYSTOLIC BLOOD PRESSURE: 144 MMHG | BODY MASS INDEX: 28.12 KG/M2 | HEART RATE: 76 BPM | OXYGEN SATURATION: 95 % | WEIGHT: 196.4 LBS | DIASTOLIC BLOOD PRESSURE: 79 MMHG | HEIGHT: 70 IN

## 2023-10-30 DIAGNOSIS — I48.92 PAROXYSMAL ATRIAL FLUTTER: Primary | ICD-10-CM

## 2023-10-30 DIAGNOSIS — I10 PRIMARY HYPERTENSION: ICD-10-CM

## 2023-10-30 DIAGNOSIS — I48.92 PAROXYSMAL ATRIAL FLUTTER: ICD-10-CM

## 2023-10-30 DIAGNOSIS — I10 ESSENTIAL (PRIMARY) HYPERTENSION: ICD-10-CM

## 2023-10-30 DIAGNOSIS — I25.10 CORONARY ARTERY DISEASE INVOLVING NATIVE CORONARY ARTERY OF NATIVE HEART WITHOUT ANGINA PECTORIS: ICD-10-CM

## 2023-10-30 DIAGNOSIS — E78.2 MIXED HYPERLIPIDEMIA: ICD-10-CM

## 2023-10-30 PROBLEM — I49.1 APC (ATRIAL PREMATURE CONTRACTIONS): Status: RESOLVED | Noted: 2020-08-20 | Resolved: 2023-10-30

## 2023-10-30 PROBLEM — I97.89 POSTOPERATIVE ATRIAL FIBRILLATION: Status: RESOLVED | Noted: 2019-11-26 | Resolved: 2023-10-30

## 2023-10-30 PROBLEM — I49.9 CARDIAC ARRHYTHMIA: Status: RESOLVED | Noted: 2023-09-26 | Resolved: 2023-10-30

## 2023-10-30 PROBLEM — I21.A1 TYPE 2 MYOCARDIAL INFARCTION: Status: RESOLVED | Noted: 2023-09-26 | Resolved: 2023-10-30

## 2023-10-30 PROBLEM — I48.91 POSTOPERATIVE ATRIAL FIBRILLATION: Status: RESOLVED | Noted: 2019-11-26 | Resolved: 2023-10-30

## 2023-10-30 LAB
ALBUMIN SERPL-MCNC: 3.9 G/DL (ref 3.5–5.2)
ALBUMIN/GLOB SERPL: 1.6 G/DL
ALP SERPL-CCNC: 83 U/L (ref 39–117)
ALT SERPL W P-5'-P-CCNC: 15 U/L (ref 1–41)
ANION GAP SERPL CALCULATED.3IONS-SCNC: 10.9 MMOL/L (ref 5–15)
AST SERPL-CCNC: 21 U/L (ref 1–40)
BILIRUB SERPL-MCNC: 0.4 MG/DL (ref 0–1.2)
BUN SERPL-MCNC: 18 MG/DL (ref 8–23)
BUN/CREAT SERPL: 14.8 (ref 7–25)
CALCIUM SPEC-SCNC: 9.1 MG/DL (ref 8.6–10.5)
CHLORIDE SERPL-SCNC: 104 MMOL/L (ref 98–107)
CHOLEST SERPL-MCNC: 122 MG/DL (ref 0–200)
CO2 SERPL-SCNC: 25.1 MMOL/L (ref 22–29)
CREAT SERPL-MCNC: 1.22 MG/DL (ref 0.76–1.27)
EGFRCR SERPLBLD CKD-EPI 2021: 62.2 ML/MIN/1.73
GLOBULIN UR ELPH-MCNC: 2.5 GM/DL
GLUCOSE SERPL-MCNC: 123 MG/DL (ref 65–99)
HDLC SERPL-MCNC: 52 MG/DL (ref 40–60)
LDLC SERPL CALC-MCNC: 54 MG/DL (ref 0–100)
LDLC/HDLC SERPL: 1.03 {RATIO}
MAGNESIUM SERPL-MCNC: 2.2 MG/DL (ref 1.6–2.4)
POTASSIUM SERPL-SCNC: 3.8 MMOL/L (ref 3.5–5.2)
PROT SERPL-MCNC: 6.4 G/DL (ref 6–8.5)
SODIUM SERPL-SCNC: 140 MMOL/L (ref 136–145)
T-UPTAKE NFR SERPL: 0.97 TBI (ref 0.8–1.3)
T4 SERPL-MCNC: 7.52 MCG/DL (ref 4.5–11.7)
TRIGL SERPL-MCNC: 82 MG/DL (ref 0–150)
TSH SERPL DL<=0.05 MIU/L-ACNC: 0.83 UIU/ML (ref 0.27–4.2)
VLDLC SERPL-MCNC: 16 MG/DL (ref 5–40)

## 2023-10-30 PROCEDURE — 80053 COMPREHEN METABOLIC PANEL: CPT

## 2023-10-30 PROCEDURE — 84479 ASSAY OF THYROID (T3 OR T4): CPT

## 2023-10-30 PROCEDURE — 84443 ASSAY THYROID STIM HORMONE: CPT

## 2023-10-30 PROCEDURE — 36415 COLL VENOUS BLD VENIPUNCTURE: CPT

## 2023-10-30 PROCEDURE — 1160F RVW MEDS BY RX/DR IN RCRD: CPT | Performed by: PHYSICIAN ASSISTANT

## 2023-10-30 PROCEDURE — 99214 OFFICE O/P EST MOD 30 MIN: CPT | Performed by: PHYSICIAN ASSISTANT

## 2023-10-30 PROCEDURE — 3078F DIAST BP <80 MM HG: CPT | Performed by: PHYSICIAN ASSISTANT

## 2023-10-30 PROCEDURE — 1159F MED LIST DOCD IN RCRD: CPT | Performed by: PHYSICIAN ASSISTANT

## 2023-10-30 PROCEDURE — 93000 ELECTROCARDIOGRAM COMPLETE: CPT | Performed by: PHYSICIAN ASSISTANT

## 2023-10-30 PROCEDURE — 80061 LIPID PANEL: CPT

## 2023-10-30 PROCEDURE — 83735 ASSAY OF MAGNESIUM: CPT

## 2023-10-30 PROCEDURE — 84436 ASSAY OF TOTAL THYROXINE: CPT

## 2023-10-30 PROCEDURE — 3077F SYST BP >= 140 MM HG: CPT | Performed by: PHYSICIAN ASSISTANT

## 2023-10-30 NOTE — PROGRESS NOTES
CARDIOLOGY    Date of Office Visit: 10/30/2023  Patient Name: Kyle Amador  : 1950  Encounter Provider: Gagandeep Murray PA-C  Primary Cardiologist: Aquiles Velazquez MD    CHIEF COMPLAINT / REASON FOR OFFICE VISIT     Hospital follow-up      HISTORY OF PRESENT ILLNESS     This is a 73 y.o. year old male who presents to [facility] CARDIOLOGY for a for  follow up from their recent inpatient hospitalization.    He has a history of intermittent episodes of chronic chest pain with known small vessel disease is seen in heart catheterization in .  In  he ended up having stenting completed to the right coronary artery.  Most recent coronary angiography in  showed mild coronary disease and was recommended for medical therapy.  His repeat heart catheterizations have been due to persistent chest discomfort and he has now been diagnosed with severe esophagitis.  He also has had prior atrial fibrillation that was noted 2019 while he was septic.    He presented to the hospital on 2023 with complaint of increasing palpitations, tachycardia and shortness of breath.  Symptoms had been ongoing for around 6 hours. He was found to be in atrial flutter with RVR.  He had an elevated troponin with a peak of 167.  He received IV diltiazem for rate control and converted to sinus rhythm.  Elevated troponin was thought to be due to demand ischemia due to severe hypertension.  He was started on Eliquis and discharged home on his home hydralazine, metoprolol and valsartan.    Today the patient reports since the hospital he has not had any reoccurrence of palpitations or tachycardia.  He has been taking his Eliquis and does think he is having side effects from the medication.  Since he started has been having headaches, dizziness and lightheadedness, loss of appetite, indigestion and muscle cramps and aches.  Was agreeable to switching him to Xarelto 20 mg daily at night until he sees   "Velazquez next month.    We had detailed discussion about the events leading up to his palpitations.  He denies any changes in medications, recent illnesses or issues with sleep prior to the episode.  He states he was just sitting down when the episode suddenly began.  He has not been taking home blood pressures and I encouraged him to start taking this again.  He has recently been treated for a sinus infection and has been treated with 2 antibiotics by his PCP.        PMHx: CAD with prior PCI to the RCA, paroxysmal atrial flutter with prior episode of atrial fibrillation in 2019, GERD, hypertension, hyperlipidemia, hypothyroidism, obstructive sleep apnea, BPH    PHYSICAL EXAMINATION     Vital Signs:  /79 (BP Location: Right arm, Patient Position: Sitting, Cuff Size: Adult)   Pulse 76   Ht 177.8 cm (70\")   Wt 89.1 kg (196 lb 6.4 oz)   SpO2 95%   BMI 28.18 kg/m²   Estimated body mass index is 28.18 kg/m² as calculated from the following:    Height as of this encounter: 177.8 cm (70\").    Weight as of this encounter: 89.1 kg (196 lb 6.4 oz).       He has not been taking home blood pressures.           Physical Exam  Constitutional:       Appearance: Normal appearance.   HENT:      Head: Normocephalic and atraumatic.   Cardiovascular:      Rate and Rhythm: Normal rate and regular rhythm.      Pulses: Normal pulses.      Heart sounds: Normal heart sounds.   Pulmonary:      Effort: Pulmonary effort is normal.      Breath sounds: Normal breath sounds.   Musculoskeletal:      Right lower leg: No edema.      Left lower leg: No edema.   Skin:     General: Skin is warm and dry.   Neurological:      General: No focal deficit present.      Mental Status: He is alert and oriented to person, place, and time.          Cardiac Testing/Results     Cardiac Testing:   - Echo 9/25/2023: Normal LV size and function.  EF is 66 to 70%.  Mild LVH.  Grade 1 diastolic dysfunction.  No significant valvular heart disease.    -CT " angiogram of the chest 9/25/2023 showed no evidence of thrombus.  Mild atherosclerotic calcifications present.    -Stress test 6/21/2021: Normal myocardial perfusion.  Hyperdynamic LV function with EF 70%.    Most recent heart catheterization was 7/11/2019 showing patent stenting to the RCA    Result Review :  The following data was reviewed by: Gagandeep Murray PA-C on 10/30/2023:       Lab Results   Component Value Date     09/26/2023     09/25/2023    K 3.4 (L) 09/26/2023    K 3.1 (L) 09/25/2023     (H) 09/26/2023     09/25/2023    CO2 24.0 09/26/2023    CO2 23.7 09/25/2023    BUN 16 09/26/2023    BUN 20 09/25/2023    CREATININE 0.99 09/26/2023    CREATININE 1.24 09/25/2023    EGFRIFNONA 55 (L) 06/20/2021    EGFRIFNONA 47 (L) 09/10/2020    GLUCOSE 102 (H) 09/26/2023    GLUCOSE 131 (H) 09/25/2023    CALCIUM 8.7 09/26/2023    CALCIUM 9.4 09/25/2023    ALBUMIN 3.5 09/26/2023    ALBUMIN 4.4 09/25/2023    AST 27 09/26/2023    AST 24 09/25/2023    ALT 20 09/26/2023    ALT 21 09/25/2023     Lab Results   Component Value Date    WBC 7.61 09/26/2023    WBC 11.71 (H) 09/25/2023    HGB 13.3 09/26/2023    HGB 15.2 09/25/2023    HCT 39.8 09/26/2023    HCT 46.6 09/25/2023    MCV 91.3 09/26/2023    MCV 91.9 09/25/2023     09/26/2023     09/25/2023     Lab Results   Component Value Date    PROBNP 391.2 09/25/2023    PROBNP 114.2 06/20/2021    BNP 46 01/08/2022     Lab Results   Component Value Date    CKTOTAL 325 (H) 11/25/2019    TROPONINI <0.02 01/08/2022    TROPONINT 86 (C) 09/26/2023     Lab Results   Component Value Date    TSH 0.658 11/26/2019    TSH 0.89 06/16/2015                 ECG 12 Lead    Date/Time: 10/30/2023 9:38 AM  Performed by: Gagandeep Murray PA-C    Authorized by: Gagandeep Murray PA-C  Comparison: compared with previous ECG from 10/25/2023  Rhythm: sinus rhythm  Rate: normal  BPM: 76  Q waves: III, II and aVF    QRS axis: normal    Clinical impression:  normal ECG  Comments: QTc 430                ASSESSMENT & PLAN       Diagnoses and all orders for this visit:    1. Paroxysmal atrial flutter (Primary)  [id number] Score: 3  Switch to Xarelto 20 mg daily due to perceived side effects with Eliquis.  Denies bleeding complications.  At his office visit with Dr. Velazquez she will determine if the patient needs to remain on this medication long-term  Continue rate control therapy with Toprol 100 mg daily  Thyroid function was not checked during hospitalization, check prior to next office visit and check magnesium as well.  Episode in hospital was reviewed to be atypical atrial flutter  Echocardiogram showed normal ejection fraction with normal size left atrium.  Continue with rate control therapy if recurrence he would be a good candidate for consideration of a possible atrial flutter ablation as he is symptomatic with recurrence.  With he had a prior episode of atrial fibrillation in November 2019 while he was hospitalized with sepsis  Unlikely to have sleep apnea, patient is well rested when he wakes in the morning and no snoring noted.  2. Coronary artery disease involving native coronary artery of native heart without angina pectoris  Remote history of PCI to the RCA.  Most recent heart catheterization in 2019 showing patent stenting to the RCA.  Most recent stress test was in 2021 with no evidence of myocardial ischemia  Recently had elevated troponin in the setting of severe hypertension and paroxysmal atrial flutter likely secondary to demand ischemia.  Continue maximally tolerated medical therapy with aspirin, statin, beta-blocker and ARB.  3. Primary hypertension  Keep blood pressure log.  Continue current medications.  Prior normal renal artery duplex  4. Mixed hyperlipidemia  Goal LDL less than 70.  Most recent LDL was 55 checked 9/13/2022  Would recommend repeating cholesterol prior to next office visit      Follow Up:  Return in about 4 weeks (around  11/27/2023) for JK as planned.  Patient was given instructions and counseling regarding his condition or for health maintenance advice. Please contact office if worsening symptoms or proceed to ER when appropriate.      Gagandeep Murray PA-C  10/30/23  10:06 EDT    MEDICATIONS         Discharge Medications            Accurate as of October 30, 2023 10:06 AM. If you have any questions, ask your nurse or doctor.                New Medications        Instructions Start Date   rivaroxaban 20 MG tablet  Commonly known as: XARELTO  Started by: Gagandeep Murray PA-C   20 mg, Oral, Daily             Continue These Medications        Instructions Start Date   amitriptyline 25 MG tablet  Commonly known as: ELAVIL   Nightly      aspirin 81 MG tablet   81 mg, Oral, Nightly      atorvastatin 40 MG tablet  Commonly known as: LIPITOR   Take 1 tablet by mouth once daily      hydrALAZINE 100 MG tablet  Commonly known as: APRESOLINE   Take 1 tablet by mouth twice daily      isosorbide mononitrate 60 MG 24 hr tablet  Commonly known as: IMDUR   Take 1 tablet by mouth once daily      metoprolol succinate  MG 24 hr tablet  Commonly known as: TOPROL-XL   TAKE 1 TABLET BY MOUTH ONCE DAILY AT NIGHT      multivitamin with minerals tablet tablet   1 tablet, Oral, Daily      pantoprazole 40 MG EC tablet  Commonly known as: PROTONIX   pantoprazole 40 mg tablet,delayed release   TAKE 1 TABLET BY MOUTH TWICE DAILY      valsartan 160 MG tablet  Commonly known as: DIOVAN   Take 1 tablet by mouth twice daily                   **Dragon Disclaimer: This note was dictated using an electronic transcription. The electronic translation of spoken language may permit erroneous, or at times, nonsensical words or phrases to be inadvertently transcribed. Although I have reviewed the note for such errors, some may still exist.

## 2023-10-30 NOTE — PROGRESS NOTES
Left voicemail with the patient, normal blood work results.  His electrolytes are not overtly abnormal.  His thyroid function is normal.  His LDL cholesterol goal would be under 70 and it is currently 54.

## 2023-10-30 NOTE — PATIENT INSTRUCTIONS
Keep Blood pressure log with goal of -140 and DBP  60-90, contact office if multiple readings out of range    Limit salt to < 3 mg daily per AHA guidelines     Stop Eliquis    Start Xarelto 20 mg daily until see by Dr. Velazquez

## 2023-10-31 ENCOUNTER — TELEPHONE (OUTPATIENT)
Dept: CARDIOLOGY | Facility: CLINIC | Age: 73
End: 2023-10-31

## 2023-10-31 NOTE — TELEPHONE ENCOUNTER
Caller: Kyle Amador    Relationship: Self    Best call back number: [phone]     Which medication are you concerned about: XARELTO 20MG     Who prescribed you this medication: DARRELL EDOUARD    When did you start taking this medication: YESTERDAY    What are your concerns: TOOK XARELTO LAST NIGHT AND TODAY HE HAD BROWN URINE.  THIS WAS LISTED IN SIDE-EFFECTS    How long have you had these concerns: TODAY

## 2023-11-01 NOTE — TELEPHONE ENCOUNTER
I spoke with Kyle Amador and updated pt on recommendations from provider.  Pt verbalized understanding and has no further questions at this time.    Thank you,    Cheryl STOKES, RN  Triage [facility] [place]  11/01/23 08:27 EDT

## 2023-11-01 NOTE — TELEPHONE ENCOUNTER
Pt called the office again this morning to FU.  He reports he only took one dose of the xarelto on 10/30.  The following day his urine was the color of dark tea.  He shares that he ended up drinking a lot of water and it cleared up by 13:00-14:00.  Last night, he chose to hold his dose.    Do you have any recommendations for this patient?    Thank you,    Cheryl STOKES RN  [facility] [place] Triage  11/01/23  08:15 EDT

## 2023-11-20 NOTE — PROGRESS NOTES
RM:________     PCP: Harinder Lea DO    : 1950  AGE: 73 y.o.  EST PATIENT     REASON FOR VISIT/  CC:        BP Readings from Last 3 Encounters:   10/30/23 144/79   23 134/87   23 140/80      Wt Readings from Last 3 Encounters:   10/30/23 89.1 kg (196 lb 6.4 oz)   23 87.5 kg (193 lb)   23 90.3 kg (199 lb)        WT: ____________ BP: __________L __________R HR______    CHEST PAIN: _____________    SOA: _____________PALPS: _______________     LIGHTHEADED: ___________FATIGUE: ________________ EDEMA __________    ALLERGIES:Amiodarone and Penicillins SMOKING HISTORY:  Social History     Tobacco Use    Smoking status: Former     Types: Cigarettes     Quit date: 1987     Years since quittin.3    Smokeless tobacco: Never   Vaping Use    Vaping Use: Never used   Substance Use Topics    Alcohol use: Yes     Comment: Occ//Caffeine use: Rare    Drug use: No     CAFFEINE USE_________________  ALCOHOL ______________________

## 2023-11-29 ENCOUNTER — OFFICE VISIT (OUTPATIENT)
Dept: CARDIOLOGY | Facility: CLINIC | Age: 73
End: 2023-11-29
Payer: MEDICARE

## 2023-11-29 VITALS
HEART RATE: 69 BPM | DIASTOLIC BLOOD PRESSURE: 82 MMHG | BODY MASS INDEX: 28.03 KG/M2 | HEIGHT: 70 IN | WEIGHT: 195.8 LBS | SYSTOLIC BLOOD PRESSURE: 132 MMHG

## 2023-11-29 DIAGNOSIS — I10 PRIMARY HYPERTENSION: ICD-10-CM

## 2023-11-29 DIAGNOSIS — N18.31 CHRONIC RENAL IMPAIRMENT, STAGE 3A: ICD-10-CM

## 2023-11-29 DIAGNOSIS — I48.92 PAROXYSMAL ATRIAL FLUTTER: Primary | ICD-10-CM

## 2023-11-29 DIAGNOSIS — I25.10 CORONARY ARTERY DISEASE INVOLVING NATIVE CORONARY ARTERY OF NATIVE HEART WITHOUT ANGINA PECTORIS: ICD-10-CM

## 2023-11-29 DIAGNOSIS — R79.89 ELEVATED TROPONIN: ICD-10-CM

## 2023-11-29 DIAGNOSIS — E78.2 MIXED HYPERLIPIDEMIA: ICD-10-CM

## 2023-11-29 PROBLEM — I21.4 NSTEMI (NON-ST ELEVATED MYOCARDIAL INFARCTION): Status: RESOLVED | Noted: 2023-09-25 | Resolved: 2023-11-29

## 2023-11-29 RX ORDER — ISOSORBIDE MONONITRATE 60 MG/1
60 TABLET, EXTENDED RELEASE ORAL DAILY
Qty: 90 TABLET | Refills: 2 | Status: SHIPPED | OUTPATIENT
Start: 2023-11-29

## 2023-11-29 RX ORDER — VALSARTAN 160 MG/1
160 TABLET ORAL 2 TIMES DAILY
Qty: 180 TABLET | Refills: 2 | Status: SHIPPED | OUTPATIENT
Start: 2023-11-29

## 2023-11-29 RX ORDER — METOPROLOL SUCCINATE 100 MG/1
100 TABLET, EXTENDED RELEASE ORAL NIGHTLY
Qty: 90 TABLET | Refills: 3 | Status: SHIPPED | OUTPATIENT
Start: 2023-11-29

## 2023-11-29 RX ORDER — HYDRALAZINE HYDROCHLORIDE 100 MG/1
100 TABLET, FILM COATED ORAL 2 TIMES DAILY
Qty: 180 TABLET | Refills: 2 | Status: SHIPPED | OUTPATIENT
Start: 2023-11-29

## 2023-11-29 NOTE — Clinical Note
Please see new referral, switching from rivaroxaban to warfarin. He has 14 days worth of the DOAC.  Karlos fajardo

## 2023-11-29 NOTE — PROGRESS NOTES
Date of Office Visit: 2023  Encounter Provider: Aquiles Velazquez MD  Place of Service: [facility] CARDIOLOG[facility]  Patient Name: Kyle Amador  :1950    Chief Complaint   Patient presents with    Coronary Artery Disease   :     HPI: Kyle Amador is a 73 y.o. male who presents today to follow up. I have reviewed prior notes and there are no changes except for any new updates described below.    He has history of fairly chronic chest pain. He was found to have distal small vessel disease by catheterization in . In , he had another catheterization due to persistent pain and he had a normal FFR of the right coronary artery. In May 2015, his chest pain got much worse and he underwent repeat cardiac catheterization and the right coronary artery was stented. Despite this, his pain continued to worsen and ultimately, he was diagnosed with severe esophagitis. He had a normal Myoview stress in 2018 due to atypical chest pain.  He was hospitalized in 2019 with chest pain again, and underwent coronary angiography; he had 30% disease in the circumflex and distal RCA.  Again, it was felt that his pain was GI in etiology.    He also has a history of severe hypertension; he has had a normal renal artery duplex. He had terrible leg swelling with amlodipine. He reported an intolerance to spironolactone.     In 2019, he was hospitalized with sepsis, GEORGES on CKD, kidney stones, and hypotension.  He had a brief episode of atrial fibrillation in that setting.  He was placed on oral amiodarone, which we stopped after one month.  His spironolactone and valsartan were stopped (spirono was added to his allergy list, but I removed it as he's not allergic).  In 2020, his ARB was resumed and HCTZ was started; it was ultimately changed to chlorthalidone. He stopped it, though, due to urinary frequency.    He presented in 2021 with atypical chest pain; a perfusion  stress test was negative. His ISMN was increased; we subsequently tried to add ranolazine but it was cost-prohibitive.    He presented in September 2023 with highly symptomatic rapid palpitations.  He was found to have atrial flutter and quickly converted to sinus rhythm.  His high-sensitivity troponin was elevated but he denies any anginal symptoms.  An echocardiogram revealed normal left ventricular systolic function and regional wall motion.  He was placed on apixaban, which was then changed to rivaroxaban.  However, it is also cost prohibitive.    He denies chest pain, dyspnea, lightheadedness, palpitations or syncope. From a cardiovascular standpoint, he's doing well.    Past Medical History:   Diagnosis Date    APC (atrial premature contractions) 08/20/2020    BPH (benign prostatic hyperplasia)     Bursitis of right shoulder 03/2013    CAD (coronary artery disease)     Status post stent to RCA    Cataract     BILATERAL    Cervical spondylosis 09/2016    Chorioretinal scar     BILATERAL    Chronic renal insufficiency, stage III (moderate)     Diverticulitis     ED (erectile dysfunction)     Esophagitis     Functional dyspepsia     GERD (gastroesophageal reflux disease)     Hemorrhoids     Hyperlipidemia     Hypertension     probable hyperaldosteronism    Hyperthyroidism     Hypocalcemia 03/2018    Hypokalemia 06/2018    Kidney cysts 03/2018    FOLLOWED BY DR. HIRAM EDWARD    Kidney stones     Myopia 12/2018    LEFT EYE    OA (osteoarthritis)     PAF (paroxysmal atrial fibrillation)     Seborrheic keratosis     Trochanteric bursitis of right hip 04/2019       Past Surgical History:   Procedure Laterality Date    APPENDECTOMY N/A     CARDIAC CATHETERIZATION N/A 07/11/2019    Procedure: Left Heart Cath;  Surgeon: Charo Weiss MD;  Location:  [facility];  Service: Cardiology    CARDIAC CATHETERIZATION N/A 07/11/2019    Procedure: Coronary angiography;  Surgeon: Charo Weiss MD;  Location:   [facility];  Service: Cardiology    CARDIAC CATHETERIZATION N/A 2019     Left ventriculography; 30 mid LCx, 30% distal RCA  Charo Weiss MD at [facility]    CARDIAC CATHETERIZATION Left 2012     20-30% diffuse LAD, 30-40% ostial diag, small LCx with diffuse 30-50%, large RCA with 60-70% mid (normal FFR of 0.9). Angiographically it was unchanged by previous cath DR. ANSELMO FLANAGAN AT [facility]    CARDIAC CATHETERIZATION Left 2015     but due to persistant chest pain, he underwent placement of a ARLETTE (3.5x23 Xience).      COLONOSCOPY N/A 2006    DIVERTICULOSIS, MODERATE EXTERNAL HEMORRHOIDS, DR. KAYLAN PINEDA AT [facility]    CORONARY ANGIOPLASTY WITH STENT PLACEMENT Left 2015    75% DISTAL RCA STENOSIS, RCA STENT, DR. JUSTIN BERKOWITZ AT [facility]    CYSTOSCOPY URETEROSCOPY LASER LITHOTRIPSY Right 2019    Procedure: RIGHT URETEROSCOPY STENT REMOVAL & STENT PLACEMENT & STONE BASKET EXTRACTION &  LASER LITHOTRIPSY;  Surgeon: Roberto Drew MD;  Location: [facility] MAIN OR;  Service: Urology    CYSTOSCOPY W/ URETERAL STENT PLACEMENT Right 2019    Procedure: CYSTO RT STENT INSERTION;  Surgeon: Jonnathan Wolf MD;  Location: [facility] MAIN OR;  Service: Urology    ENDOSCOPY N/A 2019    Z LINE IRREGULAR, SMALL HIATAL HERNIA, MILD INFLAMMATION WITH ERYTHEMA AND FRIABILITY IN GASTRIC BODY, DR. MARTA FLORES AT [facility]    ENDOSCOPY N/A 2015    LA GRADE B ESOPHAGITIS, ACUTE GASTRITIS, POSSIBLE PILL ESOPHAGITIS, DR. MARTA FLORES AT [facility]    ENDOSCOPY AND COLONOSCOPY N/A 2011    CHRONIC GASTRITIS, HIATAL HERNIA,OTHERWISE NORMAL EGD, DIVERTICULOSIS IN SIGMOID, NON BLEEDING INTERNAL HEMORRHOIDS, OTHERWISE WNL CY, RESCOPE IN 10 YRS, DR. MARTA FLORES AT [facility]    KIDNEY STONE SURGERY N/A        Social History     Socioeconomic History    Marital status:    Tobacco Use    Smoking status: Former     Types: Cigarettes     Quit date: 1987     Years since quittin.3     Passive  exposure: Never    Smokeless tobacco: Never   Vaping Use    Vaping Use: Never used   Substance and Sexual Activity    Alcohol use: Yes     Alcohol/week: 1.0 standard drink of alcohol     Types: 1 Drinks containing 0.5 oz of alcohol per week     Comment: Not regular user    Drug use: No    Sexual activity: Not Currently     Partners: Female     Birth control/protection: None       Family History   Problem Relation Age of Onset    Stroke Other     Heart disease Father     Diabetes Father     Hypertension Father     Malig Hyperthermia Neg Hx        Review of Systems   Cardiovascular:  Negative for chest pain and palpitations.   Respiratory:  Negative for shortness of breath.    Hematologic/Lymphatic: Bruises/bleeds easily.   Musculoskeletal:  Positive for joint pain.   Neurological:  Positive for headaches and light-headedness. Negative for dizziness.   All other systems reviewed and are negative.      Allergies   Allergen Reactions    Amiodarone Nausea Only and Cough    Penicillins          Current Outpatient Medications:     amitriptyline (ELAVIL) 25 MG tablet, every night., Disp: , Rfl:     aspirin 81 MG tablet, Take 1 tablet by mouth Every Night., Disp: , Rfl:     atorvastatin (LIPITOR) 40 MG tablet, Take 1 tablet by mouth once daily, Disp: 90 tablet, Rfl: 1    hydrALAZINE (APRESOLINE) 100 MG tablet, Take 1 tablet by mouth 2 (Two) Times a Day., Disp: 180 tablet, Rfl: 2    isosorbide mononitrate (IMDUR) 60 MG 24 hr tablet, Take 1 tablet by mouth Daily., Disp: 90 tablet, Rfl: 2    metoprolol succinate XL (TOPROL-XL) 100 MG 24 hr tablet, Take 1 tablet by mouth Every Night., Disp: 90 tablet, Rfl: 3    Multiple Vitamins-Minerals (MULTIVITAMIN ADULT PO), Take 1 tablet by mouth Daily., Disp: , Rfl:     pantoprazole (PROTONIX) 40 MG EC tablet, pantoprazole 40 mg tablet,delayed release  TAKE 1 TABLET BY MOUTH TWICE DAILY, Disp: , Rfl:     valsartan (DIOVAN) 160 MG tablet, Take 1 tablet by mouth 2 (Two) Times a Day., Disp:  "180 tablet, Rfl: 2    rivaroxaban (XARELTO) 20 MG tablet, Take 1 tablet by mouth Daily. (Patient not taking: Reported on 11/29/2023), Disp: 30 tablet, Rfl: 30     Objective:     Vitals:    11/29/23 1047 11/29/23 1117   BP: 140/90 132/82   BP Location: Left arm    Pulse: 69    Weight: 88.8 kg (195 lb 12.8 oz)    Height: 177.8 cm (70\")        Body mass index is 28.09 kg/m².    Physical Exam  Vitals reviewed.   Constitutional:       General: He is not in acute distress.     Appearance: He is well-developed.   HENT:      Head: Normocephalic.      Nose: Nose normal.      Mouth/Throat:      Comments: Masked  Eyes:      Conjunctiva/sclera: Conjunctivae normal.   Neck:      Vascular: No JVD.   Cardiovascular:      Rate and Rhythm: Normal rate and regular rhythm.      Pulses: Normal pulses and intact distal pulses.      Heart sounds: Normal heart sounds. No murmur heard.  Pulmonary:      Effort: Pulmonary effort is normal.      Breath sounds: Normal breath sounds.   Abdominal:      Palpations: Abdomen is soft.      Tenderness: There is no abdominal tenderness.   Musculoskeletal:         General: No swelling. Normal range of motion.      Cervical back: Normal range of motion.   Skin:     General: Skin is warm and dry.      Findings: No rash.   Neurological:      General: No focal deficit present.      Mental Status: He is alert.      Cranial Nerves: No cranial nerve deficit.   Psychiatric:         Mood and Affect: Mood normal.         Behavior: Behavior normal.           ECG 12 Lead    Date/Time: 11/29/2023 4:32 PM  Performed by: Aquiles Velazquez MD    Authorized by: Aquiles Velazquez MD  Comparison: compared with previous ECG   Similar to previous ECG  Rhythm: sinus rhythm  Conduction: 1st degree AV block  ST Segments: ST segments normal  T Waves: T waves normal  Other: no other findings    Clinical impression: non-specific ECG         Assessment:       Diagnosis Plan   1. Paroxysmal atrial flutter  [facility] " Telemetry      2. Elevated troponin        3. Coronary artery disease involving native coronary artery of native heart without angina pectoris        4. Mixed hyperlipidemia        5. Primary hypertension        6. Chronic renal impairment, stage 3a             Plan:      1.  He had a short-lived episode of atrial flutter.  It was highly symptomatic.  He has normal left atrial volume on echocardiogram.  He wants to stop the blood thinner completely, but his [id number] score is 3.  I explained that atrial fibrillation commonly accompanies atrial flutter.  However, the normal left atrial volume is reassuring.  I would like to have him a take an anticoagulant for at least 6 months while we collect more information about his heart.  I have ordered a 30-day rhythm monitor.  I have switched him to warfarin as DOACs are cost prohibitive.    2/3/4.  Coronary Artery Disease  Assessment   The patient has no angina    Subjective - Objective   There has been a previous stent procedure using ARLETTE  2015   Current antiplatelet therapy includes aspirin 81 mg      He is on atorvastatin.     He has chronic atypical chest pain and does have small vessel disease, but he's also had a lot of non-cardiac chest pain.  He's on aspirin and atorvastatin. We wanted to start ranolazine but it was cost-prohibitive. He's on metoprolol; he does not tolerate amlodipine.    He had an elevated troponin in the setting of rapid heart rate, but again, he denies any anginal symptoms with it.  He had no ischemic EKG changes and no regional wall motion abnormalities on his echo.  He is very active and is not limited.  For that reason, I do not think I need to do any ischemic testing.    5/6.  His BP is well controlled, for him.  We will continue with his current regimen. He has a lot of medication intolerances (CCB, thiazides, spironolactone).         Sincerely,       Aquiles Velazquez MD

## 2023-11-30 ENCOUNTER — TELEPHONE (OUTPATIENT)
Dept: CARDIOLOGY | Facility: CLINIC | Age: 73
End: 2023-11-30
Payer: MEDICARE

## 2023-11-30 ENCOUNTER — ANTICOAGULATION VISIT (OUTPATIENT)
Dept: PHARMACY | Facility: HOSPITAL | Age: 73
End: 2023-11-30
Payer: MEDICARE

## 2023-11-30 DIAGNOSIS — I48.92 PAROXYSMAL ATRIAL FLUTTER: Primary | ICD-10-CM

## 2023-11-30 NOTE — TELEPHONE ENCOUNTER
Pt called and LVM asking for samples to last until Jan 1, 2024.     I have returned the pt's call and advised we could not give out samples of that quantity. Our normal protocol is 2 weeks worth of samples.    Pt states he not going to go to the coumadin clinic as he has heard negative things in regards to warfarin.     Pt states he will be receiving new insurance next year and medication Xarelto will be covered then.     Pt stated someone told him they will have samples for him to , pt advised we could only give out 2 weeks worth due to a limited supply.     Pt verbalized understanding.     Pt advised to call back when he may need more samples.

## 2023-11-30 NOTE — TELEPHONE ENCOUNTER
Please note that the patient has now decided that he does want to continue with the DOAC, and not switch to warfarin.  Sorry for the confusion yesterday.    Delia, we can probably come up with 2 more weeks before the end of the year, but if we do not have it, then we just do not have it.  He can also call his PCP to see if they have any.

## 2023-11-30 NOTE — TELEPHONE ENCOUNTER
Caller: JAYLA SMITH    Relationship:PATIENT    Callback number: [phone]    Is it ok to leave a message: [x] Yes [] No    Requested medication for samples: XARELTO 20MG    How much medication does the patient currently have left: NONE    Who will be picking up the samples: PATIENT      Additional details provided: PT STATES DURING HIS VISI THERE WAS DISCUSSION ON BLOOD THINNER AND HE DIDN'T WANT TO STAY WITH XARELTO DUE TO COST BUT IN JANUARY HE IS PLANNING TO SWITCH INSURANCES AND HE IS COVERED FOR XARELTO ON THAT PLAN - PT STATES IF HE CAN HAVE SAMPLES TO GET HIM UNTIL THE END OF THE YEAR THEN HE CAN STAY WITH THAT MEDICATION INSTEAD OF SWITCHING TO COUMADIN - PT WILL NEED PRESCRIPTION STARTING AT BEGINNING OF YEAR - PT WILL BE IN TOMORROW 12.01.2023 FOR HOLTER PLACEMENT

## 2023-11-30 NOTE — PROGRESS NOTES
This encounter is for documentation purposes only. The patient was referred to the medication management clinic for initiation of warfarin due to cost issues with xarelto. The patient has decided he would not like to transition to warfarin and would prefer to continue with samples. The medication management clinic will no longer be following the patient.

## 2024-01-02 NOTE — TELEPHONE ENCOUNTER
Pt left voicemail stating that he would like this sent in for only 30 days supply at a time due to cost

## 2024-01-25 ENCOUNTER — OFFICE (OUTPATIENT)
Dept: URBAN - METROPOLITAN AREA CLINIC 66 | Facility: CLINIC | Age: 74
End: 2024-01-25

## 2024-01-25 VITALS
HEIGHT: 70 IN | HEART RATE: 71 BPM | DIASTOLIC BLOOD PRESSURE: 84 MMHG | SYSTOLIC BLOOD PRESSURE: 171 MMHG | WEIGHT: 193 LBS | DIASTOLIC BLOOD PRESSURE: 90 MMHG | SYSTOLIC BLOOD PRESSURE: 167 MMHG

## 2024-01-25 DIAGNOSIS — R19.4 CHANGE IN BOWEL HABIT: ICD-10-CM

## 2024-01-25 DIAGNOSIS — K62.5 HEMORRHAGE OF ANUS AND RECTUM: ICD-10-CM

## 2024-01-25 DIAGNOSIS — K64.8 OTHER HEMORRHOIDS: ICD-10-CM

## 2024-01-25 PROCEDURE — 99203 OFFICE O/P NEW LOW 30 MIN: CPT | Performed by: NURSE PRACTITIONER

## 2024-01-25 RX ORDER — SORBITOL SOLUTION 70 %
SOLUTION, ORAL MISCELLANEOUS
Qty: 100 | Refills: 1 | Status: ACTIVE
Start: 2024-01-25

## 2024-01-25 RX ORDER — HYDROCORTISONE ACETATE 25 MG/1
SUPPOSITORY RECTAL
Qty: 14 | Refills: 1 | Status: ACTIVE
Start: 2024-01-25

## 2024-02-08 RX ORDER — ATORVASTATIN CALCIUM 40 MG/1
TABLET, FILM COATED ORAL
Qty: 90 TABLET | Refills: 0 | Status: SHIPPED | OUTPATIENT
Start: 2024-02-08

## 2024-02-22 ENCOUNTER — TELEPHONE (OUTPATIENT)
Dept: CARDIOLOGY | Facility: CLINIC | Age: 74
End: 2024-02-22
Payer: MEDICARE

## 2024-02-22 NOTE — TELEPHONE ENCOUNTER
Notified patient of recommendations. Patient verbalized understanding.    Jennifer Land RN  Triage [facility]

## 2024-02-22 NOTE — TELEPHONE ENCOUNTER
"Patient is calling because he has been dealing with hematuria and has been seeing his urologist, Dr. Regan, in regards to it. He said Dr. Regan did a CT scan and a scope and said everything looked good. Except he does have a kidney stone. Dr. Regan told the patient this is probably happening because of his Xarelto and advised the patient to reach out to us to see if he could stop his Xarelto. He said he is \"peeing blood like a horse.\"     Jennifer Land RN  Triage [facility]   "

## 2024-02-26 NOTE — TELEPHONE ENCOUNTER
Patient called back to provide update. Since holding the Xarelto he has had no further hematuria. He said if possible he would prefer not to be on a blood thinner at all.     Jennifer Land RN  Triage [facility]

## 2024-02-26 NOTE — TELEPHONE ENCOUNTER
Notified patient of recommendations. Patient verbalized understanding.    Jennifer Land RN  Triage [facility]

## 2024-03-05 NOTE — PROGRESS NOTES
RM:________     PCP: Harinder Lea DO    : 1950  AGE: 73 y.o.  EST PATIENT     REASON FOR VISIT/  CC:        BP Readings from Last 3 Encounters:   23 132/82   10/30/23 144/79   23 134/87      Wt Readings from Last 3 Encounters:   23 88.8 kg (195 lb 12.8 oz)   10/30/23 89.1 kg (196 lb 6.4 oz)   23 87.5 kg (193 lb)        WT: ____________ BP: __________L __________R HR______    CHEST PAIN: _____________    SOA: _____________PALPS: _______________     LIGHTHEADED: ___________FATIGUE: ________________ EDEMA __________    ALLERGIES:Amiodarone and Penicillins SMOKING HISTORY:  Social History     Tobacco Use    Smoking status: Former     Current packs/day: 0.00     Types: Cigarettes     Quit date: 1987     Years since quittin.5     Passive exposure: Never    Smokeless tobacco: Never   Vaping Use    Vaping status: Never Used   Substance Use Topics    Alcohol use: Yes     Alcohol/week: 1.0 standard drink of alcohol     Types: 1 Drinks containing 0.5 oz of alcohol per week     Comment: Not regular user    Drug use: No     CAFFEINE USE_________________  ALCOHOL ______________________

## 2024-03-11 ENCOUNTER — OFFICE VISIT (OUTPATIENT)
Dept: CARDIOLOGY | Facility: CLINIC | Age: 74
End: 2024-03-11
Payer: MEDICARE

## 2024-03-11 VITALS
DIASTOLIC BLOOD PRESSURE: 82 MMHG | HEIGHT: 70 IN | WEIGHT: 197 LBS | BODY MASS INDEX: 28.2 KG/M2 | SYSTOLIC BLOOD PRESSURE: 164 MMHG | HEART RATE: 70 BPM

## 2024-03-11 DIAGNOSIS — I10 PRIMARY HYPERTENSION: ICD-10-CM

## 2024-03-11 DIAGNOSIS — I48.92 PAROXYSMAL ATRIAL FLUTTER: ICD-10-CM

## 2024-03-11 DIAGNOSIS — E78.2 MIXED HYPERLIPIDEMIA: ICD-10-CM

## 2024-03-11 DIAGNOSIS — N18.31 CHRONIC RENAL IMPAIRMENT, STAGE 3A: ICD-10-CM

## 2024-03-11 DIAGNOSIS — I25.10 CORONARY ARTERY DISEASE INVOLVING NATIVE CORONARY ARTERY OF NATIVE HEART WITHOUT ANGINA PECTORIS: Primary | ICD-10-CM

## 2024-03-11 PROCEDURE — 3077F SYST BP >= 140 MM HG: CPT | Performed by: INTERNAL MEDICINE

## 2024-03-11 PROCEDURE — 93000 ELECTROCARDIOGRAM COMPLETE: CPT | Performed by: INTERNAL MEDICINE

## 2024-03-11 PROCEDURE — 1160F RVW MEDS BY RX/DR IN RCRD: CPT | Performed by: INTERNAL MEDICINE

## 2024-03-11 PROCEDURE — 3079F DIAST BP 80-89 MM HG: CPT | Performed by: INTERNAL MEDICINE

## 2024-03-11 PROCEDURE — 1159F MED LIST DOCD IN RCRD: CPT | Performed by: INTERNAL MEDICINE

## 2024-03-11 PROCEDURE — 99214 OFFICE O/P EST MOD 30 MIN: CPT | Performed by: INTERNAL MEDICINE

## 2024-03-11 RX ORDER — DOXAZOSIN MESYLATE 4 MG/1
4 TABLET ORAL NIGHTLY
Qty: 90 TABLET | Refills: 1 | Status: SHIPPED | OUTPATIENT
Start: 2024-03-11

## 2024-03-11 NOTE — PROGRESS NOTES
Date of Office Visit: 2024  Encounter Provider: Aquiles Velazquez MD  Place of Service: [facility] CARDIOLOGY  Patient Name: Kyle Amador  :1950    Chief Complaint   Patient presents with    Hypertension   :     HPI: Kyle Amador is a 73 y.o. male who presents today to follow up. I have reviewed prior notes and there are no changes except for any new updates described below.    He has history of fairly chronic chest pain. He was found to have distal small vessel disease by catheterization in . In , he had another catheterization due to persistent pain and he had a normal FFR of the right coronary artery. In May 2015, his chest pain got much worse and he underwent repeat cardiac catheterization and the right coronary artery was stented. Despite this, his pain continued to worsen and ultimately, he was diagnosed with severe esophagitis. He had a normal Myoview stress in 2018 due to atypical chest pain.  He was hospitalized in 2019 with chest pain again, and underwent coronary angiography; he had 30% disease in the circumflex and distal RCA.  Again, it was felt that his pain was GI in etiology.    He also has a history of severe hypertension; he has had a normal renal artery duplex. He had terrible leg swelling with amlodipine. He reported an intolerance to spironolactone.     In 2019, he was hospitalized with sepsis, GEORGES on CKD, kidney stones, and hypotension.  He had a brief episode of atrial fibrillation in that setting.  He was placed on oral amiodarone, which we stopped after one month.  His spironolactone and valsartan were stopped (spirono was added to his allergy list, but I removed it as he's not allergic).  In 2020, his ARB was resumed and HCTZ was started; it was ultimately changed to chlorthalidone. He stopped it, though, due to urinary frequency.    He presented in 2021 with atypical chest pain; a perfusion stress  test was negative. His ISMN was increased; we subsequently tried to add ranolazine but it was cost-prohibitive.    He presented in September 2023 with highly symptomatic rapid palpitations.  He was found to have atrial flutter and quickly converted to sinus rhythm.  His high-sensitivity troponin was elevated but he denied any anginal symptoms.  An echocardiogram revealed normal left ventricular systolic function and regional wall motion.  He was placed on apixaban, which was then changed to rivaroxaban. An MCOT in January 2024 showed a 3% burden of PACs, but no sustained arrhythmias.     Both DOACs were cost prohibitive.  He also developed hematuria of unclear cause.  A cystoscope and CT were unrevealing.  He has since stopped all anticoagulation, which is his preference.    He denies chest pain, dyspnea, lightheadedness, palpitations or syncope. From a cardiovascular standpoint, he's doing well.  However, his systolic blood pressure is averaging in the 150s at home.    Past Medical History:   Diagnosis Date    APC (atrial premature contractions) 08/20/2020    BPH (benign prostatic hyperplasia)     Bursitis of right shoulder 03/2013    CAD (coronary artery disease)     Status post stent to RCA    Cataract     BILATERAL    Cervical spondylosis 09/2016    Chorioretinal scar     BILATERAL    Chronic renal insufficiency, stage III (moderate)     Diverticulitis     ED (erectile dysfunction)     Esophagitis     Functional dyspepsia     GERD (gastroesophageal reflux disease)     Hemorrhoids     Hyperlipidemia     Hypertension     probable hyperaldosteronism    Hyperthyroidism     Hypocalcemia 03/2018    Hypokalemia 06/2018    Kidney cysts 03/2018    FOLLOWED BY DR. HIRAM EDWARD    Kidney stones     Myopia 12/2018    LEFT EYE    OA (osteoarthritis)     PAF (paroxysmal atrial fibrillation)     Seborrheic keratosis     Trochanteric bursitis of right hip 04/2019       Past Surgical History:   Procedure Laterality Date     APPENDECTOMY N/A     CARDIAC CATHETERIZATION N/A 07/11/2019    Procedure: Left Heart Cath;  Surgeon: Charo Weiss MD;  Location: [facility] CATH INVASIVE LOCATION;  Service: Cardiology    CARDIAC CATHETERIZATION N/A 07/11/2019    Procedure: Coronary angiography;  Surgeon: Charo Weiss MD;  [facility] CATH [facility];  Service: Cardiology    CARDIAC CATHETERIZATION N/A 07/11/2019     Left ventriculography; 30 mid LCx, 30% distal RCA  Charo Weiss MD at [facility]    CARDIAC CATHETERIZATION Left 05/18/2012     20-30% diffuse LAD, 30-40% ostial diag, small LCx with diffuse 30-50%, large RCA with 60-70% mid (normal FFR of 0.9). Angiographically it was unchanged by previous cath DR. ANSELMO FLANAGAN AT [facility]    CARDIAC CATHETERIZATION Left 05/2015     but due to persistant chest pain, he underwent placement of a ARLETTE (3.5x23 Xience).      COLONOSCOPY N/A 09/12/2006    DIVERTICULOSIS, MODERATE EXTERNAL HEMORRHOIDS, DR. KAYLAN PINEDA AT [facility]    CORONARY ANGIOPLASTY WITH STENT PLACEMENT Left 06/01/2015    75% DISTAL RCA STENOSIS, RCA STENT, DR. JUSTIN BERKOWITZ AT [facility]    CYSTOSCOPY URETEROSCOPY LASER LITHOTRIPSY Right 12/18/2019    Procedure: RIGHT URETEROSCOPY STENT REMOVAL & STENT PLACEMENT & STONE BASKET EXTRACTION &  LASER LITHOTRIPSY;  Surgeon: oRberto Drew MD;  Location: [facility] OR;  Service: Urology    CYSTOSCOPY W/ URETERAL STENT PLACEMENT Right 11/23/2019    Procedure: CYSTO RT STENT INSERTION;  Surgeon: Jonnathan Wolf MD;  Location: [facility] MAIN OR;  Service: Urology    ENDOSCOPY N/A 08/14/2019    Z LINE IRREGULAR, SMALL HIATAL HERNIA, MILD INFLAMMATION WITH ERYTHEMA AND FRIABILITY IN GASTRIC BODY, DR. MARTA FLORES AT [facility]    ENDOSCOPY N/A 06/16/2015    LA GRADE B ESOPHAGITIS, ACUTE GASTRITIS, POSSIBLE PILL ESOPHAGITIS, DR. MARTA FLORES AT [facility]    ENDOSCOPY AND COLONOSCOPY N/A 06/03/2011    CHRONIC GASTRITIS, HIATAL HERNIA,OTHERWISE NORMAL EGD, DIVERTICULOSIS IN SIGMOID, NON BLEEDING INTERNAL  HEMORRHOIDS, OTHERWISE WNL CY, RESCOPE IN 10 YRS, DR. MARTA FLORES AT [facility]    KIDNEY STONE SURGERY N/A        Social History     Socioeconomic History    Marital status:    Tobacco Use    Smoking status: Former     Current packs/day: 0.00     Types: Cigarettes     Quit date: 1987     Years since quittin.6     Passive exposure: Never    Smokeless tobacco: Never   Vaping Use    Vaping status: Never Used   Substance and Sexual Activity    Alcohol use: Yes     Alcohol/week: 1.0 standard drink of alcohol     Types: 1 Drinks containing 0.5 oz of alcohol per week     Comment: Not regular user    Drug use: No    Sexual activity: Not Currently     Partners: Female     Birth control/protection: None       Family History   Problem Relation Age of Onset    Stroke Other     Heart disease Father     Diabetes Father     Hypertension Father     Malig Hyperthermia Neg Hx        Review of Systems   Cardiovascular:  Negative for chest pain and palpitations.   Respiratory:  Negative for shortness of breath.    Hematologic/Lymphatic: Bruises/bleeds easily.   Musculoskeletal:  Positive for joint pain.   Neurological:  Positive for headaches and light-headedness. Negative for dizziness.   All other systems reviewed and are negative.      Allergies   Allergen Reactions    Amiodarone Nausea Only and Cough    Penicillins          Current Outpatient Medications:     amitriptyline (ELAVIL) 25 MG tablet, every night., Disp: , Rfl:     aspirin 81 MG tablet, Take 1 tablet by mouth Every Night., Disp: , Rfl:     atorvastatin (LIPITOR) 40 MG tablet, Take 1 tablet by mouth once daily, Disp: 90 tablet, Rfl: 0    hydrALAZINE (APRESOLINE) 100 MG tablet, Take 1 tablet by mouth 2 (Two) Times a Day., Disp: 180 tablet, Rfl: 2    isosorbide mononitrate (IMDUR) 60 MG 24 hr tablet, Take 1 tablet by mouth Daily., Disp: 90 tablet, Rfl: 2    metoprolol succinate XL (TOPROL-XL) 100 MG 24 hr tablet, Take 1 tablet by mouth Every Night., Disp: 90  "tablet, Rfl: 3    Multiple Vitamins-Minerals (MULTIVITAMIN ADULT PO), Take 1 tablet by mouth Daily., Disp: , Rfl:     pantoprazole (PROTONIX) 40 MG EC tablet, pantoprazole 40 mg tablet,delayed release  TAKE 1 TABLET BY MOUTH TWICE DAILY, Disp: , Rfl:     valsartan (DIOVAN) 160 MG tablet, Take 1 tablet by mouth 2 (Two) Times a Day., Disp: 180 tablet, Rfl: 2    doxazosin (Cardura) 4 MG tablet, Take 1 tablet by mouth Every Night., Disp: 90 tablet, Rfl: 1     Objective:     Vitals:    03/11/24 1342   BP: 164/82   BP Location: Left arm   Pulse: 70   Weight: 89.4 kg (197 lb)   Height: 177.8 cm (70\")         Body mass index is 28.27 kg/m².    Physical Exam  Vitals reviewed.   Constitutional:       General: He is not in acute distress.     Appearance: He is well-developed.   HENT:      Head: Normocephalic.      Nose: Nose normal.      Mouth/Throat:      Pharynx: Oropharynx is clear.   Eyes:      Conjunctiva/sclera: Conjunctivae normal.   Neck:      Vascular: No JVD.   Cardiovascular:      Rate and Rhythm: Normal rate and regular rhythm.      Pulses: Normal pulses and intact distal pulses.      Heart sounds: Normal heart sounds. No murmur heard.  Pulmonary:      Effort: Pulmonary effort is normal.      Breath sounds: Normal breath sounds.   Abdominal:      Palpations: Abdomen is soft.      Tenderness: There is no abdominal tenderness.   Musculoskeletal:         General: No swelling. Normal range of motion.      Cervical back: Normal range of motion.   Skin:     General: Skin is warm and dry.      Findings: No rash.   Neurological:      General: No focal deficit present.      Mental Status: He is alert.      Cranial Nerves: No cranial nerve deficit.   Psychiatric:         Mood and Affect: Mood normal.           ECG 12 Lead    Date/Time: 3/11/2024 2:59 PM  Performed by: Aquiles Velazquez MD    Authorized by: Aquiles Velazquez MD  Comparison: compared with previous ECG   Similar to previous ECG  Rhythm: sinus rhythm  Conduction: " conduction normal  ST Segments: ST segments normal  T Waves: T waves normal  QRS axis: normal  Other: no other findings    Clinical impression: normal ECG          (  Assessment:       Diagnosis Plan   1. Coronary artery disease involving native coronary artery of native heart without angina pectoris        2. Mixed hyperlipidemia        3. Primary hypertension        4. Chronic renal impairment, stage 3a        5. Paroxysmal atrial flutter            Plan:     1/2.  Coronary Artery Disease  Assessment   The patient has no angina    Subjective - Objective   There has been a previous stent procedure using ARLETTE  2015   Current antiplatelet therapy includes aspirin 81 mg      He is on atorvastatin.     He has chronic atypical chest pain and does have small vessel disease, but he's also had a lot of non-cardiac chest pain.  He's on aspirin and atorvastatin. We wanted to start ranolazine but it was cost-prohibitive. He's on metoprolol; he does not tolerate amlodipine due to significant leg swelling.     3/4.  His BP is suboptimally controlled on maximum dose valsartan, high-dose metoprolol, and high-dose hydralazine.  He does not tolerate spironolactone due to mastalgia.  He does not want to take any diuretics due to urinary frequency.  He did not tolerate calcium channel blockers due to leg swelling.  I have started doxazosin, 4 mg daily, and have discussed the case with Dr. Green, his nephrologist.  She has an appointment with him in a month.    5.  He had a short-lived episode of atrial flutter.  It was highly symptomatic.  He has normal left atrial volume on echocardiogram.  His CHADSVASc score was 3.  He wore a 30 day MCOT that showed a 3% burden of APCs but no sustained atrial arrhythmias.     I explained that atrial fibrillation commonly accompanies atrial flutter.  Ideally, he would remain on an anticoagulant but he developed significant hematuria and he simply does not desire to stay on the blood thinners any  longer.  We discussed the risks and benefits.    Sincerely,       Aquiles Velazquez MD

## 2024-04-09 ENCOUNTER — PRE-ADMISSION TESTING (OUTPATIENT)
Dept: PREADMISSION TESTING | Facility: HOSPITAL | Age: 74
End: 2024-04-09
Payer: MEDICARE

## 2024-04-09 VITALS
TEMPERATURE: 98.7 F | DIASTOLIC BLOOD PRESSURE: 87 MMHG | HEART RATE: 87 BPM | SYSTOLIC BLOOD PRESSURE: 177 MMHG | RESPIRATION RATE: 16 BRPM | BODY MASS INDEX: 28.92 KG/M2 | HEIGHT: 70 IN | OXYGEN SATURATION: 98 % | WEIGHT: 202 LBS

## 2024-04-09 LAB
DEPRECATED RDW RBC AUTO: 43.2 FL (ref 37–54)
ERYTHROCYTE [DISTWIDTH] IN BLOOD BY AUTOMATED COUNT: 12.8 % (ref 12.3–15.4)
HCT VFR BLD AUTO: 39.2 % (ref 37.5–51)
HGB BLD-MCNC: 12.7 G/DL (ref 13–17.7)
MCH RBC QN AUTO: 30 PG (ref 26.6–33)
MCHC RBC AUTO-ENTMCNC: 32.4 G/DL (ref 31.5–35.7)
MCV RBC AUTO: 92.7 FL (ref 79–97)
PLATELET # BLD AUTO: 248 10*3/MM3 (ref 140–450)
PMV BLD AUTO: 9.4 FL (ref 6–12)
RBC # BLD AUTO: 4.23 10*6/MM3 (ref 4.14–5.8)
WBC NRBC COR # BLD AUTO: 6.3 10*3/MM3 (ref 3.4–10.8)

## 2024-04-09 PROCEDURE — 36415 COLL VENOUS BLD VENIPUNCTURE: CPT

## 2024-04-09 PROCEDURE — 85027 COMPLETE CBC AUTOMATED: CPT

## 2024-04-09 NOTE — DISCHARGE INSTRUCTIONS
Take the following medications the morning of surgery with a small sip of water:      HYDRALAZINE AND PANTOPRAZOLE    If you are on prescription narcotic pain medication to control your pain you may also take that medication the morning of surgery.    General Instructions:  Do not eat or drink anything after midnight the night before surgery.  Infants may have breast milk up to four hours before surgery.  Infants drinking formula may drink formula up to six hours before surgery.   Patients who avoid smoking, chewing tobacco and alcohol for 4 weeks prior to surgery have a reduced risk of post-operative complications.  Quit smoking as many days before surgery as you can.  Do not smoke, use chewing tobacco or drink alcohol the day of surgery.   If applicable bring your C-PAP/ BI-PAP machine in with you to preop day of surgery.  Bring any papers given to you in the doctor’s office.  Wear clean comfortable clothes.  Do not wear contact lenses, false eyelashes or make-up.  Bring a case for your glasses.   Bring crutches or walker if applicable.  Remove all piercings.  Leave jewelry and any other valuables at home.  Hair extensions with metal clips must be removed prior to surgery.  The Pre-Admission Testing nurse will instruct you to bring medications if unable to obtain an accurate list in Pre-Admission Testing.            Preventing a Surgical Site Infection:  For 2 to 3 days before surgery, avoid shaving with a razor because the razor can irritate skin and make it easier to develop an infection.    Any areas of open skin can increase the risk of a post-operative wound infection by allowing bacteria to enter and travel throughout the body.  Notify your surgeon if you have any skin wounds / rashes even if it is not near the expected surgical site.  The area will need assessed to determine if surgery should be delayed until it is healed.  The night prior to surgery shower using a fresh bar of anti-bacterial soap (such as  Dial) and clean washcloth.  Sleep in a clean bed with clean clothing.  Do not allow pets to sleep with you.  Shower on the morning of surgery using a fresh bar of anti-bacterial soap (such as Dial) and clean washcloth.  Dry with a clean towel and dress in clean clothing.  Ask your surgeon if you will be receiving antibiotics prior to surgery.  Make sure you, your family, and all healthcare providers clean their hands with soap and water or an alcohol based hand  before caring for you or your wound.    Day of surgery:  Your arrival time is approximately two hours before your scheduled surgery time.  Upon arrival, a Pre-op nurse and Anesthesiologist will review your health history, obtain vital signs, and answer questions you may have.  The only belongings needed at this time will be your home medications and if applicable your C-PAP/BI-PAP machine.  A Pre-op nurse will start an IV and you may receive medication in preparation for surgery, including something to help you relax.      Please be aware that surgery does come with discomfort.  We want to make every effort to control your discomfort so please discuss any uncontrolled symptoms with your nurse.   Your doctor will most likely have prescribed pain medications.      If you are going home after surgery you will receive individualized written care instructions before being discharged.  A responsible adult must drive you to and from the hospital on the day of your surgery and ideally stay with you through the night.  Discharge prescriptions can be filled by the hospital pharmacy during regular pharmacy hours.  If you are having surgery late in the day/evening your prescription may be e-prescribed to your pharmacy.  Please verify your pharmacy hours or chose a 24 hour pharmacy to avoid not having access to your prescription because your pharmacy has closed for the day.    If you are staying overnight following surgery, you will be transported to your  hospital room following the recovery period.  [facility] has all private rooms.    If you have any questions please call Pre-Admission Testing at [phone].  Deductibles and co-payments are collected on the day of service. Please be prepared to pay the required co-pay, deductible or deposit on the day of service as defined by your plan.    Call your surgeon immediately if you experience any of the following symptoms:  Sore Throat  Shortness of Breath or difficulty breathing  Cough  Chills  Body soreness or muscle pain  Headache  Fever  New loss of taste or smell  Do not arrive for your surgery ill.  Your procedure will need to be rescheduled to another time.  You will need to call your physician before the day of surgery to avoid any unnecessary exposure to hospital staff as well as other patients.

## 2024-04-18 NOTE — H&P
[facility] History and Physical    Patient Care Team:  Harinder Lea DO as PCP - General  Harinder Lea DO as PCP - Family Medicine  Jennie Green MD as Consulting Physician (Nephrology)  Aquiles Velazquez MD as Consulting Physician (Cardiology)    Chief complaint kidney stone    Subjective     Patient is a 73 y.o. male presents with bilateral nephro calculi with a symptomatic right renal stones and free-floating 6 mm stone he is off Plavix and other anticoagulation medications preoperatively no gross hematuria no fever and chills no nausea vomiting      Review of Systems   Pertinent items are noted in HPI    Past Medical History:   Diagnosis Date    APC (atrial premature contractions) 08/20/2020    BPH (benign prostatic hyperplasia)     Bursitis of right shoulder 03/2013    CAD (coronary artery disease)     Status post stent to RCA    Cataract     BILATERAL    Cervical spondylosis 09/2016    Chorioretinal scar     BILATERAL    Chronic renal insufficiency, stage III (moderate)     Diverticulitis     ED (erectile dysfunction)     Esophagitis     Functional dyspepsia     GERD (gastroesophageal reflux disease)     Hemorrhoids     Hyperlipidemia     Hypertension     probable hyperaldosteronism    Hyperthyroidism     Hypocalcemia 03/2018    Hypokalemia 06/2018    Kidney cysts 03/2018    FOLLOWED BY DR. HIRAM EDWARD    Kidney stones     Myopia 12/2018    LEFT EYE    OA (osteoarthritis)     PAF (paroxysmal atrial fibrillation)     Seborrheic keratosis     Trochanteric bursitis of right hip 04/2019     Past Surgical History:   Procedure Laterality Date    APPENDECTOMY N/A     CARDIAC CATHETERIZATION N/A 07/11/2019    Procedure: Left Heart Cath;  Surgeon: Charo Weiss MD;  Location:  [facility] CATH INVASIVE LOCATION;  Service: Cardiology    CARDIAC CATHETERIZATION N/A 07/11/2019    Procedure: Coronary angiography;  Surgeon: Charo Weiss MD;  Location:  [facility] CATH INVASIVE LOCATION;  Service:  Cardiology    CARDIAC CATHETERIZATION N/A 07/11/2019     Left ventriculography; 30 mid LCx, 30% distal RCA  Charo Weiss MD at [facility]    CARDIAC CATHETERIZATION Left 05/18/2012     20-30% diffuse LAD, 30-40% ostial diag, small LCx with diffuse 30-50%, large RCA with 60-70% mid (normal FFR of 0.9). Angiographically it was unchanged by previous cath DR. ANSELMO FLANAGAN AT [facility]    CARDIAC CATHETERIZATION Left 05/2015     but due to persistant chest pain, he underwent placement of a ARLETTE (3.5x23 Xience).      COLONOSCOPY N/A 09/12/2006    DIVERTICULOSIS, MODERATE EXTERNAL HEMORRHOIDS, DR. KAYLAN PINEDA AT [facility]    CORONARY ANGIOPLASTY WITH STENT PLACEMENT Left 06/01/2015    75% DISTAL RCA STENOSIS, RCA STENT, DR. JUSTIN BERKOWITZ AT [facility]    CYSTOSCOPY URETEROSCOPY LASER LITHOTRIPSY Right 12/18/2019    Procedure: RIGHT URETEROSCOPY STENT REMOVAL & STENT PLACEMENT & STONE BASKET EXTRACTION &  LASER LITHOTRIPSY;  Surgeon: Roberto Drew MD;  Location: [facility] MAIN OR;  Service: Urology    CYSTOSCOPY W/ URETERAL STENT PLACEMENT Right 11/23/2019    Procedure: CYSTO RT STENT INSERTION;  Surgeon: Jonnathan Wolf MD;  Location: [facility] MAIN OR;  Service: Urology    ENDOSCOPY N/A 08/14/2019    Z LINE IRREGULAR, SMALL HIATAL HERNIA, MILD INFLAMMATION WITH ERYTHEMA AND FRIABILITY IN GASTRIC BODY, DR. MARTA FLORES AT [facility]    ENDOSCOPY N/A 06/16/2015    LA GRADE B ESOPHAGITIS, ACUTE GASTRITIS, POSSIBLE PILL ESOPHAGITIS, DR. MARTA FLORES AT [facility]    ENDOSCOPY AND COLONOSCOPY N/A 06/03/2011    CHRONIC GASTRITIS, HIATAL HERNIA,OTHERWISE NORMAL EGD, DIVERTICULOSIS IN SIGMOID, NON BLEEDING INTERNAL HEMORRHOIDS, OTHERWISE WNL CY, RESCOPE IN 10 YRS, DR. MARTA FLORES AT [facility]    KIDNEY STONE SURGERY N/A      Family History   Problem Relation Age of Onset    Stroke Other     Heart disease Father     Diabetes Father     Hypertension Father     Malig Hyperthermia Neg Hx      Social History     Tobacco Use    Smoking status: Former     Current  packs/day: 0.00     Types: Cigarettes     Quit date: 1987     Years since quittin.7     Passive exposure: Never    Smokeless tobacco: Never   Vaping Use    Vaping status: Never Used   Substance Use Topics    Alcohol use: Yes     Alcohol/week: 1.0 standard drink of alcohol     Types: 1 Drinks containing 0.5 oz of alcohol per week     Comment: COUPLE TIMES MONTH    Drug use: No       Meds:  No medications prior to admission.       Allergies:  Amiodarone and Penicillins    Debilities:      Objective     Vital Signs     No intake or output data in the 24 hours ending 24 0738       Physical Exam:      General Appearance:  Alert, cooperative, in no acute distress   Head:  Normocephalic, without obvious abnormality, atraumatic   Eyes:  Lids and lashes normal, conjunctivae and sclerae normal, no icterus, no pallor, corneas clear   Ears:  Ears appear intact with no abnormalities noted   Throat:     Neck:  No adenopathy, supple, trachea midline, no thyromegaly, no JVD   Back:  No kyphosis present, no scoliosis present, no skin lesions, erythema or scars, no tenderness to percussion or palpation, range of motion normal   Lungs:  respirations regular, even and unlabored    Heart:  Regular rhythm and normal rate   Chest Wall:  No abnormalities observed   Abdomen:  no masses, no organomegaly, soft non-tender, non-distended, no guarding, no rebound tenderness   Rectal:  No masses prostates enlarged palpably benign   Extremities:     Pulses:     Skin:     Lymph nodes:     Neurologic:                                                                               Results Review:    I reviewed the patient's new clinical results.  Results for orders placed or performed in visit on 24   CBC (No Diff)    Specimen: Blood   Result Value Ref Range    WBC 6.30 3.40 - 10.80 10*3/mm3    RBC 4.23 4.14 - 5.80 10*6/mm3    Hemoglobin 12.7 (L) 13.0 - 17.7 g/dL    Hematocrit 39.2 37.5 - 51.0 %    MCV 92.7 79.0 - 97.0 fL    MCH  30.0 26.6 - 33.0 pg    MCHC 32.4 31.5 - 35.7 g/dL    RDW 12.8 12.3 - 15.4 %    RDW-SD 43.2 37.0 - 54.0 fl    MPV 9.4 6.0 - 12.0 fL    Platelets 248 140 - 450 10*3/mm3        Assessment:  Symptomatic right nephro calculi    Plan:    Patient is off anticoagulation therapy undergo ESWL procedure and risks and to infection bleeding incomplete fragmentation answered procedures contiguous organ injury patient understands agrees to proceed    I discussed the patient's findings and my recommendations with patient.     Isra Regan MD  04/18/24  07:38 EDT

## 2024-04-19 ENCOUNTER — ANESTHESIA (OUTPATIENT)
Dept: PERIOP | Facility: HOSPITAL | Age: 74
End: 2024-04-19
Payer: MEDICARE

## 2024-04-19 ENCOUNTER — ANESTHESIA EVENT (OUTPATIENT)
Dept: PERIOP | Facility: HOSPITAL | Age: 74
End: 2024-04-19
Payer: MEDICARE

## 2024-04-19 ENCOUNTER — HOSPITAL ENCOUNTER (OUTPATIENT)
Facility: HOSPITAL | Age: 74
Setting detail: HOSPITAL OUTPATIENT SURGERY
Discharge: HOME | End: 2024-04-19
Attending: UROLOGY | Admitting: UROLOGY
Payer: MEDICARE

## 2024-04-19 VITALS
RESPIRATION RATE: 16 BRPM | DIASTOLIC BLOOD PRESSURE: 93 MMHG | OXYGEN SATURATION: 100 % | SYSTOLIC BLOOD PRESSURE: 175 MMHG | HEART RATE: 62 BPM | TEMPERATURE: 97.7 F

## 2024-04-19 DIAGNOSIS — N20.0 RIGHT RENAL STONE: Primary | ICD-10-CM

## 2024-04-19 PROCEDURE — 25810000003 LACTATED RINGERS PER 1000 ML: Performed by: ANESTHESIOLOGY

## 2024-04-19 PROCEDURE — C1758 CATHETER, URETERAL: HCPCS | Performed by: UROLOGY

## 2024-04-19 PROCEDURE — 25010000002 PROPOFOL 200 MG/20ML EMULSION: Performed by: NURSE ANESTHETIST, CERTIFIED REGISTERED

## 2024-04-19 PROCEDURE — 25010000002 FENTANYL CITRATE (PF) 50 MCG/ML SOLUTION: Performed by: NURSE ANESTHETIST, CERTIFIED REGISTERED

## 2024-04-19 PROCEDURE — 25010000002 ONDANSETRON PER 1 MG: Performed by: NURSE ANESTHETIST, CERTIFIED REGISTERED

## 2024-04-19 PROCEDURE — 25010000002 DEXAMETHASONE SODIUM PHOSPHATE 20 MG/5ML SOLUTION: Performed by: NURSE ANESTHETIST, CERTIFIED REGISTERED

## 2024-04-19 PROCEDURE — 25010000002 LEVOFLOXACIN PER 250 MG: Performed by: UROLOGY

## 2024-04-19 PROCEDURE — C1769 GUIDE WIRE: HCPCS | Performed by: UROLOGY

## 2024-04-19 RX ORDER — SODIUM CHLORIDE 0.9 % (FLUSH) 0.9 %
3 SYRINGE (ML) INJECTION EVERY 12 HOURS SCHEDULED
Status: DISCONTINUED | OUTPATIENT
Start: 2024-04-19 | End: 2024-04-19 | Stop reason: HOSPADM

## 2024-04-19 RX ORDER — DROPERIDOL 2.5 MG/ML
0.62 INJECTION, SOLUTION INTRAMUSCULAR; INTRAVENOUS
Status: DISCONTINUED | OUTPATIENT
Start: 2024-04-19 | End: 2024-04-19 | Stop reason: HOSPADM

## 2024-04-19 RX ORDER — EPHEDRINE SULFATE 50 MG/ML
5 INJECTION, SOLUTION INTRAVENOUS ONCE AS NEEDED
Status: DISCONTINUED | OUTPATIENT
Start: 2024-04-19 | End: 2024-04-19 | Stop reason: HOSPADM

## 2024-04-19 RX ORDER — IPRATROPIUM BROMIDE AND ALBUTEROL SULFATE 2.5; .5 MG/3ML; MG/3ML
3 SOLUTION RESPIRATORY (INHALATION) ONCE AS NEEDED
Status: DISCONTINUED | OUTPATIENT
Start: 2024-04-19 | End: 2024-04-19 | Stop reason: HOSPADM

## 2024-04-19 RX ORDER — FENTANYL CITRATE 50 UG/ML
50 INJECTION, SOLUTION INTRAMUSCULAR; INTRAVENOUS
Status: DISCONTINUED | OUTPATIENT
Start: 2024-04-19 | End: 2024-04-19 | Stop reason: HOSPADM

## 2024-04-19 RX ORDER — LIDOCAINE HYDROCHLORIDE 20 MG/ML
INJECTION, SOLUTION INFILTRATION; PERINEURAL AS NEEDED
Status: DISCONTINUED | OUTPATIENT
Start: 2024-04-19 | End: 2024-04-19 | Stop reason: SURG

## 2024-04-19 RX ORDER — SODIUM CHLORIDE, SODIUM LACTATE, POTASSIUM CHLORIDE, CALCIUM CHLORIDE 600; 310; 30; 20 MG/100ML; MG/100ML; MG/100ML; MG/100ML
9 INJECTION, SOLUTION INTRAVENOUS CONTINUOUS
Status: DISCONTINUED | OUTPATIENT
Start: 2024-04-19 | End: 2024-04-19 | Stop reason: HOSPADM

## 2024-04-19 RX ORDER — DEXAMETHASONE SODIUM PHOSPHATE 4 MG/ML
INJECTION, SOLUTION INTRA-ARTICULAR; INTRALESIONAL; INTRAMUSCULAR; INTRAVENOUS; SOFT TISSUE AS NEEDED
Status: DISCONTINUED | OUTPATIENT
Start: 2024-04-19 | End: 2024-04-19 | Stop reason: SURG

## 2024-04-19 RX ORDER — PROPOFOL 10 MG/ML
INJECTION, EMULSION INTRAVENOUS AS NEEDED
Status: DISCONTINUED | OUTPATIENT
Start: 2024-04-19 | End: 2024-04-19 | Stop reason: SURG

## 2024-04-19 RX ORDER — PROMETHAZINE HYDROCHLORIDE 25 MG/1
25 TABLET ORAL ONCE AS NEEDED
Status: DISCONTINUED | OUTPATIENT
Start: 2024-04-19 | End: 2024-04-19 | Stop reason: HOSPADM

## 2024-04-19 RX ORDER — MIDAZOLAM HYDROCHLORIDE 1 MG/ML
0.5 INJECTION INTRAMUSCULAR; INTRAVENOUS
Status: DISCONTINUED | OUTPATIENT
Start: 2024-04-19 | End: 2024-04-19 | Stop reason: HOSPADM

## 2024-04-19 RX ORDER — OXYCODONE HYDROCHLORIDE AND ACETAMINOPHEN 5; 325 MG/1; MG/1
1 TABLET ORAL EVERY 6 HOURS PRN
Qty: 20 TABLET | Refills: 0 | Status: SHIPPED | OUTPATIENT
Start: 2024-04-19

## 2024-04-19 RX ORDER — NALOXONE HCL 0.4 MG/ML
0.2 VIAL (ML) INJECTION AS NEEDED
Status: DISCONTINUED | OUTPATIENT
Start: 2024-04-19 | End: 2024-04-19 | Stop reason: HOSPADM

## 2024-04-19 RX ORDER — FLUMAZENIL 0.1 MG/ML
0.2 INJECTION INTRAVENOUS AS NEEDED
Status: DISCONTINUED | OUTPATIENT
Start: 2024-04-19 | End: 2024-04-19 | Stop reason: HOSPADM

## 2024-04-19 RX ORDER — HYDROCODONE BITARTRATE AND ACETAMINOPHEN 5; 325 MG/1; MG/1
1 TABLET ORAL ONCE AS NEEDED
Status: DISCONTINUED | OUTPATIENT
Start: 2024-04-19 | End: 2024-04-19 | Stop reason: HOSPADM

## 2024-04-19 RX ORDER — ONDANSETRON 2 MG/ML
4 INJECTION INTRAMUSCULAR; INTRAVENOUS ONCE AS NEEDED
Status: DISCONTINUED | OUTPATIENT
Start: 2024-04-19 | End: 2024-04-19 | Stop reason: HOSPADM

## 2024-04-19 RX ORDER — ONDANSETRON 2 MG/ML
INJECTION INTRAMUSCULAR; INTRAVENOUS AS NEEDED
Status: DISCONTINUED | OUTPATIENT
Start: 2024-04-19 | End: 2024-04-19 | Stop reason: SURG

## 2024-04-19 RX ORDER — LEVOFLOXACIN 5 MG/ML
500 INJECTION, SOLUTION INTRAVENOUS ONCE
Status: COMPLETED | OUTPATIENT
Start: 2024-04-19 | End: 2024-04-19

## 2024-04-19 RX ORDER — FAMOTIDINE 10 MG/ML
20 INJECTION, SOLUTION INTRAVENOUS ONCE
Status: COMPLETED | OUTPATIENT
Start: 2024-04-19 | End: 2024-04-19

## 2024-04-19 RX ORDER — SODIUM CHLORIDE 0.9 % (FLUSH) 0.9 %
3-10 SYRINGE (ML) INJECTION AS NEEDED
Status: DISCONTINUED | OUTPATIENT
Start: 2024-04-19 | End: 2024-04-19 | Stop reason: HOSPADM

## 2024-04-19 RX ORDER — LABETALOL HYDROCHLORIDE 5 MG/ML
5 INJECTION, SOLUTION INTRAVENOUS
Status: DISCONTINUED | OUTPATIENT
Start: 2024-04-19 | End: 2024-04-19 | Stop reason: HOSPADM

## 2024-04-19 RX ORDER — ONDANSETRON 4 MG/1
4 TABLET, ORALLY DISINTEGRATING ORAL EVERY 8 HOURS PRN
Qty: 10 TABLET | Refills: 0 | Status: SHIPPED | OUTPATIENT
Start: 2024-04-19

## 2024-04-19 RX ORDER — PROMETHAZINE HYDROCHLORIDE 25 MG/1
25 SUPPOSITORY RECTAL ONCE AS NEEDED
Status: DISCONTINUED | OUTPATIENT
Start: 2024-04-19 | End: 2024-04-19 | Stop reason: HOSPADM

## 2024-04-19 RX ORDER — LIDOCAINE HYDROCHLORIDE 10 MG/ML
0.5 INJECTION, SOLUTION INFILTRATION; PERINEURAL ONCE AS NEEDED
Status: DISCONTINUED | OUTPATIENT
Start: 2024-04-19 | End: 2024-04-19 | Stop reason: HOSPADM

## 2024-04-19 RX ORDER — HYDROMORPHONE HYDROCHLORIDE 1 MG/ML
0.5 INJECTION, SOLUTION INTRAMUSCULAR; INTRAVENOUS; SUBCUTANEOUS
Status: DISCONTINUED | OUTPATIENT
Start: 2024-04-19 | End: 2024-04-19 | Stop reason: HOSPADM

## 2024-04-19 RX ORDER — FENTANYL CITRATE 50 UG/ML
50 INJECTION, SOLUTION INTRAMUSCULAR; INTRAVENOUS ONCE AS NEEDED
Status: DISCONTINUED | OUTPATIENT
Start: 2024-04-19 | End: 2024-04-19 | Stop reason: HOSPADM

## 2024-04-19 RX ORDER — DIPHENHYDRAMINE HYDROCHLORIDE 50 MG/ML
12.5 INJECTION INTRAMUSCULAR; INTRAVENOUS
Status: DISCONTINUED | OUTPATIENT
Start: 2024-04-19 | End: 2024-04-19 | Stop reason: HOSPADM

## 2024-04-19 RX ORDER — FENTANYL CITRATE 50 UG/ML
INJECTION, SOLUTION INTRAMUSCULAR; INTRAVENOUS AS NEEDED
Status: DISCONTINUED | OUTPATIENT
Start: 2024-04-19 | End: 2024-04-19 | Stop reason: SURG

## 2024-04-19 RX ORDER — OXYCODONE AND ACETAMINOPHEN 7.5; 325 MG/1; MG/1
1 TABLET ORAL EVERY 4 HOURS PRN
Status: DISCONTINUED | OUTPATIENT
Start: 2024-04-19 | End: 2024-04-19 | Stop reason: HOSPADM

## 2024-04-19 RX ORDER — HYDRALAZINE HYDROCHLORIDE 20 MG/ML
5 INJECTION INTRAMUSCULAR; INTRAVENOUS
Status: DISCONTINUED | OUTPATIENT
Start: 2024-04-19 | End: 2024-04-19 | Stop reason: HOSPADM

## 2024-04-19 RX ADMIN — SODIUM CHLORIDE, POTASSIUM CHLORIDE, SODIUM LACTATE AND CALCIUM CHLORIDE 9 ML/HR: 600; 310; 30; 20 INJECTION, SOLUTION INTRAVENOUS at 11:45

## 2024-04-19 RX ADMIN — DEXAMETHASONE SODIUM PHOSPHATE 8 MG: 4 INJECTION, SOLUTION INTRAMUSCULAR; INTRAVENOUS at 13:44

## 2024-04-19 RX ADMIN — LIDOCAINE HYDROCHLORIDE 100 MG: 20 INJECTION, SOLUTION INFILTRATION; PERINEURAL at 13:31

## 2024-04-19 RX ADMIN — PROPOFOL 200 MG: 10 INJECTION, EMULSION INTRAVENOUS at 13:31

## 2024-04-19 RX ADMIN — FAMOTIDINE 20 MG: 10 INJECTION INTRAVENOUS at 11:45

## 2024-04-19 RX ADMIN — OXYCODONE HYDROCHLORIDE AND ACETAMINOPHEN 1 TABLET: 7.5; 325 TABLET ORAL at 15:21

## 2024-04-19 RX ADMIN — ONDANSETRON 4 MG: 2 INJECTION INTRAMUSCULAR; INTRAVENOUS at 13:44

## 2024-04-19 RX ADMIN — FENTANYL CITRATE 50 MCG: 50 INJECTION, SOLUTION INTRAMUSCULAR; INTRAVENOUS at 13:31

## 2024-04-19 RX ADMIN — LEVOFLOXACIN 500 MG: 500 INJECTION, SOLUTION INTRAVENOUS at 13:20

## 2024-04-19 NOTE — ANESTHESIA PREPROCEDURE EVALUATION
Anesthesia Evaluation     Patient summary reviewed   NPO Solid Status: > 8 hours  NPO Liquid Status: > 2 hours           Airway   Mallampati: I  TM distance: >3 FB  Neck ROM: full  No difficulty expected  Dental      Pulmonary    Cardiovascular - normal exam    (+) hypertension, CAD, cardiac stents Drug eluting stent unknown timeframe , dysrhythmias Atrial Flutter, hyperlipidemia      Neuro/Psych  GI/Hepatic/Renal/Endo    (+) GERD, renal disease- CRI, thyroid problem     Musculoskeletal     Abdominal    Substance History      OB/GYN          Other                    Anesthesia Plan    ASA 3     general     intravenous induction     Anesthetic plan, risks, benefits, and alternatives have been provided, discussed and informed consent has been obtained with: patient.  Pre-procedure education provided    CODE STATUS:

## 2024-04-19 NOTE — ADDENDUM NOTE
Addendum  created 04/19/24 1530 by Laura Del Angel MD    Attestation recorded in Intraprocedure, Intraprocedure Attestations filed

## 2024-04-19 NOTE — OP NOTE
Preoperative diagnosis recurrent stone disease with right renal colic    Postop diagnosis same    Procedure is cystoscopy with right retrograde pyelogram and right ESWL with pause protocol 3000 shocks maximum level 7    Surgeon Jass    Anesthesia General    Procedure note 73-year-old gentleman with above-mentioned history and findings undergo the above-mentioned procedure procedure attendant risks have been discussed understood to proceed    The patient's been off anticoagulation therapy preoperatively    Site was marked antibiotics were given timeout was taken allergies reviewed after under general anesthesia he was coupled with shock electrode where he underwent a total of 3000 shocks max level 7 with pause protocol flexible cystoscopy was started at the beginning of the procedure with intraurethral lidocaine jelly administered scope advanced bladder he had bilateral lobe prostatic obstruction the median lobe and the bladder had about 400 cc this was not a postvoid residual there is mild trabeculations ureteral orifice ease were normal position configuration with clear E flux the right ureteral orifice was cannulated without difficulty with a guidewire followed by a Pollick catheter and contrast was injected full-strength retrograde fashion there were no filling defects or hydronephrosis delineating our anatomy we then proceeded with lithotripsy as mentioned above the patient procedure well findings were called to his wife Maricruz at [phone]    Disposition discharge and outpatient structure sheet progressive diet and activities continuation of his home meds except for his anticoagulation medications remain off of until there is no blood or blood in his urine for 24 hours with no signs of any active bruising on the right foot on the right flank my office will call for follow-up in about a month with a KUB x-ray at that time any stone fragments caught in your breathing bring at that time as well phone numbers  given on the instruction sheet any concerns or questions additional medications include hydrocodone and Zofran

## 2024-04-19 NOTE — ANESTHESIA PROCEDURE NOTES
Airway  Urgency: elective    Date/Time: 4/19/2024 1:33 PM  Airway not difficult    General Information and Staff    Patient location during procedure: OR  CRNA/CAA: Macy Pham CRNA    Indications and Patient Condition  Indications for airway management: airway protection    Preoxygenated: yes  MILS not maintained throughout  Mask difficulty assessment: 1 - vent by mask    Final Airway Details  Final airway type: supraglottic airway      Successful airway: classic and LMA  Size 4     Number of attempts at approach: 1  Assessment: lips, teeth, and gum same as pre-op    Additional Comments  Inflated to seal.

## 2024-04-19 NOTE — BRIEF OP NOTE
EXTRACORPOREAL SHOCKWAVE LITHOTRIPSY, CYSTOSCOPY BLADDER STONE LITHOTRIPSY  Progress Note    Kyle Amador  4/19/2024    Pre-op Diagnosis:   Right renal stones colic       Post-Op Diagnosis Codes:  Same    Procedure/CPT® Codes:        Procedure(s):  RIGHT  SHOCKWAVE LITHOTRIPSY WITH  CYSTOSCOPY RETROGRADE              Surgeon(s):  Isra Regan MD    Anesthesia: General    Staff:   * No surgical staff found *         Estimated Blood Loss: none    Urine Voided: * No values recorded between 4/19/2024 12:00 AM and 4/19/2024 12:38 PM *    Specimens:                None          Drains:   [REMOVED] Ureteral Drain/Stent Right ureter 6 Fr. (Removed)       Findings:         Complications: None          Isra Regan MD     Date: 4/19/2024  Time: 12:38 EDT

## 2024-04-19 NOTE — INTERVAL H&P NOTE
H&P reviewed. The patient was examined and there are no changes to the H&P.      /75 (BP Location: Right arm, Patient Position: Sitting)   Pulse 68   Temp 97.5 °F (36.4 °C) (Oral)   Resp 16   SpO2 98%

## 2024-04-19 NOTE — ANESTHESIA POSTPROCEDURE EVALUATION
Patient: Kyle Amador    Procedure Summary       Date: 04/19/24 Room / Location: [facility] 09 / [facility]    Anesthesia Start: 1325 Anesthesia Stop: 1426    Procedures:       RIGHT  SHOCKWAVE LITHOTRIPSY WITH (Right)      CYSTOSCOPY RETROGRADE (Right) Diagnosis:     Surgeons: Isra Regan MD Provider: Laura Del Angel MD    Anesthesia Type: general ASA Status: 3            Anesthesia Type: general    Vitals  Vitals Value Taken Time   /87 04/19/24 1435   Temp 36.5 °C (97.7 °F) 04/19/24 1420   Pulse 63 04/19/24 1444   Resp 16 04/19/24 1435   SpO2 99 % 04/19/24 1443   Vitals shown include unfiled device data.        Post Anesthesia Care and Evaluation    Pain management: adequate    Airway patency: patent  Anesthetic complications: No anesthetic complications    Cardiovascular status: acceptable  Respiratory status: acceptable  Hydration status: acceptable    Comments: /87   Pulse 67   Temp 36.5 °C (97.7 °F) (Oral)   Resp 16   SpO2 98%

## 2024-04-25 ENCOUNTER — APPOINTMENT (OUTPATIENT)
Dept: GENERAL RADIOLOGY | Facility: HOSPITAL | Age: 74
End: 2024-04-25
Payer: MEDICARE

## 2024-04-25 ENCOUNTER — HOSPITAL ENCOUNTER (OUTPATIENT)
Facility: HOSPITAL | Age: 74
Setting detail: OBSERVATION
LOS: 1 days | Discharge: HOME | End: 2024-04-26
Attending: EMERGENCY MEDICINE | Admitting: INTERNAL MEDICINE
Payer: MEDICARE

## 2024-04-25 ENCOUNTER — APPOINTMENT (OUTPATIENT)
Dept: CARDIOLOGY | Facility: HOSPITAL | Age: 74
End: 2024-04-25
Payer: MEDICARE

## 2024-04-25 ENCOUNTER — APPOINTMENT (OUTPATIENT)
Dept: CT IMAGING | Facility: HOSPITAL | Age: 74
End: 2024-04-25
Payer: MEDICARE

## 2024-04-25 DIAGNOSIS — R07.9 CHEST PAIN, UNSPECIFIED TYPE: Primary | ICD-10-CM

## 2024-04-25 DIAGNOSIS — S37.019A PERINEPHRIC HEMATOMA: ICD-10-CM

## 2024-04-25 DIAGNOSIS — N17.9 AKI (ACUTE KIDNEY INJURY): ICD-10-CM

## 2024-04-25 DIAGNOSIS — J90 PLEURAL EFFUSION, BILATERAL: ICD-10-CM

## 2024-04-25 PROBLEM — N18.2 CKD (CHRONIC KIDNEY DISEASE) STAGE 2, GFR 60-89 ML/MIN: Status: ACTIVE | Noted: 2024-04-25

## 2024-04-25 LAB
ALBUMIN SERPL-MCNC: 3.5 G/DL (ref 3.5–5.2)
ALBUMIN/GLOB SERPL: 1.7 G/DL
ALP SERPL-CCNC: 59 U/L (ref 39–117)
ALT SERPL W P-5'-P-CCNC: 13 U/L (ref 1–41)
ANION GAP SERPL CALCULATED.3IONS-SCNC: 11 MMOL/L (ref 5–15)
AORTIC DIMENSIONLESS INDEX: 0.9 (DI)
APTT PPP: 27.8 SECONDS (ref 22.7–35.4)
ASCENDING AORTA: 3.2 CM
AST SERPL-CCNC: 14 U/L (ref 1–40)
BASOPHILS # BLD AUTO: 0.03 10*3/MM3 (ref 0–0.2)
BASOPHILS NFR BLD AUTO: 0.3 % (ref 0–1.5)
BH CV ECHO MEAS - ACS: 2.17 CM
BH CV ECHO MEAS - AO MAX PG: 10 MMHG
BH CV ECHO MEAS - AO MEAN PG: 5 MMHG
BH CV ECHO MEAS - AO ROOT DIAM: 3.4 CM
BH CV ECHO MEAS - AO V2 MAX: 158 CM/SEC
BH CV ECHO MEAS - AO V2 VTI: 32.6 CM
BH CV ECHO MEAS - AVA(I,D): 3.7 CM2
BH CV ECHO MEAS - EDV(CUBED): 118.5 ML
BH CV ECHO MEAS - EDV(MOD-SP2): 145 ML
BH CV ECHO MEAS - EDV(MOD-SP4): 145 ML
BH CV ECHO MEAS - EF(MOD-BP): 74.7 %
BH CV ECHO MEAS - EF(MOD-SP2): 75.2 %
BH CV ECHO MEAS - EF(MOD-SP4): 73.1 %
BH CV ECHO MEAS - ESV(CUBED): 26.8 ML
BH CV ECHO MEAS - ESV(MOD-SP2): 36 ML
BH CV ECHO MEAS - ESV(MOD-SP4): 39 ML
BH CV ECHO MEAS - FS: 39.1 %
BH CV ECHO MEAS - IVS/LVPW: 0.94 CM
BH CV ECHO MEAS - IVSD: 1.27 CM
BH CV ECHO MEAS - LAT PEAK E' VEL: 7.8 CM/SEC
BH CV ECHO MEAS - LV MASS(C)D: 256.6 GRAMS
BH CV ECHO MEAS - LV MAX PG: 7.5 MMHG
BH CV ECHO MEAS - LV MEAN PG: 3.8 MMHG
BH CV ECHO MEAS - LV V1 MAX: 136.9 CM/SEC
BH CV ECHO MEAS - LV V1 VTI: 28.7 CM
BH CV ECHO MEAS - LVIDD: 4.9 CM
BH CV ECHO MEAS - LVIDS: 3 CM
BH CV ECHO MEAS - LVOT AREA: 4.2 CM2
BH CV ECHO MEAS - LVOT DIAM: 2.32 CM
BH CV ECHO MEAS - LVPWD: 1.35 CM
BH CV ECHO MEAS - MED PEAK E' VEL: 7.2 CM/SEC
BH CV ECHO MEAS - MV A DUR: 0.16 SEC
BH CV ECHO MEAS - MV A MAX VEL: 83.4 CM/SEC
BH CV ECHO MEAS - MV DEC SLOPE: 521.8 CM/SEC2
BH CV ECHO MEAS - MV DEC TIME: 0.18 SEC
BH CV ECHO MEAS - MV E MAX VEL: 80 CM/SEC
BH CV ECHO MEAS - MV E/A: 0.96
BH CV ECHO MEAS - MV MAX PG: 5.4 MMHG
BH CV ECHO MEAS - MV MEAN PG: 2.8 MMHG
BH CV ECHO MEAS - MV P1/2T: 55 MSEC
BH CV ECHO MEAS - MV V2 VTI: 30.9 CM
BH CV ECHO MEAS - MVA(P1/2T): 4 CM2
BH CV ECHO MEAS - MVA(VTI): 3.9 CM2
BH CV ECHO MEAS - PA ACC TIME: 0.07 SEC
BH CV ECHO MEAS - PA V2 MAX: 93.4 CM/SEC
BH CV ECHO MEAS - PULM A REVS DUR: 0.14 SEC
BH CV ECHO MEAS - PULM A REVS VEL: 31.2 CM/SEC
BH CV ECHO MEAS - PULM DIAS VEL: 40.4 CM/SEC
BH CV ECHO MEAS - PULM S/D: 1.69
BH CV ECHO MEAS - PULM SYS VEL: 68.4 CM/SEC
BH CV ECHO MEAS - QP/QS: 0.71
BH CV ECHO MEAS - RV MAX PG: 1.65 MMHG
BH CV ECHO MEAS - RV V1 MAX: 64.3 CM/SEC
BH CV ECHO MEAS - RV V1 VTI: 14.1 CM
BH CV ECHO MEAS - RVOT DIAM: 2.8 CM
BH CV ECHO MEAS - SUP REN AO DIAM: 2.5 CM
BH CV ECHO MEAS - SV(LVOT): 121 ML
BH CV ECHO MEAS - SV(MOD-SP2): 109 ML
BH CV ECHO MEAS - SV(MOD-SP4): 106 ML
BH CV ECHO MEAS - SV(RVOT): 85.7 ML
BH CV ECHO MEAS - TAPSE (>1.6): 2.14 CM
BH CV ECHO MEASUREMENTS AVERAGE E/E' RATIO: 10.67
BH CV XLRA - RV BASE: 3.2 CM
BH CV XLRA - RV MID: 2.34 CM
BH CV XLRA - TDI S': 14.6 CM/SEC
BILIRUB SERPL-MCNC: 0.7 MG/DL (ref 0–1.2)
BUN SERPL-MCNC: 26 MG/DL (ref 8–23)
BUN/CREAT SERPL: 14.4 (ref 7–25)
CALCIUM SPEC-SCNC: 8 MG/DL (ref 8.6–10.5)
CHLORIDE SERPL-SCNC: 107 MMOL/L (ref 98–107)
CO2 SERPL-SCNC: 23 MMOL/L (ref 22–29)
CREAT SERPL-MCNC: 1.81 MG/DL (ref 0.76–1.27)
D DIMER PPP FEU-MCNC: 1.92 MCGFEU/ML (ref 0–0.74)
DEPRECATED RDW RBC AUTO: 44.1 FL (ref 37–54)
EGFRCR SERPLBLD CKD-EPI 2021: 38.8 ML/MIN/1.73
EOSINOPHIL # BLD AUTO: 0.38 10*3/MM3 (ref 0–0.4)
EOSINOPHIL NFR BLD AUTO: 4.2 % (ref 0.3–6.2)
ERYTHROCYTE [DISTWIDTH] IN BLOOD BY AUTOMATED COUNT: 12.9 % (ref 12.3–15.4)
GEN 5 2HR TROPONIN T REFLEX: 20 NG/L
GLOBULIN UR ELPH-MCNC: 2.1 GM/DL
GLUCOSE SERPL-MCNC: 110 MG/DL (ref 65–99)
HCT VFR BLD AUTO: 29.5 % (ref 37.5–51)
HGB BLD-MCNC: 9.7 G/DL (ref 13–17.7)
IMM GRANULOCYTES # BLD AUTO: 0.02 10*3/MM3 (ref 0–0.05)
IMM GRANULOCYTES NFR BLD AUTO: 0.2 % (ref 0–0.5)
INR PPP: 1.04 (ref 0.9–1.1)
LEFT ATRIUM VOLUME INDEX: 29.8 ML/M2
LYMPHOCYTES # BLD AUTO: 1.55 10*3/MM3 (ref 0.7–3.1)
LYMPHOCYTES NFR BLD AUTO: 17 % (ref 19.6–45.3)
MCH RBC QN AUTO: 30.8 PG (ref 26.6–33)
MCHC RBC AUTO-ENTMCNC: 32.9 G/DL (ref 31.5–35.7)
MCV RBC AUTO: 93.7 FL (ref 79–97)
MONOCYTES # BLD AUTO: 0.9 10*3/MM3 (ref 0.1–0.9)
MONOCYTES NFR BLD AUTO: 9.9 % (ref 5–12)
NEUTROPHILS NFR BLD AUTO: 6.22 10*3/MM3 (ref 1.7–7)
NEUTROPHILS NFR BLD AUTO: 68.4 % (ref 42.7–76)
NRBC BLD AUTO-RTO: 0 /100 WBC (ref 0–0.2)
NT-PROBNP SERPL-MCNC: 822 PG/ML (ref 0–900)
PLATELET # BLD AUTO: 257 10*3/MM3 (ref 140–450)
PMV BLD AUTO: 9.5 FL (ref 6–12)
POTASSIUM SERPL-SCNC: 3.5 MMOL/L (ref 3.5–5.2)
PROT SERPL-MCNC: 5.6 G/DL (ref 6–8.5)
PROTHROMBIN TIME: 13.8 SECONDS (ref 11.7–14.2)
QT INTERVAL: 365 MS
QTC INTERVAL: 427 MS
RBC # BLD AUTO: 3.15 10*6/MM3 (ref 4.14–5.8)
SINUS: 3.3 CM
SODIUM SERPL-SCNC: 141 MMOL/L (ref 136–145)
STJ: 2.42 CM
TROPONIN T DELTA: -1 NG/L
TROPONIN T SERPL HS-MCNC: 21 NG/L
URATE SERPL-MCNC: 5.6 MG/DL (ref 3.4–7)
WBC NRBC COR # BLD AUTO: 9.1 10*3/MM3 (ref 3.4–10.8)

## 2024-04-25 PROCEDURE — 85379 FIBRIN DEGRADATION QUANT: CPT | Performed by: EMERGENCY MEDICINE

## 2024-04-25 PROCEDURE — 85610 PROTHROMBIN TIME: CPT | Performed by: EMERGENCY MEDICINE

## 2024-04-25 PROCEDURE — 83880 ASSAY OF NATRIURETIC PEPTIDE: CPT | Performed by: EMERGENCY MEDICINE

## 2024-04-25 PROCEDURE — 93306 TTE W/DOPPLER COMPLETE: CPT | Performed by: INTERNAL MEDICINE

## 2024-04-25 PROCEDURE — 25510000001 IOPAMIDOL PER 1 ML: Performed by: EMERGENCY MEDICINE

## 2024-04-25 PROCEDURE — 71275 CT ANGIOGRAPHY CHEST: CPT

## 2024-04-25 PROCEDURE — 25010000002 MORPHINE PER 10 MG: Performed by: EMERGENCY MEDICINE

## 2024-04-25 PROCEDURE — 74176 CT ABD & PELVIS W/O CONTRAST: CPT

## 2024-04-25 PROCEDURE — 25010000002 MORPHINE PER 10 MG: Performed by: STUDENT IN AN ORGANIZED HEALTH CARE EDUCATION/TRAINING PROGRAM

## 2024-04-25 PROCEDURE — 93010 ELECTROCARDIOGRAM REPORT: CPT | Performed by: INTERNAL MEDICINE

## 2024-04-25 PROCEDURE — 96374 THER/PROPH/DIAG INJ IV PUSH: CPT

## 2024-04-25 PROCEDURE — 25810000003 SODIUM CHLORIDE 0.9 % SOLUTION: Performed by: EMERGENCY MEDICINE

## 2024-04-25 PROCEDURE — 96375 TX/PRO/DX INJ NEW DRUG ADDON: CPT

## 2024-04-25 PROCEDURE — 36415 COLL VENOUS BLD VENIPUNCTURE: CPT

## 2024-04-25 PROCEDURE — 84550 ASSAY OF BLOOD/URIC ACID: CPT | Performed by: NURSE PRACTITIONER

## 2024-04-25 PROCEDURE — 25010000002 ONDANSETRON PER 1 MG: Performed by: EMERGENCY MEDICINE

## 2024-04-25 PROCEDURE — 93306 TTE W/DOPPLER COMPLETE: CPT

## 2024-04-25 PROCEDURE — 71045 X-RAY EXAM CHEST 1 VIEW: CPT

## 2024-04-25 PROCEDURE — 25510000001 PERFLUTREN (DEFINITY) 8.476 MG IN SODIUM CHLORIDE (PF) 0.9 % 10 ML INJECTION: Performed by: INTERNAL MEDICINE

## 2024-04-25 PROCEDURE — 85730 THROMBOPLASTIN TIME PARTIAL: CPT | Performed by: EMERGENCY MEDICINE

## 2024-04-25 PROCEDURE — 85025 COMPLETE CBC W/AUTO DIFF WBC: CPT | Performed by: EMERGENCY MEDICINE

## 2024-04-25 PROCEDURE — 93005 ELECTROCARDIOGRAM TRACING: CPT | Performed by: EMERGENCY MEDICINE

## 2024-04-25 PROCEDURE — 99285 EMERGENCY DEPT VISIT HI MDM: CPT

## 2024-04-25 PROCEDURE — 80053 COMPREHEN METABOLIC PANEL: CPT | Performed by: EMERGENCY MEDICINE

## 2024-04-25 PROCEDURE — 96376 TX/PRO/DX INJ SAME DRUG ADON: CPT

## 2024-04-25 PROCEDURE — 84484 ASSAY OF TROPONIN QUANT: CPT | Performed by: EMERGENCY MEDICINE

## 2024-04-25 PROCEDURE — 99222 1ST HOSP IP/OBS MODERATE 55: CPT | Performed by: INTERNAL MEDICINE

## 2024-04-25 RX ORDER — METOPROLOL SUCCINATE 100 MG/1
100 TABLET, EXTENDED RELEASE ORAL NIGHTLY
Status: DISCONTINUED | OUTPATIENT
Start: 2024-04-25 | End: 2024-04-26 | Stop reason: HOSPADM

## 2024-04-25 RX ORDER — MORPHINE SULFATE 2 MG/ML
4 INJECTION, SOLUTION INTRAMUSCULAR; INTRAVENOUS ONCE
Status: COMPLETED | OUTPATIENT
Start: 2024-04-25 | End: 2024-04-25

## 2024-04-25 RX ORDER — HYDRALAZINE HYDROCHLORIDE 50 MG/1
100 TABLET, FILM COATED ORAL 2 TIMES DAILY
Status: DISCONTINUED | OUTPATIENT
Start: 2024-04-25 | End: 2024-04-26 | Stop reason: HOSPADM

## 2024-04-25 RX ORDER — OXYCODONE HYDROCHLORIDE AND ACETAMINOPHEN 5; 325 MG/1; MG/1
1 TABLET ORAL EVERY 6 HOURS PRN
Status: DISCONTINUED | OUTPATIENT
Start: 2024-04-25 | End: 2024-04-26 | Stop reason: HOSPADM

## 2024-04-25 RX ORDER — ONDANSETRON 4 MG/1
4 TABLET, ORALLY DISINTEGRATING ORAL EVERY 8 HOURS PRN
Status: DISCONTINUED | OUTPATIENT
Start: 2024-04-25 | End: 2024-04-26 | Stop reason: HOSPADM

## 2024-04-25 RX ORDER — PANTOPRAZOLE SODIUM 40 MG/1
40 TABLET, DELAYED RELEASE ORAL 2 TIMES DAILY
Status: DISCONTINUED | OUTPATIENT
Start: 2024-04-25 | End: 2024-04-26 | Stop reason: HOSPADM

## 2024-04-25 RX ORDER — ONDANSETRON 2 MG/ML
4 INJECTION INTRAMUSCULAR; INTRAVENOUS ONCE
Status: COMPLETED | OUTPATIENT
Start: 2024-04-25 | End: 2024-04-25

## 2024-04-25 RX ORDER — VALSARTAN 160 MG/1
160 TABLET ORAL 2 TIMES DAILY
Status: DISCONTINUED | OUTPATIENT
Start: 2024-04-25 | End: 2024-04-25

## 2024-04-25 RX ORDER — SODIUM CHLORIDE 0.9 % (FLUSH) 0.9 %
10 SYRINGE (ML) INJECTION AS NEEDED
Status: DISCONTINUED | OUTPATIENT
Start: 2024-04-25 | End: 2024-04-26 | Stop reason: HOSPADM

## 2024-04-25 RX ORDER — ISOSORBIDE MONONITRATE 60 MG/1
60 TABLET, EXTENDED RELEASE ORAL NIGHTLY
Status: DISCONTINUED | OUTPATIENT
Start: 2024-04-25 | End: 2024-04-26 | Stop reason: HOSPADM

## 2024-04-25 RX ORDER — AMITRIPTYLINE HYDROCHLORIDE 25 MG/1
25 TABLET, FILM COATED ORAL NIGHTLY
Status: DISCONTINUED | OUTPATIENT
Start: 2024-04-25 | End: 2024-04-26 | Stop reason: HOSPADM

## 2024-04-25 RX ORDER — ASPIRIN 81 MG/1
324 TABLET, CHEWABLE ORAL ONCE
Status: DISCONTINUED | OUTPATIENT
Start: 2024-04-25 | End: 2024-04-26

## 2024-04-25 RX ORDER — ATORVASTATIN CALCIUM 20 MG/1
40 TABLET, FILM COATED ORAL NIGHTLY
Status: DISCONTINUED | OUTPATIENT
Start: 2024-04-25 | End: 2024-04-26 | Stop reason: HOSPADM

## 2024-04-25 RX ORDER — NITROGLYCERIN 0.4 MG/1
0.4 TABLET SUBLINGUAL
Status: DISCONTINUED | OUTPATIENT
Start: 2024-04-25 | End: 2024-04-26 | Stop reason: HOSPADM

## 2024-04-25 RX ORDER — TERAZOSIN 5 MG/1
5 CAPSULE ORAL NIGHTLY
Status: DISCONTINUED | OUTPATIENT
Start: 2024-04-25 | End: 2024-04-26 | Stop reason: HOSPADM

## 2024-04-25 RX ORDER — MORPHINE SULFATE 2 MG/ML
2 INJECTION, SOLUTION INTRAMUSCULAR; INTRAVENOUS ONCE
Status: COMPLETED | OUTPATIENT
Start: 2024-04-25 | End: 2024-04-25

## 2024-04-25 RX ORDER — MULTIPLE VITAMINS W/ MINERALS TAB 9MG-400MCG
1 TAB ORAL DAILY
Status: DISCONTINUED | OUTPATIENT
Start: 2024-04-25 | End: 2024-04-26 | Stop reason: HOSPADM

## 2024-04-25 RX ADMIN — TERAZOSIN HYDROCHLORIDE 5 MG: 5 CAPSULE ORAL at 21:23

## 2024-04-25 RX ADMIN — NITROGLYCERIN 0.4 MG: 0.4 TABLET SUBLINGUAL at 06:50

## 2024-04-25 RX ADMIN — MORPHINE SULFATE 2 MG: 2 INJECTION, SOLUTION INTRAMUSCULAR; INTRAVENOUS at 07:09

## 2024-04-25 RX ADMIN — METOPROLOL SUCCINATE 100 MG: 100 TABLET, EXTENDED RELEASE ORAL at 21:23

## 2024-04-25 RX ADMIN — PANTOPRAZOLE SODIUM 40 MG: 40 TABLET, DELAYED RELEASE ORAL at 21:23

## 2024-04-25 RX ADMIN — IOPAMIDOL 95 ML: 755 INJECTION, SOLUTION INTRAVENOUS at 06:32

## 2024-04-25 RX ADMIN — Medication 1 TABLET: at 13:09

## 2024-04-25 RX ADMIN — MORPHINE SULFATE 4 MG: 2 INJECTION, SOLUTION INTRAMUSCULAR; INTRAVENOUS at 08:58

## 2024-04-25 RX ADMIN — AMITRIPTYLINE HYDROCHLORIDE 25 MG: 25 TABLET, FILM COATED ORAL at 21:23

## 2024-04-25 RX ADMIN — SODIUM CHLORIDE 500 ML: 9 INJECTION, SOLUTION INTRAVENOUS at 06:39

## 2024-04-25 RX ADMIN — ONDANSETRON 4 MG: 2 INJECTION INTRAMUSCULAR; INTRAVENOUS at 07:09

## 2024-04-25 RX ADMIN — HYDRALAZINE HYDROCHLORIDE 100 MG: 50 TABLET ORAL at 21:23

## 2024-04-25 RX ADMIN — PERFLUTREN 2 ML: 6.52 INJECTION, SUSPENSION INTRAVENOUS at 12:59

## 2024-04-25 RX ADMIN — NITROGLYCERIN 1 INCH: 20 OINTMENT TOPICAL at 07:07

## 2024-04-25 RX ADMIN — ATORVASTATIN CALCIUM 40 MG: 20 TABLET, FILM COATED ORAL at 21:23

## 2024-04-25 RX ADMIN — PANTOPRAZOLE SODIUM 40 MG: 40 TABLET, DELAYED RELEASE ORAL at 13:09

## 2024-04-25 RX ADMIN — HYDRALAZINE HYDROCHLORIDE 100 MG: 50 TABLET ORAL at 13:09

## 2024-04-25 RX ADMIN — ISOSORBIDE MONONITRATE 60 MG: 60 TABLET, EXTENDED RELEASE ORAL at 21:23

## 2024-04-25 NOTE — CONSULTS
[facility]                                                                                             Nephrology Initial Consult Note    Patient Identification:  Name: Kyle Amador MRN: [id number]  Age: 73 y.o. : 1950  Sex: male  Date:2024    Requesting Physician: As per consult order.  Reason for Consultation: GEORGES on CKD  Information from:patient/ family/ chart      History of Present Illness: This is a 73 y.o. year old male with stage III chronic kidney disease followed in our office by Dr. Jennie Green.  He was last seen about 10 days ago in our office.  Creatinine was stable at that time, vitamin D was started and he was instructed to follow back up in 1 year.  Medical history significant for CAD hyperlipidemia hypertension and A-fib.  Status post lithotripsy on  and he presented to the ER with left-sided chest pain that awoke him from sleep.  Also complained of right flank pain, found to have a perinephric hematoma with mass effect on the right kidney on CT.  He has been eating and drinking well, no nausea vomiting diarrhea or dysuria.  He is on an ARB at home in addition to hydralazine for hypertension control.  He remains normotensive, creatinine was 1.8 in the ER.  He did have a CT angiogram of his chest done right after his labs were done.  A CT abdomen pelvis is pending.  Echo showed normal EF, LVH, grade 1 diastolic dysfunction.      The following medical history and medications personally reviewed by me:    Problem List:     Perinephric hematoma    Coronary artery disease involving native coronary artery of native heart without angina pectoris    Hypertension    GEORGES (acute kidney injury)    Chronic renal insufficiency, stage III (moderate)    Mixed hyperlipidemia    Paroxysmal atrial flutter    CKD (chronic kidney disease) stage 2, GFR 60-89 ml/min      Past Medical  History:  Past Medical History:   Diagnosis Date    APC (atrial premature contractions) 08/20/2020    BPH (benign prostatic hyperplasia)     Bursitis of right shoulder 03/2013    CAD (coronary artery disease)     Status post stent to RCA    Cataract     BILATERAL    Cervical spondylosis 09/2016    Chorioretinal scar     BILATERAL    Chronic renal insufficiency, stage III (moderate)     Diverticulitis     ED (erectile dysfunction)     Esophagitis     Functional dyspepsia     GERD (gastroesophageal reflux disease)     Hemorrhoids     Hyperlipidemia     Hypertension     probable hyperaldosteronism    Hyperthyroidism     Hypocalcemia 03/2018    Hypokalemia 06/2018    Kidney cysts 03/2018    FOLLOWED BY DR. HIRAM EDWARD    Kidney stones     Myopia 12/2018    LEFT EYE    OA (osteoarthritis)     PAF (paroxysmal atrial fibrillation)     Seborrheic keratosis     Trochanteric bursitis of right hip 04/2019       Past Surgical History:  Past Surgical History:   Procedure Laterality Date    APPENDECTOMY N/A     CARDIAC CATHETERIZATION N/A 07/11/2019    Procedure: Left Heart Cath;  Surgeon: Charo Weiss MD;  Location:  [facility];  Service: Cardiology    CARDIAC CATHETERIZATION N/A 07/11/2019    Procedure: Coronary angiography;  Surgeon: Charo Weiss MD;  Location:  [facility];  Service: Cardiology    CARDIAC CATHETERIZATION N/A 07/11/2019     Left ventriculography; 30 mid LCx, 30% distal RCA  Charo Weiss MD at [facility]    CARDIAC CATHETERIZATION Left 05/18/2012     20-30% diffuse LAD, 30-40% ostial diag, small LCx with diffuse 30-50%, large RCA with 60-70% mid (normal FFR of 0.9). Angiographically it was unchanged by previous cath DR. ANSELMO FLANAGAN AT [facility]    CARDIAC CATHETERIZATION Left 05/2015     but due to persistant chest pain, he underwent placement of a ARLETTE (3.5x23 Xience).      COLONOSCOPY N/A 09/12/2006    DIVERTICULOSIS, MODERATE EXTERNAL HEMORRHOIDS, DR. KAYLAN PINEDA AT [facility]     CORONARY ANGIOPLASTY WITH STENT PLACEMENT Left 06/01/2015    75% DISTAL RCA STENOSIS, RCA STENT, DR. JUSTIN BERKOWITZ AT [facility]    CYSTOSCOPY BLADDER STONE LITHOTRIPSY Right 4/19/2024    Procedure: CYSTOSCOPY RETROGRADE;  Surgeon: Isra Regan MD;  Location: [facility];  Service: Urology;  Laterality: Right;    CYSTOSCOPY URETEROSCOPY LASER LITHOTRIPSY Right 12/18/2019    Procedure: RIGHT URETEROSCOPY STENT REMOVAL & STENT PLACEMENT & STONE BASKET EXTRACTION &  LASER LITHOTRIPSY;  Surgeon: Roberto Drew MD;  Location: [facility] OR;  Service: Urology    CYSTOSCOPY W/ URETERAL STENT PLACEMENT Right 11/23/2019    Procedure: CYSTO RT STENT INSERTION;  Surgeon: Jonnathan Wolf MD;  Location: [facility];  Service: Urology    ENDOSCOPY N/A 08/14/2019    Z LINE IRREGULAR, SMALL HIATAL HERNIA, MILD INFLAMMATION WITH ERYTHEMA AND FRIABILITY IN GASTRIC BODY, DR. MARTA FLORES AT [facility]    ENDOSCOPY N/A 06/16/2015    LA GRADE B ESOPHAGITIS, ACUTE GASTRITIS, POSSIBLE PILL ESOPHAGITIS, DR. MARTA FLORES AT [facility]    ENDOSCOPY AND COLONOSCOPY N/A 06/03/2011    CHRONIC GASTRITIS, HIATAL HERNIA,OTHERWISE NORMAL EGD, DIVERTICULOSIS IN SIGMOID, NON BLEEDING INTERNAL HEMORRHOIDS, OTHERWISE WNL CY, RESCOPE IN 10 YRS, DR. MARTA FLORES AT [facility]    EXTRACORPOREAL SHOCK WAVE LITHOTRIPSY (ESWL) Right 4/19/2024    Procedure: RIGHT  SHOCKWAVE LITHOTRIPSY WITH;  Surgeon: Isra Regan MD;  Location: [facility];  Service: Urology;  Laterality: Right;    KIDNEY STONE SURGERY N/A         Home Meds:   Medications Prior to Admission   Medication Sig Dispense Refill Last Dose    amitriptyline (ELAVIL) 25 MG tablet Take 1 tablet by mouth Every Night.   4/24/2024 at 2000    atorvastatin (LIPITOR) 40 MG tablet Take 1 tablet by mouth once daily (Patient taking differently: Take 1 tablet by mouth Every Night.) 90 tablet 0 4/24/2024 at 2000    doxazosin (Cardura) 4 MG tablet Take 1 tablet by mouth Every Night. 90 tablet 1 4/24/2024 at  2000    hydrALAZINE (APRESOLINE) 100 MG tablet Take 1 tablet by mouth 2 (Two) Times a Day. 180 tablet 2 4/24/2024 at 2000    isosorbide mononitrate (IMDUR) 60 MG 24 hr tablet Take 1 tablet by mouth Daily. (Patient taking differently: Take 1 tablet by mouth Every Night.) 90 tablet 2 4/24/2024 at 2000    metoprolol succinate XL (TOPROL-XL) 100 MG 24 hr tablet Take 1 tablet by mouth Every Night. 90 tablet 3 4/24/2024 at 2000    Multiple Vitamins-Minerals (MULTIVITAMIN ADULT PO) Take 1 tablet by mouth Daily. PT HOLDING FOR SURGERY   4/24/2024 at 2000    ondansetron ODT (ZOFRAN-ODT) 4 MG disintegrating tablet Place 1 tablet on the tongue Every 8 (Eight) Hours As Needed for Nausea. 10 tablet 0 Past Week    oxyCODONE-acetaminophen (PERCOCET) 5-325 MG per tablet Take 1 tablet by mouth Every 6 (Six) Hours As Needed (Pain). 20 tablet 0 Past Week    pantoprazole (PROTONIX) 40 MG EC tablet Take 1 tablet by mouth 2 (Two) Times a Day.   4/24/2024 at 2000    valsartan (DIOVAN) 160 MG tablet Take 1 tablet by mouth 2 (Two) Times a Day. 180 tablet 2 4/24/2024 at 2000       Current Meds:   Current Facility-Administered Medications   Medication Dose Route Frequency Provider Last Rate Last Admin    amitriptyline (ELAVIL) tablet 25 mg  25 mg Oral Nightly Kanika Phillips, APRN        aspirin chewable tablet 324 mg  324 mg Oral Once Elias Duran MD        atorvastatin (LIPITOR) tablet 40 mg  40 mg Oral Nightly HurKanika le W, APRN        hydrALAZINE (APRESOLINE) tablet 100 mg  100 mg Oral BID Kanika Phillips, APRN   100 mg at 04/25/24 1309    isosorbide mononitrate (IMDUR) 24 hr tablet 60 mg  60 mg Oral Nightly Kanika Phillips, APRN        metoprolol succinate XL (TOPROL-XL) 24 hr tablet 100 mg  100 mg Oral Nightly Kanika Phillips W, APRN        multivitamin with minerals 1 tablet  1 tablet Oral Daily Kanika Phillips, APRN   1 tablet at 04/25/24 1309    nitroglycerin (NITROSTAT) SL tablet 0.4 mg  0.4 mg  Sublingual Q5 Min PRN Elias Duran MD   0.4 mg at 24 0650    ondansetron ODT (ZOFRAN-ODT) disintegrating tablet 4 mg  4 mg Translingual Q8H PRN Kanika Phillips APRN        oxyCODONE-acetaminophen (PERCOCET) 5-325 MG per tablet 1 tablet  1 tablet Oral Q6H PRN Kanika Phillips APRN        pantoprazole (PROTONIX) EC tablet 40 mg  40 mg Oral BID Kanika Phillips APRN   40 mg at 24 1309    sodium chloride 0.9 % flush 10 mL  10 mL Intravenous PRN Elias Duran MD        terazosin (HYTRIN) capsule 5 mg  5 mg Oral Nightly Kanika Phillips APRN           Allergies:  Allergies   Allergen Reactions    Amiodarone Nausea Only and Cough    Penicillins Rash       Social History:   Social History     Socioeconomic History    Marital status:    Tobacco Use    Smoking status: Former     Current packs/day: 0.00     Types: Cigarettes     Quit date: 1987     Years since quittin.7     Passive exposure: Never    Smokeless tobacco: Never   Vaping Use    Vaping status: Never Used   Substance and Sexual Activity    Alcohol use: Yes     Alcohol/week: 1.0 standard drink of alcohol     Types: 1 Drinks containing 0.5 oz of alcohol per week     Comment: COUPLE TIMES MONTH    Drug use: No    Sexual activity: Not Currently     Partners: Female     Birth control/protection: None        Family History:  Family History   Problem Relation Age of Onset    Stroke Other     Heart disease Father     Diabetes Father     Hypertension Father     Malig Hyperthermia Neg Hx         Review of Systems: as per HPI, in addition:    General:      Denies weakness / fatigue,                       No fevers / chills                       no weight loss  HEENT:       no dysphagia / odynophagia  Neck:           normal range of motion, no swelling  Respiratory: no cough / congestion                      No shortness of air                       No wheezing  CV:              No chest pain                       No  "palpitations  Abdomen/GI: no nausea / vomiting                      No diarrhea / constipation                      No abdominal pain  :             no dysuria / urinary frequency                       No urgency, normal output  Endocrine:   no polyuria / polydipsia,                      No heat or cold intolerance  Skin:           no rashes or skin breakdown   Vascular:   No edema                     No claudication  Psych:        no depression/ anxiety  Neuro:        no focal weakness, no seizures  Musculoskeletal: + Flank and chest wall pain      Physical Exam:  Vitals:   Temp (24hrs), Av.4 °F (36.9 °C), Min:97.7 °F (36.5 °C), Max:99 °F (37.2 °C)    /62 (BP Location: Right arm, Patient Position: Lying)   Pulse 78   Temp 97.7 °F (36.5 °C) (Oral)   Resp 18   Ht 178 cm (70.08\")   Wt 88.4 kg (194 lb 14.2 oz)   SpO2 97%   BMI 27.90 kg/m²   Intake/Output:     Intake/Output Summary (Last 24 hours) at 2024 1429  Last data filed at 2024 1024  Gross per 24 hour   Intake 700 ml   Output 400 ml   Net 300 ml        Wt Readings from Last 1 Encounters:   24 1258 88.4 kg (194 lb 14.2 oz)   24 0501 88.5 kg (195 lb)       Exam:    General Appearance:  Awake, alert, oriented x3, no acute distress  Well-appearing   Head and Face:  Normocephalic, atraumatic, mucus membranes moist, oropharynx clear   Eyes:  No icterus, pupils equal round and reactive to light, extraocular movements intact    ENMT: Moist mucosa, tongue symmetric    Neck: Supple  no jugular venous distention  no thyromegaly   Pulmonary:  Respiratory effort: Normal  Auscultation of lungs: Clear bilaterally  No wheezes  No rhonchi  Good air movement, good expansion   Chest wall:  No tenderness or deformity   Cardiovascular:  Auscultation of the heart: Normal rhythm, no murmurs  Trace edema of bilateral lower extremities   Abdomen:  Abdomen: soft, non-tender, normal bowel sounds all four quadrants, no masses   Liver and spleen: no " hepatosplenomegaly   Musculoskeletal: Digits and nails: normal  Normal range of motion  No joint swelling or gross deformities    Skin: Skin inspection: color normal, no visible rashes or lesions  Skin palpation: texture, turgor normal, no palpable lesions   Lymphatic:  no cervical lymphadenopathy    Psychiatric: Judgement and insight: normal  Orientation to person place and time: normal  Mood and affect: normal       DATA:  Radiology and Labs:  The following labs independently reviewed by me, additional AM labs ordered  Old records independently reviewed showing CKD 3 with a baseline creatinine around 1.2   The following radiologic studies independently viewed by me, findings echo findings detailed above, CT showing perinephric hematoma, CTA chest negative for PE right perinephric hematoma was seen with mass effect on the right kidney  Interval notes, chart personally reviewed by me.  I have reviewed and summarized old records as detailed above  Plan of care discussed with patient himself at bedside  New problems include GEORGES on CKD    Risk/ complexity of medical care/ medical decision making: High complexity, new CKD with GEORGES  Chronic illness with severe exacerbation or progression: CKD      Labs:   Recent Results (from the past 24 hour(s))   Comprehensive Metabolic Panel    Collection Time: 04/25/24  5:13 AM    Specimen: Blood   Result Value Ref Range    Glucose 110 (H) 65 - 99 mg/dL    BUN 26 (H) 8 - 23 mg/dL    Creatinine 1.81 (H) 0.76 - 1.27 mg/dL    Sodium 141 136 - 145 mmol/L    Potassium 3.5 3.5 - 5.2 mmol/L    Chloride 107 98 - 107 mmol/L    CO2 23.0 22.0 - 29.0 mmol/L    Calcium 8.0 (L) 8.6 - 10.5 mg/dL    Total Protein 5.6 (L) 6.0 - 8.5 g/dL    Albumin 3.5 3.5 - 5.2 g/dL    ALT (SGPT) 13 1 - 41 U/L    AST (SGOT) 14 1 - 40 U/L    Alkaline Phosphatase 59 39 - 117 U/L    Total Bilirubin 0.7 0.0 - 1.2 mg/dL    Globulin 2.1 gm/dL    A/G Ratio 1.7 g/dL    BUN/Creatinine Ratio 14.4 7.0 - 25.0    Anion Gap 11.0  5.0 - 15.0 mmol/L    eGFR 38.8 (L) >60.0 mL/min/1.73   Protime-INR    Collection Time: 04/25/24  5:13 AM    Specimen: Blood   Result Value Ref Range    Protime 13.8 11.7 - 14.2 Seconds    INR 1.04 0.90 - 1.10   aPTT    Collection Time: 04/25/24  5:13 AM    Specimen: Blood   Result Value Ref Range    PTT 27.8 22.7 - 35.4 seconds   BNP    Collection Time: 04/25/24  5:13 AM    Specimen: Blood   Result Value Ref Range    proBNP 822.0 0.0 - 900.0 pg/mL   High Sensitivity Troponin T    Collection Time: 04/25/24  5:13 AM    Specimen: Blood   Result Value Ref Range    HS Troponin T 21 <22 ng/L   CBC Auto Differential    Collection Time: 04/25/24  5:13 AM    Specimen: Blood   Result Value Ref Range    WBC 9.10 3.40 - 10.80 10*3/mm3    RBC 3.15 (L) 4.14 - 5.80 10*6/mm3    Hemoglobin 9.7 (L) 13.0 - 17.7 g/dL    Hematocrit 29.5 (L) 37.5 - 51.0 %    MCV 93.7 79.0 - 97.0 fL    MCH 30.8 26.6 - 33.0 pg    MCHC 32.9 31.5 - 35.7 g/dL    RDW 12.9 12.3 - 15.4 %    RDW-SD 44.1 37.0 - 54.0 fl    MPV 9.5 6.0 - 12.0 fL    Platelets 257 140 - 450 10*3/mm3    Neutrophil % 68.4 42.7 - 76.0 %    Lymphocyte % 17.0 (L) 19.6 - 45.3 %    Monocyte % 9.9 5.0 - 12.0 %    Eosinophil % 4.2 0.3 - 6.2 %    Basophil % 0.3 0.0 - 1.5 %    Immature Grans % 0.2 0.0 - 0.5 %    Neutrophils, Absolute 6.22 1.70 - 7.00 10*3/mm3    Lymphocytes, Absolute 1.55 0.70 - 3.10 10*3/mm3    Monocytes, Absolute 0.90 0.10 - 0.90 10*3/mm3    Eosinophils, Absolute 0.38 0.00 - 0.40 10*3/mm3    Basophils, Absolute 0.03 0.00 - 0.20 10*3/mm3    Immature Grans, Absolute 0.02 0.00 - 0.05 10*3/mm3    nRBC 0.0 0.0 - 0.2 /100 WBC   D-dimer, Quantitative    Collection Time: 04/25/24  5:13 AM    Specimen: Blood   Result Value Ref Range    D-Dimer, Quantitative 1.92 (H) 0.00 - 0.74 MCGFEU/mL   ECG 12 Lead Chest Pain    Collection Time: 04/25/24  5:14 AM   Result Value Ref Range    QT Interval 365 ms    QTC Interval 427 ms   High Sensitivity Troponin T 2Hr    Collection Time: 04/25/24  7:08  AM    Specimen: Blood   Result Value Ref Range    HS Troponin T 20 <22 ng/L    Troponin T Delta -1 >=-4 - <+4 ng/L   Uric Acid    Collection Time: 04/25/24  7:08 AM    Specimen: Blood   Result Value Ref Range    Uric Acid 5.6 3.4 - 7.0 mg/dL   Adult Transthoracic Echo Complete W/ Cont if Necessary Per Protocol    Collection Time: 04/25/24 12:40 PM   Result Value Ref Range    EF(MOD-bp) 74.7 %    LVIDd 4.9 cm    LVIDs 3.0 cm    IVSd 1.27 cm    LVPWd 1.35 cm    FS 39.1 %    IVS/LVPW 0.94 cm    ESV(cubed) 26.8 ml    EDV(cubed) 118.5 ml    LV mass(C)d 256.6 grams    LVOT area 4.2 cm2    LVOT diam 2.32 cm    EDV(MOD-sp2) 145.0 ml    EDV(MOD-sp4) 145.0 ml    ESV(MOD-sp2) 36.0 ml    ESV(MOD-sp4) 39.0 ml    SV(MOD-sp2) 109.0 ml    SV(MOD-sp4) 106.0 ml    EF(MOD-sp2) 75.2 %    EF(MOD-sp4) 73.1 %    MV E max german 80.0 cm/sec    MV A max german 83.4 cm/sec    MV dec time 0.18 sec    MV E/A 0.96     Pulm A Revs Dur 0.14 sec    MV A dur 0.16 sec    LA ESV Index (BP) 29.8 ml/m2    Med Peak E' German 7.2 cm/sec    Lat Peak E' German 7.8 cm/sec    Avg E/e' ratio 10.67     SV(LVOT) 121.0 ml    SV(RVOT) 85.7 ml    Qp/Qs 0.71     RV Base 3.2 cm    RV Mid 2.34 cm    TAPSE (>1.6) 2.14 cm    RV S' 14.6 cm/sec    Pulm Sys German 68.4 cm/sec    Pulm Nolasco German 40.4 cm/sec    Pulm S/D 1.69     Pulm A Revs German 31.2 cm/sec    LV V1 max 136.9 cm/sec    LV V1 max PG 7.5 mmHg    LV V1 mean PG 3.8 mmHg    LV V1 VTI 28.7 cm    Ao pk german 158.0 cm/sec    Ao max PG 10.0 mmHg    Ao mean PG 5.0 mmHg    Ao V2 VTI 32.6 cm    GUNNER(I,D) 3.7 cm2    MV max PG 5.4 mmHg    MV mean PG 2.8 mmHg    MV V2 VTI 30.9 cm    MV P1/2t 55.0 msec    MVA(P1/2t) 4.0 cm2    MVA(VTI) 3.9 cm2    MV dec slope 521.8 cm/sec2    RVOT diam 2.8 cm    RV V1 max PG 1.65 mmHg    RV V1 max 64.3 cm/sec    RV V1 VTI 14.1 cm    PA V2 max 93.4 cm/sec    PA acc time 0.07 sec    Ao root diam 3.4 cm    ACS 2.17 cm    Sinus 3.3 cm    STJ 2.42 cm    Dimensionless Index 0.90 (DI)    Ascending aorta 3.2 cm     Abdo Ao Diam 2.5 cm       Radiology:  Imaging Results (Last 24 Hours)       Procedure Component Value Units Date/Time    CT Abdomen Pelvis Without Contrast [[id number]] Resulted: 04/25/24 1359     Updated: 04/25/24 1403    XR Chest 1 View [[id number]] Collected: 04/25/24 0748     Updated: 04/25/24 0748    Narrative:        Patient: RAMONA SMITH  Time Out: 07:47  Exam(s): XR CXR 1 VIEW     EXAM:    XR Chest, 1 View    CLINICAL HISTORY:       Chest Pain.    TECHNIQUE:    Frontal view of the chest.    COMPARISON:    Chest radiograph 9 25 23    FINDINGS:    Lungs:  Interstitial prominence could represent atelectasis, pulmonary   edema or atypical infection.    Pleural space:  Small bilateral pleural effusions.  No pneumothorax.    Heart:  Unremarkable.  No cardiomegaly.    Mediastinum:  Unremarkable.  Normal mediastinal contour.    Bones joints:  There are degenerative changes of the spine.  No acute   fracture.    IMPRESSION:       1.  Interstitial prominence could represent atelectasis, pulmonary edema   or atypical infection.  2.  Small bilateral pleural effusions.      Impression:          Electronically signed by Donna Gottlieb MD on 04-25-24 at 0747    CT Angiogram Chest [[id number]] Collected: 04/25/24 0711     Updated: 04/25/24 0727    Narrative:      CT ANGIOGRAM CHEST-     DATE OF EXAM: 4/25/2024 6:15 AM     INDICATION: Pleuritic chest pain. Positive D-dimer. Status post post  lithotripsy for renal stone on 4/19/2024.     COMPARISON: Chest radiographs 4/25/2024 and 9/25/2023. CT chest  9/25/2023 and 6/11/2018.     TECHNIQUE: Multiple contiguous axial images were acquired through the  chest following the intravenous administration of 95 mL of Isovue-370.  Reformatted coronal and sagittal sequences and a 3D reconstruction image  were also reviewed. Radiation dose reduction techniques were utilized,  including automated exposure control and exposure modulation based on  body size.     FINDINGS:  No evidence of a  pulmonary arterial filling defect to suggest pulmonary  malaise and. The heart and great vessels of the chest are normal in  size. Moderate to severe calcified coronary artery disease. No  pericardial effusion. Mild calcified atherosclerotic disease in the  thoracic aorta without aneurysm. Calcified remnants of prior  granulomatous disease in the mediastinum. No pathologically enlarged  intrathoracic lymph nodes are identified. Small hiatal hernia.     Trace bilateral pleural effusions, right greater than left, with  associated decompressive atelectasis of the dependent aspect of each  lower lobe. Multifocal bibasilar subsegmental atelectasis and/or  scarring. Calcified remnants of prior granulomatous disease in both  lungs. No focal lung consolidation. No concerning pulmonary nodule. The  central airways are widely patent. No pneumothorax.     Limited imaging of the upper abdomen partially includes a high  attenuation right perinephric hematoma.  Simple appearing left renal cyst. Cholelithiasis.     Flowing multilevel thoracic syndesmophytes, indicating ankylosing  spondylitis. Partially imaged lower cervical spondylosis and chronic  changes in both shoulders. No osseous abnormality or concerning osseous  lesion.       Impression:         1. The study is negative for pulmonary embolism.  2. Partially imaged high attenuation right perinephric hematoma with  associated mass effect on the right kidney.  3. Trace bilateral pleural effusions, right greater than left, with  bilateral dependent compressive atelectasis and multifocal bibasilar  subsegmental atelectasis and/or scarring.  4. Moderate to severe calcified coronary artery disease.  5. Cholelithiasis.  6. Small hiatal hernia.  7. Flowing multilevel thoracic syndesmophytes, indicating ankylosing  spondylitis.     Results negative for PE and positive for partially imaged right  perinephric hematoma were discussed over the phone with the ER  physician, Dr. Luke  Emelia, at 7:20 a.m. on 4/25/2024     This report was finalized on 4/25/2024 7:24 AM by Jimmie Brown MD on  Workstation: [id number]                    ASSESSMENT:   GEORGES, unclear etiology.  Will be able to see his kidney is a little better on the CT abdomen and pelvis.  Perinephric hematoma with some decreased oral intake may be contributing.  He was also on an ARB at home which has been held.    Perinephric hematoma, following recent lithotripsy    Coronary artery disease involving native coronary artery of native heart without angina pectoris    Hypertension    GEORGES (acute kidney injury)    Chronic renal insufficiency, stage III (moderate)    Mixed hyperlipidemia    Paroxysmal atrial flutter    CKD (chronic kidney disease) stage 2, GFR 60-89 ml/min        DISCUSSION/PLAN:   Creatinine above his usual baseline of around 1.1-1.2  Await dedicated CT of the abdomen and pelvis to further evaluate the perinephric hematoma  Part of the GEORGES could be from a compression of the right kidney from the hematoma  Will gently hydrate with normal saline today  Check urine studies  Continue to hold ARB and monitor BP trends    Continue to monitor electrolytes and volume closely, avoid any further IV contrast and potential nephrotoxic medications     I appreciate the consult request.  Please send me a secure chat message with any nonurgent questions regarding patient care.  For any urgent patient care issues please call my office number below.      Vernon Judge MD  [facility]  Office phone number: [phone]  Answering service phone number: [phone]      4/25/2024        Dictation via Dragon dictation software

## 2024-04-25 NOTE — ED PROVIDER NOTES
EMERGENCY DEPARTMENT ENCOUNTER    Room Number:  S412/1  PCP: Harinder Lea DO  Patient Care Team:  Harinder Lea DO as PCP - General  Harinder Lea DO as PCP - Family Medicine  Jennie Green MD as Consulting Physician (Nephrology)  Aquiles Velazquez MD as Consulting Physician (Cardiology)   Independent Historians: Patient    HPI:  Chief Complaint: Chest pain    A complete HPI/ROS/PMH/PSH/SH/FH are unobtainable due to: None    Chronic or social conditions impacting patient care (Social Determinants of Health): None  (Financial Resource Strain / Food Insecurity / Transportation Needs / Physical Activity / Stress / Social Connections / Intimate Partner Violence / Housing Stability)    Context: Kyle Amador is a 73 y.o. male who presents to the ED c/o acute Left-sided chest pain that started around 2 AM and woke him from sleep.  States that it is worse with deep breath.   no diaphoresis no nausea vomiting no shortness of breath.  Patient has history of NSTEMI received a stent in late September 2023.  Patient states he has not had similar chest pain to this in the past.  Patient also recently underwent shockwave lithotripsy for kidney stone on 4/19/2024.    Review of prior external notes (non-ED) -and- Review of prior external test results outside of this encounter: I reviewed patient's cardiology note from 9/25/2023 as well as patient's urology note from 4/19/2024    Prescription drug monitoring program review:         PAST MEDICAL HISTORY  Active Ambulatory Problems     Diagnosis Date Noted    Coronary artery disease involving native coronary artery of native heart without angina pectoris     Hypertension     Internal hemorrhoids with complication 10/09/2019    GEORGES (acute kidney injury) 11/23/2019    Chronic renal insufficiency, stage III (moderate) 12/16/2019    Mixed hyperlipidemia 12/01/2021    Right renal stone 06/23/2022    Paroxysmal atrial flutter 09/26/2023     Resolved Ambulatory  Problems     Diagnosis Date Noted    Precordial pain 02/05/2018    Dizziness 02/13/2019    Elevated BUN 02/13/2019    Ureteral stone with hydronephrosis 11/23/2019    Severe sepsis 11/24/2019    Acute pyelonephritis 11/24/2019    Hypotension 11/24/2019    Postoperative atrial fibrillation 11/26/2019    Calculus of kidney and ureter 12/16/2019    APC (atrial premature contractions) 08/20/2020    Calculus of kidney 05/18/2021    Chest pain 06/21/2021    Premature atrial contraction 08/20/2020    NSTEMI (non-ST elevated myocardial infarction) 09/25/2023    Cardiac arrhythmia 09/26/2023    Type 2 myocardial infarction 09/26/2023     Past Medical History:   Diagnosis Date    BPH (benign prostatic hyperplasia)     Bursitis of right shoulder 03/2013    CAD (coronary artery disease)     Cataract     Cervical spondylosis 09/2016    Chorioretinal scar     Diverticulitis     ED (erectile dysfunction)     Esophagitis     Functional dyspepsia     GERD (gastroesophageal reflux disease)     Hemorrhoids     Hyperlipidemia     Hyperthyroidism     Hypocalcemia 03/2018    Hypokalemia 06/2018    Kidney cysts 03/2018    Kidney stones     Myopia 12/2018    OA (osteoarthritis)     PAF (paroxysmal atrial fibrillation)     Seborrheic keratosis     Trochanteric bursitis of right hip 04/2019         PAST SURGICAL HISTORY  Past Surgical History:   Procedure Laterality Date    APPENDECTOMY N/A     CARDIAC CATHETERIZATION N/A 07/11/2019    Procedure: Left Heart Cath;  Surgeon: Charo Weiss MD;  Location:  [facility];  Service: Cardiology    CARDIAC CATHETERIZATION N/A 07/11/2019    Procedure: Coronary angiography;  Surgeon: Charo Weiss MD;  Location:  [facility];  Service: Cardiology    CARDIAC CATHETERIZATION N/A 07/11/2019     Left ventriculography; 30 mid LCx, 30% distal RCA  Charo Weiss MD at [facility]    CARDIAC CATHETERIZATION Left 05/18/2012     20-30% diffuse LAD, 30-40% ostial diag, small LCx with  diffuse 30-50%, large RCA with 60-70% mid (normal FFR of 0.9). Angiographically it was unchanged by previous cath DR. ANSELMO FLANAGAN AT [facility]    CARDIAC CATHETERIZATION Left 05/2015     but due to persistant chest pain, he underwent placement of a ARLETTE (3.5x23 Xience).      COLONOSCOPY N/A 09/12/2006    DIVERTICULOSIS, MODERATE EXTERNAL HEMORRHOIDS, DR. KAYLAN PINEDA AT [facility]    CORONARY ANGIOPLASTY WITH STENT PLACEMENT Left 06/01/2015    75% DISTAL RCA STENOSIS, RCA STENT, DR. JUSTIN BERKOWITZ AT [facility]    CYSTOSCOPY BLADDER STONE LITHOTRIPSY Right 4/19/2024    Procedure: CYSTOSCOPY RETROGRADE;  Surgeon: Isra Regan MD;  Location: [facility] OR;  Service: Urology;  Laterality: Right;    CYSTOSCOPY URETEROSCOPY LASER LITHOTRIPSY Right 12/18/2019    Procedure: RIGHT URETEROSCOPY STENT REMOVAL & STENT PLACEMENT & STONE BASKET EXTRACTION &  LASER LITHOTRIPSY;  Surgeon: Roberto Drew MD;  Location: [facility] MAIN OR;  Service: Urology    CYSTOSCOPY W/ URETERAL STENT PLACEMENT Right 11/23/2019    Procedure: CYSTO RT STENT INSERTION;  Surgeon: Jonnathan Wolf MD;  Location: [facility] OR;  Service: Urology    ENDOSCOPY N/A 08/14/2019    Z LINE IRREGULAR, SMALL HIATAL HERNIA, MILD INFLAMMATION WITH ERYTHEMA AND FRIABILITY IN GASTRIC BODY, DR. MARTA FLORES AT [facility]    ENDOSCOPY N/A 06/16/2015    LA GRADE B ESOPHAGITIS, ACUTE GASTRITIS, POSSIBLE PILL ESOPHAGITIS, DR. MARTA FLORES AT [facility]    ENDOSCOPY AND COLONOSCOPY N/A 06/03/2011    CHRONIC GASTRITIS, HIATAL HERNIA,OTHERWISE NORMAL EGD, DIVERTICULOSIS IN SIGMOID, NON BLEEDING INTERNAL HEMORRHOIDS, OTHERWISE WNL CY, RESCOPE IN 10 YRS, DR. MARTA FLORES AT [facility]    EXTRACORPOREAL SHOCK WAVE LITHOTRIPSY (ESWL) Right 4/19/2024    Procedure: RIGHT  SHOCKWAVE LITHOTRIPSY WITH;  Surgeon: Isra Regan MD;  Location: [facility] MAIN OR;  Service: Urology;  Laterality: Right;    KIDNEY STONE SURGERY N/A          FAMILY HISTORY  Family History   Problem Relation Age of Onset    Stroke  Other     Heart disease Father     Diabetes Father     Hypertension Father     Malig Hyperthermia Neg Hx          SOCIAL HISTORY  Social History     Socioeconomic History    Marital status:    Tobacco Use    Smoking status: Former     Current packs/day: 0.00     Types: Cigarettes     Quit date: 1987     Years since quittin.7     Passive exposure: Never    Smokeless tobacco: Never   Vaping Use    Vaping status: Never Used   Substance and Sexual Activity    Alcohol use: Yes     Alcohol/week: 1.0 standard drink of alcohol     Types: 1 Drinks containing 0.5 oz of alcohol per week     Comment: COUPLE TIMES MONTH    Drug use: No    Sexual activity: Not Currently     Partners: Female     Birth control/protection: None         ALLERGIES  Amiodarone and Penicillins        REVIEW OF SYSTEMS  Review of Systems  Included in HPI  All systems reviewed and negative except for those discussed in HPI.      PHYSICAL EXAM    I have reviewed the triage vital signs and nursing notes.    ED Triage Vitals [24 0501]   Temp Heart Rate Resp BP SpO2   98.5 °F (36.9 °C) 84 16 161/86 98 %      Temp src Heart Rate Source Patient Position BP Location FiO2 (%)   Oral Monitor Sitting Right arm --       Physical Exam  GENERAL: alert, no acute distress  SKIN: Warm, dry  HENT: Normocephalic, atraumatic  EYES: no scleral icterus  CV: regular rhythm, regular rate  RESPIRATORY: normal effort, lungs clear  ABDOMEN: soft, nontender, nondistended  MUSCULOSKELETAL: no deformity  NEURO: alert, moves all extremities, follows commands                                                               LAB RESULTS  No results found for this or any previous visit (from the past 24 hour(s)).      I ordered the above labs and independently reviewed the results.        RADIOLOGY  No Radiology Exams Resulted Within Past 24 Hours    I ordered the above noted radiological studies. Reviewed by me and discussed with radiologist.  See dictation for  official radiology interpretation.      PROCEDURES    Procedures      MEDICATIONS GIVEN IN ER    Medications   sodium chloride 0.9 % bolus 500 mL (0 mL Intravenous Stopped 4/25/24 0712)   iopamidol (ISOVUE-370) 76 % injection 100 mL (95 mL Intravenous Given 4/25/24 0632)   nitroglycerin (NITROSTAT) ointment 1 inch (1 inch Topical Given 4/25/24 0707)   morphine injection 2 mg (2 mg Intravenous Given 4/25/24 0709)   ondansetron (ZOFRAN) injection 4 mg (4 mg Intravenous Given 4/25/24 0709)   morphine injection 4 mg (4 mg Intravenous Given 4/25/24 0858)   perflutren (DEFINITY) 8.476 mg in Sodium Chloride (PF) 0.9 % 10 mL injection (2 mL Intravenous Given 4/25/24 1259)         ORDERS PLACED DURING THIS VISIT:  Orders Placed This Encounter   Procedures    XR Chest 1 View    CT Angiogram Chest    CT Abdomen Pelvis Without Contrast    Comprehensive Metabolic Panel    Protime-INR    aPTT    BNP    High Sensitivity Troponin T    CBC Auto Differential    D-dimer, Quantitative    High Sensitivity Troponin T 2Hr    Uric Acid    CBC (No Diff)    Renal Function Panel    Monitor Blood Pressure    No caffeine after 6 PM  Nursing Communication    Inpatient Cardiology Consult    Inpatient Urology Consult    Inpatient Nutrition Consult    Inpatient Nephrology Consult    ECG 12 Lead Chest Pain    Telemetry Scan    Telemetry Scan    ECG 12 Lead Chest Pain    Telemetry Scan    Adult Transthoracic Echo Complete W/ Cont if Necessary Per Protocol    Inpatient Admission    Initiate Observation Status    Discharge patient    CBC & Differential         PROGRESS, DATA ANALYSIS, CONSULTS, AND MEDICAL DECISION MAKING    All labs have been independently interpreted by me.  All radiology studies have been reviewed by me and discussed with radiologist dictating the report.   EKG's independently viewed and interpreted by me.  Discussion below represents my analysis of pertinent findings related to patient's condition, differential diagnosis, treatment  plan and final disposition.    My differential diagnosis for chest pain includes but is not limited to:  Muscle strain, costochondritis, myositis, pleurisy, rib fracture, intercostal neuritis, herpes zoster, tumor, myocardial infarction, coronary syndrome, unstable angina, angina, aortic dissection, mitral valve prolapse, pericarditis, palpitations, pulmonary embolus, pneumonia, pneumothorax, lung cancer, GERD, esophagitis, esophageal spasm  .    Clinical Scores:                ED Course as of 04/29/24 0711   u Apr 25, 2024   0526 EKG          EKG time: 0514  Rhythm/Rate: Sinus rhythm rate 82  P waves and CT: Normal  QRS, axis: Narrow regular  ST and T waves: Nonspecific    Interpreted Contemporaneously by me, independently viewed  Significant changed compared to prior ED   [TJ]   0531 Hemoglobin(!): 9.7 [TJ]   0553 HS Troponin T: 21 [TJ]   0655 Care to Dr Kapoor.  Imaging pending. [TJ]   0733 Transferred care pending admission.  In brief, patient presented emergency department with pleuritic chest pain, had a recent lithotripsy.  Labs demonstrating acute kidney injury and mild drop in hemoglobin, CT demonstrating right-sided perinephric hematoma with bilateral pleural effusions.  Pleuritic chest pain may be related to pleural irritation.  Plan to admit for additional management. [FS]   0751 EKG interpreted myself:  514, sinus rhythm rate of 82, no acute ST segment changes or T wave inversions.  1 PVC. [FS]   0846 Discussed case with [facility], will plan for admission for additional workup and management of his symptoms. [FS]      ED Course User Index  [FS] Ti Kapoor MD  [TJ] Elias Duran MD               PPE: The patient wore a mask and I wore an N95 mask throughout the entire patient encounter.       AS OF 07:11 EDT VITALS:    BP - 116/58  HR - 78  TEMP - 98.4 °F (36.9 °C) (Oral)  O2 SATS - 96%        DIAGNOSIS  Final diagnoses:   Chest pain, unspecified type   GEORGES (acute kidney injury)   Perinephric  hematoma   Pleural effusion, bilateral         DISPOSITION  ED Disposition       ED Disposition   Decision to Admit    Condition   --    Comment   Level of Care: Telemetry [5]   Diagnosis: Perinephric hematoma [772192]   Admitting Physician: RAMIREZ SARMIENTO [[id number]]   Attending Physician: RAMIREZ SARMIENTO [[id number]]   Certification: I Certify That Inpatient Hospital Services Are Medically Necessary For Greater Than 2 Midnights                    Note Disclaimer: At [facility], we believe that sharing information builds trust and better relationships. You are receiving this note because you recently visited [facility]. It is possible you will see health information before a provider has talked with you about it. This kind of information can be easy to misunderstand. To help you fully understand what it means for your health, we urge you to discuss this note with your provider.         Elias Duran MD  04/25/24 0657       Elias Duran MD  04/29/24 0711

## 2024-04-25 NOTE — CONSULTS
Date of Consultation: 24    Referral Provider: Ti Kapoor MD     Reason for Consultation: Chest pain, coronary artery disease.    Encounter Provider: Jacinto Qiu MD    Group of Service: [facility]     Patient Name: Kyle Amador YOB: 1950    Chief complaint: Chest pain.    History of Present Illness:      This is a very pleasant 73 year-old male who normally follows with Dr. Velazquez in our practice.  He has a history of coronary artery disease and difficult to control hypertension.  He has a history of ARELTTE to the RCA in .  He also has a history of brief atrial fibrillation and atrial flutter in the past.  He developed hematuria in , and stopped all anticoagulation afterwards.    The patient presented to the emergency department on 2024 with left-sided chest pain.  He states it started around 2 AM, and woke him up from sleep.  It is sharp and stabbing in nature, and does get worse with deep breathing.  He did have an elevated D-dimer, and a CT angiogram of the chest was obtained.  He did not have evidence of a pulmonary embolism.  He had trace bilateral pleural effusions.  He was also noted to have a right perinephric hematoma with mass effect on the right kidney (this is secondary to a recent right ESWL procedure)     His EKG showed no ischemic changes.  His troponin has been negative x 2.      ECHO 2023     Left ventricular systolic function is normal. Left ventricular ejection fraction appears to be 66 - 70%. Normal left ventricular cavity size noted. Left ventricular wall thickness is consistent with mild concentric hypertrophy. All left ventricular wall segments contract normally. Left ventricular diastolic function is consistent with (grade I) impaired relaxation.     Stress Test 2021  Myocardial perfusion imaging indicates a normal myocardial perfusion study with no evidence of ischemia.  Left ventricular ejection fraction is hyperdynamic  (Calculated EF > 70%).  Findings consistent with a normal ECG stress test.  Impressions are consistent with a low risk study.     Cardiac Catheterization 7/11/2019  SUMMARY: No evidence of obstructive CAD. The RCA stent is widely patent.   RECOMMENDATIONS: BP management. Risk factor modification.       Past Medical History:   Diagnosis Date    APC (atrial premature contractions) 08/20/2020    BPH (benign prostatic hyperplasia)     Bursitis of right shoulder 03/2013    CAD (coronary artery disease)     Status post stent to RCA    Cataract     BILATERAL    Cervical spondylosis 09/2016    Chorioretinal scar     BILATERAL    Chronic renal insufficiency, stage III (moderate)     Diverticulitis     ED (erectile dysfunction)     Esophagitis     Functional dyspepsia     GERD (gastroesophageal reflux disease)     Hemorrhoids     Hyperlipidemia     Hypertension     probable hyperaldosteronism    Hyperthyroidism     Hypocalcemia 03/2018    Hypokalemia 06/2018    Kidney cysts 03/2018    FOLLOWED BY DR. HIRAM EDWARD    Kidney stones     Myopia 12/2018    LEFT EYE    OA (osteoarthritis)     PAF (paroxysmal atrial fibrillation)     Seborrheic keratosis     Trochanteric bursitis of right hip 04/2019         Past Surgical History:   Procedure Laterality Date    APPENDECTOMY N/A     CARDIAC CATHETERIZATION N/A 07/11/2019    Procedure: Left Heart Cath;  Surgeon: Charo Weiss MD;  Location: [facility] CATH INVASIVE LOCATION;  Service: Cardiology    CARDIAC CATHETERIZATION N/A 07/11/2019    Procedure: Coronary angiography;  Surgeon: Charo Weiss MD;  Location:  [facility] CATH INVASIVE LOCATION;  Service: Cardiology    CARDIAC CATHETERIZATION N/A 07/11/2019     Left ventriculography; 30 mid LCx, 30% distal RCA  [facility] MD at [facility]    CARDIAC CATHETERIZATION Left 05/18/2012     20-30% diffuse LAD, 30-40% ostial diag, small LCx with diffuse 30-50%, large RCA with 60-70% mid (normal FFR of 0.9). Angiographically it was unchanged by  previous cath DR. ANSELMO FLANAGAN AT [facility]    CARDIAC CATHETERIZATION Left 05/2015     but due to persistant chest pain, he underwent placement of a ARLETTE (3.5x23 Xience).      COLONOSCOPY N/A 09/12/2006    DIVERTICULOSIS, MODERATE EXTERNAL HEMORRHOIDS, DR. KAYLAN PINEDA AT [facility]    CORONARY ANGIOPLASTY WITH STENT PLACEMENT Left 06/01/2015    75% DISTAL RCA STENOSIS, RCA STENT, DR. JUSTIN BERKOWITZ AT [facility]    CYSTOSCOPY BLADDER STONE LITHOTRIPSY Right 4/19/2024    Procedure: CYSTOSCOPY RETROGRADE;  Surgeon: Isra Regan MD;  Location: [facility] OR;  Service: Urology;  Laterality: Right;    CYSTOSCOPY URETEROSCOPY LASER LITHOTRIPSY Right 12/18/2019    Procedure: RIGHT URETEROSCOPY STENT REMOVAL & STENT PLACEMENT & STONE BASKET EXTRACTION &  LASER LITHOTRIPSY;  Surgeon: Roberto Drew MD;  Location: [facility] OR;  Service: Urology    CYSTOSCOPY W/ URETERAL STENT PLACEMENT Right 11/23/2019    Procedure: CYSTO RT STENT INSERTION;  Surgeon: Jonnathan Wolf MD;  Location: [facility] OR;  Service: Urology    ENDOSCOPY N/A 08/14/2019    Z LINE IRREGULAR, SMALL HIATAL HERNIA, MILD INFLAMMATION WITH ERYTHEMA AND FRIABILITY IN GASTRIC BODY, DR. MARTA FLORES AT [facility]    ENDOSCOPY N/A 06/16/2015    LA GRADE B ESOPHAGITIS, ACUTE GASTRITIS, POSSIBLE PILL ESOPHAGITIS, DR. MARTA FLORES AT [facility]    ENDOSCOPY AND COLONOSCOPY N/A 06/03/2011    CHRONIC GASTRITIS, HIATAL HERNIA,OTHERWISE NORMAL EGD, DIVERTICULOSIS IN SIGMOID, NON BLEEDING INTERNAL HEMORRHOIDS, OTHERWISE WNL CY, RESCOPE IN 10 YRS, DR. MARTA FLORES AT [facility]    EXTRACORPOREAL SHOCK WAVE LITHOTRIPSY (ESWL) Right 4/19/2024    Procedure: RIGHT  SHOCKWAVE LITHOTRIPSY WITH;  Surgeon: Isra Regan MD;  Location: [facility] MAIN OR;  Service: Urology;  Laterality: Right;    KIDNEY STONE SURGERY N/A          Allergies   Allergen Reactions    Amiodarone Nausea Only and Cough    Penicillins Rash         No current facility-administered medications on file prior to encounter.      Current Outpatient Medications on File Prior to Encounter   Medication Sig Dispense Refill    amitriptyline (ELAVIL) 25 MG tablet Take 1 tablet by mouth Every Night.      atorvastatin (LIPITOR) 40 MG tablet Take 1 tablet by mouth once daily (Patient taking differently: Take 1 tablet by mouth Every Night.) 90 tablet 0    doxazosin (Cardura) 4 MG tablet Take 1 tablet by mouth Every Night. 90 tablet 1    hydrALAZINE (APRESOLINE) 100 MG tablet Take 1 tablet by mouth 2 (Two) Times a Day. 180 tablet 2    isosorbide mononitrate (IMDUR) 60 MG 24 hr tablet Take 1 tablet by mouth Daily. (Patient taking differently: Take 1 tablet by mouth Every Night.) 90 tablet 2    metoprolol succinate XL (TOPROL-XL) 100 MG 24 hr tablet Take 1 tablet by mouth Every Night. 90 tablet 3    Multiple Vitamins-Minerals (MULTIVITAMIN ADULT PO) Take 1 tablet by mouth Daily. PT HOLDING FOR SURGERY      ondansetron ODT (ZOFRAN-ODT) 4 MG disintegrating tablet Place 1 tablet on the tongue Every 8 (Eight) Hours As Needed for Nausea. 10 tablet 0    oxyCODONE-acetaminophen (PERCOCET) 5-325 MG per tablet Take 1 tablet by mouth Every 6 (Six) Hours As Needed (Pain). 20 tablet 0    pantoprazole (PROTONIX) 40 MG EC tablet Take 1 tablet by mouth 2 (Two) Times a Day.      valsartan (DIOVAN) 160 MG tablet Take 1 tablet by mouth 2 (Two) Times a Day. 180 tablet 2         Social History     Socioeconomic History    Marital status:    Tobacco Use    Smoking status: Former     Current packs/day: 0.00     Types: Cigarettes     Quit date: 1987     Years since quittin.7     Passive exposure: Never    Smokeless tobacco: Never   Vaping Use    Vaping status: Never Used   Substance and Sexual Activity    Alcohol use: Yes     Alcohol/week: 1.0 standard drink of alcohol     Types: 1 Drinks containing 0.5 oz of alcohol per week     Comment: COUPLE TIMES MONTH    Drug use: No    Sexual activity: Not Currently     Partners: Female     Birth control/protection:  "None         Family History   Problem Relation Age of Onset    Stroke Other     Heart disease Father     Diabetes Father     Hypertension Father     Malig Hyperthermia Neg Hx        REVIEW OF SYSTEMS:   Pertinent positives are noted in the HPI above.  Otherwise, all other systems were reviewed, and are negative.     Objective:     Vitals:    04/25/24 0831 04/25/24 0911 04/25/24 1258 04/25/24 1315   BP: 132/73 140/75 140/75 151/62   BP Location:  Right arm  Right arm   Patient Position:  Lying  Lying   Pulse: 79 83 83 78   Resp: 18 18  18   Temp:  99 °F (37.2 °C)  97.7 °F (36.5 °C)   TempSrc:  Oral  Oral   SpO2: 95% 97%     Weight:   88.4 kg (194 lb 14.2 oz)    Height:   178 cm (70.08\")      Body mass index is 27.9 kg/m².  Flowsheet Rows      Flowsheet Row First Filed Value   Admission Height 177.8 cm (70\") Documented at 04/25/2024 0501   Admission Weight 88.5 kg (195 lb) Documented at 04/25/2024 0501             General:    No acute distress, alert and oriented x4, pleasant                   Head:    Normocephalic, atraumatic.   Eyes:          Conjunctivae and sclerae normal, no icterus.   Throat:   No oral lesions, no thrush, oral mucosa moist.    Neck:   Supple, trachea midline.   Lungs:     Clear to auscultation bilaterally     Heart:    Regular rhythm and normal rate.  No murmurs, gallops, or rubs noted.   Abdomen:     Soft, non-tender, non-distended, positive bowel sounds.    Extremities:   Trace pedal edema lower extremities bilaterally.   Pulses:   Pulses palpable and equal bilaterally.    Skin:   No bleeding or rash.   Neuro:   Non-focal.  Moves all extremities well.    Psychiatric:   Normal mood and affect.           Lab Review:                Results from last 7 days   Lab Units 04/25/24  0513   SODIUM mmol/L 141   POTASSIUM mmol/L 3.5   CHLORIDE mmol/L 107   CO2 mmol/L 23.0   BUN mg/dL 26*   CREATININE mg/dL 1.81*   GLUCOSE mg/dL 110*   CALCIUM mg/dL 8.0*     Results from last 7 days   Lab Units " 04/25/24  0708 04/25/24  0513   HSTROP T ng/L 20 21     Results from last 7 days   Lab Units 04/25/24  0513   WBC 10*3/mm3 9.10   HEMOGLOBIN g/dL 9.7*   HEMATOCRIT % 29.5*   PLATELETS 10*3/mm3 257     Results from last 7 days   Lab Units 04/25/24  0513   INR  1.04   APTT seconds 27.8                   EKG (reviewed by me personally):            Assessment:   1.  Pleuritic left-sided chest pain  2.  Acute kidney injury with chronic kidney disease  3.  Right perinephric hematoma secondary to recent ESWL procedure  4.  Coronary artery disease, status post ARLETTE to the RCA in 2015  5.  Brief paroxysmal atrial fibrillation and flutter in the past, not on anticoagulation secondary to hematuria in 2023  6.  Anemia, likely multifactorial  7.  Hypertension    Plan:       His chest pain is pleuritic in nature and sharp on the left side.  This is not really consistent with ischemic pain.  Additionally, even with his renal function being Impaired, his troponin is normal.  This is very reassuring.  His CT angiogram of the chest did not show evidence for pulmonary embolism.    Echocardiogram checked to ensure that he does not have a pericardial effusion or other structural heart disease.  He had a trivial pericardial effusion and an EF of 65 to 70%.  I do not feel that this is likely pericarditis in this setting.  He has no EKG changes to suggest pericarditis either.    I was initially going to do a stress test tomorrow, although I am going to reevaluate him in the morning and see how he is doing clinically.  I would also like his renal function to be better.  I have a low suspicion that this is anginal pain despite his known coronary artery disease.    Nephrology and urology are following for the perinephric hematoma and the kidney injury.  I would continue him on the Lipitor, Imdur, and Toprol-XL for now.    Thank you very much for this consult.    Alejandro Qiu MD

## 2024-04-25 NOTE — PROGRESS NOTES
Nutrition Services    Patient Name:  Kyle Amador  YOB: 1950  MRN: [id number]  Admit Date:  4/25/2024    Assessment Date:  04/25/24    Summary: Consult for RN admit screen  Pt is a 74 y/o male who presented with Chest pain. Pt has a hx of CAD, diverticulitis, esophagitis, GERD, HLD, hyperthyroidism, osteoarthritis, PAF, GEORGES on CKD 2, HTN.   Pt laying in bed with wife at bedside when I visited. Pt reported his appetite has been pretty lackluster for the past week but normally its steady. Pt reported he typically eats 100% of 2 meals per day. Pt reported a usual weight of 195 lbs with no recent weight loss. Per EMR, Pt weight appears stable. Pt denied any chew/swallow issues or n/v. RD encouraged PO intake and will cotninue to follow.     CLINICAL NUTRITION ASSESSMENT      Reason for Assessment Nurse Admission Screen, Physician Consult     Diagnosis/Problem   Chest pain. Pt has a hx of CAD, diverticulitis, esophagitis, GERD, HLD, hyperthyroidism, osteoarthritis, PAF, GEORGES on CKD 2, HTN.    Medical/Surgical History Past Medical History:   Diagnosis Date    APC (atrial premature contractions) 08/20/2020    BPH (benign prostatic hyperplasia)     Bursitis of right shoulder 03/2013    CAD (coronary artery disease)     Status post stent to RCA    Cataract     BILATERAL    Cervical spondylosis 09/2016    Chorioretinal scar     BILATERAL    Chronic renal insufficiency, stage III (moderate)     Diverticulitis     ED (erectile dysfunction)     Esophagitis     Functional dyspepsia     GERD (gastroesophageal reflux disease)     Hemorrhoids     Hyperlipidemia     Hypertension     probable hyperaldosteronism    Hyperthyroidism     Hypocalcemia 03/2018    Hypokalemia 06/2018    Kidney cysts 03/2018    FOLLOWED BY DR. HIRAM EDWARD    Kidney stones     Myopia 12/2018    LEFT EYE    OA (osteoarthritis)     PAF (paroxysmal atrial fibrillation)     Seborrheic keratosis     Trochanteric bursitis of right hip 04/2019        Past Surgical History:   Procedure Laterality Date    APPENDECTOMY N/A     CARDIAC CATHETERIZATION N/A 07/11/2019    Procedure: Left Heart Cath;  Surgeon: Charo Weiss MD;  Location: [facility] CATH INVASIVE LOCATION;  Service: Cardiology    CARDIAC CATHETERIZATION N/A 07/11/2019    Procedure: Coronary angiography;  Surgeon: Charo Weiss MD;  Location: [facility] CATH INVASIVE LOCATION;  Service: Cardiology    CARDIAC CATHETERIZATION N/A 07/11/2019     Left ventriculography; 30 mid LCx, 30% distal RCA  Charo Weiss MD at [facility]    CARDIAC CATHETERIZATION Left 05/18/2012     20-30% diffuse LAD, 30-40% ostial diag, small LCx with diffuse 30-50%, large RCA with 60-70% mid (normal FFR of 0.9). Angiographically it was unchanged by previous cath DR. ANSELMO FLANAGAN AT [facility]    CARDIAC CATHETERIZATION Left 05/2015     but due to persistant chest pain, he underwent placement of a ARLETTE (3.5x23 Xience).      COLONOSCOPY N/A 09/12/2006    DIVERTICULOSIS, MODERATE EXTERNAL HEMORRHOIDS, DR. KAYLAN PINEDA AT [facility]    CORONARY ANGIOPLASTY WITH STENT PLACEMENT Left 06/01/2015    75% DISTAL RCA STENOSIS, RCA STENT, DR. JUSTIN BERKOWITZ AT [facility]    CYSTOSCOPY BLADDER STONE LITHOTRIPSY Right 4/19/2024    Procedure: CYSTOSCOPY RETROGRADE;  Surgeon: Isra Regan MD;  Location: [facility];  Service: Urology;  Laterality: Right;    CYSTOSCOPY URETEROSCOPY LASER LITHOTRIPSY Right 12/18/2019    Procedure: RIGHT URETEROSCOPY STENT REMOVAL & STENT PLACEMENT & STONE BASKET EXTRACTION &  LASER LITHOTRIPSY;  Surgeon: Roberto Drew MD;  Location: [facility] OR;  Service: Urology    CYSTOSCOPY W/ URETERAL STENT PLACEMENT Right 11/23/2019    Procedure: CYSTO RT STENT INSERTION;  Surgeon: Jonnathan Wolf MD;  Location: [facility] OR;  Service: Urology    ENDOSCOPY N/A 08/14/2019    Z LINE IRREGULAR, SMALL HIATAL HERNIA, MILD INFLAMMATION WITH ERYTHEMA AND FRIABILITY IN GASTRIC BODY, DR. MARTA FLORES AT [facility]    ENDOSCOPY N/A 06/16/2015     "LA GRADE B ESOPHAGITIS, ACUTE GASTRITIS, POSSIBLE PILL ESOPHAGITIS, DR. MARTA FLORES AT [facility]    ENDOSCOPY AND COLONOSCOPY N/A 06/03/2011    CHRONIC GASTRITIS, HIATAL HERNIA,OTHERWISE NORMAL EGD, DIVERTICULOSIS IN SIGMOID, NON BLEEDING INTERNAL HEMORRHOIDS, OTHERWISE WNL CY, RESCOPE IN 10 YRS, DR. MARTA FLORES AT [facility]    EXTRACORPOREAL SHOCK WAVE LITHOTRIPSY (ESWL) Right 4/19/2024    Procedure: RIGHT  SHOCKWAVE LITHOTRIPSY WITH;  Surgeon: Isra Regan MD;  Location: [facility];  Service: Urology;  Laterality: Right;    KIDNEY STONE SURGERY N/A         Anthropometrics        Current Height  Current Weight  BMI kg/m2 Height: 178 cm (70.08\")  Weight: 88.4 kg (194 lb 14.2 oz) (04/25/24 1258)  Body mass index is 27.9 kg/m².   Adjusted BMI (if applicable)    BMI Category Overweight (25 - 29.9)   Ideal Body Weight (IBW) 161 lbs   Usual Body Weight (UBW) 195 lbs   Weight Trend Stable   Weight History Wt Readings from Last 30 Encounters:   04/25/24 1258 88.4 kg (194 lb 14.2 oz)   04/25/24 0501 88.5 kg (195 lb)   04/09/24 1613 91.6 kg (202 lb)   03/11/24 1342 89.4 kg (197 lb)   11/29/23 1047 88.8 kg (195 lb 12.8 oz)   10/30/23 0928 89.1 kg (196 lb 6.4 oz)   09/26/23 1041 87.5 kg (193 lb)   09/25/23 2050 87.8 kg (193 lb 8 oz)   09/25/23 1645 88.5 kg (195 lb)   03/09/23 1131 90.3 kg (199 lb)   09/13/22 1110 88.5 kg (195 lb)   06/23/22 0956 88 kg (194 lb)   12/01/21 0926 90.7 kg (200 lb)   07/08/21 0934 90.4 kg (199 lb 3.2 oz)   06/21/21 0136 89.5 kg (197 lb 4.8 oz)   06/20/21 2319 90.3 kg (199 lb)   05/18/21 0906 92.1 kg (203 lb)   11/19/20 1004 91.6 kg (202 lb)   08/20/20 1249 90.7 kg (200 lb)   05/21/20 0920 86.2 kg (190 lb)   04/16/20 1437 86.4 kg (190 lb 6.4 oz)   03/10/20 1132 88.4 kg (194 lb 12.8 oz)   03/06/20 1053 86.2 kg (190 lb)   01/27/20 1048 88.1 kg (194 lb 3.2 oz)   12/26/19 1507 87 kg (191 lb 12.8 oz)   12/18/19 1310 86.4 kg (190 lb 6 oz)   12/11/19 1444 88.5 kg (195 lb)   11/27/19 0533 90.9 kg (200 lb 8 oz) "   11/26/19 0904 91.2 kg (201 lb)   11/26/19 0423 91.5 kg (201 lb 11.2 oz)   11/25/19 0500 91 kg (200 lb 9.6 oz)   11/23/19 1600 87.5 kg (193 lb)   10/09/19 1436 88.1 kg (194 lb 3.2 oz)   08/21/19 1030 90 kg (198 lb 6.4 oz)   08/14/19 0732 87.6 kg (193 lb 1.6 oz)   07/11/19 0108 89.1 kg (196 lb 8 oz)   07/10/19 2022 90.8 kg (200 lb 1.6 oz)   02/13/19 1331 91.1 kg (200 lb 12.8 oz)   08/09/18 1128 92.5 kg (204 lb)        Estimated/Assessed Needs        Energy Requirements    Weight for Calculation 88 kg   Method for Estimation  22 kcal/kg   EST Needs (kcal/day) 1936       Protein Requirements    Weight for Calculation 88 kg   EST Protein Needs (g/kg) 1.0 - 1.2 gm/kg   EST Daily Needs (g/day)        Fluid Requirements     Method for Estimation 1 mL/kcal    Estimated Needs (mL/day)        Fluid Deficit    Current Na Level (mEq/L)    Desired Na Level (mEq/L)    Estimated Fluid Deficit (L)       Labs       Pertinent Labs    Results from last 7 days   Lab Units 04/25/24  0513   SODIUM mmol/L 141   POTASSIUM mmol/L 3.5   CHLORIDE mmol/L 107   CO2 mmol/L 23.0   BUN mg/dL 26*   CREATININE mg/dL 1.81*   CALCIUM mg/dL 8.0*   BILIRUBIN mg/dL 0.7   ALK PHOS U/L 59   ALT (SGPT) U/L 13   AST (SGOT) U/L 14   GLUCOSE mg/dL 110*     Results from last 7 days   Lab Units 04/25/24  0513   HEMOGLOBIN g/dL 9.7*   HEMATOCRIT % 29.5*   WBC 10*3/mm3 9.10   ALBUMIN g/dL 3.5     Results from last 7 days   Lab Units 04/25/24  0513   INR  1.04   APTT seconds 27.8   PLATELETS 10*3/mm3 257     COVID19   Date Value Ref Range Status   06/21/2021 Not Detected Not Detected - Ref. Range Final     Lab Results   Component Value Date    HGBA1C 5.40 07/12/2019          Medications           Scheduled Medications amitriptyline, 25 mg, Oral, Nightly  aspirin, 324 mg, Oral, Once  atorvastatin, 40 mg, Oral, Nightly  hydrALAZINE, 100 mg, Oral, BID  isosorbide mononitrate, 60 mg, Oral, Nightly  metoprolol succinate XL, 100 mg, Oral, Nightly  multivitamin  with minerals, 1 tablet, Oral, Daily  pantoprazole, 40 mg, Oral, BID  terazosin, 5 mg, Oral, Nightly       Infusions     PRN Medications   nitroglycerin    ondansetron ODT    oxyCODONE-acetaminophen    [COMPLETED] Insert Peripheral IV **AND** sodium chloride     Physical Findings          General Findings alert, oriented, overweight   Oral/Mouth Cavity WDL   Edema  no edema   Gastrointestinal WDL, last bowel movement: 4/24   Skin  skin intact   Tubes/Drains/Lines none   NFPE Not indicated at this time   --  Current Nutrition Orders & Evaluation of Intake       Oral Nutrition     Food Allergies NKFA   Current PO Diet NPO Diet NPO Type: Sips with Meds  Diet: Regular/House, Cardiac; Healthy Heart (2-3 Na+); Fluid Consistency: Thin (IDDSI 0)   Supplement n/a   PO Evaluation     % PO Intake Less than 100% of 2 meals per day x 6 days per pt    Factors Affecting Intake: decreased appetite     PES STATEMENT / NUTRITION DIAGNOSIS      Nutrition Dx Problem  Problem: Inadequate Oral Intake  Etiology: Medical Diagnosis - Chest pain. Pt has a hx of CAD, diverticulitis, esophagitis, GERD, HLD, hyperthyroidism, osteoarthritis, PAF, GEORGES on CKD 2, HTN.     Signs/Symptoms: Report of Minimal PO Intake   --  NUTRITION INTERVENTION / PLAN OF CARE      Intervention Goal(s) Maintain nutrition status, Establish goals of care, Meet estimated needs, Increase intake, No significant weight loss, and PO intake goal %: 75%         RD Intervention/Action Encourage intake, Continue to monitor, and Care plan reviewed         Prescription/Orders:       PO Diet       Supplements       Enteral Nutrition       Parenteral Nutrition    New Prescription Ordered? Continue same per protocol, No changes at this time   --      Monitor/Evaluation Per protocol, PO intake, Weight, Skin status, GI status, Symptoms, POC/GOC, Swallow function   Discharge Plan/Needs No discharge needs identified at this time   --    RD to follow per protocol.      Electronically  signed by:  Ti Lindo  04/25/24 14:58 EDT

## 2024-04-25 NOTE — ED NOTES
"Nursing report ED to floor  Kyle Amador  73 y.o.  male    HPI :  HPI (Adult)  Stated Reason for Visit: sharp left sided chest pain that began 6525-3970 that woke him up from his sleep-pt states the pain gets \"sharper\" when he breathes. pt has previous cardiac stents about 7 years ago.  History Obtained From: patient    Chief Complaint  Chief Complaint   Patient presents with    Chest Pain       Admitting doctor:   Nakul Gastelum MD    Admitting diagnosis:   The primary encounter diagnosis was Chest pain, unspecified type. Diagnoses of GEORGES (acute kidney injury), Perinephric hematoma, and Pleural effusion, bilateral were also pertinent to this visit.    Code status:   Current Code Status       Date Active Code Status Order ID Comments User Context       Prior            Allergies:   Amiodarone and Penicillins    Isolation:   No active isolations    Intake and Output    Intake/Output Summary (Last 24 hours) at 4/25/2024 0802  Last data filed at 4/25/2024 0712  Gross per 24 hour   Intake 500 ml   Output --   Net 500 ml       Weight:       04/25/24  0501   Weight: 88.5 kg (195 lb)       Most recent vitals:   Vitals:    04/25/24 0650 04/25/24 0707 04/25/24 0716 04/25/24 0731   BP: 156/82 154/79 146/81 113/89   BP Location:       Patient Position:       Pulse: 80 84 78 81   Resp:   18 18   Temp:       TempSrc:       SpO2: 97%  95% 94%   Weight:       Height:           Active LDAs/IV Access:   Lines, Drains & Airways       Active LDAs       Name Placement date Placement time Site Days    Peripheral IV 04/25/24 0459 Anterior;Distal;Left Forearm 04/25/24  0459  Forearm  less than 1                    Labs (abnormal labs have a star):   Labs Reviewed   COMPREHENSIVE METABOLIC PANEL - Abnormal; Notable for the following components:       Result Value    Glucose 110 (*)     BUN 26 (*)     Creatinine 1.81 (*)     Calcium 8.0 (*)     Total Protein 5.6 (*)     eGFR 38.8 (*)     All other components within normal limits    " "Narrative:     GFR Normal >60  Chronic Kidney Disease <60  Kidney Failure <15    The GFR formula is only valid for adults with stable renal function between ages 18 and 70.   CBC WITH AUTO DIFFERENTIAL - Abnormal; Notable for the following components:    RBC 3.15 (*)     Hemoglobin 9.7 (*)     Hematocrit 29.5 (*)     Lymphocyte % 17.0 (*)     All other components within normal limits   D-DIMER, QUANTITATIVE - Abnormal; Notable for the following components:    D-Dimer, Quantitative 1.92 (*)     All other components within normal limits    Narrative:     According to the assay 's published package insert, a normal (<0.50 MCGFEU/mL) D-dimer result in conjunction with a non-high clinical probability assessment, excludes deep vein thrombosis (DVT) and pulmonary embolism (PE) with high sensitivity.    D-dimer values increase with age and this can make VTE exclusion of an older population difficult. To address this, the [employer], based on best available evidence and recent guidelines, recommends that clinicians use age-adjusted D-dimer thresholds in patients greater than 50 years of age with: a) a low probability of PE who do not meet all Pulmonary Embolism Rule Out Criteria, or b) in those with intermediate probability of PE.   The formula for an age-adjusted D-dimer cut-off is \"age/100\".  For example, a 60 year old patient would have an age-adjusted cut-off of 0.60 MCGFEU/mL and an 80 year old 0.80 MCGFEU/mL.   PROTIME-INR - Normal   APTT - Normal   BNP (IN-HOUSE) - Normal    Narrative:     This assay is used as an aid in the diagnosis of individuals suspected of having heart failure. It can be used as an aid in the diagnosis of acute decompensated heart failure (ADHF) in patients presenting with signs and symptoms of ADHF to the emergency department (ED). In addition, NT-proBNP of <300 pg/mL indicates ADHF is not likely.    Age Range Result Interpretation  NT-proBNP Concentration " (pg/mL:      <50             Positive            >450                   Gray                 300-450                    Negative             <300    50-75           Positive            >900                  Gray                300-900                  Negative            <300      >75             Positive            >1800                  Gray                300-1800                  Negative            <300   TROPONIN - Normal    Narrative:     High Sensitive Troponin T Reference Range:  <14.0 ng/L- Negative Female for AMI  <22.0 ng/L- Negative Male for AMI  >=14 - Abnormal Female indicating possible myocardial injury.  >=22 - Abnormal Male indicating possible myocardial injury.   Clinicians would have to utilize clinical acumen, EKG, Troponin, and serial changes to determine if it is an Acute Myocardial Infarction or myocardial injury due to an underlying chronic condition.        HIGH SENSITIVITIY TROPONIN T 2HR - Normal    Narrative:     High Sensitive Troponin T Reference Range:  <14.0 ng/L- Negative Female for AMI  <22.0 ng/L- Negative Male for AMI  >=14 - Abnormal Female indicating possible myocardial injury.  >=22 - Abnormal Male indicating possible myocardial injury.   Clinicians would have to utilize clinical acumen, EKG, Troponin, and serial changes to determine if it is an Acute Myocardial Infarction or myocardial injury due to an underlying chronic condition.        CBC AND DIFFERENTIAL    Narrative:     The following orders were created for panel order CBC & Differential.  Procedure                               Abnormality         Status                     ---------                               -----------         ------                     CBC Auto Differential[[id number]]        Abnormal            Final result                 Please view results for these tests on the individual orders.       EKG:   ECG 12 Lead Chest Pain    (Results Pending)       Meds given in ED:   Medications   sodium chloride  0.9 % flush 10 mL (has no administration in time range)   aspirin chewable tablet 324 mg (324 mg Oral Not Given 24 0526)   nitroglycerin (NITROSTAT) SL tablet 0.4 mg (0.4 mg Sublingual Given 24 0650)   sodium chloride 0.9 % bolus 500 mL (0 mL Intravenous Stopped 24 0712)   iopamidol (ISOVUE-370) 76 % injection 100 mL (95 mL Intravenous Given 24 0632)   nitroglycerin (NITROSTAT) ointment 1 inch (1 inch Topical Given 24 0707)   morphine injection 2 mg (2 mg Intravenous Given 24 0709)   ondansetron (ZOFRAN) injection 4 mg (4 mg Intravenous Given 24 0709)       Imaging results:  XR Chest 1 View    Result Date: 2024  Electronically signed by Donna Gottlieb MD on 24 at 0747    CT Angiogram Chest    Result Date: 2024   1. The study is negative for pulmonary embolism. 2. Partially imaged high attenuation right perinephric hematoma with associated mass effect on the right kidney. 3. Trace bilateral pleural effusions, right greater than left, with bilateral dependent compressive atelectasis and multifocal bibasilar subsegmental atelectasis and/or scarring. 4. Moderate to severe calcified coronary artery disease. 5. Cholelithiasis. 6. Small hiatal hernia. 7. Flowing multilevel thoracic syndesmophytes, indicating ankylosing spondylitis.  Results negative for PE and positive for partially imaged right perinephric hematoma were discussed over the phone with the ER physician, Dr. Ti Kapoor, at 7:20 a.m. on 2024  This report was finalized on 2024 7:24 AM by Jimmie Brown MD on Workstation: [id number]       Ambulatory status:   - ad jones    Social issues:   Social History     Socioeconomic History    Marital status:    Tobacco Use    Smoking status: Former     Current packs/day: 0.00     Types: Cigarettes     Quit date: 1987     Years since quittin.7     Passive exposure: Never    Smokeless tobacco: Never   Vaping Use    Vaping status: Never Used    Substance and Sexual Activity    Alcohol use: Yes     Alcohol/week: 1.0 standard drink of alcohol     Types: 1 Drinks containing 0.5 oz of alcohol per week     Comment: COUPLE TIMES MONTH    Drug use: No    Sexual activity: Not Currently     Partners: Female     Birth control/protection: None       Peripheral Neurovascular  Peripheral Neurovascular (Adult)  Peripheral Neurovascular WDL: WDL    Neuro Cognitive  Neuro Cognitive (Adult)  Cognitive/Neuro/Behavioral WDL: WDL    Learning  Learning Assessment (Adult)  Learning Readiness and Ability: no barriers identified    Respiratory  Respiratory WDL  Respiratory WDL: WDL    Abdominal Pain  Abdominal Pain CPG Interventions  Coping Interventions: anticipatory guidance provided, care explained to patient/family prior to performing, safe, supportive environment facilitated  Safety Interventions  Safety Precautions/Falls Reduction: assistive device/personal items within reach  All Alarms: alarm(s) activated and audible    Pain Assessments  Pain (Adult)  (0-10) Pain Rating: Rest: 10  (0-10) Pain Rating: Activity: 6  Pain Location: chest    NIH Stroke Scale       Shobha Ham RN  04/25/24 08:02 EDT

## 2024-04-25 NOTE — H&P
Patient Name:  Kyle Amador  YOB: 1950  MRN:  [id number]  Admit Date:  4/25/2024  Patient Care Team:  Harinder Lea DO as PCP - General  Harinder Lea DO as PCP - Family Medicine  Jennie Green MD as Consulting Physician (Nephrology)  Aquiles Velazquez MD as Consulting Physician (Cardiology)      Subjective   History Present Illness     Chief Complaint   Patient presents with    Chest Pain       Mr. Amador is a 73 y.o. with a history of CAD status post RCA stent 9/23, CKD 2, HLD, HTN, PAF, stones status post lithotripsy 4/19/2024 that presents with left-sided chest pain.  He woke up around 2 AM with chest pain worse with deep breath.   He denies diaphoresis, lightheadedness, nausea, vomiting or shortness of breath.  He is status post NSTEMI with stent placement in September 2023 followed by Dr. Velazquez.  Patient was treated for right kidney stones on 4/19 with lithotripsy.  He also has a dull ache to his right flank worse with palpation. He has had poor po intake for several days per wife and patient states lack of appetite. No other overt bleeding.  Denies cough, congestion, fever or chills hematuria or other LUTS.          History of Present Illness    Review of Systems   Constitutional:  Negative for appetite change and fatigue.   HENT:  Negative for trouble swallowing.    Respiratory:  Negative for choking.    Cardiovascular:  Positive for chest pain. Negative for leg swelling.   Gastrointestinal:  Negative for abdominal distention, abdominal pain, blood in stool, constipation, diarrhea, nausea and vomiting.        Flank pain   Genitourinary:  Negative for difficulty urinating, dysuria and hematuria.   Musculoskeletal:  Negative for back pain.   Skin:  Negative for pallor.   Neurological:  Negative for dizziness.   Psychiatric/Behavioral:  Negative for confusion.         Personal History     Past Medical History:   Diagnosis Date    APC (atrial premature contractions)  08/20/2020    BPH (benign prostatic hyperplasia)     Bursitis of right shoulder 03/2013    CAD (coronary artery disease)     Status post stent to RCA    Cataract     BILATERAL    Cervical spondylosis 09/2016    Chorioretinal scar     BILATERAL    Chronic renal insufficiency, stage III (moderate)     Diverticulitis     ED (erectile dysfunction)     Esophagitis     Functional dyspepsia     GERD (gastroesophageal reflux disease)     Hemorrhoids     Hyperlipidemia     Hypertension     probable hyperaldosteronism    Hyperthyroidism     Hypocalcemia 03/2018    Hypokalemia 06/2018    Kidney cysts 03/2018    FOLLOWED BY DR. HIRAM EDWARD    Kidney stones     Myopia 12/2018    LEFT EYE    OA (osteoarthritis)     PAF (paroxysmal atrial fibrillation)     Seborrheic keratosis     Trochanteric bursitis of right hip 04/2019     Past Surgical History:   Procedure Laterality Date    APPENDECTOMY N/A     CARDIAC CATHETERIZATION N/A 07/11/2019    Procedure: Left Heart Cath;  Surgeon: Charo Weiss MD;  Location:  [facility];  Service: Cardiology    CARDIAC CATHETERIZATION N/A 07/11/2019    Procedure: Coronary angiography;  Surgeon: Charo Weiss MD;  Location:  [facility];  Service: Cardiology    CARDIAC CATHETERIZATION N/A 07/11/2019     Left ventriculography; 30 mid LCx, 30% distal RCA  Charo Weiss MD at [facility]    CARDIAC CATHETERIZATION Left 05/18/2012     20-30% diffuse LAD, 30-40% ostial diag, small LCx with diffuse 30-50%, large RCA with 60-70% mid (normal FFR of 0.9). Angiographically it was unchanged by previous cath DR. ANSELMO FLANAGAN AT [facility]    CARDIAC CATHETERIZATION Left 05/2015     but due to persistant chest pain, he underwent placement of a ARLETTE (3.5x23 Xience).      COLONOSCOPY N/A 09/12/2006    DIVERTICULOSIS, MODERATE EXTERNAL HEMORRHOIDS, DR. KAYLAN PINEDA AT [facility]    CORONARY ANGIOPLASTY WITH STENT PLACEMENT Left 06/01/2015    75% DISTAL RCA STENOSIS, RCA STENT, DR. ANDERSON  SHO AT [facility]    CYSTOSCOPY BLADDER STONE LITHOTRIPSY Right 2024    Procedure: CYSTOSCOPY RETROGRADE;  Surgeon: Isra Regan MD;  Location: [facility] OR;  Service: Urology;  Laterality: Right;    CYSTOSCOPY URETEROSCOPY LASER LITHOTRIPSY Right 2019    Procedure: RIGHT URETEROSCOPY STENT REMOVAL & STENT PLACEMENT & STONE BASKET EXTRACTION &  LASER LITHOTRIPSY;  Surgeon: Roberto Drew MD;  Location: [facility] MAIN OR;  Service: Urology    CYSTOSCOPY W/ URETERAL STENT PLACEMENT Right 2019    Procedure: CYSTO RT STENT INSERTION;  Surgeon: Jonnathan Wofl MD;  Location: [facility] MAIN OR;  Service: Urology    ENDOSCOPY N/A 2019    Z LINE IRREGULAR, SMALL HIATAL HERNIA, MILD INFLAMMATION WITH ERYTHEMA AND FRIABILITY IN GASTRIC BODY, DR. MARTA FLORES AT [facility]    ENDOSCOPY N/A 2015    LA GRADE B ESOPHAGITIS, ACUTE GASTRITIS, POSSIBLE PILL ESOPHAGITIS, DR. MARTA FLORES AT [facility]    ENDOSCOPY AND COLONOSCOPY N/A 2011    CHRONIC GASTRITIS, HIATAL HERNIA,OTHERWISE NORMAL EGD, DIVERTICULOSIS IN SIGMOID, NON BLEEDING INTERNAL HEMORRHOIDS, OTHERWISE WNL CY, RESCOPE IN 10 YRS, DR. MARTA FLORES AT [facility]    EXTRACORPOREAL SHOCK WAVE LITHOTRIPSY (ESWL) Right 2024    Procedure: RIGHT  SHOCKWAVE LITHOTRIPSY WITH;  Surgeon: Isra Regan MD;  Location: [facility] OR;  Service: Urology;  Laterality: Right;    KIDNEY STONE SURGERY N/A      Family History   Problem Relation Age of Onset    Stroke Other     Heart disease Father     Diabetes Father     Hypertension Father     Malig Hyperthermia Neg Hx      Social History     Tobacco Use    Smoking status: Former     Current packs/day: 0.00     Types: Cigarettes     Quit date: 1987     Years since quittin.7     Passive exposure: Never    Smokeless tobacco: Never   Vaping Use    Vaping status: Never Used   Substance Use Topics    Alcohol use: Yes     Alcohol/week: 1.0 standard drink of alcohol     Types: 1 Drinks containing 0.5 oz of  alcohol per week     Comment: COUPLE TIMES MONTH    Drug use: No     No current facility-administered medications on file prior to encounter.     Current Outpatient Medications on File Prior to Encounter   Medication Sig Dispense Refill    amitriptyline (ELAVIL) 25 MG tablet Take 1 tablet by mouth Every Night.      atorvastatin (LIPITOR) 40 MG tablet Take 1 tablet by mouth once daily (Patient taking differently: Take 1 tablet by mouth Every Night.) 90 tablet 0    doxazosin (Cardura) 4 MG tablet Take 1 tablet by mouth Every Night. 90 tablet 1    hydrALAZINE (APRESOLINE) 100 MG tablet Take 1 tablet by mouth 2 (Two) Times a Day. 180 tablet 2    isosorbide mononitrate (IMDUR) 60 MG 24 hr tablet Take 1 tablet by mouth Daily. (Patient taking differently: Take 1 tablet by mouth Every Night.) 90 tablet 2    metoprolol succinate XL (TOPROL-XL) 100 MG 24 hr tablet Take 1 tablet by mouth Every Night. 90 tablet 3    Multiple Vitamins-Minerals (MULTIVITAMIN ADULT PO) Take 1 tablet by mouth Daily. PT HOLDING FOR SURGERY      ondansetron ODT (ZOFRAN-ODT) 4 MG disintegrating tablet Place 1 tablet on the tongue Every 8 (Eight) Hours As Needed for Nausea. 10 tablet 0    oxyCODONE-acetaminophen (PERCOCET) 5-325 MG per tablet Take 1 tablet by mouth Every 6 (Six) Hours As Needed (Pain). 20 tablet 0    pantoprazole (PROTONIX) 40 MG EC tablet Take 1 tablet by mouth 2 (Two) Times a Day.      valsartan (DIOVAN) 160 MG tablet Take 1 tablet by mouth 2 (Two) Times a Day. 180 tablet 2     Allergies   Allergen Reactions    Amiodarone Nausea Only and Cough    Penicillins Rash       Objective    Objective     Vital Signs  Temp:  [98.5 °F (36.9 °C)-99 °F (37.2 °C)] 99 °F (37.2 °C)  Heart Rate:  [71-84] 83  Resp:  [16-18] 18  BP: (113-161)/(73-89) 140/75  SpO2:  [94 %-98 %] 97 %  on   ;   Device (Oxygen Therapy): room air  Body mass index is 27.98 kg/m².    Physical Exam  Vitals and nursing note reviewed.   Constitutional:       Appearance: He is  well-developed.   HENT:      Head: Normocephalic and atraumatic.   Eyes:      Conjunctiva/sclera: Conjunctivae normal.   Neck:      Trachea: No tracheal deviation.   Cardiovascular:      Rate and Rhythm: Normal rate and regular rhythm.   Pulmonary:      Effort: Pulmonary effort is normal. No respiratory distress.      Breath sounds: Normal breath sounds.   Abdominal:      General: Bowel sounds are normal. There is no distension.      Palpations: Abdomen is soft. There is no mass.      Tenderness: There is no abdominal tenderness. There is right CVA tenderness. There is no guarding or rebound.   Musculoskeletal:         General: Normal range of motion.      Cervical back: Normal range of motion.   Skin:     General: Skin is warm and dry.   Neurological:      General: No focal deficit present.      Mental Status: He is alert and oriented to person, place, and time.   Psychiatric:         Mood and Affect: Mood normal.         Results Review:  I reviewed the patient's new clinical results.  I reviewed the patient's new imaging results and agree with the interpretation.  I reviewed the patient's other test results and agree with the interpretation  I personally viewed and interpreted the patient's EKG/Telemetry data  Discussed with ED provider.    Lab Results (last 24 hours)       Procedure Component Value Units Date/Time    CBC & Differential [[id number]]  (Abnormal) Collected: 04/25/24 0513    Specimen: Blood Updated: 04/25/24 0531    Narrative:      The following orders were created for panel order CBC & Differential.  Procedure                               Abnormality         Status                     ---------                               -----------         ------                     CBC Auto Differential[[id number]]        Abnormal            Final result                 Please view results for these tests on the individual orders.    Comprehensive Metabolic Panel [[id number]]  (Abnormal) Collected: 04/25/24 0513     Specimen: Blood Updated: 04/25/24 0551     Glucose 110 mg/dL      BUN 26 mg/dL      Creatinine 1.81 mg/dL      Sodium 141 mmol/L      Potassium 3.5 mmol/L      Chloride 107 mmol/L      CO2 23.0 mmol/L      Calcium 8.0 mg/dL      Total Protein 5.6 g/dL      Albumin 3.5 g/dL      ALT (SGPT) 13 U/L      AST (SGOT) 14 U/L      Alkaline Phosphatase 59 U/L      Total Bilirubin 0.7 mg/dL      Globulin 2.1 gm/dL      A/G Ratio 1.7 g/dL      BUN/Creatinine Ratio 14.4     Anion Gap 11.0 mmol/L      eGFR 38.8 mL/min/1.73     Narrative:      GFR Normal >60  Chronic Kidney Disease <60  Kidney Failure <15    The GFR formula is only valid for adults with stable renal function between ages 18 and 70.    Protime-INR [[id number]]  (Normal) Collected: 04/25/24 0513    Specimen: Blood Updated: 04/25/24 0545     Protime 13.8 Seconds      INR 1.04    aPTT [[id number]]  (Normal) Collected: 04/25/24 0513    Specimen: Blood Updated: 04/25/24 0545     PTT 27.8 seconds     BNP [[id number]]  (Normal) Collected: 04/25/24 0513    Specimen: Blood Updated: 04/25/24 0625     proBNP 822.0 pg/mL     Narrative:      This assay is used as an aid in the diagnosis of individuals suspected of having heart failure. It can be used as an aid in the diagnosis of acute decompensated heart failure (ADHF) in patients presenting with signs and symptoms of ADHF to the emergency department (ED). In addition, NT-proBNP of <300 pg/mL indicates ADHF is not likely.    Age Range Result Interpretation  NT-proBNP Concentration (pg/mL:      <50             Positive            >450                   Gray                 300-450                    Negative             <300    50-75           Positive            >900                  Gray                300-900                  Negative            <300      >75             Positive            >1800                  Gray                300-1800                  Negative            <300    High Sensitivity Troponin T  [[id number]]  (Normal) Collected: 04/25/24 0513    Specimen: Blood Updated: 04/25/24 0551     HS Troponin T 21 ng/L     Narrative:      High Sensitive Troponin T Reference Range:  <14.0 ng/L- Negative Female for AMI  <22.0 ng/L- Negative Male for AMI  >=14 - Abnormal Female indicating possible myocardial injury.  >=22 - Abnormal Male indicating possible myocardial injury.   Clinicians would have to utilize clinical acumen, EKG, Troponin, and serial changes to determine if it is an Acute Myocardial Infarction or myocardial injury due to an underlying chronic condition.         CBC Auto Differential [[id number]]  (Abnormal) Collected: 04/25/24 0513    Specimen: Blood Updated: 04/25/24 0531     WBC 9.10 10*3/mm3      RBC 3.15 10*6/mm3      Hemoglobin 9.7 g/dL      Hematocrit 29.5 %      MCV 93.7 fL      MCH 30.8 pg      MCHC 32.9 g/dL      RDW 12.9 %      RDW-SD 44.1 fl      MPV 9.5 fL      Platelets 257 10*3/mm3      Neutrophil % 68.4 %      Lymphocyte % 17.0 %      Monocyte % 9.9 %      Eosinophil % 4.2 %      Basophil % 0.3 %      Immature Grans % 0.2 %      Neutrophils, Absolute 6.22 10*3/mm3      Lymphocytes, Absolute 1.55 10*3/mm3      Monocytes, Absolute 0.90 10*3/mm3      Eosinophils, Absolute 0.38 10*3/mm3      Basophils, Absolute 0.03 10*3/mm3      Immature Grans, Absolute 0.02 10*3/mm3      nRBC 0.0 /100 WBC     D-dimer, Quantitative [[id number]]  (Abnormal) Collected: 04/25/24 0513    Specimen: Blood Updated: 04/25/24 0545     D-Dimer, Quantitative 1.92 MCGFEU/mL     Narrative:      According to the assay 's published package insert, a normal (<0.50 MCGFEU/mL) D-dimer result in conjunction with a non-high clinical probability assessment, excludes deep vein thrombosis (DVT) and pulmonary embolism (PE) with high sensitivity.    D-dimer values increase with age and this can make VTE exclusion of an older population difficult. To address this, the [employer], based on best available  "evidence and recent guidelines, recommends that clinicians use age-adjusted D-dimer thresholds in patients greater than 50 years of age with: a) a low probability of PE who do not meet all Pulmonary Embolism Rule Out Criteria, or b) in those with intermediate probability of PE.   The formula for an age-adjusted D-dimer cut-off is \"age/100\".  For example, a 60 year old patient would have an age-adjusted cut-off of 0.60 MCGFEU/mL and an 80 year old 0.80 MCGFEU/mL.    High Sensitivity Troponin T 2Hr [[id number]]  (Normal) Collected: 04/25/24 0708    Specimen: Blood Updated: 04/25/24 0746     HS Troponin T 20 ng/L      Troponin T Delta -1 ng/L     Narrative:      High Sensitive Troponin T Reference Range:  <14.0 ng/L- Negative Female for AMI  <22.0 ng/L- Negative Male for AMI  >=14 - Abnormal Female indicating possible myocardial injury.  >=22 - Abnormal Male indicating possible myocardial injury.   Clinicians would have to utilize clinical acumen, EKG, Troponin, and serial changes to determine if it is an Acute Myocardial Infarction or myocardial injury due to an underlying chronic condition.                 Imaging Results (Last 24 Hours)       Procedure Component Value Units Date/Time    XR Chest 1 View [[id number]] Collected: 04/25/24 0748     Updated: 04/25/24 0748    Narrative:        Patient: RAMONA SMITH  Time Out: 07:47  Exam(s): XR CXR 1 VIEW     EXAM:    XR Chest, 1 View    CLINICAL HISTORY:       Chest Pain.    TECHNIQUE:    Frontal view of the chest.    COMPARISON:    Chest radiograph 9 25 23    FINDINGS:    Lungs:  Interstitial prominence could represent atelectasis, pulmonary   edema or atypical infection.    Pleural space:  Small bilateral pleural effusions.  No pneumothorax.    Heart:  Unremarkable.  No cardiomegaly.    Mediastinum:  Unremarkable.  Normal mediastinal contour.    Bones joints:  There are degenerative changes of the spine.  No acute   fracture.    IMPRESSION:       1.  Interstitial " prominence could represent atelectasis, pulmonary edema   or atypical infection.  2.  Small bilateral pleural effusions.      Impression:          Electronically signed by Donna Gottlieb MD on 04-25-24 at 0747    CT Angiogram Chest [[id number]] Collected: 04/25/24 0711     Updated: 04/25/24 0727    Narrative:      CT ANGIOGRAM CHEST-     DATE OF EXAM: 4/25/2024 6:15 AM     INDICATION: Pleuritic chest pain. Positive D-dimer. Status post post  lithotripsy for renal stone on 4/19/2024.     COMPARISON: Chest radiographs 4/25/2024 and 9/25/2023. CT chest  9/25/2023 and 6/11/2018.     TECHNIQUE: Multiple contiguous axial images were acquired through the  chest following the intravenous administration of 95 mL of Isovue-370.  Reformatted coronal and sagittal sequences and a 3D reconstruction image  were also reviewed. Radiation dose reduction techniques were utilized,  including automated exposure control and exposure modulation based on  body size.     FINDINGS:  No evidence of a pulmonary arterial filling defect to suggest pulmonary  malaise and. The heart and great vessels of the chest are normal in  size. Moderate to severe calcified coronary artery disease. No  pericardial effusion. Mild calcified atherosclerotic disease in the  thoracic aorta without aneurysm. Calcified remnants of prior  granulomatous disease in the mediastinum. No pathologically enlarged  intrathoracic lymph nodes are identified. Small hiatal hernia.     Trace bilateral pleural effusions, right greater than left, with  associated decompressive atelectasis of the dependent aspect of each  lower lobe. Multifocal bibasilar subsegmental atelectasis and/or  scarring. Calcified remnants of prior granulomatous disease in both  lungs. No focal lung consolidation. No concerning pulmonary nodule. The  central airways are widely patent. No pneumothorax.     Limited imaging of the upper abdomen partially includes a high  attenuation right perinephric  hematoma.  Simple appearing left renal cyst. Cholelithiasis.     Flowing multilevel thoracic syndesmophytes, indicating ankylosing  spondylitis. Partially imaged lower cervical spondylosis and chronic  changes in both shoulders. No osseous abnormality or concerning osseous  lesion.       Impression:         1. The study is negative for pulmonary embolism.  2. Partially imaged high attenuation right perinephric hematoma with  associated mass effect on the right kidney.  3. Trace bilateral pleural effusions, right greater than left, with  bilateral dependent compressive atelectasis and multifocal bibasilar  subsegmental atelectasis and/or scarring.  4. Moderate to severe calcified coronary artery disease.  5. Cholelithiasis.  6. Small hiatal hernia.  7. Flowing multilevel thoracic syndesmophytes, indicating ankylosing  spondylitis.     Results negative for PE and positive for partially imaged right  perinephric hematoma were discussed over the phone with the ER  physician, Dr. Ti Kapoor, at 7:20 a.m. on 4/25/2024     This report was finalized on 4/25/2024 7:24 AM by Jimmie Brown MD on  Workstation: [id number]               Results for orders placed during the hospital encounter of 09/25/23    Adult Transthoracic Echo Complete W/ Cont if Necessary Per Protocol    Interpretation Summary    Left ventricular systolic function is normal. Left ventricular ejection fraction appears to be 66 - 70%. Normal left ventricular cavity size noted. Left ventricular wall thickness is consistent with mild concentric hypertrophy. All left ventricular wall segments contract normally. Left ventricular diastolic function is consistent with (grade I) impaired relaxation.      ECG 12 Lead Chest Pain   Preliminary Result   HEART RATE= 82  bpm   RR Interval= 732  ms   ND Interval= 179  ms   P Horizontal Axis= -73  deg   P Front Axis= 58  deg   QRSD Interval= 93  ms   QT Interval= 365  ms   QTcB= 427  ms   QRS Axis= 10  deg   T Wave Axis= 52   deg   - OTHERWISE NORMAL ECG -   Sinus rhythm   Ventricular premature complex   Electronically Signed By:    Date and Time of Study: 2024-04-25 05:14:24      Telemetry Scan   Final Result           Assessment/Plan     Active Hospital Problems    Diagnosis  POA    **Perinephric hematoma [S37.019A]  Yes    CKD (chronic kidney disease) stage 2, GFR 60-89 ml/min [N18.2]  Yes    Paroxysmal atrial flutter [I48.92]  Yes    Mixed hyperlipidemia [E78.2]  Yes    Chronic renal insufficiency, stage III (moderate) [N18.30]  Yes    ILEANA (acute kidney injury) [N17.9]  Yes    Coronary artery disease involving native coronary artery of native heart without angina pectoris [I25.10]  Yes    Hypertension [I10]  Yes      Resolved Hospital Problems   No resolved problems to display.       Mr. Amador is a 73 y.o. that presents with chest pain with hx CAD that presents with left sided chest pain.        Right perinephric hematoma   ABLA  Seen on CT with mass effect on the right kidney.  Status post lithotripsy for  stone on 4/13/24.  Consult urology. Hgb 9.1 compared to 12.7 on 4/9. No other overt bleeding. Monitor renal fx and hgb. Check anemia studies     Chest pain, pleuritic   CAD s/p stent  PAF  HTN   CTA chest or elevated Ddimer negative for PE with bilateral pleural effusions, right greater than left. Cardiology consult.  Echo and stress test ordered. Continue home , metoprolol, imdur, hydralazine. Hold  valsartan with Ileana. Currently in SR. Not on anticoagulation     ILEANA on CKD2  Crt baseline 1.0-1.22 currently 1.81. consult Dr. Green, established nephrologist   Hold ARB         VTE Prophylaxis - SCDs.  Code Status - Full code.       SAUL Dwyer  [facility]  04/25/24  10:40 EDT

## 2024-04-25 NOTE — CONSULTS
FIRST UROLOGY CONSULT      Patient Identification:  NAME:  Kyle Amador  Age:  73 y.o.   Sex:  male   :  1950   MRN:  [id number]     Chief complaint: Left chest pain    History of present illness:      Pt is a 73 y.o. male with a history of kidney stones s/p right ESWL 24, BPH with LUTS and gross hematuria that presented to the ED on 24 with complaints of left chest pain that began abruptly this AM. Patient reports pain worsens with inspiration. Pt was diagnosed and admitted with chest pain, GEORGES, perinephric hematoma and bilateral pleural effusion. Urology was consulted for evaluation and treatment of a right perinephric hematoma. Patient currently takes ASA. Patient reports post-op flank pain present for a couple days following procedure that has since improved. Denies hematuria, fever, chills, nausea or vomiting. Patient with decreased PO intake over the last few days.     In hospital:  -AVSS, good UOP  -WBC - 9.10  -Hgb - 9.7 (12.7)  -Hct - 29.5 (39.2)  -Creat - 1.81    -CT - Limited imaging of abd/pelvis partially includes a high attenuation right perinephric hematoma.    Past medical history:  Past Medical History:   Diagnosis Date    APC (atrial premature contractions) 2020    BPH (benign prostatic hyperplasia)     Bursitis of right shoulder 2013    CAD (coronary artery disease)     Status post stent to RCA    Cataract     BILATERAL    Cervical spondylosis 2016    Chorioretinal scar     BILATERAL    Chronic renal insufficiency, stage III (moderate)     Diverticulitis     ED (erectile dysfunction)     Esophagitis     Functional dyspepsia     GERD (gastroesophageal reflux disease)     Hemorrhoids     Hyperlipidemia     Hypertension     probable hyperaldosteronism    Hyperthyroidism     Hypocalcemia 2018    Hypokalemia 2018    Kidney cysts 2018    FOLLOWED BY DR. HIRAM EDWARD    Kidney stones     Myopia 2018    LEFT EYE    OA (osteoarthritis)     PAF  (paroxysmal atrial fibrillation)     Seborrheic keratosis     Trochanteric bursitis of right hip 04/2019       Past surgical history:  Past Surgical History:   Procedure Laterality Date    APPENDECTOMY N/A     CARDIAC CATHETERIZATION N/A 07/11/2019    Procedure: Left Heart Cath;  Surgeon: Charo Weiss MD;  Location: [facility] CATH INVASIVE LOCATION;  Service: Cardiology    CARDIAC CATHETERIZATION N/A 07/11/2019    Procedure: Coronary angiography;  Surgeon: Charo Weiss MD;  Location: [facility] CATH INVASIVE LOCATION;  Service: Cardiology    CARDIAC CATHETERIZATION N/A 07/11/2019     Left ventriculography; 30 mid LCx, 30% distal RCA  Chaor Weiss MD at [facility]    CARDIAC CATHETERIZATION Left 05/18/2012     20-30% diffuse LAD, 30-40% ostial diag, small LCx with diffuse 30-50%, large RCA with 60-70% mid (normal FFR of 0.9). Angiographically it was unchanged by previous cath DR. ANSELMO FLANAGAN AT [facility]    CARDIAC CATHETERIZATION Left 05/2015     but due to persistant chest pain, he underwent placement of a ARLETTE (3.5x23 Xience).      COLONOSCOPY N/A 09/12/2006    DIVERTICULOSIS, MODERATE EXTERNAL HEMORRHOIDS, DR. KAYLAN PINEDA AT [facility]    CORONARY ANGIOPLASTY WITH STENT PLACEMENT Left 06/01/2015    75% DISTAL RCA STENOSIS, RCA STENT, DR. JUSTIN BERKOWITZ AT [facility]    CYSTOSCOPY BLADDER STONE LITHOTRIPSY Right 4/19/2024    Procedure: CYSTOSCOPY RETROGRADE;  Surgeon: Isra Regan MD;  Location: [facility] OR;  Service: Urology;  Laterality: Right;    CYSTOSCOPY URETEROSCOPY LASER LITHOTRIPSY Right 12/18/2019    Procedure: RIGHT URETEROSCOPY STENT REMOVAL & STENT PLACEMENT & STONE BASKET EXTRACTION &  LASER LITHOTRIPSY;  Surgeon: Roberto Drew MD;  Location: [facility] OR;  Service: Urology    CYSTOSCOPY W/ URETERAL STENT PLACEMENT Right 11/23/2019    Procedure: CYSTO RT STENT INSERTION;  Surgeon: Jonnathan Wolf MD;  Location: [facility] MAIN OR;  Service: Urology    ENDOSCOPY N/A 08/14/2019    Z LINE IRREGULAR, SMALL  HIATAL HERNIA, MILD INFLAMMATION WITH ERYTHEMA AND FRIABILITY IN GASTRIC BODY, DR. MARTA FLORES AT [facility]    ENDOSCOPY N/A 06/16/2015    LA GRADE B ESOPHAGITIS, ACUTE GASTRITIS, POSSIBLE PILL ESOPHAGITIS, DR. MARTA FLORES AT [facility]    ENDOSCOPY AND COLONOSCOPY N/A 06/03/2011    CHRONIC GASTRITIS, HIATAL HERNIA,OTHERWISE NORMAL EGD, DIVERTICULOSIS IN SIGMOID, NON BLEEDING INTERNAL HEMORRHOIDS, OTHERWISE WNL CY, RESCOPE IN 10 YRS, DR. MARTA FLORES AT [facility]    EXTRACORPOREAL SHOCK WAVE LITHOTRIPSY (ESWL) Right 4/19/2024    Procedure: RIGHT  SHOCKWAVE LITHOTRIPSY WITH;  Surgeon: Isra Regan MD;  Location: [facility] MAIN OR;  Service: Urology;  Laterality: Right;    KIDNEY STONE SURGERY N/A        Allergies:  Amiodarone and Penicillins    Home medications:  Medications Prior to Admission   Medication Sig Dispense Refill Last Dose    amitriptyline (ELAVIL) 25 MG tablet Take 1 tablet by mouth Every Night.   4/24/2024 at 2000    atorvastatin (LIPITOR) 40 MG tablet Take 1 tablet by mouth once daily (Patient taking differently: Take 1 tablet by mouth Every Night.) 90 tablet 0 4/24/2024 at 2000    doxazosin (Cardura) 4 MG tablet Take 1 tablet by mouth Every Night. 90 tablet 1 4/24/2024 at 2000    hydrALAZINE (APRESOLINE) 100 MG tablet Take 1 tablet by mouth 2 (Two) Times a Day. 180 tablet 2 4/24/2024 at 2000    isosorbide mononitrate (IMDUR) 60 MG 24 hr tablet Take 1 tablet by mouth Daily. (Patient taking differently: Take 1 tablet by mouth Every Night.) 90 tablet 2 4/24/2024 at 2000    metoprolol succinate XL (TOPROL-XL) 100 MG 24 hr tablet Take 1 tablet by mouth Every Night. 90 tablet 3 4/24/2024 at 2000    Multiple Vitamins-Minerals (MULTIVITAMIN ADULT PO) Take 1 tablet by mouth Daily. PT HOLDING FOR SURGERY   4/24/2024 at 2000    ondansetron ODT (ZOFRAN-ODT) 4 MG disintegrating tablet Place 1 tablet on the tongue Every 8 (Eight) Hours As Needed for Nausea. 10 tablet 0 Past Week    oxyCODONE-acetaminophen (PERCOCET) 5-325 MG per  tablet Take 1 tablet by mouth Every 6 (Six) Hours As Needed (Pain). 20 tablet 0 Past Week    pantoprazole (PROTONIX) 40 MG EC tablet Take 1 tablet by mouth 2 (Two) Times a Day.   2024 at     valsartan (DIOVAN) 160 MG tablet Take 1 tablet by mouth 2 (Two) Times a Day. 180 tablet 2 2024 at         Hospital medications:  amitriptyline, 25 mg, Oral, Nightly  aspirin, 324 mg, Oral, Once  atorvastatin, 40 mg, Oral, Nightly  hydrALAZINE, 100 mg, Oral, BID  isosorbide mononitrate, 60 mg, Oral, Nightly  metoprolol succinate XL, 100 mg, Oral, Nightly  multivitamin with minerals, 1 tablet, Oral, Daily  pantoprazole, 40 mg, Oral, BID  terazosin, 5 mg, Oral, Nightly           nitroglycerin    ondansetron ODT    oxyCODONE-acetaminophen    [COMPLETED] Insert Peripheral IV **AND** sodium chloride    Family history:  Family History   Problem Relation Age of Onset    Stroke Other     Heart disease Father     Diabetes Father     Hypertension Father     Malig Hyperthermia Neg Hx        Social history:  Social History     Tobacco Use    Smoking status: Former     Current packs/day: 0.00     Types: Cigarettes     Quit date: 1987     Years since quittin.7     Passive exposure: Never    Smokeless tobacco: Never   Vaping Use    Vaping status: Never Used   Substance Use Topics    Alcohol use: Yes     Alcohol/week: 1.0 standard drink of alcohol     Types: 1 Drinks containing 0.5 oz of alcohol per week     Comment: COUPLE TIMES MONTH    Drug use: No       Review of systems:      12 point negative except as in HPI    Objective:  TMax 24 hours:   Temp (24hrs), Av.8 °F (37.1 °C), Min:98.5 °F (36.9 °C), Max:99 °F (37.2 °C)      Vitals Ranges:   Temp:  [98.5 °F (36.9 °C)-99 °F (37.2 °C)] 99 °F (37.2 °C)  Heart Rate:  [71-84] 83  Resp:  [16-18] 18  BP: (113-161)/(73-89) 140/75    Intake/Output Last 3 shifts:  No intake/output data recorded.     Physical Exam:    General Appearance:    Alert, cooperative, NAD   Back:      No CVA tenderness   Abdomen:  :      Soft, NDNT, no masses, no guarding    Deferred   Neuro/Psych:   Orientation intact, mood/affect pleasant       Results review:   I reviewed the patient's new clinical results.    Data review:  Lab Results (last 24 hours)       Procedure Component Value Units Date/Time    High Sensitivity Troponin T 2Hr [[id number]]  (Normal) Collected: 04/25/24 0708    Specimen: Blood Updated: 04/25/24 0746     HS Troponin T 20 ng/L      Troponin T Delta -1 ng/L     Narrative:      High Sensitive Troponin T Reference Range:  <14.0 ng/L- Negative Female for AMI  <22.0 ng/L- Negative Male for AMI  >=14 - Abnormal Female indicating possible myocardial injury.  >=22 - Abnormal Male indicating possible myocardial injury.   Clinicians would have to utilize clinical acumen, EKG, Troponin, and serial changes to determine if it is an Acute Myocardial Infarction or myocardial injury due to an underlying chronic condition.         BNP [[id number]]  (Normal) Collected: 04/25/24 0513    Specimen: Blood Updated: 04/25/24 0625     proBNP 822.0 pg/mL     Narrative:      This assay is used as an aid in the diagnosis of individuals suspected of having heart failure. It can be used as an aid in the diagnosis of acute decompensated heart failure (ADHF) in patients presenting with signs and symptoms of ADHF to the emergency department (ED). In addition, NT-proBNP of <300 pg/mL indicates ADHF is not likely.    Age Range Result Interpretation  NT-proBNP Concentration (pg/mL:      <50             Positive            >450                   Gray                 300-450                    Negative             <300    50-75           Positive            >900                  Gray                300-900                  Negative            <300      >75             Positive            >1800                  Gray                300-1800                  Negative            <300    Comprehensive Metabolic Panel [[id number]]   (Abnormal) Collected: 04/25/24 0513    Specimen: Blood Updated: 04/25/24 0551     Glucose 110 mg/dL      BUN 26 mg/dL      Creatinine 1.81 mg/dL      Sodium 141 mmol/L      Potassium 3.5 mmol/L      Chloride 107 mmol/L      CO2 23.0 mmol/L      Calcium 8.0 mg/dL      Total Protein 5.6 g/dL      Albumin 3.5 g/dL      ALT (SGPT) 13 U/L      AST (SGOT) 14 U/L      Alkaline Phosphatase 59 U/L      Total Bilirubin 0.7 mg/dL      Globulin 2.1 gm/dL      A/G Ratio 1.7 g/dL      BUN/Creatinine Ratio 14.4     Anion Gap 11.0 mmol/L      eGFR 38.8 mL/min/1.73     Narrative:      GFR Normal >60  Chronic Kidney Disease <60  Kidney Failure <15    The GFR formula is only valid for adults with stable renal function between ages 18 and 70.    High Sensitivity Troponin T [[id number]]  (Normal) Collected: 04/25/24 0513    Specimen: Blood Updated: 04/25/24 0551     HS Troponin T 21 ng/L     Narrative:      High Sensitive Troponin T Reference Range:  <14.0 ng/L- Negative Female for AMI  <22.0 ng/L- Negative Male for AMI  >=14 - Abnormal Female indicating possible myocardial injury.  >=22 - Abnormal Male indicating possible myocardial injury.   Clinicians would have to utilize clinical acumen, EKG, Troponin, and serial changes to determine if it is an Acute Myocardial Infarction or myocardial injury due to an underlying chronic condition.         Protime-INR [[id number]]  (Normal) Collected: 04/25/24 0513    Specimen: Blood Updated: 04/25/24 0545     Protime 13.8 Seconds      INR 1.04    aPTT [[id number]]  (Normal) Collected: 04/25/24 0513    Specimen: Blood Updated: 04/25/24 0545     PTT 27.8 seconds     D-dimer, Quantitative [[id number]]  (Abnormal) Collected: 04/25/24 0513    Specimen: Blood Updated: 04/25/24 0545     D-Dimer, Quantitative 1.92 MCGFEU/mL     Narrative:      According to the assay 's published package insert, a normal (<0.50 MCGFEU/mL) D-dimer result in conjunction with a non-high clinical probability  "assessment, excludes deep vein thrombosis (DVT) and pulmonary embolism (PE) with high sensitivity.    D-dimer values increase with age and this can make VTE exclusion of an older population difficult. To address this, the [employer], based on best available evidence and recent guidelines, recommends that clinicians use age-adjusted D-dimer thresholds in patients greater than 50 years of age with: a) a low probability of PE who do not meet all Pulmonary Embolism Rule Out Criteria, or b) in those with intermediate probability of PE.   The formula for an age-adjusted D-dimer cut-off is \"age/100\".  For example, a 60 year old patient would have an age-adjusted cut-off of 0.60 MCGFEU/mL and an 80 year old 0.80 MCGFEU/mL.    CBC & Differential [[id number]]  (Abnormal) Collected: 04/25/24 0513    Specimen: Blood Updated: 04/25/24 0531    Narrative:      The following orders were created for panel order CBC & Differential.  Procedure                               Abnormality         Status                     ---------                               -----------         ------                     CBC Auto Differential[[id number]]        Abnormal            Final result                 Please view results for these tests on the individual orders.    CBC Auto Differential [[id number]]  (Abnormal) Collected: 04/25/24 0513    Specimen: Blood Updated: 04/25/24 0531     WBC 9.10 10*3/mm3      RBC 3.15 10*6/mm3      Hemoglobin 9.7 g/dL      Hematocrit 29.5 %      MCV 93.7 fL      MCH 30.8 pg      MCHC 32.9 g/dL      RDW 12.9 %      RDW-SD 44.1 fl      MPV 9.5 fL      Platelets 257 10*3/mm3      Neutrophil % 68.4 %      Lymphocyte % 17.0 %      Monocyte % 9.9 %      Eosinophil % 4.2 %      Basophil % 0.3 %      Immature Grans % 0.2 %      Neutrophils, Absolute 6.22 10*3/mm3      Lymphocytes, Absolute 1.55 10*3/mm3      Monocytes, Absolute 0.90 10*3/mm3      Eosinophils, Absolute 0.38 10*3/mm3      Basophils, Absolute " 0.03 10*3/mm3      Immature Grans, Absolute 0.02 10*3/mm3      nRBC 0.0 /100 WBC              Imaging:  Imaging Results (Last 24 Hours)       Procedure Component Value Units Date/Time    XR Chest 1 View [[id number]] Collected: 04/25/24 0748     Updated: 04/25/24 0748    Narrative:        Patient: RAMONA SMITH  Time Out: 07:47  Exam(s): XR CXR 1 VIEW     EXAM:    XR Chest, 1 View    CLINICAL HISTORY:       Chest Pain.    TECHNIQUE:    Frontal view of the chest.    COMPARISON:    Chest radiograph 9 25 23    FINDINGS:    Lungs:  Interstitial prominence could represent atelectasis, pulmonary   edema or atypical infection.    Pleural space:  Small bilateral pleural effusions.  No pneumothorax.    Heart:  Unremarkable.  No cardiomegaly.    Mediastinum:  Unremarkable.  Normal mediastinal contour.    Bones joints:  There are degenerative changes of the spine.  No acute   fracture.    IMPRESSION:       1.  Interstitial prominence could represent atelectasis, pulmonary edema   or atypical infection.  2.  Small bilateral pleural effusions.      Impression:          Electronically signed by Donna Gottlieb MD on 04-25-24 at 0747    CT Angiogram Chest [[id number]] Collected: 04/25/24 0711     Updated: 04/25/24 0727    Narrative:      CT ANGIOGRAM CHEST-     DATE OF EXAM: 4/25/2024 6:15 AM     INDICATION: Pleuritic chest pain. Positive D-dimer. Status post post  lithotripsy for renal stone on 4/19/2024.     COMPARISON: Chest radiographs 4/25/2024 and 9/25/2023. CT chest  9/25/2023 and 6/11/2018.     TECHNIQUE: Multiple contiguous axial images were acquired through the  chest following the intravenous administration of 95 mL of Isovue-370.  Reformatted coronal and sagittal sequences and a 3D reconstruction image  were also reviewed. Radiation dose reduction techniques were utilized,  including automated exposure control and exposure modulation based on  body size.     FINDINGS:  No evidence of a pulmonary arterial filling defect to  suggest pulmonary  malaise and. The heart and great vessels of the chest are normal in  size. Moderate to severe calcified coronary artery disease. No  pericardial effusion. Mild calcified atherosclerotic disease in the  thoracic aorta without aneurysm. Calcified remnants of prior  granulomatous disease in the mediastinum. No pathologically enlarged  intrathoracic lymph nodes are identified. Small hiatal hernia.     Trace bilateral pleural effusions, right greater than left, with  associated decompressive atelectasis of the dependent aspect of each  lower lobe. Multifocal bibasilar subsegmental atelectasis and/or  scarring. Calcified remnants of prior granulomatous disease in both  lungs. No focal lung consolidation. No concerning pulmonary nodule. The  central airways are widely patent. No pneumothorax.     Limited imaging of the upper abdomen partially includes a high  attenuation right perinephric hematoma.  Simple appearing left renal cyst. Cholelithiasis.     Flowing multilevel thoracic syndesmophytes, indicating ankylosing  spondylitis. Partially imaged lower cervical spondylosis and chronic  changes in both shoulders. No osseous abnormality or concerning osseous  lesion.       Impression:         1. The study is negative for pulmonary embolism.  2. Partially imaged high attenuation right perinephric hematoma with  associated mass effect on the right kidney.  3. Trace bilateral pleural effusions, right greater than left, with  bilateral dependent compressive atelectasis and multifocal bibasilar  subsegmental atelectasis and/or scarring.  4. Moderate to severe calcified coronary artery disease.  5. Cholelithiasis.  6. Small hiatal hernia.  7. Flowing multilevel thoracic syndesmophytes, indicating ankylosing  spondylitis.     Results negative for PE and positive for partially imaged right  perinephric hematoma were discussed over the phone with the ER  physician, Dr. Ti Kapoor, at 7:20 a.m. on 4/25/2024      This report was finalized on 4/25/2024 7:24 AM by Jimmie Brown MD on  Workstation: [id number]                  Assessment:     Right perinephric hematoma  Right nephrolithiasis s/p right ESWL     Plan:     - No acute urologic surgical intervention planned. Perinephric hematoma not uncommon after ESWL. Most found incidentally unless patient with complaints of worsening flank pain requiring imaging. Patient otherwise hemodynamically stable. Will order CT abd/pelvis without contrast for full evaluation of kidneys. Will await results.   - Monitor H/H  - Recommend holding ASA  - Urology following    Lexy Berkowitz, APROBDULIO  04/25/24  10:41 EDT

## 2024-04-25 NOTE — CONSULTS
Pt follows Dr. Jennie Green from [facility] outpt, will defer consult to their group. Consult called.

## 2024-04-26 VITALS
BODY MASS INDEX: 27.9 KG/M2 | WEIGHT: 194.89 LBS | RESPIRATION RATE: 18 BRPM | DIASTOLIC BLOOD PRESSURE: 58 MMHG | HEIGHT: 70 IN | SYSTOLIC BLOOD PRESSURE: 116 MMHG | OXYGEN SATURATION: 96 % | TEMPERATURE: 98.4 F | HEART RATE: 78 BPM

## 2024-04-26 PROBLEM — R07.9 CHEST PAIN: Status: ACTIVE | Noted: 2024-04-26

## 2024-04-26 LAB
ALBUMIN SERPL-MCNC: 3.5 G/DL (ref 3.5–5.2)
ANION GAP SERPL CALCULATED.3IONS-SCNC: 9 MMOL/L (ref 5–15)
BUN SERPL-MCNC: 25 MG/DL (ref 8–23)
BUN/CREAT SERPL: 13.4 (ref 7–25)
CALCIUM SPEC-SCNC: 8.1 MG/DL (ref 8.6–10.5)
CHLORIDE SERPL-SCNC: 106 MMOL/L (ref 98–107)
CO2 SERPL-SCNC: 23 MMOL/L (ref 22–29)
CREAT SERPL-MCNC: 1.87 MG/DL (ref 0.76–1.27)
DEPRECATED RDW RBC AUTO: 45.2 FL (ref 37–54)
EGFRCR SERPLBLD CKD-EPI 2021: 37.3 ML/MIN/1.73
ERYTHROCYTE [DISTWIDTH] IN BLOOD BY AUTOMATED COUNT: 12.9 % (ref 12.3–15.4)
GLUCOSE SERPL-MCNC: 115 MG/DL (ref 65–99)
HCT VFR BLD AUTO: 29.6 % (ref 37.5–51)
HGB BLD-MCNC: 9.6 G/DL (ref 13–17.7)
MCH RBC QN AUTO: 30.5 PG (ref 26.6–33)
MCHC RBC AUTO-ENTMCNC: 32.4 G/DL (ref 31.5–35.7)
MCV RBC AUTO: 94 FL (ref 79–97)
PHOSPHATE SERPL-MCNC: 3 MG/DL (ref 2.5–4.5)
PLATELET # BLD AUTO: 253 10*3/MM3 (ref 140–450)
PMV BLD AUTO: 9.5 FL (ref 6–12)
POTASSIUM SERPL-SCNC: 4.1 MMOL/L (ref 3.5–5.2)
QT INTERVAL: 367 MS
QTC INTERVAL: 421 MS
RBC # BLD AUTO: 3.15 10*6/MM3 (ref 4.14–5.8)
SODIUM SERPL-SCNC: 138 MMOL/L (ref 136–145)
WBC NRBC COR # BLD AUTO: 8.3 10*3/MM3 (ref 3.4–10.8)

## 2024-04-26 PROCEDURE — 85027 COMPLETE CBC AUTOMATED: CPT | Performed by: NURSE PRACTITIONER

## 2024-04-26 PROCEDURE — 93005 ELECTROCARDIOGRAM TRACING: CPT | Performed by: INTERNAL MEDICINE

## 2024-04-26 PROCEDURE — 93010 ELECTROCARDIOGRAM REPORT: CPT | Performed by: INTERNAL MEDICINE

## 2024-04-26 PROCEDURE — G0378 HOSPITAL OBSERVATION PER HR: HCPCS

## 2024-04-26 PROCEDURE — 80069 RENAL FUNCTION PANEL: CPT | Performed by: NURSE PRACTITIONER

## 2024-04-26 PROCEDURE — 99214 OFFICE O/P EST MOD 30 MIN: CPT | Performed by: INTERNAL MEDICINE

## 2024-04-26 RX ORDER — SODIUM CHLORIDE 9 MG/ML
100 INJECTION, SOLUTION INTRAVENOUS CONTINUOUS
Status: DISCONTINUED | OUTPATIENT
Start: 2024-04-26 | End: 2024-04-26

## 2024-04-26 RX ORDER — LIDOCAINE 50 MG/G
2 PATCH TOPICAL EVERY 24 HOURS
Qty: 5 PATCH | Refills: 0 | Status: SHIPPED | OUTPATIENT
Start: 2024-04-26 | End: 2024-04-29

## 2024-04-26 RX ORDER — VALSARTAN 160 MG/1
160 TABLET ORAL 2 TIMES DAILY
Start: 2024-04-26

## 2024-04-26 RX ORDER — LIDOCAINE 4 G/G
2 PATCH TOPICAL
Status: DISCONTINUED | OUTPATIENT
Start: 2024-04-26 | End: 2024-04-26 | Stop reason: HOSPADM

## 2024-04-26 RX ADMIN — Medication 1 TABLET: at 08:07

## 2024-04-26 RX ADMIN — PANTOPRAZOLE SODIUM 40 MG: 40 TABLET, DELAYED RELEASE ORAL at 08:07

## 2024-04-26 NOTE — DISCHARGE SUMMARY
Patient Name: Kyle Amador  : 1950  MRN: [id number]    Date of Admission: 2024  Date of Discharge:  2024  Primary Care Physician: Harinder Lea DO      Chief Complaint:   Chest Pain      Discharge Diagnoses     Active Hospital Problems    Diagnosis  POA    **Perinephric hematoma [S37.019A]  Yes    CKD (chronic kidney disease) stage 2, GFR 60-89 ml/min [N18.2]  Yes    Paroxysmal atrial flutter [I48.92]  Yes    Mixed hyperlipidemia [E78.2]  Yes    Chronic renal insufficiency, stage III (moderate) [N18.30]  Yes    GEORGES (acute kidney injury) [N17.9]  Yes    Coronary artery disease involving native coronary artery of native heart without angina pectoris [I25.10]  Yes    Hypertension [I10]  Yes      Resolved Hospital Problems   No resolved problems to display.        Hospital Course     Mr. Amador is a 73 y.o. male with a history of CAD, CKD, paroxysmal atrial fibrillation not on anticoagulation, HTN, recent right lithotripsy that presented with left-sided pleuritic chest pain and right flank pain.  Found to have a right perinephric hematoma secondary to recent ES WL procedure, GEORGES and acute blood loss anemia. Cardiology was consulted, echocardiogram with normal wall motion, EKG without acute ischemic change and normal troponin.  Left-sided chest pain is likely related to other acute issues including GEORGES and perinephric hematoma.  Cardiology does not recommend further stress testing.  Urology evaluated for subscapular hematoma but did not recommend further intervention.  Repeat CT scan in the urology office next week to monitor subscapular hematoma and hold aspirin at discharge.  Creatinine has been elevated at 1.8 compared to his usual baseline of 1.1-1.2.  Nephrology suspects etiology likely an extrinsic renal compression from the large perinephric hematoma.  He has been instructed to avoid NSAIDs, aspirin and hold ARB until he follows up with his established nephrologist for repeat labs  in 1 to 2 weeks.  His hemoglobin has been low but stable at 9.6 with no other overt bleeding.  He will need repeat labs in 1 week with his PCP or other specialist.  Patient has been cleared by all specialist for discharge today to home.         Day of Discharge     Subjective:  Chest pain has resolved. Mild right flank pain. NAD    Physical Exam:  Temp:  [97.7 °F (36.5 °C)-99.1 °F (37.3 °C)] 98.4 °F (36.9 °C)  Heart Rate:  [78-95] 78  Resp:  [18] 18  BP: (116-153)/(57-75) 116/58  Body mass index is 27.9 kg/m².  Physical Exam  Vitals and nursing note reviewed.   Constitutional:       Appearance: Normal appearance. He is well-developed. He is not ill-appearing.   HENT:      Head: Normocephalic and atraumatic.   Eyes:      Conjunctiva/sclera: Conjunctivae normal.   Neck:      Trachea: No tracheal deviation.   Cardiovascular:      Rate and Rhythm: Normal rate and regular rhythm.   Pulmonary:      Effort: Pulmonary effort is normal. No respiratory distress.   Abdominal:      General: Bowel sounds are normal. There is no distension.      Palpations: Abdomen is soft. There is no mass.      Tenderness: There is no abdominal tenderness. There is no guarding or rebound.   Genitourinary:     Rectum: No anal fissure, external hemorrhoid or internal hemorrhoid. Normal anal tone.   Musculoskeletal:         General: Normal range of motion.      Cervical back: Normal range of motion.   Skin:     General: Skin is warm and dry.   Neurological:      Mental Status: He is alert and oriented to person, place, and time.   Psychiatric:         Judgment: Judgment normal.         Consultants     Consult Orders (all) (From admission, onward)       Start     Ordered    04/25/24 1130  Inpatient Nephrology Consult  Once        Specialty:  Nephrology  Provider:  Jennie Green MD    04/25/24 1130    04/25/24 1041  Inpatient Nephrology Consult  Once,   Status:  Canceled        Specialty:  Nephrology  Provider:  Carl Heredia MD     04/25/24 1040    04/25/24 1004  Inpatient Nutrition Consult  Once        Provider:  (Not yet assigned)    04/25/24 1003    04/25/24 0945  Inpatient Urology Consult  Once        Specialty:  Urology  Provider:  Isra Regan MD    04/25/24 0944    04/25/24 0937  Inpatient Cardiology Consult  Once        Specialty:  Cardiology  Provider:  Jacinto Qiu MD    04/25/24 0936    04/25/24 0744  [facility] (on-call MD unless specified) Details  Once        Specialty:  Hospitalist  Provider:  (Not yet assigned)    04/25/24 0743                  Procedures     * Surgery not found *    Imaging Results (All)       Procedure Component Value Units Date/Time    CT Abdomen Pelvis Without Contrast [[id number]] Collected: 04/25/24 1439     Updated: 04/25/24 1457    Narrative:      CT ABDOMEN PELVIS WO CONTRAST-     INDICATION: CTA chest April 25, 2024     COMPARISON: Retroperitoneal hematoma follow-up     TECHNIQUE:  Routine CT abdomen/pelvis without IV contrast. Coronal and sagittal  reformats. Radiation dose reduction techniques were utilized, including  automated exposure control and exposure modulation based on body size.     FINDINGS:      Lung bases: Small right greater than left pleural effusions.  Subsegmental atelectasis at the bases. Coronary artery atherosclerotic  calcifications. Heart is at upper limits in size.     ABDOMEN: Few low attenuating liver lesions measuring less than 1 cm are  too small to characterize, often biliary cystic hamartomas.  Cholelithiasis. Vicarious excretion of contrast through the gallbladder.  No gallbladder wall thickening or surrounding inflammation.  Calcifications in the spleen, consistent with prior granulomatous  infection. Mild pancreatic atrophy. No pancreatic ductal dilatation or  mass seen. No adrenal nodules. Large right perinephric hematoma, series  2, axial image 59, measures 10.1 x 4.6 x 10.5 cm, series 3, coronal  image 80, with mass effect and displacement of the right  kidney, with  some hemorrhage tracking into Gerota's fascia and inferiorly into the  right pelvic retroperitoneum. Persistent and somewhat striated right  nephrogram. Excreted contrast seen in the bilateral urinary collecting  system. Fluid attenuating left renal cysts. No solid-appearing renal  mass or hydronephrosis.     Pelvis: Mild prostatomegaly with some mass effect and uplifting of the  bladder apex. Excreted contrast in the bladder lumen. No bladder mass.  Gas in the nondependent bladder lumen. Trace free fluid in the  rectovesicular pouch.     Bowel: No obstruction. Colonic diverticulosis. Appendix not identified  though no secondary findings of appendicitis.     Abdominal wall: Rectus diastases. Small fat-containing umbilical hernia.     Retroperitoneum: No lymphadenopathy.     Vasculature: No abdominal aortic aneurysm. Severe aortoiliac  atherosclerotic calcification.     Osseous structures: No destructive osseous lesions. Diffuse idiopathic  skeletal hyperostosis seen in the lower thoracic spine..       Impression:         1. Large right perinephric hematoma, with mass effect and displacement  of the right kidney and associated persistent and striated nephrogram.  Small amount of hemorrhage tracks into Gerota's fascia into the pelvic  retroperitoneum. The visualized portions of the right perinephric  hematoma appear unchanged from CT chest.  2. Cholelithiasis.  3. Colonic diverticulosis.  4. Mild prostatomegaly.  5. Gas in the bladder lumen. May be related to recent instrumentation or  cystitis in the appropriate clinical and laboratory setting.  6. Small pleural effusions.     This report was finalized on 4/25/2024 2:54 PM by Dr. Tim Jara M.D on Workstation: [id number]       XR Chest 1 View [[id number]] Collected: 04/25/24 0748     Updated: 04/25/24 0748    Narrative:        Patient: RAMONA SMITH  Time Out: 07:47  Exam(s): XR CXR 1 VIEW     EXAM:    XR Chest, 1 View    CLINICAL HISTORY:        Chest Pain.    TECHNIQUE:    Frontal view of the chest.    COMPARISON:    Chest radiograph 9 25 23    FINDINGS:    Lungs:  Interstitial prominence could represent atelectasis, pulmonary   edema or atypical infection.    Pleural space:  Small bilateral pleural effusions.  No pneumothorax.    Heart:  Unremarkable.  No cardiomegaly.    Mediastinum:  Unremarkable.  Normal mediastinal contour.    Bones joints:  There are degenerative changes of the spine.  No acute   fracture.    IMPRESSION:       1.  Interstitial prominence could represent atelectasis, pulmonary edema   or atypical infection.  2.  Small bilateral pleural effusions.      Impression:          Electronically signed by Donna Gottlieb MD on 04-25-24 at 0747    CT Angiogram Chest [[id number]] Collected: 04/25/24 0711     Updated: 04/25/24 0727    Narrative:      CT ANGIOGRAM CHEST-     DATE OF EXAM: 4/25/2024 6:15 AM     INDICATION: Pleuritic chest pain. Positive D-dimer. Status post post  lithotripsy for renal stone on 4/19/2024.     COMPARISON: Chest radiographs 4/25/2024 and 9/25/2023. CT chest  9/25/2023 and 6/11/2018.     TECHNIQUE: Multiple contiguous axial images were acquired through the  chest following the intravenous administration of 95 mL of Isovue-370.  Reformatted coronal and sagittal sequences and a 3D reconstruction image  were also reviewed. Radiation dose reduction techniques were utilized,  including automated exposure control and exposure modulation based on  body size.     FINDINGS:  No evidence of a pulmonary arterial filling defect to suggest pulmonary  malaise and. The heart and great vessels of the chest are normal in  size. Moderate to severe calcified coronary artery disease. No  pericardial effusion. Mild calcified atherosclerotic disease in the  thoracic aorta without aneurysm. Calcified remnants of prior  granulomatous disease in the mediastinum. No pathologically enlarged  intrathoracic lymph nodes are identified. Small hiatal  hernia.     Trace bilateral pleural effusions, right greater than left, with  associated decompressive atelectasis of the dependent aspect of each  lower lobe. Multifocal bibasilar subsegmental atelectasis and/or  scarring. Calcified remnants of prior granulomatous disease in both  lungs. No focal lung consolidation. No concerning pulmonary nodule. The  central airways are widely patent. No pneumothorax.     Limited imaging of the upper abdomen partially includes a high  attenuation right perinephric hematoma.  Simple appearing left renal cyst. Cholelithiasis.     Flowing multilevel thoracic syndesmophytes, indicating ankylosing  spondylitis. Partially imaged lower cervical spondylosis and chronic  changes in both shoulders. No osseous abnormality or concerning osseous  lesion.       Impression:         1. The study is negative for pulmonary embolism.  2. Partially imaged high attenuation right perinephric hematoma with  associated mass effect on the right kidney.  3. Trace bilateral pleural effusions, right greater than left, with  bilateral dependent compressive atelectasis and multifocal bibasilar  subsegmental atelectasis and/or scarring.  4. Moderate to severe calcified coronary artery disease.  5. Cholelithiasis.  6. Small hiatal hernia.  7. Flowing multilevel thoracic syndesmophytes, indicating ankylosing  spondylitis.     Results negative for PE and positive for partially imaged right  perinephric hematoma were discussed over the phone with the ER  physician, Dr. Ti Kapoor, at 7:20 a.m. on 4/25/2024     This report was finalized on 4/25/2024 7:24 AM by Jimmie Brown MD on  Workstation: [id number]             Results for orders placed during the hospital encounter of 09/25/23    Duplex Venous Lower Extremity - Bilateral CAR    Interpretation Summary    Normal bilateral lower extremity venous duplex scan.    Results for orders placed during the hospital encounter of 04/25/24    Adult Transthoracic Echo  "Complete W/ Cont if Necessary Per Protocol    Interpretation Summary    Left ventricular systolic function is normal. Left ventricular ejection fraction appears to be 66 - 70%.    Left ventricular wall thickness is consistent with mild concentric hypertrophy.    Left ventricular diastolic function is consistent with (grade I) impaired relaxation.    Normal right ventricular cavity size and systolic function noted    The left atrial cavity is mildly dilated.    Insufficient TR velocity profile to estimate the right ventricular systolic pressure.    There is a trivial pericardial effusion.    Pertinent Labs     Results from last 7 days   Lab Units 04/26/24  0321 04/25/24  0513   WBC 10*3/mm3 8.30 9.10   HEMOGLOBIN g/dL 9.6* 9.7*   PLATELETS 10*3/mm3 253 257     Results from last 7 days   Lab Units 04/26/24 0321 04/25/24  0513   SODIUM mmol/L 138 141   POTASSIUM mmol/L 4.1 3.5   CHLORIDE mmol/L 106 107   CO2 mmol/L 23.0 23.0   BUN mg/dL 25* 26*   CREATININE mg/dL 1.87* 1.81*   GLUCOSE mg/dL 115* 110*   EGFR mL/min/1.73 37.3* 38.8*     Results from last 7 days   Lab Units 04/26/24  0321 04/25/24  0513   ALBUMIN g/dL 3.5 3.5   BILIRUBIN mg/dL  --  0.7   ALK PHOS U/L  --  59   AST (SGOT) U/L  --  14   ALT (SGPT) U/L  --  13     Results from last 7 days   Lab Units 04/26/24  0321 04/25/24  0513   CALCIUM mg/dL 8.1* 8.0*   ALBUMIN g/dL 3.5 3.5   PHOSPHORUS mg/dL 3.0  --        Results from last 7 days   Lab Units 04/25/24  0708 04/25/24  0513   HSTROP T ng/L 20 21   PROBNP pg/mL  --  822.0   D DIMER QUANT MCGFEU/mL  --  1.92*     Results from last 7 days   Lab Units 04/25/24  0708   URIC ACID mg/dL 5.6         Invalid input(s): \"LDLCALC\"          Test Results Pending at Discharge       Discharge Details        Discharge Medications        Changes to Medications        Instructions Start Date   atorvastatin 40 MG tablet  Commonly known as: LIPITOR  What changed: when to take this   Take 1 tablet by mouth once daily    "   isosorbide mononitrate 60 MG 24 hr tablet  Commonly known as: IMDUR  What changed: when to take this   60 mg, Oral, Daily      valsartan 160 MG tablet  Commonly known as: DIOVAN  What changed: additional instructions   160 mg, Oral, 2 Times Daily, Hold until follow up labs obtained.             Continue These Medications        Instructions Start Date   amitriptyline 25 MG tablet  Commonly known as: ELAVIL   25 mg, Oral, Nightly      doxazosin 4 MG tablet  Commonly known as: Cardura   4 mg, Oral, Nightly      hydrALAZINE 100 MG tablet  Commonly known as: APRESOLINE   100 mg, Oral, 2 Times Daily      metoprolol succinate  MG 24 hr tablet  Commonly known as: TOPROL-XL   100 mg, Oral, Nightly      multivitamin with minerals tablet tablet   1 tablet, Oral, Daily, PT HOLDING FOR SURGERY       ondansetron ODT 4 MG disintegrating tablet  Commonly known as: ZOFRAN-ODT   4 mg, Translingual, Every 8 Hours PRN      oxyCODONE-acetaminophen 5-325 MG per tablet  Commonly known as: PERCOCET   1 tablet, Oral, Every 6 Hours PRN      pantoprazole 40 MG EC tablet  Commonly known as: PROTONIX   Take 1 tablet by mouth 2 (Two) Times a Day.               Allergies   Allergen Reactions    Amiodarone Nausea Only and Cough    Penicillins Rash       Discharge Disposition:  Home or Self Care      Discharge Diet:  Diet Order   Procedures    Diet: Regular/House; Fluid Consistency: Thin (IDDSI 0)       Discharge Activity: as tolerated      CODE STATUS:    Code Status and Medical Interventions:   Ordered at: 04/25/24 1020     Code Status (Patient has no pulse and is not breathing):    CPR (Attempt to Resuscitate)     Medical Interventions (Patient has pulse or is breathing):    Full Support       Future Appointments   Date Time Provider Department Center   9/18/2024 11:45 AM Aquiles Velazquez MD [facility]      Follow-up Information       Harinder Lea DO .    Specialty: Family Medicine  Contact information:  [address] [place] [address]  [facility] [address]  [phone]                             Time Spent on Discharge:  Greater than 30 minutes      SAUL Dwyer  [facility]  04/26/24  11:06 EDT

## 2024-04-26 NOTE — CASE MANAGEMENT/SOCIAL WORK
Discharge Planning Assessment  [facility]     Patient Name: Kyle Amador  MRN: [id number]  Today's Date: 4/26/2024    Admit Date: 4/25/2024    Plan: Home with spouse   Discharge Needs Assessment       Row Name 04/26/24 0932       Living Environment    People in Home spouse    Current Living Arrangements home    Family Caregiver if Needed spouse    Quality of Family Relationships involved;helpful;supportive       Transition Planning    Patient/Family Anticipates Transition to home    Patient/Family Anticipated Services at Transition     Transportation Anticipated car, drives self       Discharge Needs Assessment    Equipment Currently Used at Home none    Concerns to be Addressed no discharge needs identified;denies needs/concerns at this time    Equipment Needed After Discharge none                   Discharge Plan       Row Name 04/26/24 0932       Plan    Plan Home with spouse    Patient/Family in Agreement with Plan yes    Plan Comments CCP met with pt at bedside, introduced self and role of CCP. Face sheet information and pharmacy verified. Pt lives in a single-story home with a basement with 3STE with his wife. Pt still drives, IADL's and denies and DME at home. Pt has a living will. Pt is enrolled in meds to beds and pt denies trouble affording or managing his medications. Pt denies HH or SNF history. DC plan is to return home, spouse to transport. Beckie GEORGE/CCP                  Continued Care and Services - Admitted Since 4/25/2024    No active coordination exists for this encounter.       Expected Discharge Date and Time       Expected Discharge Date Expected Discharge Time    Apr 28, 2024            Demographic Summary    No documentation.                  Functional Status       Row Name 04/26/24 0932       Functional Status    Usual Activity Tolerance excellent    Current Activity Tolerance good       Assessment of Health Literacy    How often do you have someone help you read hospital  materials? Never    How often do you have problems learning about your medical condition because of difficulty understanding written information? Never    How often do you have a problem understanding what is told to you about your medical condition? Never    How confident are you filling out medical forms by yourself? Extremely    Health Literacy Excellent       Functional Status, IADL    Medications independent    Meal Preparation independent    Housekeeping independent    Laundry independent    Shopping independent                               Laura Ramirez RN

## 2024-04-26 NOTE — PROGRESS NOTES
[facility] DAILY PROGRESS NOTE      Name: Kyle Amador  Age: 73 y.o.  Sex: male  :  1950  MRN: [id number]    Date: 2024             Chief complaint: No new complaints    Patient appears well today. No new complaints. Cardiology believed that chest pain was likely pleuritic and not cardiac related. Did not feel that stress test was necessary. EKG and Troponin were normal. Denies flank pain. Nephrology following. Stable for discharge from their standpoint with recommendation to have repeat labs.     - AVSS, good UOP  - WBC - 8.30  - Cr - 1.87 (1.81)  - Hgb - 9.6 (9.7)  - Hct - 29.6 (29.5)    Physical Exam:    General Appearance:    Alert, cooperative, NAD   Back:     No CVA tenderness   Lungs:     Respirations unlabored, no wheezing   Abdomen:  :      Soft, NDNT, no masses, no guarding    Deferred   Neuro/Psych:   Orientation intact, mood/affect pleasant       Assessment:    Right subcapsular hematoma s/p right ESWL     Plan:    - No intervention planned. CT reviewed. Hemoglobin stable. No new associated flank pain.   - Repeat CBC in the AM. If H/H with no significant change, patient likely able to be discharged tomorrow  - Will scheduled for repeat CT scan in our office early next week to reassess subcapsular hematoma  - Recommend avoidance of strenuous activities   - Continue to hold ASA  - Urology will see in AM    SAUL Brown  2024  12:50 EDT

## 2024-04-26 NOTE — PROGRESS NOTES
"   LOS: 1 day     Chief Complaint/ Reason for encounter: GEORGES on CKD    Subjective   04/26/24 : Patient is doing well today with no new complaints  Good appetite with no nausea or vomiting  No shortness of breath chest pain or edema  Voiding well with no dysuria          Medical history reviewed:  History of Present Illness    Subjective    History taken from: Patient and chart    Vital Signs  Temp:  [97.7 °F (36.5 °C)-99.1 °F (37.3 °C)] 98.4 °F (36.9 °C)  Heart Rate:  [78-95] 78  Resp:  [18] 18  BP: (116-153)/(57-75) 116/58       Wt Readings from Last 1 Encounters:   04/25/24 1258 88.4 kg (194 lb 14.2 oz)   04/25/24 0501 88.5 kg (195 lb)       Objective:  Vital signs: (most recent): Blood pressure 116/58, pulse 78, temperature 98.4 °F (36.9 °C), temperature source Oral, resp. rate 18, height 178 cm (70.08\"), weight 88.4 kg (194 lb 14.2 oz), SpO2 96%.                Objective:  General Appearance:  Comfortable, well-appearing, in no acute distress and not in pain.  Awake, alert, oriented  HEENT: Mucous membranes moist, no injury, oropharynx clear  Lungs:  Normal effort and normal respiratory rate.  Breath sounds clear to auscultation.  No  respiratory distress.  No rales, decreased breath sounds or rhonchi.    Heart: Normal rate.  Regular rhythm.  S1, S2 normal.  No murmur.   Abdomen: Abdomen is soft.  Bowel sounds are normal, no abdominal tenderness.  There is no rebound or guarding  Extremities: No edema of bilateral lower extremities  Neurological: No focal motor or sensory deficits, pupils reactive  Skin:  Warm and dry.  No rash or cyanosis.       Results Review:    Intake/Output:     Intake/Output Summary (Last 24 hours) at 4/26/2024 1151  Last data filed at 4/26/2024 0802  Gross per 24 hour   Intake 480 ml   Output 1100 ml   Net -620 ml         DATA:  Radiology and Labs:  The following labs independently reviewed by me. Additional labs ordered for tomorrow a.m.  Interval notes, chart personally reviewed by me. "   Old records independently reviewed showing CKD 3 followed in our office by Dr. Green  The following radiologic studies independently viewed by me, findings CT abdomen pelvis showing a large perinephric hematoma with some extrinsic renal compression and displacement of the kidney  New problems include large perinephric hematoma  Discussed with patient himself at bedside    Risk/ complexity of medical care/ medical decision making high risk, new GEORGES on CKD    Labs:   Recent Results (from the past 24 hour(s))   Adult Transthoracic Echo Complete W/ Cont if Necessary Per Protocol    Collection Time: 04/25/24 12:40 PM   Result Value Ref Range    EF(MOD-bp) 74.7 %    LVIDd 4.9 cm    LVIDs 3.0 cm    IVSd 1.27 cm    LVPWd 1.35 cm    FS 39.1 %    IVS/LVPW 0.94 cm    ESV(cubed) 26.8 ml    EDV(cubed) 118.5 ml    LV mass(C)d 256.6 grams    LVOT area 4.2 cm2    LVOT diam 2.32 cm    EDV(MOD-sp2) 145.0 ml    EDV(MOD-sp4) 145.0 ml    ESV(MOD-sp2) 36.0 ml    ESV(MOD-sp4) 39.0 ml    SV(MOD-sp2) 109.0 ml    SV(MOD-sp4) 106.0 ml    EF(MOD-sp2) 75.2 %    EF(MOD-sp4) 73.1 %    MV E max german 80.0 cm/sec    MV A max german 83.4 cm/sec    MV dec time 0.18 sec    MV E/A 0.96     Pulm A Revs Dur 0.14 sec    MV A dur 0.16 sec    LA ESV Index (BP) 29.8 ml/m2    Med Peak E' German 7.2 cm/sec    Lat Peak E' German 7.8 cm/sec    Avg E/e' ratio 10.67     SV(LVOT) 121.0 ml    SV(RVOT) 85.7 ml    Qp/Qs 0.71     RV Base 3.2 cm    RV Mid 2.34 cm    TAPSE (>1.6) 2.14 cm    RV S' 14.6 cm/sec    Pulm Sys German 68.4 cm/sec    Pulm Nolasco German 40.4 cm/sec    Pulm S/D 1.69     Pulm A Revs German 31.2 cm/sec    LV V1 max 136.9 cm/sec    LV V1 max PG 7.5 mmHg    LV V1 mean PG 3.8 mmHg    LV V1 VTI 28.7 cm    Ao pk german 158.0 cm/sec    Ao max PG 10.0 mmHg    Ao mean PG 5.0 mmHg    Ao V2 VTI 32.6 cm    GUNNER(I,D) 3.7 cm2    MV max PG 5.4 mmHg    MV mean PG 2.8 mmHg    MV V2 VTI 30.9 cm    MV P1/2t 55.0 msec    MVA(P1/2t) 4.0 cm2    MVA(VTI) 3.9 cm2    MV dec slope 521.8 cm/sec2     RVOT diam 2.8 cm    RV V1 max PG 1.65 mmHg    RV V1 max 64.3 cm/sec    RV V1 VTI 14.1 cm    PA V2 max 93.4 cm/sec    PA acc time 0.07 sec    Ao root diam 3.4 cm    ACS 2.17 cm    Sinus 3.3 cm    STJ 2.42 cm    Dimensionless Index 0.90 (DI)    Ascending aorta 3.2 cm    Abdo Ao Diam 2.5 cm   CBC (No Diff)    Collection Time: 04/26/24  3:21 AM    Specimen: Blood   Result Value Ref Range    WBC 8.30 3.40 - 10.80 10*3/mm3    RBC 3.15 (L) 4.14 - 5.80 10*6/mm3    Hemoglobin 9.6 (L) 13.0 - 17.7 g/dL    Hematocrit 29.6 (L) 37.5 - 51.0 %    MCV 94.0 79.0 - 97.0 fL    MCH 30.5 26.6 - 33.0 pg    MCHC 32.4 31.5 - 35.7 g/dL    RDW 12.9 12.3 - 15.4 %    RDW-SD 45.2 37.0 - 54.0 fl    MPV 9.5 6.0 - 12.0 fL    Platelets 253 140 - 450 10*3/mm3   Renal Function Panel    Collection Time: 04/26/24  3:21 AM    Specimen: Blood   Result Value Ref Range    Glucose 115 (H) 65 - 99 mg/dL    BUN 25 (H) 8 - 23 mg/dL    Creatinine 1.87 (H) 0.76 - 1.27 mg/dL    Sodium 138 136 - 145 mmol/L    Potassium 4.1 3.5 - 5.2 mmol/L    Chloride 106 98 - 107 mmol/L    CO2 23.0 22.0 - 29.0 mmol/L    Calcium 8.1 (L) 8.6 - 10.5 mg/dL    Albumin 3.5 3.5 - 5.2 g/dL    Phosphorus 3.0 2.5 - 4.5 mg/dL    Anion Gap 9.0 5.0 - 15.0 mmol/L    BUN/Creatinine Ratio 13.4 7.0 - 25.0    eGFR 37.3 (L) >60.0 mL/min/1.73   ECG 12 Lead Chest Pain    Collection Time: 04/26/24  3:28 AM   Result Value Ref Range    QT Interval 367 ms    QTC Interval 421 ms       Radiology:  Pertinent radiology studies were reviewed as described above      Medications have been reviewed separately in chart overview      ASSESSMENT:  GEORGES, unclear etiology.  Without any obstruction on his CT.  ARB on hold.  Waste product stable.  Likely due to extrinsic renal compression from the large perinephric hematoma    Perinephric hematoma, following recent lithotripsy    Coronary artery disease involving native coronary artery of native heart without angina pectoris    Hypertension    GEORGES (acute kidney injury)     Chronic renal insufficiency, stage III (moderate)    Mixed hyperlipidemia    Paroxysmal atrial flutter    CKD (chronic kidney disease) stage 2, GFR 60-89 ml/min           DISCUSSION/PLAN:   Creatinine remains above his usual baseline of around 1.1-1.2 but stable today at 1.8  No other etiology for his GEORGES other than the large perinephric hematoma with extrinsic renal compression  He is eating and drinking well.  Hemoglobin is stable  Discussed with patient about avoiding any aspirin or NSAIDs  Continue to double up on his oral fluid intake    Stable for discharge home from a renal standpoint if okay with urology  Hold ARB until he follows up with his usual nephrologist, Dr. Green.  Will arrange for a follow-up visit in 1 to 2 weeks with repeat labs        Vernon Judge MD  [facility]   Office phone number: [phone]  Answering service phone number: [phone]    04/26/24  11:51 EDT    Dictation performed using Dragon dictation software

## 2024-04-26 NOTE — PLAN OF CARE
Problem: Adult Inpatient Plan of Care  Goal: Plan of Care Review  Outcome: Ongoing, Progressing  Goal: Patient-Specific Goal (Individualized)  Outcome: Ongoing, Progressing  Goal: Absence of Hospital-Acquired Illness or Injury  Outcome: Ongoing, Progressing  Intervention: Identify and Manage Fall Risk  Recent Flowsheet Documentation  Taken 4/26/2024 0600 by Melonie Patten RN  Safety Promotion/Fall Prevention: safety round/check completed  Taken 4/26/2024 0400 by Melonie Patten RN  Safety Promotion/Fall Prevention: safety round/check completed  Taken 4/26/2024 0200 by Melonie Patten RN  Safety Promotion/Fall Prevention: safety round/check completed  Taken 4/26/2024 0000 by Melonie Patten RN  Safety Promotion/Fall Prevention: safety round/check completed  Taken 4/25/2024 2200 by Melonie Patten RN  Safety Promotion/Fall Prevention: safety round/check completed  Taken 4/25/2024 2100 by Melonie Patten RN  Safety Promotion/Fall Prevention:   safety round/check completed   nonskid shoes/slippers when out of bed   lighting adjusted   fall prevention program maintained   clutter free environment maintained   activity supervised  Intervention: Prevent Skin Injury  Recent Flowsheet Documentation  Taken 4/26/2024 0600 by Melonie Patten RN  Body Position: position changed independently  Taken 4/26/2024 0400 by Melonie Patten RN  Body Position: position changed independently  Taken 4/26/2024 0200 by Melonie Patten RN  Body Position: position changed independently  Taken 4/26/2024 0000 by Melonie Patten RN  Body Position: position changed independently  Taken 4/25/2024 2200 by Melonie Patten RN  Body Position: position changed independently  Taken 4/25/2024 2100 by Melonie Patten RN  Body Position: position changed independently  Intervention: Prevent and Manage VTE (Venous Thromboembolism) Risk  Recent Flowsheet Documentation  Taken 4/26/2024 0600 by Melonie Patten RN  Activity Management:  up ad jones  Taken 4/26/2024 0400 by Melonie Patten RN  Activity Management: up ad jonse  Taken 4/26/2024 0200 by Melonie Patten RN  Activity Management: up ad jones  Taken 4/26/2024 0000 by Melonie Patten RN  Activity Management: up ad jones  Taken 4/25/2024 2200 by Melonie Patten RN  Activity Management: up ad jones  Taken 4/25/2024 2100 by Melonie Patten RN  Activity Management: up ad jones  VTE Prevention/Management:   bilateral   sequential compression devices off   patient refused intervention  Intervention: Prevent Infection  Recent Flowsheet Documentation  Taken 4/26/2024 0600 by Melonie Patten RN  Infection Prevention: rest/sleep promoted  Taken 4/26/2024 0400 by Melonie Patten RN  Infection Prevention: rest/sleep promoted  Taken 4/26/2024 0200 by Melonie Patten RN  Infection Prevention:   visitors restricted/screened   single patient room provided   rest/sleep promoted   personal protective equipment utilized   hand hygiene promoted  Taken 4/26/2024 0000 by Melonie Patten RN  Infection Prevention:   visitors restricted/screened   single patient room provided   rest/sleep promoted   personal protective equipment utilized   hand hygiene promoted  Taken 4/25/2024 2200 by Melonie Patten RN  Infection Prevention:   visitors restricted/screened   single patient room provided   rest/sleep promoted   personal protective equipment utilized   hand hygiene promoted  Taken 4/25/2024 2100 by Melonie Patten RN  Infection Prevention:   visitors restricted/screened   single patient room provided   rest/sleep promoted   personal protective equipment utilized   hand hygiene promoted  Goal: Optimal Comfort and Wellbeing  Outcome: Ongoing, Progressing  Intervention: Provide Person-Centered Care  Recent Flowsheet Documentation  Taken 4/25/2024 2100 by Melonie Patten RN  Trust Relationship/Rapport:   care explained   choices provided   emotional support provided   empathic listening provided   questions  answered  Goal: Readiness for Transition of Care  Outcome: Ongoing, Progressing     Problem: Hypertension Comorbidity  Goal: Blood Pressure in Desired Range  Outcome: Ongoing, Progressing  Intervention: Maintain Blood Pressure Management  Recent Flowsheet Documentation  Taken 4/25/2024 2100 by Melonie Patten, RN  Medication Review/Management: medications reviewed

## 2024-04-26 NOTE — PROGRESS NOTES
LOS: 1 day   Patient Care Team:  Harinder Lea DO as PCP - General  Harinder Lea DO as PCP - Family Medicine  Jennie Green MD as Consulting Physician (Nephrology)  Aquiles Velazquez MD as Consulting Physician (Cardiology)    Chief Complaint: Pleuritic left-sided chest pain, coronary artery disease.    Interval History: Chest pain has resolved.  No acute events.  No new shortness of breath.    Vital Signs:  Temp:  [97.7 °F (36.5 °C)-99.1 °F (37.3 °C)] 98.4 °F (36.9 °C)  Heart Rate:  [78-95] 78  Resp:  [18] 18  BP: (116-153)/(57-75) 116/58    Intake/Output Summary (Last 24 hours) at 4/26/2024 1116  Last data filed at 4/26/2024 0802  Gross per 24 hour   Intake 480 ml   Output 1100 ml   Net -620 ml       Physical Exam:   General Appearance:    No acute distress, alert and oriented x4   Lungs:     Clear to auscultation bilaterally     Heart:    Regular rhythm and normal rate.  No murmurs, gallops, or rubs.   Abdomen:     Soft, nontender, nondistended.    Extremities:   No clubbing, cyanosis, or edema.     Results Review:    Results from last 7 days   Lab Units 04/26/24  0321   SODIUM mmol/L 138   POTASSIUM mmol/L 4.1   CHLORIDE mmol/L 106   CO2 mmol/L 23.0   BUN mg/dL 25*   CREATININE mg/dL 1.87*   GLUCOSE mg/dL 115*   CALCIUM mg/dL 8.1*     Results from last 7 days   Lab Units 04/25/24  0708 04/25/24  0513   HSTROP T ng/L 20 21     Results from last 7 days   Lab Units 04/26/24  0321   WBC 10*3/mm3 8.30   HEMOGLOBIN g/dL 9.6*   HEMATOCRIT % 29.6*   PLATELETS 10*3/mm3 253     Results from last 7 days   Lab Units 04/25/24  0513   INR  1.04   APTT seconds 27.8                   I reviewed the patient's new clinical results.        Assessment:  1.  Pleuritic left-sided chest pain  2.  Acute kidney injury with chronic kidney disease  3.  Right perinephric hematoma secondary to recent ESWL procedure  4.  Coronary artery disease, status post ARLETTE to the RCA in 2015  5.  Brief paroxysmal atrial fibrillation  and flutter in the past, not on anticoagulation secondary to hematuria in 2023  6.  Anemia, likely multifactorial  7.  Hypertension    Plan:  -Chest pain is resolved.  This was pleuritic, and sounded noncardiac.  His echocardiogram had normal wall motion.  His EKG shows no ischemic changes.  His troponin was normal.    -In light of his other issues, including the perinephric hematoma and acute kidney injury, I am going to hold on stress testing.  Again, I feel this was unlikely to be cardiac chest pain.    -From a cardiac standpoint, he is stable for discharge.  Discussed with Dr. Gastelum.    Jacinto Qiu MD  04/26/24  11:16 EDT

## 2024-05-21 RX ORDER — ATORVASTATIN CALCIUM 40 MG/1
TABLET, FILM COATED ORAL
Qty: 90 TABLET | Refills: 0 | Status: SHIPPED | OUTPATIENT
Start: 2024-05-21

## 2024-08-15 RX ORDER — ATORVASTATIN CALCIUM 40 MG/1
TABLET, FILM COATED ORAL
Qty: 90 TABLET | Refills: 0 | Status: SHIPPED | OUTPATIENT
Start: 2024-08-15

## 2024-08-28 RX ORDER — DOXAZOSIN 4 MG/1
4 TABLET ORAL NIGHTLY
Qty: 90 TABLET | Refills: 0 | Status: SHIPPED | OUTPATIENT
Start: 2024-08-28

## 2024-09-18 ENCOUNTER — OFFICE VISIT (OUTPATIENT)
Dept: CARDIOLOGY | Facility: CLINIC | Age: 74
End: 2024-09-18
Payer: MEDICARE

## 2024-09-18 VITALS
DIASTOLIC BLOOD PRESSURE: 70 MMHG | HEIGHT: 70 IN | BODY MASS INDEX: 27.46 KG/M2 | OXYGEN SATURATION: 97 % | SYSTOLIC BLOOD PRESSURE: 120 MMHG | WEIGHT: 191.8 LBS | HEART RATE: 61 BPM

## 2024-09-18 DIAGNOSIS — I10 PRIMARY HYPERTENSION: ICD-10-CM

## 2024-09-18 DIAGNOSIS — N18.31 CHRONIC RENAL IMPAIRMENT, STAGE 3A: ICD-10-CM

## 2024-09-18 DIAGNOSIS — I48.92 PAROXYSMAL ATRIAL FLUTTER: ICD-10-CM

## 2024-09-18 DIAGNOSIS — I25.10 CORONARY ARTERY DISEASE INVOLVING NATIVE CORONARY ARTERY OF NATIVE HEART WITHOUT ANGINA PECTORIS: Primary | ICD-10-CM

## 2024-09-18 DIAGNOSIS — E78.2 MIXED HYPERLIPIDEMIA: ICD-10-CM

## 2024-09-18 PROBLEM — R07.9 CHEST PAIN: Status: RESOLVED | Noted: 2024-04-26 | Resolved: 2024-09-18

## 2024-09-18 PROBLEM — N17.9 AKI (ACUTE KIDNEY INJURY): Status: RESOLVED | Noted: 2019-11-23 | Resolved: 2024-09-18

## 2024-09-18 PROBLEM — N18.2 CKD (CHRONIC KIDNEY DISEASE) STAGE 2, GFR 60-89 ML/MIN: Status: RESOLVED | Noted: 2024-04-25 | Resolved: 2024-09-18

## 2024-09-18 RX ORDER — ASPIRIN 81 MG/1
81 TABLET, CHEWABLE ORAL DAILY
COMMUNITY

## 2024-09-18 RX ORDER — ERGOCALCIFEROL 1.25 MG/1
50000 CAPSULE, LIQUID FILLED ORAL WEEKLY
COMMUNITY

## 2024-09-30 RX ORDER — ISOSORBIDE MONONITRATE 60 MG/1
60 TABLET, EXTENDED RELEASE ORAL DAILY
Qty: 90 TABLET | Refills: 0 | Status: SHIPPED | OUTPATIENT
Start: 2024-09-30

## 2024-10-28 RX ORDER — VALSARTAN 160 MG/1
160 TABLET ORAL 2 TIMES DAILY
Qty: 180 TABLET | Refills: 3 | Status: SHIPPED | OUTPATIENT
Start: 2024-10-28

## 2024-10-28 NOTE — TELEPHONE ENCOUNTER
NOV 9/29/25 (ISABELLA)  LOV 9/18/24 (ISABELLA)    Plan:      1/2.  Coronary Artery Disease  Assessment   The patient has no angina     Subjective - Objective   There has been a previous stent procedure using ARLETTE  2015   Current antiplatelet therapy includes aspirin 81 mg        He is on atorvastatin.      He has chronic atypical chest pain and does have small vessel disease, but he's also had a lot of non-cardiac chest pain.  He's on aspirin and atorvastatin. We wanted to start ranolazine but it was cost-prohibitive. He's on metoprolol and ISMN; he does not tolerate amlodipine due to significant leg swelling.      3/4.  His BP is controlled on maximum dose valsartan, high-dose metoprolol, high-dose hydralazine, ISMN, and doxaxosin.  He does not tolerate spironolactone due to mastalgia.  He does not want to take any diuretics due to urinary frequency.  He did not tolerate calcium channel blockers due to leg swelling.  He follows with Dr Jennie Green.     5.  He had a short-lived episode of atrial flutter.  It was highly symptomatic.  He has normal left atrial volume on echocardiogram.  His CHADSVASc score was 3.  He wore a 30 day MCOT that showed a 3% burden of APCs but no sustained atrial arrhythmias.      I explained that atrial fibrillation commonly accompanies atrial flutter.  Ideally, he would remain on an anticoagulant but he developed significant hematuria and he simply does not desire to stay on the blood thinners any longer.  We discussed the risks and benefits.

## 2024-11-08 RX ORDER — ATORVASTATIN CALCIUM 40 MG/1
TABLET, FILM COATED ORAL
Qty: 90 TABLET | Refills: 0 | Status: SHIPPED | OUTPATIENT
Start: 2024-11-08

## 2024-11-18 RX ORDER — HYDRALAZINE HYDROCHLORIDE 100 MG/1
100 TABLET, FILM COATED ORAL 2 TIMES DAILY
Qty: 180 TABLET | Refills: 0 | Status: SHIPPED | OUTPATIENT
Start: 2024-11-18

## 2024-11-21 RX ORDER — DOXAZOSIN 4 MG/1
4 TABLET ORAL NIGHTLY
Qty: 90 TABLET | Refills: 0 | Status: SHIPPED | OUTPATIENT
Start: 2024-11-21

## 2024-12-05 ENCOUNTER — TELEPHONE (OUTPATIENT)
Dept: CARDIOLOGY | Facility: CLINIC | Age: 74
End: 2024-12-05
Payer: MEDICARE

## 2024-12-05 NOTE — TELEPHONE ENCOUNTER
Pt is scheduled for laparoscopic cholecystectomy 12/17/24  with Dr. Smith, at [facility].   They are requesting pre op risk assessment and for ASA to be held for 7 days prior to surgery.

## 2024-12-05 NOTE — TELEPHONE ENCOUNTER
CARDIOLOGY    Patient Name: Kyle Amador  :1950  Age: 74 y.o.    24    To whom it may concern:    Kyle Amador was referred to my office for cardiovascular risk assessment exam for upcoming cholecystectomy.    He has known CAD s/p PCI in . He has small vessel disease and chronic angina. He has normal LV systolic function. He has severe HTN and has CKD. He has atrial flutter and is not anticoagulated per his desires.    He has good functional status.     Aspirin SHOULD NOT be held for this procedure as there is risk of stent thrombosis.    From a cardiovascular standpoint, the patient is at the following risk of major cardiovascular event in the rosa-operative setting:  [] Low         [] Modifiable  [x] Low-Moderate       [x] Non-Modifiable   [] Moderate  [] Moderare - high  [] High    Cardiac Testing:  [] Needs further cardiac testing  [x] Does not need further cardiac testing    Anticoagulant Status:  [] No anticoagulants    [x] The patient is on  [x] ASA; hold for _0__ days.  [] Coumadin; hold for ___ days.  [] Plavix; hold for ___ days.  [] Eliquis; hold for ___ days.  [] Brilinta; hold for ___ days.  [] Xarelto; hold for ___ days.  [] Effient; hold for ___ days.      If there are any problems during surgery, please do not hesitate to contact my office.    Thank you for allowing me to participate in this patient's care.    Sincerely,         Aquiles Velazquez MD  [facility]   [facility]  [address] [place] [address]  Office: [phone]

## 2024-12-16 RX ORDER — ISOSORBIDE MONONITRATE 60 MG/1
60 TABLET, EXTENDED RELEASE ORAL DAILY
Qty: 90 TABLET | Refills: 0 | Status: SHIPPED | OUTPATIENT
Start: 2024-12-16

## 2025-01-16 RX ORDER — METOPROLOL SUCCINATE 100 MG/1
100 TABLET, EXTENDED RELEASE ORAL NIGHTLY
Qty: 90 TABLET | Refills: 2 | Status: SHIPPED | OUTPATIENT
Start: 2025-01-16

## 2025-02-03 ENCOUNTER — TELEPHONE (OUTPATIENT)
Dept: CARDIOLOGY | Facility: CLINIC | Age: 75
End: 2025-02-03
Payer: MEDICARE

## 2025-02-03 RX ORDER — ATORVASTATIN CALCIUM 40 MG/1
TABLET, FILM COATED ORAL
Qty: 90 TABLET | Refills: 0 | Status: SHIPPED | OUTPATIENT
Start: 2025-02-03

## 2025-02-11 RX ORDER — HYDRALAZINE HYDROCHLORIDE 100 MG/1
100 TABLET, FILM COATED ORAL 2 TIMES DAILY
Qty: 180 TABLET | Refills: 0 | Status: SHIPPED | OUTPATIENT
Start: 2025-02-11

## 2025-02-18 RX ORDER — DOXAZOSIN 4 MG/1
4 TABLET ORAL NIGHTLY
Qty: 90 TABLET | Refills: 1 | Status: SHIPPED | OUTPATIENT
Start: 2025-02-18

## 2025-02-21 ENCOUNTER — OFFICE (OUTPATIENT)
Dept: URBAN - METROPOLITAN AREA CLINIC 76 | Facility: CLINIC | Age: 75
End: 2025-02-21
Payer: COMMERCIAL

## 2025-02-21 VITALS
DIASTOLIC BLOOD PRESSURE: 78 MMHG | HEART RATE: 82 BPM | HEIGHT: 70 IN | WEIGHT: 190 LBS | OXYGEN SATURATION: 98 % | SYSTOLIC BLOOD PRESSURE: 122 MMHG

## 2025-02-21 DIAGNOSIS — K59.00 CONSTIPATION, UNSPECIFIED: ICD-10-CM

## 2025-02-21 DIAGNOSIS — K21.9 GASTRO-ESOPHAGEAL REFLUX DISEASE WITHOUT ESOPHAGITIS: ICD-10-CM

## 2025-02-21 DIAGNOSIS — K64.8 OTHER HEMORRHOIDS: ICD-10-CM

## 2025-02-21 PROCEDURE — 99214 OFFICE O/P EST MOD 30 MIN: CPT

## 2025-02-21 RX ORDER — POLYETHYLENE GLYCOL 3350 17 G/17G
17 POWDER, FOR SOLUTION ORAL
Qty: 1 | Refills: 11 | Status: ACTIVE
Start: 2025-02-21

## 2025-02-24 ENCOUNTER — LAB (OUTPATIENT)
Dept: LAB | Facility: HOSPITAL | Age: 75
End: 2025-02-24
Payer: MEDICARE

## 2025-02-24 DIAGNOSIS — E78.2 MIXED HYPERLIPIDEMIA: ICD-10-CM

## 2025-02-24 LAB
ALT SERPL W P-5'-P-CCNC: 15 U/L (ref 1–41)
AST SERPL-CCNC: 16 U/L (ref 1–40)
CHOLEST SERPL-MCNC: 106 MG/DL (ref 0–200)
HDLC SERPL-MCNC: 49 MG/DL (ref 40–60)
LDLC SERPL CALC-MCNC: 45 MG/DL (ref 0–100)
LDLC/HDLC SERPL: 0.97 {RATIO}
TRIGL SERPL-MCNC: 47 MG/DL (ref 0–150)
VLDLC SERPL-MCNC: 12 MG/DL (ref 5–40)

## 2025-02-24 PROCEDURE — 84460 ALANINE AMINO (ALT) (SGPT): CPT

## 2025-02-24 PROCEDURE — 36415 COLL VENOUS BLD VENIPUNCTURE: CPT

## 2025-02-24 PROCEDURE — 84450 TRANSFERASE (AST) (SGOT): CPT

## 2025-02-24 PROCEDURE — 80061 LIPID PANEL: CPT

## 2025-02-24 RX ORDER — ATORVASTATIN CALCIUM 40 MG/1
40 TABLET, FILM COATED ORAL DAILY
Qty: 90 TABLET | Refills: 3 | Status: SHIPPED | OUTPATIENT
Start: 2025-02-24

## 2025-02-24 NOTE — PROGRESS NOTES
Please call  Perry.  Lipid panel is excellent.  Liver enzymes normal.  Continue atorvastatin.  Thank you

## 2025-03-12 RX ORDER — ISOSORBIDE MONONITRATE 60 MG/1
60 TABLET, EXTENDED RELEASE ORAL DAILY
Qty: 90 TABLET | Refills: 2 | Status: SHIPPED | OUTPATIENT
Start: 2025-03-12

## 2025-05-08 RX ORDER — HYDRALAZINE HYDROCHLORIDE 100 MG/1
100 TABLET, FILM COATED ORAL 2 TIMES DAILY
Qty: 180 TABLET | Refills: 0 | Status: SHIPPED | OUTPATIENT
Start: 2025-05-08

## 2025-05-22 ENCOUNTER — OFFICE (OUTPATIENT)
Dept: URBAN - METROPOLITAN AREA CLINIC 76 | Facility: CLINIC | Age: 75
End: 2025-05-22
Payer: MEDICARE

## 2025-05-22 VITALS
HEIGHT: 70 IN | DIASTOLIC BLOOD PRESSURE: 74 MMHG | WEIGHT: 197 LBS | HEART RATE: 71 BPM | OXYGEN SATURATION: 94 % | SYSTOLIC BLOOD PRESSURE: 118 MMHG

## 2025-05-22 DIAGNOSIS — K59.00 CONSTIPATION, UNSPECIFIED: ICD-10-CM

## 2025-05-22 DIAGNOSIS — R10.30 LOWER ABDOMINAL PAIN, UNSPECIFIED: ICD-10-CM

## 2025-05-22 PROCEDURE — 99214 OFFICE O/P EST MOD 30 MIN: CPT

## 2025-05-22 RX ORDER — LINACLOTIDE 72 UG/1
72 CAPSULE, GELATIN COATED ORAL
Qty: 90 | Refills: 3 | Status: ACTIVE
Start: 2025-05-22

## 2025-07-29 ENCOUNTER — AMBULATORY SURGICAL CENTER (OUTPATIENT)
Dept: URBAN - METROPOLITAN AREA SURGERY 20 | Facility: SURGERY | Age: 75
End: 2025-07-29
Payer: MEDICARE

## 2025-07-29 ENCOUNTER — OFFICE (OUTPATIENT)
Dept: URBAN - METROPOLITAN AREA PATHOLOGY 4 | Facility: PATHOLOGY | Age: 75
End: 2025-07-29
Payer: MEDICARE

## 2025-07-29 VITALS — HEIGHT: 70 IN

## 2025-07-29 DIAGNOSIS — K63.89 OTHER SPECIFIED DISEASES OF INTESTINE: ICD-10-CM

## 2025-07-29 DIAGNOSIS — K64.2 THIRD DEGREE HEMORRHOIDS: ICD-10-CM

## 2025-07-29 DIAGNOSIS — K57.30 DIVERTICULOSIS OF LARGE INTESTINE WITHOUT PERFORATION OR ABS: ICD-10-CM

## 2025-07-29 DIAGNOSIS — K59.00 CONSTIPATION, UNSPECIFIED: ICD-10-CM

## 2025-07-29 PROBLEM — K57.32 DIVERTICULITIS OF LARGE INTESTINE WITHOUT PERFORATION OR ABS: Status: ACTIVE | Noted: 2025-07-29

## 2025-07-29 PROCEDURE — 88305 TISSUE EXAM BY PATHOLOGIST: CPT | Mod: Q6 | Performed by: PATHOLOGY

## 2025-07-29 PROCEDURE — 45380 COLONOSCOPY AND BIOPSY: CPT | Performed by: INTERNAL MEDICINE

## 2025-08-04 RX ORDER — HYDRALAZINE HYDROCHLORIDE 100 MG/1
100 TABLET, FILM COATED ORAL 2 TIMES DAILY
Qty: 180 TABLET | Refills: 1 | Status: SHIPPED | OUTPATIENT
Start: 2025-08-04

## 2025-08-12 RX ORDER — DOXAZOSIN 4 MG/1
4 TABLET ORAL NIGHTLY
Qty: 90 TABLET | Refills: 0 | Status: SHIPPED | OUTPATIENT
Start: 2025-08-12

## 2025-08-27 ENCOUNTER — OFFICE (OUTPATIENT)
Dept: URBAN - METROPOLITAN AREA CLINIC 76 | Facility: CLINIC | Age: 75
End: 2025-08-27
Payer: MEDICARE

## 2025-08-27 VITALS
OXYGEN SATURATION: 98 % | HEART RATE: 69 BPM | SYSTOLIC BLOOD PRESSURE: 136 MMHG | WEIGHT: 194 LBS | DIASTOLIC BLOOD PRESSURE: 70 MMHG | HEIGHT: 70 IN

## 2025-08-27 DIAGNOSIS — K59.00 CONSTIPATION, UNSPECIFIED: ICD-10-CM

## 2025-08-27 DIAGNOSIS — K64.2 THIRD DEGREE HEMORRHOIDS: ICD-10-CM

## 2025-08-27 PROCEDURE — 99213 OFFICE O/P EST LOW 20 MIN: CPT

## (undated) DEVICE — GLV SURG BIOGEL LTX PF 7 1/2

## (undated) DEVICE — TIDISHIELD UROLOGY DRAIN BAGS FROSTY VINYL STERILE FITS SIEMENS UROSKOP ACCESS 20 PER CASE: Brand: TIDISHIELD

## (undated) DEVICE — TUBING, SUCTION, 1/4" X 10', STRAIGHT: Brand: MEDLINE

## (undated) DEVICE — GOWN,NON-REINFORCED,SIRUS,SET IN SLV,XL: Brand: MEDLINE

## (undated) DEVICE — GW EMR FIX EXCHG J STD .035 3MM 260CM

## (undated) DEVICE — NITINOL WIRE WITH HYDROPHILIC TIP: Brand: SENSOR

## (undated) DEVICE — KT MANIFLD CARDIAC

## (undated) DEVICE — CATH VENT MIV RADL PIG ST TIP 5F 110CM

## (undated) DEVICE — CATH URETRL FLXITP POLLACK STD 5F 70CM

## (undated) DEVICE — CATH FOL COUNCL 2WY 18F 5CC

## (undated) DEVICE — CANN NASL CO2 TRULINK W/O2 A/

## (undated) DEVICE — NITINOL STONE RETRIEVAL BASKET: Brand: ZERO TIP

## (undated) DEVICE — FRCP BX RADJAW4 NDL 2.8 240CM LG OG BX40

## (undated) DEVICE — SENSR O2 OXIMAX FNGR A/ 18IN NONSTR

## (undated) DEVICE — GLV SURG SIGNATURE ESSENTIAL PF LTX SZ8

## (undated) DEVICE — FIBR LASR HOLMIUM ACCUMAX 365 1PT USE

## (undated) DEVICE — PK CATH CARD 40

## (undated) DEVICE — GLIDESHEATH SLENDER STAINLESS STEEL KIT: Brand: GLIDESHEATH SLENDER

## (undated) DEVICE — PRT BIOP SEALS

## (undated) DEVICE — PK URETSCP 40

## (undated) DEVICE — LOU CYSTO: Brand: MEDLINE INDUSTRIES, INC.

## (undated) DEVICE — Device: Brand: DEFENDO AIR/WATER/SUCTION AND BIOPSY VALVE

## (undated) DEVICE — CATH DIAG IMPULSE FR4 5F 100CM

## (undated) DEVICE — CVR JUMPSUIT STA DRI IMPERV 2MIL REG

## (undated) DEVICE — BITEBLOCK OMNI BLOC

## (undated) DEVICE — CATH DIAG IMPULSE FL3.5 5F 100CM

## (undated) DEVICE — SYR LUERLOK 20CC BX/50

## (undated) DEVICE — SYRINGE,TOOMEY,IRRIGATION,70CC,STERILE: Brand: MEDLINE